# Patient Record
Sex: FEMALE | Race: BLACK OR AFRICAN AMERICAN | NOT HISPANIC OR LATINO | Employment: UNEMPLOYED | URBAN - METROPOLITAN AREA
[De-identification: names, ages, dates, MRNs, and addresses within clinical notes are randomized per-mention and may not be internally consistent; named-entity substitution may affect disease eponyms.]

---

## 2018-08-26 ENCOUNTER — HOSPITAL ENCOUNTER (EMERGENCY)
Facility: CLINIC | Age: 33
Discharge: HOME | End: 2018-08-26
Attending: PSYCHIATRY & NEUROLOGY | Admitting: PSYCHIATRY & NEUROLOGY
Payer: COMMERCIAL

## 2018-08-26 VITALS
HEART RATE: 70 BPM | DIASTOLIC BLOOD PRESSURE: 75 MMHG | OXYGEN SATURATION: 99 % | RESPIRATION RATE: 16 BRPM | TEMPERATURE: 97 F | SYSTOLIC BLOOD PRESSURE: 125 MMHG | WEIGHT: 107 LBS

## 2018-08-26 DIAGNOSIS — F41.1 GAD (GENERALIZED ANXIETY DISORDER): ICD-10-CM

## 2018-08-26 PROCEDURE — 99285 EMERGENCY DEPT VISIT HI MDM: CPT | Performed by: PSYCHIATRY & NEUROLOGY

## 2018-08-26 PROCEDURE — 25000132 ZZH RX MED GY IP 250 OP 250 PS 637: Performed by: PSYCHIATRY & NEUROLOGY

## 2018-08-26 PROCEDURE — 99283 EMERGENCY DEPT VISIT LOW MDM: CPT | Mod: Z6 | Performed by: PSYCHIATRY & NEUROLOGY

## 2018-08-26 RX ORDER — HYDROXYZINE HYDROCHLORIDE 25 MG/1
25 TABLET, FILM COATED ORAL EVERY 8 HOURS PRN
Qty: 50 TABLET | Refills: 0 | Status: ON HOLD | OUTPATIENT
Start: 2018-08-26 | End: 2019-04-25

## 2018-08-26 RX ORDER — HYDROXYZINE HYDROCHLORIDE 25 MG/1
50 TABLET, FILM COATED ORAL ONCE
Status: DISCONTINUED | OUTPATIENT
Start: 2018-08-26 | End: 2018-08-26

## 2018-08-26 RX ORDER — MIRTAZAPINE 15 MG/1
15 TABLET, FILM COATED ORAL AT BEDTIME
Qty: 30 TABLET | Refills: 0 | Status: ON HOLD | OUTPATIENT
Start: 2018-08-26 | End: 2019-04-25

## 2018-08-26 RX ORDER — HYDROXYZINE HYDROCHLORIDE 50 MG/1
50 TABLET, FILM COATED ORAL ONCE
Status: COMPLETED | OUTPATIENT
Start: 2018-08-26 | End: 2018-08-26

## 2018-08-26 RX ADMIN — HYDROXYZINE HYDROCHLORIDE 50 MG: 50 TABLET, FILM COATED ORAL at 23:18

## 2018-08-26 ASSESSMENT — ENCOUNTER SYMPTOMS
HEMATOLOGIC/LYMPHATIC NEGATIVE: 1
ACTIVITY CHANGE: 1
DYSPHORIC MOOD: 0
SLEEP DISTURBANCE: 1
NEUROLOGICAL NEGATIVE: 1
EYES NEGATIVE: 1
ENDOCRINE NEGATIVE: 1
RESPIRATORY NEGATIVE: 1
NERVOUS/ANXIOUS: 1
APPETITE CHANGE: 1
DECREASED CONCENTRATION: 1
GASTROINTESTINAL NEGATIVE: 1
MUSCULOSKELETAL NEGATIVE: 1
CARDIOVASCULAR NEGATIVE: 1
HALLUCINATIONS: 0

## 2018-08-26 NOTE — ED AVS SNAPSHOT
[facility], Emergency Department    [address]    [facility] [address]    Phone:  [phone]    Fax:  [phone]                                       Kimmy Mendez   MRN: [id number]    Department:  [facility], Emergency Department   Date of Visit:  8/26/2018           Patient Information     Date Of Birth          1985        Your diagnoses for this visit were:     ROWAN (generalized anxiety disorder)        You were seen by Magno Collier MD.      Follow-up Information     Follow up with Clinic, [facility].    Contact information:    [address] [facility] [id number] [phone]          Discharge Instructions       Stop Zoloft as it causes side effects. Stop trazodone as it is not helping you sleep  Start Remeron to help with anxiety, improving appetite and help with sleeping  Add hydroxyzine (Vistaril) to manage anxiety until Remeron takes effect. It will also help with reducing anxiety, help with sleep and reduce nausea    Follow-up at [facility] for continued med management and refills. They can help you with therapy.  Consider reconnecting with your previous therapist to work on healthy coping and resilience    24 Hour Appointment Hotline       To make an appointment at any [facility] clinic, call [phone] ([phone]). If you don't have a family doctor or clinic, we will help you find one. [facility] clinics are conveniently located to serve the needs of you and your family.             Review of your medicines      START taking        Dose / Directions Last dose taken    hydrOXYzine 25 MG tablet   Commonly known as:  ATARAX   Dose:  25 mg   Quantity:  50 tablet        Take 1 tablet (25 mg) by mouth every 8 hours as needed for anxiety   Refills:  0        mirtazapine 15 MG tablet   Commonly known as:  REMERON   Dose:  15 mg   Quantity:  30 tablet        Take 1 tablet (15 mg) by mouth At Bedtime   Refills:  0          Our records show that you are  "taking the medicines listed below. If these are incorrect, please call your family doctor or clinic.        Dose / Directions Last dose taken    prenatal multivitamin plus iron 27-0.8 MG Tabs per tablet   Dose:  1 tablet   Indication:  Pregnancy        Take 1 tablet by mouth daily   Refills:  0          STOP taking        Dose Reason for stopping Comments    TRAZODONE HCL PO              ZOLOFT PO                      Prescriptions were sent or printed at these locations (2 Prescriptions)                   Other Prescriptions                Printed at Department/Unit printer (2 of 2)         hydrOXYzine (ATARAX) 25 MG tablet               mirtazapine (REMERON) 15 MG tablet                Orders Needing Specimen Collection     None      Pending Results     No orders found from 8/24/2018 to 8/27/2018.            Pending Culture Results     No orders found from 8/24/2018 to 8/27/2018.            Pending Results Instructions     If you had any lab results that were not finalized at the time of your Discharge, you can call the ED Lab Result RN at [phone]. You will be contacted by this team for any positive Lab results or changes in treatment. The nurses are available 7 days a week from 10A to 6:30P.  You can leave a message 24 hours per day and they will return your call.        Thank you for choosing [facility]       Thank you for choosing [facility] for your care. Our goal is always to provide you with excellent care. Hearing back from our patients is one way we can continue to improve our services. Please take a few minutes to complete the written survey that you may receive in the mail after you visit with us. Thank you!        [facility] Information     [employer] lets you send messages to your doctor, view your test results, renew your prescriptions, schedule appointments and more. To sign up, go to [email or web address] . Click on \"Log in\" on the left side of the screen, which will take you to the Welcome page. Then " "click on \"Sign up Now\" on the right side of the page.     You will be asked to enter the access code listed below, as well as some personal information. Please follow the directions to create your username and password.     Your access code is: [email or web address]  Expires: 2018 11:22 PM     Your access code will  in 90 days. If you need help or a new code, please call your [facility] clinic or [phone].        Care EveryWhere ID     This is your Care EveryWhere ID. This could be used by other organizations to access your [facility] medical records  [id number]        Equal Access to Services     [facility] Center[facility] : Francisca Martínez, tawanda parikh, [address], maki santillan. So [facility] [phone].    ATENCIÓN: Si habla español, tiene a miner disposición servicios gratuitos de asistencia lingüística. Llame al [phone].    We comply with applicable federal civil rights laws and [place] laws. We do not discriminate on the basis of race, color, national origin, age, disability, sex, sexual orientation, or gender identity.            After Visit Summary       This is your record. Keep this with you and show to your community pharmacist(s) and doctor(s) at your next visit.                  "

## 2018-08-27 NOTE — ED PROVIDER NOTES
History     Chief Complaint   Patient presents with     Anxiety     reports having high levels of anxiety, feeling worried all the time, stressing over simple things that she doesn't need to stress over, crying a lot, emotionally labile, racing thoughts, not sleeping well, pacing     Patient is a 32 year old female presenting with nervous/anxious. The history is provided by the patient and medical records.   Ted Mendez is a 32 year old female who is here accompanied by older sister. Patient lives with her mother and 3 yo son. Sister is presently staying with them to help patient. She currently has a provider in [place] who started Zoloft and Trazodone for her mood concerns. Patient tried the Zoloft but started to feel nauseous and dizzy. She decided to stop taking it. She was also given trazodone to help with sleep but reports 100 mg is not helping her. She admits to excessive worry. She is stressed financially. She is not working because she does not feel she can function. Her  is in [place]. Patient reports worrying about the smallest things. She paces and does not feel she can sleep. Her appetite is poor. She is not suicidal. Patient has history of postpartum depression and was prescribed citalopram. She had seen a therapist at the time. Patient cannot see her provider presently as [place] is [place]. Sister has scheduled an appointment with [facility] in early September.    PERSONAL MEDICAL HISTORY  Past Medical History:   Diagnosis Date     Depressive disorder      PAST SURGICAL HISTORY  History reviewed. No pertinent surgical history.  FAMILY HISTORY  No family history on file.  SOCIAL HISTORY  Social History   Substance Use Topics     Smoking status: Never Smoker     Smokeless tobacco: Never Used     Alcohol use No     MEDICATIONS  No current facility-administered medications for this encounter.      Current Outpatient Prescriptions   Medication     hydrOXYzine (ATARAX) 25 MG tablet      mirtazapine (REMERON) 15 MG tablet     Prenatal Vit-Fe Fumarate-FA (PRENATAL MULTIVITAMIN  PLUS IRON) 27-0.8 MG TABS     ALLERGIES  No Known Allergies    I have reviewed the Medications, Allergies, Past Medical and Surgical History, and Social History in the Epic system.    Review of Systems   Constitutional: Positive for activity change and appetite change.   HENT: Negative.    Eyes: Negative.    Respiratory: Negative.    Cardiovascular: Negative.    Gastrointestinal: Negative.    Endocrine: Negative.    Genitourinary: Negative.    Musculoskeletal: Negative.    Skin: Negative.    Neurological: Negative.    Hematological: Negative.    Psychiatric/Behavioral: Positive for decreased concentration and sleep disturbance. Negative for dysphoric mood, hallucinations and suicidal ideas. The patient is nervous/anxious.    All other systems reviewed and are negative.      Physical Exam   BP: 110/71  Pulse: 73  Temp: 98.3  F (36.8  C)  Resp: 16  Weight: 48.5 kg (107 lb)  SpO2: 99 %      Physical Exam   Constitutional: She appears well-developed.   HENT:   Head: Normocephalic.   Eyes: Pupils are equal, round, and reactive to light.   Neck: Normal range of motion.   Pulmonary/Chest: Effort normal.   Musculoskeletal: Normal range of motion.   Neurological: She is alert.   Skin: Skin is warm.   Psychiatric: Her speech is normal. Judgment and thought content normal. Her mood appears anxious. She is not agitated, not aggressive, not hyperactive, not actively hallucinating and not combative. Thought content is not paranoid and not delusional. Cognition and memory are normal. She expresses no homicidal and no suicidal ideation. She is inattentive.   Nursing note and vitals reviewed.      ED Course     ED Course     Procedures    Labs Ordered and Resulted from Time of ED Arrival Up to the Time of Departure from the ED - No data to display         Assessments & Plan (with Medical Decision Making)   Patient with RWOAN who feels it is  impairing her ability to function. There is no imminent danger requiring urgent intervention. Patient is started on Remeron to manage her mood, poor appetite and sleep. She can take hydroxyzine for additional benefit and as a bridge until Remeron takes effect. Patient can stop taking Zoloft and trazodone as they appear to cause more side effects or is not working. She is encouraged to seek therapy either with her previous therapist or services provided through [facility]. She is encouraged to follow-up at [facility] on 8/6/18 for continued management.    I have reviewed the nursing notes.    I have reviewed the findings, diagnosis, plan and need for follow up with the patient.    New Prescriptions    HYDROXYZINE (ATARAX) 25 MG TABLET    Take 1 tablet (25 mg) by mouth every 8 hours as needed for anxiety    MIRTAZAPINE (REMERON) 15 MG TABLET    Take 1 tablet (15 mg) by mouth At Bedtime       Final diagnoses:   ROWAN (generalized anxiety disorder)       8/26/2018   [facility], [place], EMERGENCY DEPARTMENT     Magno Collier MD  08/26/18 7991

## 2018-08-27 NOTE — DISCHARGE INSTRUCTIONS
Stop Zoloft as it causes side effects. Stop trazodone as it is not helping you sleep  Start Remeron to help with anxiety, improving appetite and help with sleeping  Add hydroxyzine (Vistaril) to manage anxiety until Remeron takes effect. It will also help with reducing anxiety, help with sleep and reduce nausea    Follow-up at [facility] for continued med management and refills. They can help you with therapy.  Consider reconnecting with your previous therapist to work on healthy coping and resilience

## 2019-03-13 ENCOUNTER — TELEPHONE (OUTPATIENT)
Dept: PSYCHIATRY | Facility: CLINIC | Age: 34
End: 2019-03-13

## 2019-03-13 NOTE — TELEPHONE ENCOUNTER
Social Work   Incoming/Outgoing Call  [facility]  Outgoing Call To: Andrea Oneal    Reason for Call:  Social Work Supports     Response/Plan:   tried calling Kimmy twice on Monday (3/11) and twice today.  The phone number does not work.        Will route to patient's current psychiatric provider(s) as an FYI.   Please call or EPIC message with any questions or concerns.    Sarah Quevedo,  Learner

## 2019-04-23 ENCOUNTER — TRANSFERRED RECORDS (OUTPATIENT)
Dept: HEALTH INFORMATION MANAGEMENT | Facility: CLINIC | Age: 34
End: 2019-04-23

## 2019-04-24 ENCOUNTER — HOSPITAL ENCOUNTER (INPATIENT)
Facility: HOSPITAL | Age: 34
LOS: 6 days | Discharge: HOME | End: 2019-04-30
Attending: PSYCHIATRY & NEUROLOGY | Admitting: PSYCHIATRY & NEUROLOGY
Payer: COMMERCIAL

## 2019-04-24 ENCOUNTER — TRANSFERRED RECORDS (OUTPATIENT)
Dept: HEALTH INFORMATION MANAGEMENT | Facility: CLINIC | Age: 34
End: 2019-04-24

## 2019-04-24 DIAGNOSIS — R45.851 DEPRESSION WITH SUICIDAL IDEATION: ICD-10-CM

## 2019-04-24 DIAGNOSIS — E03.9 HYPOTHYROIDISM, UNSPECIFIED TYPE: Primary | ICD-10-CM

## 2019-04-24 DIAGNOSIS — F32.A DEPRESSION WITH SUICIDAL IDEATION: ICD-10-CM

## 2019-04-24 PROCEDURE — 12400000 ZZH R&B MH

## 2019-04-24 PROCEDURE — 25000132 ZZH RX MED GY IP 250 OP 250 PS 637: Performed by: NURSE PRACTITIONER

## 2019-04-24 RX ORDER — ALUMINA, MAGNESIA, AND SIMETHICONE 2400; 2400; 240 MG/30ML; MG/30ML; MG/30ML
30 SUSPENSION ORAL EVERY 4 HOURS PRN
Status: DISCONTINUED | OUTPATIENT
Start: 2019-04-24 | End: 2019-04-30 | Stop reason: HOSPADM

## 2019-04-24 RX ORDER — OLANZAPINE 10 MG/1
10 TABLET ORAL 3 TIMES DAILY PRN
Status: DISCONTINUED | OUTPATIENT
Start: 2019-04-24 | End: 2019-04-26

## 2019-04-24 RX ORDER — HYDROXYZINE HYDROCHLORIDE 25 MG/1
25-50 TABLET, FILM COATED ORAL EVERY 4 HOURS PRN
Status: DISCONTINUED | OUTPATIENT
Start: 2019-04-24 | End: 2019-04-30 | Stop reason: HOSPADM

## 2019-04-24 RX ORDER — OLANZAPINE 10 MG/2ML
10 INJECTION, POWDER, FOR SOLUTION INTRAMUSCULAR 3 TIMES DAILY PRN
Status: DISCONTINUED | OUTPATIENT
Start: 2019-04-24 | End: 2019-04-26

## 2019-04-24 RX ORDER — TRAZODONE HYDROCHLORIDE 50 MG/1
50 TABLET, FILM COATED ORAL
Status: DISCONTINUED | OUTPATIENT
Start: 2019-04-24 | End: 2019-04-26

## 2019-04-24 RX ORDER — ACETAMINOPHEN 325 MG/1
650 TABLET ORAL EVERY 4 HOURS PRN
Status: DISCONTINUED | OUTPATIENT
Start: 2019-04-24 | End: 2019-04-30 | Stop reason: HOSPADM

## 2019-04-24 RX ADMIN — TRAZODONE HYDROCHLORIDE 50 MG: 50 TABLET ORAL at 23:22

## 2019-04-24 ASSESSMENT — ACTIVITIES OF DAILY LIVING (ADL)
BATHING: 0-->INDEPENDENT
RETIRED_COMMUNICATION: 2-->DIFFICULTY UNDERSTANDING (NOT RELATED TO LANGUAGE BARRIER)
DRESS: 0-->INDEPENDENT
TOILETING: 0-->INDEPENDENT
COGNITION: 1 - ATTENTION OR MEMORY DEFICITS
AMBULATION: 0-->INDEPENDENT
FALL_HISTORY_WITHIN_LAST_SIX_MONTHS: NO
RETIRED_EATING: 0-->INDEPENDENT
SWALLOWING: 2-->DIFFICULTY SWALLOWING FOODS
TRANSFERRING: 0-->INDEPENDENT
WHICH_OF_THE_ABOVE_FUNCTIONAL_RISKS_HAD_A_RECENT_ONSET_OR_CHANGE?: COGNITION

## 2019-04-24 ASSESSMENT — MIFFLIN-ST. JEOR: SCORE: 1169.91

## 2019-04-25 LAB
ALBUMIN UR-MCNC: NEGATIVE MG/DL
APPEARANCE UR: CLEAR
BILIRUB UR QL STRIP: NEGATIVE
COLOR UR AUTO: ABNORMAL
GLUCOSE UR STRIP-MCNC: NEGATIVE MG/DL
HGB UR QL STRIP: NEGATIVE
KETONES UR STRIP-MCNC: 10 MG/DL
LEUKOCYTE ESTERASE UR QL STRIP: NEGATIVE
NITRATE UR QL: NEGATIVE
PH UR STRIP: 6.5 PH (ref 4.7–8)
SOURCE: ABNORMAL
SP GR UR STRIP: 1 (ref 1–1.03)
UROBILINOGEN UR STRIP-MCNC: NORMAL MG/DL (ref 0–2)

## 2019-04-25 PROCEDURE — 25000132 ZZH RX MED GY IP 250 OP 250 PS 637: Performed by: NURSE PRACTITIONER

## 2019-04-25 PROCEDURE — 99223 1ST HOSP IP/OBS HIGH 75: CPT | Performed by: NURSE PRACTITIONER

## 2019-04-25 PROCEDURE — 81003 URINALYSIS AUTO W/O SCOPE: CPT | Performed by: NURSE PRACTITIONER

## 2019-04-25 PROCEDURE — 12400000 ZZH R&B MH

## 2019-04-25 RX ORDER — LAMOTRIGINE 25 MG/1
25 TABLET ORAL 2 TIMES DAILY
Status: DISCONTINUED | OUTPATIENT
Start: 2019-04-25 | End: 2019-04-25

## 2019-04-25 RX ORDER — HYDROXYZINE HYDROCHLORIDE 25 MG/1
25 TABLET, FILM COATED ORAL EVERY 8 HOURS PRN
Status: ON HOLD | COMMUNITY
End: 2019-04-25

## 2019-04-25 RX ORDER — LAMOTRIGINE 25 MG/1
25 TABLET ORAL DAILY
Status: DISCONTINUED | OUTPATIENT
Start: 2019-04-25 | End: 2019-04-28

## 2019-04-25 RX ORDER — LORAZEPAM 0.5 MG/1
0.5 TABLET ORAL 3 TIMES DAILY PRN
Status: ON HOLD | COMMUNITY
End: 2019-04-25

## 2019-04-25 RX ORDER — SERTRALINE HYDROCHLORIDE 25 MG/1
25 TABLET, FILM COATED ORAL DAILY
Status: ON HOLD | COMMUNITY
End: 2019-04-25

## 2019-04-25 RX ORDER — LEVOTHYROXINE SODIUM 25 UG/1
25 TABLET ORAL EVERY MORNING
Status: ON HOLD | COMMUNITY
End: 2019-04-30

## 2019-04-25 RX ORDER — MIRTAZAPINE 15 MG/1
15 TABLET, FILM COATED ORAL AT BEDTIME
Status: DISCONTINUED | OUTPATIENT
Start: 2019-04-25 | End: 2019-04-28

## 2019-04-25 RX ORDER — CHLORAL HYDRATE 500 MG
1000 CAPSULE ORAL DAILY
Status: DISCONTINUED | OUTPATIENT
Start: 2019-04-25 | End: 2019-04-28

## 2019-04-25 RX ORDER — LEVOTHYROXINE SODIUM 25 UG/1
25 TABLET ORAL EVERY MORNING
Status: DISCONTINUED | OUTPATIENT
Start: 2019-04-25 | End: 2019-04-30 | Stop reason: HOSPADM

## 2019-04-25 RX ORDER — MIRTAZAPINE 15 MG/1
30 TABLET, FILM COATED ORAL AT BEDTIME
Status: ON HOLD | COMMUNITY
End: 2019-04-30

## 2019-04-25 RX ORDER — LORAZEPAM 0.5 MG/1
0.5 TABLET ORAL 3 TIMES DAILY PRN
Status: DISCONTINUED | OUTPATIENT
Start: 2019-04-25 | End: 2019-04-26

## 2019-04-25 RX ORDER — LAMOTRIGINE 25 MG/1
25 TABLET ORAL 2 TIMES DAILY
Status: ON HOLD | COMMUNITY
End: 2019-04-30

## 2019-04-25 RX ORDER — TRAZODONE HYDROCHLORIDE 100 MG/1
100 TABLET ORAL AT BEDTIME
Status: ON HOLD | COMMUNITY
End: 2019-04-25

## 2019-04-25 RX ADMIN — LAMOTRIGINE 25 MG: 25 TABLET ORAL at 20:05

## 2019-04-25 RX ADMIN — Medication 1000 MG: at 15:11

## 2019-04-25 RX ADMIN — OLANZAPINE 10 MG: 10 TABLET, FILM COATED ORAL at 19:24

## 2019-04-25 RX ADMIN — LEVOTHYROXINE SODIUM 25 MCG: 25 TABLET ORAL at 13:45

## 2019-04-25 RX ADMIN — MIRTAZAPINE 15 MG: 15 TABLET, FILM COATED ORAL at 20:05

## 2019-04-25 ASSESSMENT — ACTIVITIES OF DAILY LIVING (ADL)
LAUNDRY: UNABLE TO COMPLETE
ORAL_HYGIENE: INDEPENDENT
DRESS: INDEPENDENT;SCRUBS (BEHAVIORAL HEALTH)
HYGIENE/GROOMING: INDEPENDENT
HYGIENE/GROOMING: INDEPENDENT
ORAL_HYGIENE: INDEPENDENT
DRESS: INDEPENDENT
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
## 2019-04-25 NOTE — PLAN OF CARE
"  Problem: Adult Behavioral Health Plan of Care  Goal: Patient-Specific Goal (Individualization)  Description  Patient will participate in 50% of groups  Patient will consume 50% of meals  Patient will sleep 6- 8  hours a night  Patient will be compliant with treatment team recommendations   Note:   Patient has only been asleep for 1.5 hours, but resting in bed the rest of shift , and may have napped between rounds. Declined prn when offered. Both tylenol for back discomfort, and a medication to help sleep. \" I would rather not take any extra pills if I don't have to because of my kidneys\" . Writer attempted to gather more information, but difficult. Accepted an ice pack prn for back discomfort. Earlier in shift had leg cramp in calf which resolved.       Problem: Suicide Risk  Goal: Absence of Self-Harm  Description  Patient will be free of self harm during hospitalization.  Patient will report decrease in suicidal ideation prior to discharge   Note:   Patient has reported no difficulty coping or suicidal ideation to this writer this evening. Has bell to ring for staff and not wanting to come out of room as no hijab. Will have treatment team review Jewish considerations in am.     "

## 2019-04-25 NOTE — PLAN OF CARE
Patient asking roommate for items instead of using bell to call staff. Patient has been reminded to use bell. Is not eggressing room due to lack of Taoist garment. Declined referral for .
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
## 2019-04-25 NOTE — PROGRESS NOTES
04/24/19 2141   Patient Belongings   Belongings Search No belongings   Clothing Search Yes   Second Staff Gisele   Comment black head scarf, pink underwear   List items sent to safe: none  All other belongings put in assigned cubby in belongings room.     I have reviewed my belongings list on admission and verify that it is correct.     Patient signature_______________________________    Second staff witness (if patient unable to sign) ______________________________       I have received all my belongings at discharge.    Patient signature________________________________    Johnathan EDMONDSON   4/24/2019  9:47 PM

## 2019-04-25 NOTE — PLAN OF CARE
Face to face end of shift report received from Mami Noyola completed. Patient observed.     Michelle Hernandez  4/24/2019  11:50 PM

## 2019-04-25 NOTE — PLAN OF CARE
Face to face end of shift report received from Lazarus JONES RN. Rounding completed. Patient observed in room.     Jacki Zapata  4/25/2019  4:40 PM

## 2019-04-25 NOTE — PLAN OF CARE
Received a call from [facility] 8th floor [phone]. Charge RN had said that they cannot locate the hold paperwork and feel it may have been cancelled when she transferred to us. Charge nurse will continue to attempt to locate and fax to us. Patient had verbalized she had a sore right leg, which while assessing appears to have been a muscle cramp back of calf which has resolved.
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
## 2019-04-25 NOTE — PLAN OF CARE
Face to face end of shift report received from Michelle HAYES RN. Rounding completed. Patient observed.     Lazarus Hart  4/25/2019  7:53 AM

## 2019-04-25 NOTE — PLAN OF CARE
"  Problem: Adult Behavioral Health Plan of Care  Goal: Patient-Specific Goal (Individualization)  Description  Patient will participate in 50% of groups  Patient will consume 50% of meals  Patient will sleep 6- 8  hours a night  Patient will be compliant with treatment team recommendations   4/25/2019 1035 by Lazarus Hart RN  Outcome: No Change   Pt has been in her room this AM resting in bed. Pt reported that she is just \"to tired\" to eat food and would just like to drink things. Pt was given a oatmeal this AM and did eat this. Pt appears to be confused at times and was unable to report to writer what pharmacy she uses to get her prescriptions. She talked with writer for some time about praying for her son and her mother. She said that people should take care of their mothers and not send them\"away.\" Pt has not had her garment that is worn on her head at this time due to pt making passive SI statements of wanting to be in heaven with her son. This will be discussed in treatment team this AM.     Pt was given her head garment. Pt took this in her heads, brought it to her face and then gave it back to staff stating that is all she wanted it to do and that she did not need it any longer. Writer sat with pt again and talked for some time. Pt's main focus is Religious based and forgiveness. She said that she always is wanting to die to be with her son but that she would never act on this because she does not want to go to hell. When attempting to ask further questions such as anxiety she said that she is tired of being asked stupid questions and that no one understands what she is going through.     Informed pt of orders for urine collection. Pt verbalized understanding and stated that she will notify staff when she needed to void.      Problem: Suicide Risk  Goal: Absence of Self-Harm  Description  Patient will be free of self harm during hospitalization.  Patient will report decrease in suicidal ideation prior to " discharge   4/25/2019 1035 by Lazarus Hart, RN  Outcome: No Change   Pt has not had any acts of self harm at this time.

## 2019-04-25 NOTE — H&P
"Psychiatric Eval/H&P  Patient Name: Kimmy Mendez   YOB: 1985  Age: 33 year old  [id number]    Primary Physician: [facility], [place]   Completed By: EMMANUEL Goldstein CNP     CC:  Suicidal Ideation    HPI  (per admit/ED) Hx of ROWAN, depression, and post partum depression. Pt presented to ED on 4/23 reporting intense grief; pt's son was reportedly murdered by pt's mother 8 days ago by choking. Pt is reporting \"strong suicidal thoughts\" with no specific plan, prior to her son's murder, pt states her son and her evy always prevented her from acting on her SI; pt unable to safety plan. Pt reports she has had a depressed mood for the past year and has tried several psych meds with minimal success; most recent change was lowered dose of Lorazepam and started on new med but pt doesn't know what it is called. No hx of SIB. No hx of IP mental health admits.       Patient is not very receptive to interview initially, she states \"I wish people would stop asking me stupid questions\" - so the rest of the interview, she provided what information she wanted to talk about, mainly regarding her Moravian and forgiveness.  Additional information in the report was gathered from chart review.    She tells me she has suicidal thoughts \"every second and minute\" - however she follows up with she would not act on this because she would go to [facility] and she wants to go to heaven with her son.  She informs me that her Moravian protects her from suicide.  Her Moravian is Druze, Allah is God.  Patient informs me she was in [place] recently and she left her son with her mother.  She reports her mother has been very ill all her life, \"mentally\".  She states \"I wish I could describe how I feel\".  Patient states \"my son is not lost, he is in heaven and in my heart\".  She states several times \"I am doing this for my mom\", \"I love my mother and forgive her\".  When asked how I can help, she responds \"pray for me\" and " "\"give me the medications you think is best\".  She is thankful for my sitting with her and only asks that I pray for her.    Past Psychiatric History:   Outpatient  visits starting in  with post partum depression after the birth of her son, prescribed Celexa.  Was prescribed Zoloft (which made her dizzy and nauseous) and trazodone (not helpful), most recently prescribed mirtazapine and hydroxyzine.  Patient has no history of suicide attempts or hospitalizations due to mental health.  She had some psychotherapy in 2015, none currently.  She was seeing Dr. Jacques Horan from [facility] in [place].    Social History:   Continues to remain unclear regarding the events and where patient was living at the time of death of her son.  Understanding review of records indicate she was in possibly living in [place], left her son with her mother and he  there.  Patient has a sister and brother in [place], a cousin and Aunt in [place].  Patient's  is in [place].  Patient is originally from [facility], is unemployed currently.  Hindu Zoroastrianism.    Chemical Use History:   No history of any substance use.       Family Psychiatric History:   Mother with serious mental illness and multiple hospitalizations.       Medical History and ROS  Current Facility-Administered Medications   Medication     acetaminophen (TYLENOL) tablet 650 mg     alum & mag hydroxide-simethicone (MYLANTA ES/MAALOX  ES) suspension 30 mL     Fish Oil CAPS 500 mg     hydrOXYzine (ATARAX) tablet 25-50 mg     lamoTRIgine (LaMICtal) tablet 25 mg     levothyroxine (SYNTHROID/LEVOTHROID) tablet 25 mcg     LORazepam (ATIVAN) tablet 0.5 mg     magnesium hydroxide (MILK OF MAGNESIA) suspension 30 mL     mirtazapine (REMERON) tablet 15 mg     nicotine (NICORETTE) gum 2-4 mg     OLANZapine (zyPREXA) tablet 10 mg    Or     OLANZapine (zyPREXA) injection 10 mg     traZODone (DESYREL) tablet 50 mg       No Known Allergies  Past Medical " "History:   Diagnosis Date     Depressive disorder      No past surgical history on file.    Physical Exam  Constitutional: oriented to person, place, and time, appears well-developed and well-nourished.   HENT: WNL  Neck: Normal range of motion   Cardiovascular: Normal rate, regular rhythm, normal heart sounds   Pulmonary/Chest: Effort normal and breath sounds normal   Abdominal: WNL  Skin: Dry, intact, no open areas, rashes, moles of concern    Review of Systems:  Constitution: No weight loss, fever, night sweats  Skin: No rashes, pruritus or open wounds  Neuro: No headaches or seizure activity.  Psych:  See HPI  Eyes: No vision changes.  ENT: No problems chewing or swallowing.   Musculoskeletal: No muscle pain, joint pain or swelling   Respiratory: No cough or dyspnea  Cardiovascular:  No chest pain,  palpitations or fainting  Gastrointestinal:  No abdominal pain, nausea, vomiting or change in bowel habits    MSE/PSYCH  PSYCHIATRIC EXAM  /89   Pulse 86   Temp 97.9  F (36.6  C) (Tympanic)   Resp 16   Ht 1.626 m (5' 4\")   Wt 48 kg (105 lb 12.8 oz)   SpO2 98%   BMI 18.16 kg/m       -Appearance/Behavior: Distressed  {attitude:anxious and guarded  -Motor: fidgety.  -Gait: Normal.    -Abnormal involuntary movements: None noted  -Mood: depressed, anxious and labile.  -Affect: Dysphoric and Labile.  -Speech: Pressured                  -Thought process/associations: Loose associations.  -Thought content: unclear delusion present, tangential.  -Perceptual disturbances: No hallucinations..              -Suicidal/Homicidal Ideation: Endorses suicidal thoughts, denies plan or intent  -Judgment: Limited.  -Insight: Limited.  *Orientation: time, place and person.  *Memory: Intact, however possibly in denial or shock   *Attention: Poor  *Language: fluent, no aphasias, able to repeat phrases and name objects. Vocab intact.  *Fund of information: appropriate for education  *Cognitive functioning estimate: 0 - " "independent.     Labs:   Low Potassium 4/23 - resolved with replacement now WNL     Assessment/Impression:   Patient presents as labile and tangential, appears to possibly be in shock and/or denial regarding the death of her son.  She endorses daily, \"every second and minute\" suicidal thoughts, however her Buddhist is the main protective factor.  She also talks a lot about forgiveness, in forgiving her mother and when asking more questions regarding this, patient avoids answering of any details surrounding her son and her mother.  Patient has history of depression and anxiety prior to this traumatic event, has been prescribed several different medications.  She is agreeable to continue mirtazapine at hs for mood/sleep, Ativan or Hydroxyzine for anxiety, and Lamictal to further stabilize mood.  Patient is now on a 72 hour hold and will likely file a petition for commitment to ensure a safe discharge plan, possibly higher level of care.      Educated regarding medication indications, risks, benefits, side effects, contraindications and possible interactions. Verbally expressed understanding.     DX:  Major Depressive Disorder, recurrent, severe without psychosis  Bereavement, severe  ROWAN      Plan:  Admit to Unit: [address] [place] [address]  72 hour mental health hold    Monitor for target symptoms:   Provide a safe environment and therapeutic milieu.     Continue mirtazapine and lamictal  Ativan for anxiety    Anticipated length of stay:  >5 days for stabilization and safety       Sammie Shields APRN, CNP  "

## 2019-04-25 NOTE — PROGRESS NOTES
04/24/19 2123   Patient Belongings   Patient Belongings none   Belongings Search No belongings   Clothing Search Yes   Second Staff Gisele   Comment black head scarf, pink underwear   .behb  Nothing to safe

## 2019-04-26 PROCEDURE — 99233 SBSQ HOSP IP/OBS HIGH 50: CPT | Performed by: NURSE PRACTITIONER

## 2019-04-26 PROCEDURE — 12400000 ZZH R&B MH

## 2019-04-26 PROCEDURE — 25000132 ZZH RX MED GY IP 250 OP 250 PS 637: Performed by: NURSE PRACTITIONER

## 2019-04-26 RX ORDER — OLANZAPINE 5 MG/1
5 TABLET ORAL AT BEDTIME
Status: DISCONTINUED | OUTPATIENT
Start: 2019-04-26 | End: 2019-04-30 | Stop reason: HOSPADM

## 2019-04-26 RX ORDER — OLANZAPINE 10 MG/2ML
5 INJECTION, POWDER, FOR SOLUTION INTRAMUSCULAR 2 TIMES DAILY PRN
Status: DISCONTINUED | OUTPATIENT
Start: 2019-04-26 | End: 2019-04-30 | Stop reason: HOSPADM

## 2019-04-26 RX ORDER — OLANZAPINE 5 MG/1
5 TABLET ORAL 2 TIMES DAILY PRN
Status: DISCONTINUED | OUTPATIENT
Start: 2019-04-26 | End: 2019-04-30 | Stop reason: HOSPADM

## 2019-04-26 RX ORDER — OLANZAPINE 10 MG/1
10 TABLET ORAL AT BEDTIME
Status: DISCONTINUED | OUTPATIENT
Start: 2019-04-26 | End: 2019-04-26

## 2019-04-26 RX ADMIN — Medication 1000 MG: at 08:26

## 2019-04-26 RX ADMIN — LAMOTRIGINE 25 MG: 25 TABLET ORAL at 21:33

## 2019-04-26 RX ADMIN — OLANZAPINE 5 MG: 5 TABLET, FILM COATED ORAL at 21:32

## 2019-04-26 RX ADMIN — MIRTAZAPINE 15 MG: 15 TABLET, FILM COATED ORAL at 21:32

## 2019-04-26 RX ADMIN — LEVOTHYROXINE SODIUM 25 MCG: 25 TABLET ORAL at 06:36

## 2019-04-26 ASSESSMENT — ACTIVITIES OF DAILY LIVING (ADL)
HYGIENE/GROOMING: INDEPENDENT
DRESS: INDEPENDENT
ORAL_HYGIENE: INDEPENDENT
LAUNDRY: UNABLE TO COMPLETE
ORAL_HYGIENE: INDEPENDENT
HYGIENE/GROOMING: INDEPENDENT
DRESS: INDEPENDENT

## 2019-04-26 NOTE — PLAN OF CARE
Face to face end of shift report received from Michelle HAYES RN. Rounding completed. Patient observed.     Hanna Smith  4/26/2019  10:45 AM

## 2019-04-26 NOTE — PLAN OF CARE
Face to face end of shift report received from Hanna Dunham. Rounding completed. Patient observed in room.    Salome Boyd  4/26/2019  5:19 PM

## 2019-04-26 NOTE — PLAN OF CARE
"  Problem: Adult Behavioral Health Plan of Care  Goal: Patient-Specific Goal (Individualization)  Description  Patient will participate in 50% of groups  Patient will consume 50% of meals  Patient will sleep 6- 8  hours a night  Patient will be compliant with treatment team recommendations   Patient may have head garment upon request when higinio for safety. If patient requests head garment and is unable to contract for safety, a 1:1 staff must be in place.    4/26/2019 1230 by Hanna Smith RN  Outcome: Improving   Patient presents lethargic upon initial assessment. Patient states she has not been able to eat. She states she would like to but has not had the appetite. Patient denies pain. Patient states her depression is very high. She denies HI/SI, hallucinations and anxiety. Patient seems to be in brighter spirits and does smile at times during conversation. Patient states that she became upset with a previous nurse when they asked her if she had lost her son. Patient states \" I will never lose my son, he is in my heart and he is in heaven.\" Patient states that she has always done everything to protect her son and that's why she had her sister and mom watch him so that she could deal with her depression. Patient states \" I love my mom, I have taken care of her since I was 12.\" Patient states she had 6 siblings. However, 3 of them passed away. Patient states her family has been through so much back home in [place] during the civil war that now her family is struggling. Patient states her mom has dealt with mental illness for a very long time. When the patient was asked if her mom was the one who hurt her son she became upset and stated, no my mother would never do that. Ala took him to heaven. He is the only one who can take you off this earth. Patient continues to state she loves her mom. Patient also fixates on taking care of parents in their end stages of life. Patient often recites verses from the Quran " "stating \"Take care of your Mama, for flora is under her feet.\" Patient states she was appalled by how people in [place] treat their parents when they put them in nursing homes. Patient seems insightful but also in extreme denial about the situation with her son. Patient did eat breakfast and was smiling and laughing with this nurse. Patient took prescribed medications.      Problem: Suicide Risk  Goal: Absence of Self-Harm  Description  Patient will be free of self harm during hospitalization.  Patient will report decrease in suicidal ideation prior to discharge   4/26/2019 1230 by Hanna Smith, RN  Outcome: Improving   Patient has remained free of self harm and states when she has thoughts of suicide she prays to Ala. She states she would never kill herself because she wants to go to heaven with her son.       "

## 2019-04-26 NOTE — PROGRESS NOTES
"[facility]  Psychiatric Progress Note      Impression:   (per admit/ED) Hx of ROWAN, depression, and post partum depression. Pt presented to ED on  reporting intense grief; pt's son was reportedly murdered by pt's mother 8 days ago by choking. Pt is reporting \"strong suicidal thoughts\" with no specific plan, prior to her son's murder, pt states her son and her evy always prevented her from acting on her SI; pt unable to safety plan. Pt reports she has had a depressed mood for the past year and has tried several psych meds with minimal success; most recent change was lowered dose of Lorazepam and started on new med but pt doesn't know what it is called. No hx of SIB. No hx of IP mental health admits.      Patient is sitting up in her room, brighter affect this morning.  She tells me she got medicine yesterday and slept really good last night.  We discuss this as Zyprexa and explore if this would be beneficial scheduled at bedtime as well as used as needed during the day.  Educate patient regarding class, indications, risks, benefits, side effects, and patient acknowledges understanding, and states she feels she needs \"something\".  She informs me her provider prescribed Lamictal for her mood, as she describes \"goes up then down\", adds this was before the death of her son.  We also discuss anxiety as she has Lorazepam prn.  She tells me taking this or any other \"pams\" make her \"more crazy\" and she does not want to take them, also does not want to take anything addictive.  Patient then shares with me how her cousin and nephew apparently knew about her son's death and kept in from her for the day, afraid to tell her and what she would do.  She describes \"knowing in my heart something happened\", then eventually they sat her down and told her he .  Patient cries, this being the first grief reaction, emotion I have seen from her, and she relays how difficult this is.  She also shares stories of her life " "in [place], how they were poor but yet cooked food for \"everyone\" because as in her Protestant, \"we are all waiting here\" and \"everything we do is for Allah\".  Patient shares her irritation with staff here who \"ask stupid questions\" of her, she states \"I will not kill myself here or anywhere\" and she states \"I have not lost my son, he is in heaven and in my heart\".  Patient also understands the staff have a job to do, assessments, she states \"I know\".  Also, her mother has been mentally ill for as long as she can remember, her and her sister took care of her and she states her mother is in the hospital now getting help.      Patient reports she would like to stay until she feels stable, then plans to return to the [facility] and stay with her Cousin.  She expresses not wanting to go back to her home as this would be too difficult with all of her son's things still there.  She would like to donate his toys to the needy, although not sure she is ready to this, encourage to enlist help from cousin and nephew.             DIagnoses:   Major Depressive Disorder, recurrent, severe without psychosis  Bereavement, severe  ROWAN             Plan:     Schedule Zyprexa 5mg at hs  Utilize Zyprexa 5mg prn for agitation or anxiety  Continue Lamictal and Remeron for sleep    ELOS:  3-5 days for further stabilization and safe discharge planning      Attestation:  Patient has been seen and evaluated by me,  EMMANUEL Goldstein CNP          Interim History:   The patient's care was discussed with the treatment team and chart notes were reviewed.          Medications:     Current Facility-Administered Medications   Medication     acetaminophen (TYLENOL) tablet 650 mg     alum & mag hydroxide-simethicone (MYLANTA ES/MAALOX  ES) suspension 30 mL     fish oil-omega-3 fatty acids capsule 1,000 mg     hydrOXYzine (ATARAX) tablet 25-50 mg     lamoTRIgine (LaMICtal) tablet 25 mg     levothyroxine (SYNTHROID/LEVOTHROID) tablet 25 mcg     " "magnesium hydroxide (MILK OF MAGNESIA) suspension 30 mL     mirtazapine (REMERON) tablet 15 mg     nicotine (NICORETTE) gum 2-4 mg     OLANZapine (zyPREXA) tablet 5 mg    Or     OLANZapine (zyPREXA) injection 5 mg     OLANZapine (zyPREXA) tablet 5 mg            10 point ROS negative        Allergies:     Allergies   Allergen Reactions     Pork Derived Products Other (See Comments)     Pt does not ingest of these products.             Psychiatric Examination:   /77   Pulse 78   Temp 97.4  F (36.3  C) (Tympanic)   Resp 14   Ht 1.626 m (5' 4\")   Wt 48 kg (105 lb 12.8 oz)   SpO2 98%   BMI 18.16 kg/m    Weight is 105 lbs 12.8 oz  Body mass index is 18.16 kg/m .    Appearance:  awake, alert  Attitude:  cooperative  Eye Contact:  good  Mood:  anxious and better  Affect:  mood congruent  Speech:  clear, coherent and pressured speech  Psychomotor Behavior:  no evidence of tardive dyskinesia, dystonia, or tics  Thought Process:  tangental and however improving  Associations:  no loose associations  Thought Content:  no evidence of suicidal ideation or homicidal ideation and no evidence of psychotic thought  Insight:  fair  Judgment:  fair  Oriented to:  time, person, and place  Attention Span and Concentration:  fair  Recent and Remote Memory:  fair  Fund of Knowledge: appropriate  Muscle Strength and Tone: normal  Gait and Station: Normal           Labs:     Results for orders placed or performed during the hospital encounter of 04/24/19   UA reflex to Microscopic   Result Value Ref Range    Color Urine Straw     Appearance Urine Clear     Glucose Urine Negative NEG^Negative mg/dL    Bilirubin Urine Negative NEG^Negative    Ketones Urine 10 (A) NEG^Negative mg/dL    Specific Gravity Urine 1.003 1.003 - 1.035    Blood Urine Negative NEG^Negative    pH Urine 6.5 4.7 - 8.0 pH    Protein Albumin Urine Negative NEG^Negative mg/dL    Urobilinogen mg/dL Normal 0.0 - 2.0 mg/dL    Nitrite Urine Negative NEG^Negative    " Leukocyte Esterase Urine Negative NEG^Negative    Source Midstream Urine

## 2019-04-26 NOTE — PLAN OF CARE
"Patient accepted am medication, did prayer to Allah. Asked writer to pray for her and her family. Patient apologetic about breath. Writer informed that she can brush teeth if she would like. Writer told patient that she will have Ensure supplement. Patient stated that she has had that in past when pregnant, and knows she should eat but can't even feel she can chew. Declined offer to be out on unit and utilize Hindu headwear.\"I am just not ready\"  Pleasant, talkative, no delusional comments.   "

## 2019-04-26 NOTE — PLAN OF CARE
"  Problem: Adult Behavioral Health Plan of Care  Goal: Patient-Specific Goal (Individualization)  Description  Patient will participate in 50% of groups  Patient will consume 50% of meals  Patient will sleep 6- 8  hours a night  Patient will be compliant with treatment team recommendations   Patient may have head garment upon request when higinio for safety. If patient requests head garment and is unable to contract for safety, a 1:1 staff must be in place.      Pt has been isolative to her room.  UA was collected at the beginning of the shift.  Pt is hard to assess.  She gets frustrated with nursing assessment and doesn't always answer questions.   Pt refused to eat dinner, stating that the staff is trying to kill her and send her to hell.  Writer did attempt to ask pt what she liked to eat, pt would not answer.  Pt became upset with writer stating that she can see that writer is pregnant and it's not fair that writer is going to spend a lifetime with child while her child is dead.  Later she told writer that \"you are faking your pregnancy and you are going to hell.  You need to go home tonight and pray.\"  Pt has had poor boundaries with peers.  She has been calling her old roommate to her door and asking her to do things for her.  Pt also told that peer that she was going to hel.  Pt was not very receptive to redirection.  Writer offered PRN Zyprexa 10mg which she took at 1924.  Pt told writer \" I can see the devil in your eyes and I know you are tying to send me to hell.\"  When writer attempted to give pt her bed time medications she stated she was starving and that she was being fed here.  Writer attempted to offer pt food options which she declined.  Pt later did accept chicken broth and apple juice.  Pt was then cooperative with her HS medications.   Outcome: No Change     Problem: Suicide Risk  Goal: Absence of Self-Harm  Description  Patient will be free of self harm during hospitalization.  Patient " will report decrease in suicidal ideation prior to discharge     Pt states she want to die and go to heaven with her son but will not kill herself because she is scared of going to hell.     Outcome: No Change

## 2019-04-26 NOTE — PLAN OF CARE
BEHAVIORAL TEAM DISCUSSION    Participants:  Sammie Shields NP,  Berta Truong LSW,  Leidy Menezes LSW, Cassia Bolton RN, Shayla Morris RN,  Dianna Kraft RN, Juani Bernal OT, Sammie Bustamante OT.   Progress: Minimal  Continued Stay Criteria/Rationale: Remains delusional. Depression. Recent trauma. Making negative comments to pregnant staff.   Medical/Physical: I and O due to minimal intake.   Precautions:   Behavioral Orders   Procedures    Code 1 - Restrict to Unit    Routine Programming     As clinically indicated    Status 15     Every 15 minutes.     Plan: Zyprexa given for agitation. Possible schedule ativan. Filing for commitment. Ordered ensure due to limited intake.   Rationale for change in precautions or plan: none    Current Facility-Administered Medications:     acetaminophen (TYLENOL) tablet 650 mg, 650 mg, Oral, Q4H PRN, Tianna Waters, NP    alum & mag hydroxide-simethicone (MYLANTA ES/MAALOX  ES) suspension 30 mL, 30 mL, Oral, Q4H PRN, Tianna Waters, NP    fish oil-omega-3 fatty acids capsule 1,000 mg, 1,000 mg, Oral, Daily, Sammie Shields APRN CNP, 1,000 mg at 04/26/19 0826    hydrOXYzine (ATARAX) tablet 25-50 mg, 25-50 mg, Oral, Q4H PRN, Tianna Waters, NP    lamoTRIgine (LaMICtal) tablet 25 mg, 25 mg, Oral, Daily, Sammie Shields APRN CNP, 25 mg at 04/25/19 2005    levothyroxine (SYNTHROID/LEVOTHROID) tablet 25 mcg, 25 mcg, Oral, QAM, Sammie Shields APRN CNP, 25 mcg at 04/26/19 0636    LORazepam (ATIVAN) tablet 0.5 mg, 0.5 mg, Oral, TID PRN, Sammie Shields APRN CNP    magnesium hydroxide (MILK OF MAGNESIA) suspension 30 mL, 30 mL, Oral, At Bedtime PRN, Tianna Waters, NP    mirtazapine (REMERON) tablet 15 mg, 15 mg, Oral, At Bedtime, Sammie Shields APRN CNP, 15 mg at 04/25/19 2005    nicotine (NICORETTE) gum 2-4 mg, 2-4 mg, Buccal, Q1H PRN, Tianna Waters, NP    OLANZapine (zyPREXA) tablet 10 mg, 10 mg, Oral, TID PRN, 10 mg at  04/25/19 1924 **OR** OLANZapine (zyPREXA) injection 10 mg, 10 mg, Intramuscular, TID PRN, Tianna Waters NP    traZODone (DESYREL) tablet 50 mg, 50 mg, Oral, At Bedtime PRN, Tianna Waters NP, 50 mg at 04/24/19 7822   Active Problems:    Depression with suicidal ideation

## 2019-04-26 NOTE — PLAN OF CARE
Received call from Verena Torrez (listed as pt niece on DORIAN) who requested a callback from the provider at ph#[phone].. Sticky note sent

## 2019-04-26 NOTE — PLAN OF CARE
Problem: Adult Behavioral Health Plan of Care  Goal: Patient-Specific Goal (Individualization)  Description  Patient will participate in 50% of groups  Patient will consume 50% of meals  Patient will sleep 6- 8  hours a night  Patient will be compliant with treatment team recommendations   Patient may have head garment upon request when higinio for safety. If patient requests head garment and is unable to contract for safety, a 1:1 staff must be in place.    4/26/2019 0549 by Michelle Hernandez, RN  Note:   Patient has been sleeping for 5 hours . Patient has had no requests this shift. Patient has made no suicidal statements to staff.      Problem: Suicide Risk  Goal: Absence of Self-Harm  Description  Patient will be free of self harm during hospitalization.  Patient will report decrease in suicidal ideation prior to discharge   4/26/2019 0549 by Michelle Hernandez, RN  Note:   Patient has been free of self harm this shift, no suicidal comments. Patient has been resting comfortably this evening.

## 2019-04-26 NOTE — PLAN OF CARE
Face to face end of shift report received from Jacki Noyola completed. Patient observed.     Michelle Hernandez  4/25/2019  11:47 PM

## 2019-04-27 PROCEDURE — 25000132 ZZH RX MED GY IP 250 OP 250 PS 637: Performed by: NURSE PRACTITIONER

## 2019-04-27 PROCEDURE — 12400000 ZZH R&B MH

## 2019-04-27 PROCEDURE — 99233 SBSQ HOSP IP/OBS HIGH 50: CPT | Performed by: NURSE PRACTITIONER

## 2019-04-27 RX ADMIN — LEVOTHYROXINE SODIUM 25 MCG: 25 TABLET ORAL at 06:49

## 2019-04-27 RX ADMIN — MAGNESIUM HYDROXIDE 30 ML: 400 SUSPENSION ORAL at 20:57

## 2019-04-27 RX ADMIN — Medication 1000 MG: at 09:14

## 2019-04-27 RX ADMIN — MIRTAZAPINE 15 MG: 15 TABLET, FILM COATED ORAL at 20:57

## 2019-04-27 RX ADMIN — OLANZAPINE 5 MG: 5 TABLET, FILM COATED ORAL at 20:57

## 2019-04-27 RX ADMIN — LAMOTRIGINE 25 MG: 25 TABLET ORAL at 20:57

## 2019-04-27 ASSESSMENT — ACTIVITIES OF DAILY LIVING (ADL)
ORAL_HYGIENE: INDEPENDENT
HYGIENE/GROOMING: INDEPENDENT
HYGIENE/GROOMING: INDEPENDENT
DRESS: INDEPENDENT;SCRUBS (BEHAVIORAL HEALTH)
ORAL_HYGIENE: INDEPENDENT
DRESS: INDEPENDENT

## 2019-04-27 NOTE — PLAN OF CARE
Problem: Adult Behavioral Health Plan of Care  Goal: Patient-Specific Goal (Individualization)  Description  Patient will participate in 50% of groups  Patient will consume 50% of meals  Patient will sleep 6- 8  hours a night  Patient will be compliant with treatment team recommendations   Patient may have head garment upon request when higinio for safety. If patient requests head garment and is unable to contract for safety, a 1:1 staff must be in place.    Patient requests no male caregivers and for males not to do the room checks as she needs to cover her head each time.   Patient requests no apple juice as it reminds her of her son.  Patient requests night light remain on throughout the night due to fear of the dark.     Pt in room laying on bed with eyes closed with regular respirations and position changes.  Pt did wake up at 0500 for morning prayers.     4/27/2019 0625 by Salome Hong, RN  Outcome: Improving

## 2019-04-27 NOTE — PLAN OF CARE
Face to face end of shift report received from DOMINICK Hong RN. Rounding completed. Patient observed, up in her room.     Morro Fregoso  4/27/2019  9:30 AM

## 2019-04-27 NOTE — PROGRESS NOTES
"[facility]  Psychiatric Progress Note      Impression:   (per admit/ED) Hx of ROWAN, depression, and post partum depression. Pt presented to ED on 4/23 reporting intense grief; pt's son was reportedly murdered by pt's mother 8 days ago by choking. Pt is reporting \"strong suicidal thoughts\" with no specific plan, prior to her son's murder, pt states her son and her evy always prevented her from acting on her SI; pt unable to safety plan. Pt reports she has had a depressed mood for the past year and has tried several psych meds with minimal success; most recent change was lowered dose of Lorazepam and started on new med but pt doesn't know what it is called. No hx of SIB. No hx of IP mental health admits.      Patient is lying down in bed today, she presents as more irritable initially, states she does not know why staff have to bother her so much.  Again we discuss staff doing their job, however I assure I will pass on to be more mindful in any questioning.  She reports sleep was good, she took Zyprexa and would like to continue.  Remind her there are also prn doses available during the day if she feels more overwhelmed or agitated.  Patient states again she feels she needs to stay longer to feel better.    Patient reports she would like to stay until she feels stable, then plans to return to the [facility] and stay with her Cousin.  She expresses not wanting to go back to her home as this would be too difficult with all of her son's things still there.  She would like to donate his toys to the needy, although not sure she is ready to this, encourage to enlist help from cousin and nephew.             DIagnoses:   Major Depressive Disorder, recurrent, severe without psychosis  Bereavement, severe  ROWAN             Plan:     Schedule Zyprexa 5mg at hs  Utilize Zyprexa 5mg prn for agitation or anxiety  Continue Lamictal     ELOS:  3-5 days for further stabilization and safe discharge " "planning      Attestation:  Patient has been seen and evaluated by me,  EMMANUEL Goldstein CNP          Interim History:   The patient's care was discussed with the treatment team and chart notes were reviewed.          Medications:     Current Facility-Administered Medications   Medication     acetaminophen (TYLENOL) tablet 650 mg     alum & mag hydroxide-simethicone (MYLANTA ES/MAALOX  ES) suspension 30 mL     fish oil-omega-3 fatty acids capsule 1,000 mg     hydrOXYzine (ATARAX) tablet 25-50 mg     lamoTRIgine (LaMICtal) tablet 25 mg     levothyroxine (SYNTHROID/LEVOTHROID) tablet 25 mcg     magnesium hydroxide (MILK OF MAGNESIA) suspension 30 mL     mirtazapine (REMERON) tablet 15 mg     nicotine (NICORETTE) gum 2-4 mg     OLANZapine (zyPREXA) tablet 5 mg    Or     OLANZapine (zyPREXA) injection 5 mg     OLANZapine (zyPREXA) tablet 5 mg            10 point ROS negative        Allergies:     Allergies   Allergen Reactions     Pork Derived Products Other (See Comments)     Pt does not ingest of these products.             Psychiatric Examination:   /76   Pulse 68   Temp 98.1  F (36.7  C) (Tympanic)   Resp 16   Ht 1.626 m (5' 4\")   Wt 48 kg (105 lb 12.8 oz)   SpO2 99%   BMI 18.16 kg/m    Weight is 105 lbs 12.8 oz  Body mass index is 18.16 kg/m .    Appearance:  awake, alert  Attitude:  cooperative  Eye Contact:  good  Mood:  anxious and better  Affect:  mood congruent  Speech:  clear, coherent and pressured speech  Psychomotor Behavior:  no evidence of tardive dyskinesia, dystonia, or tics  Thought Process:  tangental and however improving  Associations:  no loose associations  Thought Content:  no evidence of suicidal ideation or homicidal ideation and no evidence of psychotic thought  Insight:  fair  Judgment:  fair  Oriented to:  time, person, and place  Attention Span and Concentration:  fair  Recent and Remote Memory:  fair  Fund of Knowledge: appropriate  Muscle Strength and Tone: " normal  Gait and Station: Normal           Labs:     Results for orders placed or performed during the hospital encounter of 04/24/19   UA reflex to Microscopic   Result Value Ref Range    Color Urine Straw     Appearance Urine Clear     Glucose Urine Negative NEG^Negative mg/dL    Bilirubin Urine Negative NEG^Negative    Ketones Urine 10 (A) NEG^Negative mg/dL    Specific Gravity Urine 1.003 1.003 - 1.035    Blood Urine Negative NEG^Negative    pH Urine 6.5 4.7 - 8.0 pH    Protein Albumin Urine Negative NEG^Negative mg/dL    Urobilinogen mg/dL Normal 0.0 - 2.0 mg/dL    Nitrite Urine Negative NEG^Negative    Leukocyte Esterase Urine Negative NEG^Negative    Source Midstream Urine

## 2019-04-27 NOTE — PLAN OF CARE
"  Problem: Adult Behavioral Health Plan of Care  Goal: Patient-Specific Goal (Individualization)  Description  Patient will participate in 50% of groups  Patient will consume 50% of meals  Patient will sleep 6- 8  hours a night  Patient will be compliant with treatment team recommendations   Patient may have head garment upon request when higinio for safety. If patient requests head garment and is unable to contract for safety, a 1:1 staff must be in place.    4/26/2019 1915 by Salome Boyd, RN  Outcome: No Change   Patient did not want to attend groups. Did eat some of her dinner. States she slept well. Patient was very labile and tearful at times. States we are \"ruining her life.\" States she does not want to leave her room when staff tried to encourage patient to join others. States people bring her apple juice when she told them not to because it reminds her of her son, and staff keeps asking \"stupid questions\". Staff attempted to explain that those are assessment questions and help staff to know how she is doing. Did come out of room to visit visitors with much prompting.  2030 sister stated after visit that she feels patient needs an  to help her understand things better. Staff then asked patient if she wanted an  and she said \"yes' to help her understand better. Also contacted Sammie regarding zyprexa in which she stated that patient did have a good understanding of the medication.  Supervisor assisted in getting  Ipad up. During medication pass patient did ask and talk with  and stated she was satisfied. She will let us know when she wants to use it again. Is aware it is on the unit. Also apologized for anger at staff earlier and admitted to being frustrated.    Problem: Suicide Risk  Goal: Absence of Self-Harm  Description  Patient will be free of self harm during hospitalization.  Patient will report decrease in suicidal ideation prior to discharge " "  4/26/2019 1915 by Salome Boyd, RN  Outcome: No Change   Patient contracts for safety. Did state \"my head tells me all the time to kill myself but I wont because I  want to be able to be with my son and not go to hell.\" When asked if it was hallucinations, patient denied that it was hallucinations.  Problem: Suicidal Behavior  Goal: Suicidal Behavior is Absent or Managed  4/26/2019 1915 by Salome Boyd, RN  Outcome: No Change     "

## 2019-04-27 NOTE — PLAN OF CARE
Face to face end of shift report received from Aniket Dunham. Rounding completed. Patient observed in room.    Salome Boyd  4/27/2019  4:58 PM

## 2019-04-27 NOTE — PLAN OF CARE
"  Problem: Adult Behavioral Health Plan of Care  Goal: Patient-Specific Goal (Individualization)  Description  Patient will participate in 50% of groups  Patient will consume 50% of meals  Patient will sleep 6- 8  hours a night  Patient will be compliant with treatment team recommendations   Patient may have head garment upon request when higinio for safety. If patient requests head garment and is unable to contract for safety, a 1:1 staff must be in place.    Patient requests no male caregivers and for males not to do the room checks as she needs to cover her head each time.   Patient requests no apple juice as it reminds her of her son.  Patient requests night light remain on throughout the night due to fear of the dark.   2019 1327 by Morro Fregoso, RN    Outcome: No Change  Pt has spent the shift isolating to her room. Pt refuses to come out of her room for meals and/or groups. Pt presented as agitated and angry on initial assessment this morning. Yelling and ranting about staff \"trying to hurt me\" and \"Why would they bring the juice that reminds me of my son?\" This nurse utilized therapeutic listening and pt did eventually calm. Pt talked at length about \"Allah,\" her family, her () son, etc. States that \"Allah took him away. He has his reasons.\" Intermittently tearful during conversation.  Pt thanked this nurse for listening and stated that she felt \"much better.\" Denied pain. Ate 100% of breakfast meal. Slept approximately 7 hours last night. Pt provided staff with scheduled times at which she needs to pray. Prayer times documented in pt's care plan. Pt requesting that staff notify her when it is the designated time(s).     1430 Pt was approached regarding a possible room change. Pt asked if she would be okay with moving to a [facility], as it would allow for more privacy for her. Pt agreeable. This nurse advised pt that she was not being moved to the [facility] because she had done " "anything wrong--and that this move was not a form of punishment. Pt indicated understanding. Pt did state that \"I really don't trust any of you. But, I trust Allah. He will not put me in a bad place.\" When pt entered her room in the [facility], she complained of \"the smell.\" Pt then asked this nurse \"Are you poisoning me?\" This nurse reassured pt that she was not being poisoned. Pt has been resting quietly in her room in the [facility] since.    Problem: Suicide Risk  Goal: Absence of Self-Harm  Description  Patient will be free of self harm during hospitalization.  Patient will report decrease in suicidal ideation prior to discharge     Outcome: No Change  Pt speaks frequently of her Christian, and of how she will see her son \"in Heaven one day.\" Pt also talks about how suicide is a sin and about how she will not go to [facility] if she kills herself. Pt has remained free from self-harm and/or injury this shift.        "

## 2019-04-27 NOTE — PLAN OF CARE
"  Problem: Adult Behavioral Health Plan of Care  Goal: Patient-Specific Goal (Individualization)  Description  Patient will participate in 50% of groups  Patient will consume 50% of meals  Patient will sleep 6- 8  hours a night  Patient will be compliant with treatment team recommendations   Patient may have head garment upon request when higinio for safety. If patient requests head garment and is unable to contract for safety, a 1:1 staff must be in place.    Patient requests no male caregivers and for males not to do the room checks as she needs to cover her head each time.   Patient requests no apple juice as it reminds her of her son.  Patient requests night light remain on throughout the night due to fear of the dark.   2057 Patient given milk of magnesia for constipation, Agrees to notify staff if has bowel movement.    Pt's prayer times:    4:37  AM  1:11  PM  5:05  PM  8:17  PM  9:45  PM    Pt requesting that staff notify her when it is prayer time, as she is not able to see a clock.   4/27/2019 1803 by Salome Boyd, RN  Outcome: No Change     Problem: Suicide Risk  Goal: Absence of Self-Harm  Description  Patient will be free of self harm during hospitalization.  Patient will report decrease in suicidal ideation prior to discharge   4/27/2019 1803 by Salome Boyd, RN  Outcome: No Change   Patient has chosen to remain in her room. States \"I'm not ready to go out there yet. I will tell you when I am ready.\" Admits to having some anxiety but refuses offer of prn medications. States\" I want to do things naturally. I am going to do yoga.\" States she is doing a little better but  admits that she still has to let medications work to help her.Asked patient if she wanted to use her Ipad  at this time and she said \"no. I trust the medications and everything to make me better.\" Informed that it is there if she wants to use it. States she will not harm self because\" would not go to [facility] and could not " "be with son.\" States \"if I wanted to kill myself, I would not take my medications,or eat. I would have done it at home.\"has made and received phone calls. Did eat 1/2 grilled sandwich and stated stomach is full.  "

## 2019-04-27 NOTE — PLAN OF CARE
Face to face end of shift report received from Salome WESTON RN. Rounding completed. Patient observed in her bedroom.     Salome Hong  4/26/2019  11:57 PM

## 2019-04-28 PROCEDURE — 99232 SBSQ HOSP IP/OBS MODERATE 35: CPT | Performed by: NURSE PRACTITIONER

## 2019-04-28 PROCEDURE — 12400000 ZZH R&B MH

## 2019-04-28 PROCEDURE — 25000132 ZZH RX MED GY IP 250 OP 250 PS 637: Performed by: NURSE PRACTITIONER

## 2019-04-28 RX ORDER — LAMOTRIGINE 25 MG/1
50 TABLET ORAL DAILY
Status: DISCONTINUED | OUTPATIENT
Start: 2019-04-28 | End: 2019-04-30 | Stop reason: HOSPADM

## 2019-04-28 RX ORDER — CHLORAL HYDRATE 500 MG
1000 CAPSULE ORAL 3 TIMES DAILY
Status: DISCONTINUED | OUTPATIENT
Start: 2019-04-28 | End: 2019-04-30 | Stop reason: HOSPADM

## 2019-04-28 RX ORDER — OLANZAPINE 2.5 MG/1
2.5 TABLET, FILM COATED ORAL EVERY MORNING
Status: DISCONTINUED | OUTPATIENT
Start: 2019-04-29 | End: 2019-04-30 | Stop reason: HOSPADM

## 2019-04-28 RX ADMIN — LAMOTRIGINE 50 MG: 25 TABLET ORAL at 20:32

## 2019-04-28 RX ADMIN — Medication 1000 MG: at 14:15

## 2019-04-28 RX ADMIN — OLANZAPINE 5 MG: 5 TABLET, FILM COATED ORAL at 20:33

## 2019-04-28 RX ADMIN — Medication 1000 MG: at 08:31

## 2019-04-28 RX ADMIN — Medication 1000 MG: at 20:32

## 2019-04-28 RX ADMIN — VITAMIN D, TAB 1000IU (100/BT) 2000 UNITS: 25 TAB at 12:22

## 2019-04-28 RX ADMIN — LEVOTHYROXINE SODIUM 25 MCG: 25 TABLET ORAL at 06:43

## 2019-04-28 ASSESSMENT — ACTIVITIES OF DAILY LIVING (ADL)
ORAL_HYGIENE: INDEPENDENT
HYGIENE/GROOMING: INDEPENDENT
DRESS: SCRUBS (BEHAVIORAL HEALTH)
DRESS: SCRUBS (BEHAVIORAL HEALTH);INDEPENDENT
ORAL_HYGIENE: INDEPENDENT
HYGIENE/GROOMING: INDEPENDENT

## 2019-04-28 NOTE — PLAN OF CARE
Problem: Adult Behavioral Health Plan of Care  Goal: Patient-Specific Goal (Individualization)  Description  Patient will participate in 50% of groups  Patient will consume 50% of meals  Patient will sleep 6- 8  hours a night  Patient will be compliant with treatment team recommendations   Patient may have head garment upon request when higinio for safety. If patient requests head garment and is unable to contract for safety, a 1:1 staff must be in place.    Patient requests no male caregivers and for males not to do the room checks as she needs to cover her head each time.   Patient requests NO JUICE OR CARROTS, as it reminds her of her son.  NO PORK  Patient requests night light remain on throughout the night due to fear of the dark.     Pt's prayer times:    4:37  AM  1:11  PM  5:05  PM  8:17  PM  9:45  PM    Pt requesting that staff notify her when it is prayer time, as she is not able to see a clock.     4/28/2019 1757 by Salome Boyd, RN  Outcome: No Change   Patient is conversing more. Admits to doing a little better but still knows needs to be in hospital for longer time. States her thoughts still race and has difficulty with concentration. Wanted to work on a puzzle to help with concentration which staff got for her. States she did sleep last night. Stated she also had a bowel movement today and no longer feels constipated. States she is still not ready to leave her room and will let staff know when wants to go to [facility] area. Did not want to use her interpretor Ipad and states she will let us know if wants to use it.   2000 Patient did walk with staff up and down hallway of [facility]. Did not want to go to open unit as admits to not wanting to see any men. States she is fearful of them with her illness. Admits it is very hard for her even at home to leave her house. Admits to having lots of fear but does not want anything extra at this time for anxiety. Working on puzzle per her request to help with her  thinking.  Problem: Suicide Risk  Goal: Absence of Self-Harm  Description  Patient will be free of self harm during hospitalization.  Patient will report decrease in suicidal ideation prior to discharge   4/28/2019 1757 by Salome Boyd RN  Outcome: No Change   Patient continues to think of her son. States she would like to be dead to be with him but would never take her life because she would not be able to be with him in [facility]. States she gets frustrated when relatives tell her she can have another child which hurts her heart.

## 2019-04-28 NOTE — PLAN OF CARE
"  Problem: Adult Behavioral Health Plan of Care  Goal: Patient-Specific Goal (Individualization)  Description  Patient will participate in 50% of groups  Patient will consume 50% of meals  Patient will sleep 6- 8  hours a night  Patient will be compliant with treatment team recommendations   Patient may have head garment upon request when higinio for safety. If patient requests head garment and is unable to contract for safety, a 1:1 staff must be in place.    Patient requests no male caregivers and for males not to do the room checks as she needs to cover her head each time.   Patient requests NO JUICE OR CARROTS, as it reminds her of her son.  Patient requests night light remain on throughout the night due to fear of the dark.     Pt's prayer times:    4:37  AM  1:11  PM  5:05  PM  8:17  PM  9:45  PM    Pt requesting that staff notify her when it is prayer time, as she is not able to see a clock.    4/28/2019 1551 by Morro Fregoso RN    Outcome: No Change  Pt isolated to her room in the ICU the entire shift. Pleasant and cooperative on assessment. Mood, however, remains labile. Denied pain. Reported that she slept \"so good\" last night. Pt showered this morning. Expressed continued grief regarding her son's recent death. Talked at length, again, about \"Allah\" and her Evangelical beliefs. Remains paranoid. Pt told this nurse \"I want to believe that you want to help me. My heart tells me to, but my head says 'no.'\" Remains afraid that staff is poisoning her food and her water. Ate 25% of breakfast meal and 100% of lunch meal. Compliant with medications as prescribed. Pt was assisted with making multiple telephone calls this shift (via the As[employer] phone, as pt will not leave her room without her head covered).    Problem: Suicide Risk  Goal: Absence of Self-Harm  Description  Patient will be free of self harm during hospitalization.  Patient will report decrease in suicidal ideation prior to discharge " "    Outcome: No Change   Pt tearful, at times. Talked frequently about her son. Stated, again, that she knows that he is in Heaven, but that \"I wanted to keep him here with me.\" Pt then went on to state \"I can't be with him right now. If I kill myself, I won't go to [facility].\" Pt did state that she finds some comfort in knowing that \"he is surrounded by so much love in [facility].\" Pt remained free from self-harm and/or injury this shift.         "

## 2019-04-28 NOTE — PLAN OF CARE
Face to face end of shift report received from Salome WESTON RN. Rounding completed. Patient observed in bed with eyes closed.     Salome Hong  4/27/2019  11:38 PM

## 2019-04-28 NOTE — PLAN OF CARE
Face to face end of shift report received from Aniket Dunham. Rounding completed. Patient observed in room.    Salome Boyd  4/28/2019  4:57 PM

## 2019-04-28 NOTE — PLAN OF CARE
"  Problem: Adult Behavioral Health Plan of Care  Goal: Patient-Specific Goal (Individualization)  Description  Patient will participate in 50% of groups  Patient will consume 50% of meals  Patient will sleep 6- 8  hours a night  Patient will be compliant with treatment team recommendations   Patient may have head garment upon request when higinio for safety. If patient requests head garment and is unable to contract for safety, a 1:1 staff must be in place.    Patient requests no male caregivers and for males not to do the room checks as she needs to cover her head each time.   Patient requests no apple juice as it reminds her of her son.  Patient requests night light remain on throughout the night due to fear of the dark.     Pt's prayer times:    4:37  AM  1:11  PM  5:05  PM  8:17  PM  9:45  PM    Pt requesting that staff notify her when it is prayer time, as she is not able to see a clock.     Pt in room laying on bed with eyes closed with regular respirations and position changes.  Pt up at 0545 for prayer. Pt was given her morning med, pt very talkative with nursing. Pt expressed sadness over the death of her child stating \"I know he is in heaven and if I kill myself I will not be able to be with him\". Pt expressed anger at Allah stating \"Allah why did you take my boy\". Pt also talked about her mother stating \"I love my mother, she refuses her medications and she shouldn't have\". Pt states her mom is in a hospital in [place] and will be getting shots because she refuses her meds.     4/28/2019 0552 by Salome Hong, RN  Outcome: Improving     "

## 2019-04-28 NOTE — PROGRESS NOTES
"[facility]  Psychiatric Progress Note      Impression:   (per admit/ED) Hx of ROWAN, depression, and post partum depression. Pt presented to ED on 4/23 reporting intense grief; pt's son was reportedly murdered by pt's mother 8 days ago by choking. Pt is reporting \"strong suicidal thoughts\" with no specific plan, prior to her son's murder, pt states her son and her evy always prevented her from acting on her SI; pt unable to safety plan. Pt reports she has had a depressed mood for the past year and has tried several psych meds with minimal success; most recent change was lowered dose of Lorazepam and started on new med but pt doesn't know what it is called. No hx of SIB. No hx of IP mental health admits.      Spoke with her sister Verena who is in [place].  She wanted update on status and plan.  Her thoughts are if patient is stable enough, she could go home with her nieces who will be visiting on Tuesday, April 30,  They would also like to see her get an individual therapist in the [place] community and work on grief/trauma and she has a PC she connects with for medication management.  Inform Verena I will speak with patient to explore her readiness and stability to leave on Tuesday.  Verena grateful for the phone call - she will also have her niece leave contact phone number.      [facility].  She tells me she feels the medication is helping her sleep and feels her head is better.  We review my conversation with her sister.  Patient tells me she does not think she will be ready to go home on Tuesday as she wants to make sure the medications are working for sure.  She continues to not trust staff or anyone here at the hospital, she tells me \"I trust you with my heart, but not my head\".  Patient requests Omega 3 tid and Vitamin D.  She tells me that is is the female doctor she trusts the most and wants a follow up appointment with her upon discharge.  She also " recognizes the need to individual psychotherapy, and wants Trauma Based therapy.  Otherwise, she is looking forward to seeing her Aunt and Uncle who are visiting today and looks forward to her nieces coming to visit.  Patient states she will let me know when she feels it is time to go home, despite she does not like it here, she feels staying here right now is best.  She is agreeable to increase Lamictal to where it was prior to admission and add low dose olanzapine for morning.           DIagnoses:   Major Depressive Disorder, recurrent, severe without psychosis  Bereavement, severe  ROWAN             Plan:     Schedule Zyprexa 5mg at hs and 2.5mg in morning  Continue Lamictal 50mg  Adding Vit D    ELOS:  3-5 days for further stabilization and safe discharge planning  Possible to leave Tuesday with family if patient feels stable    Attestation:  Patient has been seen and evaluated by me,  EMMANUEL Goldstein CNP          Interim History:   The patient's care was discussed with the treatment team and chart notes were reviewed.          Medications:     Current Facility-Administered Medications   Medication     acetaminophen (TYLENOL) tablet 650 mg     alum & mag hydroxide-simethicone (MYLANTA ES/MAALOX  ES) suspension 30 mL     fish oil-omega-3 fatty acids capsule 1,000 mg     hydrOXYzine (ATARAX) tablet 25-50 mg     lamoTRIgine (LaMICtal) tablet 50 mg     levothyroxine (SYNTHROID/LEVOTHROID) tablet 25 mcg     magnesium hydroxide (MILK OF MAGNESIA) suspension 30 mL     nicotine (NICORETTE) gum 2-4 mg     OLANZapine (zyPREXA) tablet 5 mg    Or     OLANZapine (zyPREXA) injection 5 mg     OLANZapine (zyPREXA) tablet 5 mg     vitamin D3 (CHOLECALCIFEROL) 1000 units (25 mcg) tablet 2,000 Units            10 point ROS negative        Allergies:     Allergies   Allergen Reactions     Pork Derived Products Other (See Comments)     Pt does not ingest of these products.             Psychiatric Examination:   /94    "Pulse 85   Temp 98.8  F (37.1  C) (Tympanic)   Resp 16   Ht 1.626 m (5' 4\")   Wt 48 kg (105 lb 12.8 oz)   SpO2 99%   BMI 18.16 kg/m    Weight is 105 lbs 12.8 oz  Body mass index is 18.16 kg/m .    Appearance:  awake, alert  Attitude:  cooperative  Eye Contact:  good  Mood:  sad  and better  Affect:  mood congruent  Speech:  clear, coherent and less pressured  Psychomotor Behavior:  no evidence of tardive dyskinesia, dystonia, or tics  Thought Process:  tangental and however improving  Associations:  no loose associations  Thought Content:  no evidence of suicidal ideation or homicidal ideation and no evidence of psychotic thought  Insight:  fair  Judgment:  fair  Oriented to:  time, person, and place  Attention Span and Concentration:  fair  Recent and Remote Memory:  fair  Fund of Knowledge: appropriate  Muscle Strength and Tone: normal  Gait and Station: Normal           Labs:     Results for orders placed or performed during the hospital encounter of 04/24/19   UA reflex to Microscopic   Result Value Ref Range    Color Urine Straw     Appearance Urine Clear     Glucose Urine Negative NEG^Negative mg/dL    Bilirubin Urine Negative NEG^Negative    Ketones Urine 10 (A) NEG^Negative mg/dL    Specific Gravity Urine 1.003 1.003 - 1.035    Blood Urine Negative NEG^Negative    pH Urine 6.5 4.7 - 8.0 pH    Protein Albumin Urine Negative NEG^Negative mg/dL    Urobilinogen mg/dL Normal 0.0 - 2.0 mg/dL    Nitrite Urine Negative NEG^Negative    Leukocyte Esterase Urine Negative NEG^Negative    Source Midstream Urine        "

## 2019-04-29 PROCEDURE — 12400000 ZZH R&B MH

## 2019-04-29 PROCEDURE — 25000132 ZZH RX MED GY IP 250 OP 250 PS 637: Performed by: NURSE PRACTITIONER

## 2019-04-29 PROCEDURE — 99232 SBSQ HOSP IP/OBS MODERATE 35: CPT | Performed by: NURSE PRACTITIONER

## 2019-04-29 RX ORDER — MIRTAZAPINE 15 MG/1
15 TABLET, FILM COATED ORAL AT BEDTIME
Status: DISCONTINUED | OUTPATIENT
Start: 2019-04-29 | End: 2019-04-30 | Stop reason: HOSPADM

## 2019-04-29 RX ADMIN — OLANZAPINE 5 MG: 5 TABLET, FILM COATED ORAL at 12:57

## 2019-04-29 RX ADMIN — Medication 1000 MG: at 21:48

## 2019-04-29 RX ADMIN — LEVOTHYROXINE SODIUM 25 MCG: 25 TABLET ORAL at 06:02

## 2019-04-29 RX ADMIN — OLANZAPINE 2.5 MG: 2.5 TABLET, FILM COATED ORAL at 08:14

## 2019-04-29 RX ADMIN — Medication 1000 MG: at 13:00

## 2019-04-29 RX ADMIN — VITAMIN D, TAB 1000IU (100/BT) 2000 UNITS: 25 TAB at 08:12

## 2019-04-29 RX ADMIN — LAMOTRIGINE 50 MG: 25 TABLET ORAL at 21:48

## 2019-04-29 RX ADMIN — MIRTAZAPINE 15 MG: 15 TABLET, FILM COATED ORAL at 21:48

## 2019-04-29 RX ADMIN — Medication 1000 MG: at 08:14

## 2019-04-29 RX ADMIN — OLANZAPINE 5 MG: 5 TABLET, FILM COATED ORAL at 21:48

## 2019-04-29 ASSESSMENT — ACTIVITIES OF DAILY LIVING (ADL)
DRESS: SCRUBS (BEHAVIORAL HEALTH);INDEPENDENT
HYGIENE/GROOMING: INDEPENDENT;SHOWER
HYGIENE/GROOMING: INDEPENDENT
ORAL_HYGIENE: INDEPENDENT
ORAL_HYGIENE: INDEPENDENT
LAUNDRY: UNABLE TO COMPLETE
LAUNDRY: UNABLE TO COMPLETE
DRESS: INDEPENDENT;SCRUBS (BEHAVIORAL HEALTH)
HYGIENE/GROOMING: INDEPENDENT
LAUNDRY: UNABLE TO COMPLETE
HYGIENE/GROOMING: INDEPENDENT
LAUNDRY: UNABLE TO COMPLETE
DRESS: INDEPENDENT;SCRUBS (BEHAVIORAL HEALTH)
DRESS: INDEPENDENT;SCRUBS (BEHAVIORAL HEALTH)
ORAL_HYGIENE: INDEPENDENT
ORAL_HYGIENE: INDEPENDENT

## 2019-04-29 NOTE — PLAN OF CARE
Face to face end of shift report received from Jennifer RENEE RN. Rounding completed. Patient observed in room.     Serena Lennon  4/29/2019  7:59 AM

## 2019-04-29 NOTE — PLAN OF CARE
BEHAVIORAL TEAM DISCUSSION    Participants: Sammie Shields NP, Berta Truong LSW,  Leidy Menezes LSW, Caridad Stone [facility], Cassia Bolton RN, Shayla Lo RN, Jacki Zapata RN, Sindy Edwards RN, Tonia Ibrahim [facility], Juani Bernal OT, Sammie Bustamante OT  Progress: minimal, not participating in programming  Continued Stay Criteria/Rationale: irritability  Medical/Physical: continue to monitor Intake and Output  Precautions:   Behavioral Orders   Procedures    Code 1 - Restrict to Unit    Routine Programming     As clinically indicated    Status 15     Every 15 minutes.     Plan: patient wants trauma based therapy, research Shinto based services  Rationale for change in precautions or plan:   Active Problems:    Depression with suicidal ideation      Current Facility-Administered Medications:     acetaminophen (TYLENOL) tablet 650 mg, 650 mg, Oral, Q4H PRN, Tianna Waters NP    alum & mag hydroxide-simethicone (MYLANTA ES/MAALOX  ES) suspension 30 mL, 30 mL, Oral, Q4H PRN, Tianna Waters NP    fish oil-omega-3 fatty acids capsule 1,000 mg, 1,000 mg, Oral, TID, Sammie Shields APRN CNP, 1,000 mg at 04/29/19 0814    hydrOXYzine (ATARAX) tablet 25-50 mg, 25-50 mg, Oral, Q4H PRN, Tianna Waters NP    lamoTRIgine (LaMICtal) tablet 50 mg, 50 mg, Oral, Daily, Sammie Shields APRN CNP, 50 mg at 04/28/19 2032    levothyroxine (SYNTHROID/LEVOTHROID) tablet 25 mcg, 25 mcg, Oral, QAM, Sammie Shields APRN CNP, 25 mcg at 04/29/19 0602    magnesium hydroxide (MILK OF MAGNESIA) suspension 30 mL, 30 mL, Oral, At Bedtime PRN, Tianna Waters NP, 30 mL at 04/27/19 2057    mirtazapine (REMERON) tablet 15 mg, 15 mg, Oral, At Bedtime, Sammie Shields APRN CNP    nicotine (NICORETTE) gum 2-4 mg, 2-4 mg, Buccal, Q1H PRN, Granberg, Tianna, NP    OLANZapine (zyPREXA) tablet 5 mg, 5 mg, Oral, BID PRN **OR** OLANZapine (zyPREXA) injection 5 mg, 5 mg, Intramuscular, BID PRN, Cornelius,  EMMANUEL Villalobos CNP    OLANZapine (zyPREXA) tablet 2.5 mg, 2.5 mg, Oral, QAM, Sammie Shields APRN CNP, 2.5 mg at 04/29/19 0814    OLANZapine (zyPREXA) tablet 5 mg, 5 mg, Oral, At Bedtime, Sammie Shields APRN CNP, 5 mg at 04/28/19 2033    vitamin D3 (CHOLECALCIFEROL) 1000 units (25 mcg) tablet 2,000 Units, 2,000 Units, Oral, Daily, Sammie Shields APRN CNP, 2,000 Units at 04/29/19 0812

## 2019-04-29 NOTE — PROGRESS NOTES
"[facility]  Psychiatric Progress Note      Impression:   (per admit/ED) Hx of ROWAN, depression, and post partum depression. Pt presented to ED on 4/23 reporting intense grief; pt's son was reportedly murdered by pt's mother 8 days ago by choking. Pt is reporting \"strong suicidal thoughts\" with no specific plan, prior to her son's murder, pt states her son and her evy always prevented her from acting on her SI; pt unable to safety plan. Pt reports she has had a depressed mood for the past year and has tried several psych meds with minimal success; most recent change was lowered dose of Lorazepam and started on new med but pt doesn't know what it is called. No hx of SIB. No hx of IP mental health admits.      4/28 -Spoke with her sister Verena who is in [place].  She wanted update on status and plan.  Her thoughts are if patient is stable enough, she could go home with her nieces who will be visiting on Tuesday, April 30,  They would also like to see her get an individual therapist in the [place] community and work on grief/trauma and she has a PC she connects with for medication management.  Inform Verena I will speak with patient to explore her readiness and stability to leave on Tuesday.  Verena grateful for the phone call - she will also have her niece leave contact phone number.      Patient is agitated initially when meeting with her this morning, she reports not sleeping well and needs to get out of here as she is getting worse.  She is pacing back and forth, paranoid and very angry, ask that I leave.      She did settle down, had a couple doses of Zyprexa and asks RN a couple different times to be able to see me again.  Patient is lying in bed this afternoon, reports feeling much more calm and apologizes for earlier.  We discuss medication changes and she understands we need to put the mirtazapine back on as this is likely helping with sleep and her mood.  She does now want to go home on " Tuesday with her family, as she is realizing she would benefit most by being with her family.  We review medications and she would like the same, with additional Zyprexa prn for anxiety and agitation.  Patient is not aware of which pharmacy she will use as she will be staying with her cousin, however thinks it would be [facility] in [place] - will work on getting this confirmed.  Social Workers will be providing her with some possible resources.         DIagnoses:   Major Depressive Disorder, recurrent, severe without psychosis  Bereavement, severe  ROWAN             Plan:     Schedule Zyprexa 5mg at hs and 2.5mg in morning  Continue Lamictal 50mg  Adding Vit D    ELOS:  3-5 days for further stabilization and safe discharge planning  Possible to leave Tuesday with family if patient feels stable    Attestation:  Patient has been seen and evaluated by me,  EMMANUEL Goldstein CNP          Interim History:   The patient's care was discussed with the treatment team and chart notes were reviewed.          Medications:     Current Facility-Administered Medications   Medication     acetaminophen (TYLENOL) tablet 650 mg     alum & mag hydroxide-simethicone (MYLANTA ES/MAALOX  ES) suspension 30 mL     fish oil-omega-3 fatty acids capsule 1,000 mg     hydrOXYzine (ATARAX) tablet 25-50 mg     lamoTRIgine (LaMICtal) tablet 50 mg     levothyroxine (SYNTHROID/LEVOTHROID) tablet 25 mcg     magnesium hydroxide (MILK OF MAGNESIA) suspension 30 mL     mirtazapine (REMERON) tablet 15 mg     nicotine (NICORETTE) gum 2-4 mg     OLANZapine (zyPREXA) tablet 5 mg    Or     OLANZapine (zyPREXA) injection 5 mg     OLANZapine (zyPREXA) tablet 2.5 mg     OLANZapine (zyPREXA) tablet 5 mg     vitamin D3 (CHOLECALCIFEROL) 1000 units (25 mcg) tablet 2,000 Units            10 point ROS negative        Allergies:     Allergies   Allergen Reactions     Pork Derived Products Other (See Comments)     Pt does not ingest of these products.   "           Psychiatric Examination:   /81   Pulse 76   Temp 98.7  F (37.1  C) (Tympanic)   Resp 16   Ht 1.626 m (5' 4\")   Wt 48 kg (105 lb 12.8 oz)   SpO2 97%   BMI 18.16 kg/m    Weight is 105 lbs 12.8 oz  Body mass index is 18.16 kg/m .    Appearance:  awake, alert  Attitude:  cooperative  Eye Contact:  good  Mood:  sad  and better  Affect:  mood congruent  Speech:  clear, coherent and less pressured  Psychomotor Behavior:  no evidence of tardive dyskinesia, dystonia, or tics  Thought Process:  tangental and however improving  Associations:  no loose associations  Thought Content:  no evidence of suicidal ideation or homicidal ideation and no evidence of psychotic thought  Insight:  fair  Judgment:  fair  Oriented to:  time, person, and place  Attention Span and Concentration:  fair  Recent and Remote Memory:  fair  Fund of Knowledge: appropriate  Muscle Strength and Tone: normal  Gait and Station: Normal           Labs:     Results for orders placed or performed during the hospital encounter of 04/24/19   UA reflex to Microscopic   Result Value Ref Range    Color Urine Straw     Appearance Urine Clear     Glucose Urine Negative NEG^Negative mg/dL    Bilirubin Urine Negative NEG^Negative    Ketones Urine 10 (A) NEG^Negative mg/dL    Specific Gravity Urine 1.003 1.003 - 1.035    Blood Urine Negative NEG^Negative    pH Urine 6.5 4.7 - 8.0 pH    Protein Albumin Urine Negative NEG^Negative mg/dL    Urobilinogen mg/dL Normal 0.0 - 2.0 mg/dL    Nitrite Urine Negative NEG^Negative    Leukocyte Esterase Urine Negative NEG^Negative    Source Midstream Urine        "

## 2019-04-29 NOTE — PLAN OF CARE
Problem: Adult Behavioral Health Plan of Care  Goal: Patient-Specific Goal (Individualization)  Description  Patient will participate in 50% of groups  Patient will consume 50% of meals  Patient will sleep 6- 8  hours a night  Patient will be compliant with treatment team recommendations   Patient may have head garment upon request when higinio for safety. If patient requests head garment and is unable to contract for safety, a 1:1 staff must be in place.    Patient requests no male caregivers and for males not to do the room checks as she needs to cover her head each time.   Patient requests NO JUICE OR CARROTS, as it reminds her of her son.  NO PORK  Patient requests night light remain on throughout the night due to fear of the dark.     Pt's prayer times:    4:37  AM  1:11  PM  5:05  PM  8:17  PM  9:45  PM    Pt requesting that staff notify her when it is prayer time, as she is not able to see a clock.     4/29/2019 0554 by Jennifer Otero, RN  Outcome: No Change   Pt appeared to be sleeping most of this shift, normal respirations and position changes noted.

## 2019-04-29 NOTE — PLAN OF CARE
Face to face end of shift report received. Rounding completed. Patient observed in room.     Serena Lennon  4/29/2019  4:00 PM

## 2019-04-29 NOTE — PLAN OF CARE
Face to face end of shift report received from Kelly RN. Rounding completed. Patient observed.     Jennifer Otero  4/29/2019  12:25 AM

## 2019-04-29 NOTE — PLAN OF CARE
"  Problem: Adult Behavioral Health Plan of Care  Goal: Patient-Specific Goal (Individualization)  Description  Patient will participate in 50% of groups  Patient will consume 50% of meals  Patient will sleep 6- 8  hours a night  Patient will be compliant with treatment team recommendations   Patient may have head garment upon request when higinio for safety. If patient requests head garment and is unable to contract for safety, a 1:1 staff must be in place.    Patient requests no male caregivers and for males not to do the room checks as she needs to cover her head each time.   Patient requests NO JUICE OR CARROTS, as it reminds her of her son.  NO PORK  Patient requests night light remain on throughout the night due to fear of the dark.     Pt's prayer times:    4:37  AM  1:11  PM  5:05  PM  8:17  PM  9:45  PM    Pt requesting that staff notify her when it is prayer time, as she is not able to see a clock.      Pt. Dismissive with first face to face attempt. \"You have no heart in your work please leave.\" Pt. Offered interpretation services and refused. [employer] interpretation service system brought to pt. And pt. Continues to refuse. \"Girlfriend I have spoke english for years. I don't need this service. I can speak it and understand you\" Pt. Very apologetic over her dismissive behavior this a.m. Requesting to speak to provider again about her medications. Provider updated and reports she will meet with Pt. Pt. Denies HI, SI, pain, and hallucinations. Pt. Reporting \"I feel so depressed I want to be dead. I would never kill myself or I wont be with my son in [facility]. He's 3 he was born in December. I love him so much I wont have any other children because I don't want to share my love with anyone else.\"  1230 Pt. C/o eating a chicken wrap. \"I don't eat any meat. Why are you all doing this to me.\" Pt. offered interpretation I-pad multiple times. Choices offered off menu. Pt. Reports \"I told you I don't want the " .  Just order me anything. Pt. In agreement with vegetable wrap. Pt. Administered phone @ 100 and 1340. Pt. Administered PRN Zyprexa 5 mg po @ 1257 for increasing agitation. Pt. In agreement. Will continue to monitor. Provider to pt. Room @ 1330 Pt. Not acknowledging provider at this time.      4/29/2019 1144 by Serena Lennon RN  Outcome: No Change     Problem: Suicidal Behavior  Goal: Suicidal Behavior is Absent or Managed  Outcome: No Change

## 2019-04-30 VITALS
TEMPERATURE: 97.2 F | WEIGHT: 105.8 LBS | OXYGEN SATURATION: 100 % | BODY MASS INDEX: 18.06 KG/M2 | SYSTOLIC BLOOD PRESSURE: 132 MMHG | HEIGHT: 64 IN | RESPIRATION RATE: 16 BRPM | HEART RATE: 70 BPM | DIASTOLIC BLOOD PRESSURE: 93 MMHG

## 2019-04-30 PROCEDURE — 99238 HOSP IP/OBS DSCHRG MGMT 30/<: CPT | Performed by: NURSE PRACTITIONER

## 2019-04-30 PROCEDURE — 25000132 ZZH RX MED GY IP 250 OP 250 PS 637: Performed by: NURSE PRACTITIONER

## 2019-04-30 RX ORDER — CHLORAL HYDRATE 500 MG
1000 CAPSULE ORAL 3 TIMES DAILY
Qty: 90 CAPSULE | Refills: 0 | Status: SHIPPED | OUTPATIENT
Start: 2019-04-30 | End: 2023-06-18

## 2019-04-30 RX ORDER — OLANZAPINE 5 MG/1
5 TABLET ORAL 2 TIMES DAILY PRN
Qty: 60 TABLET | Refills: 0 | Status: SHIPPED | OUTPATIENT
Start: 2019-04-30 | End: 2023-06-18

## 2019-04-30 RX ORDER — LAMOTRIGINE 25 MG/1
50 TABLET ORAL DAILY
Qty: 30 TABLET | Refills: 0 | Status: SHIPPED | OUTPATIENT
Start: 2019-04-30 | End: 2023-06-18

## 2019-04-30 RX ORDER — OLANZAPINE 5 MG/1
5 TABLET ORAL AT BEDTIME
Qty: 30 TABLET | Refills: 0 | Status: SHIPPED | OUTPATIENT
Start: 2019-04-30 | End: 2023-06-18

## 2019-04-30 RX ORDER — LEVOTHYROXINE SODIUM 25 UG/1
25 TABLET ORAL EVERY MORNING
Qty: 30 TABLET | Refills: 0 | Status: SHIPPED | OUTPATIENT
Start: 2019-05-01 | End: 2023-06-18

## 2019-04-30 RX ORDER — MIRTAZAPINE 15 MG/1
15 TABLET, FILM COATED ORAL AT BEDTIME
Qty: 30 TABLET | Refills: 0 | Status: SHIPPED | OUTPATIENT
Start: 2019-04-30 | End: 2023-06-18

## 2019-04-30 RX ADMIN — VITAMIN D, TAB 1000IU (100/BT) 2000 UNITS: 25 TAB at 08:30

## 2019-04-30 RX ADMIN — LEVOTHYROXINE SODIUM 25 MCG: 25 TABLET ORAL at 06:04

## 2019-04-30 RX ADMIN — OLANZAPINE 2.5 MG: 2.5 TABLET, FILM COATED ORAL at 08:29

## 2019-04-30 RX ADMIN — Medication 1000 MG: at 08:28

## 2019-04-30 NOTE — PLAN OF CARE
BEHAVIORAL TEAM DISCUSSION    Participants: Sammie Shields NP, [facility]ymour [facility], Berta Truong LSW,  Leidy Menezes LSW, Caridad Stone LGSW, mariel Bustamante OT, Jacki Faith RN, Stefany Alfonso RN  Progress: Moderate   Continued Stay Criteria/Rationale: Depression, grief  Medical/Physical: None   Precautions:   Behavioral Orders   Procedures    Code 1 - Restrict to Unit    Routine Programming     As clinically indicated    Status 15     Every 15 minutes.     Plan: meds sent to [facility], discharge home with family this afternoon   Rationale for change in precautions or plan: NONE    Active Problems:    Depression with suicidal ideation    Current Facility-Administered Medications:     acetaminophen (TYLENOL) tablet 650 mg, 650 mg, Oral, Q4H PRN, Tianna Waters NP    alum & mag hydroxide-simethicone (MYLANTA ES/MAALOX  ES) suspension 30 mL, 30 mL, Oral, Q4H PRN, Tianna Waters NP    fish oil-omega-3 fatty acids capsule 1,000 mg, 1,000 mg, Oral, TID, Sammie Shields APRN CNP, 1,000 mg at 04/30/19 0828    hydrOXYzine (ATARAX) tablet 25-50 mg, 25-50 mg, Oral, Q4H PRN, Tianna Waters NP    lamoTRIgine (LaMICtal) tablet 50 mg, 50 mg, Oral, Daily, Sammie Shields APRN CNP, 50 mg at 04/29/19 2148    levothyroxine (SYNTHROID/LEVOTHROID) tablet 25 mcg, 25 mcg, Oral, QAM, Sammie Shields APRN CNP, 25 mcg at 04/30/19 0604    magnesium hydroxide (MILK OF MAGNESIA) suspension 30 mL, 30 mL, Oral, At Bedtime PRN, Tianna Waters NP, 30 mL at 04/27/19 2057    mirtazapine (REMERON) tablet 15 mg, 15 mg, Oral, At Bedtime, Sammie Shields APRN CNP, 15 mg at 04/29/19 2148    nicotine (NICORETTE) gum 2-4 mg, 2-4 mg, Buccal, Q1H PRN, Granberg, Tianna, NP    OLANZapine (zyPREXA) tablet 5 mg, 5 mg, Oral, BID PRN, 5 mg at 04/29/19 1257 **OR** OLANZapine (zyPREXA) injection 5 mg, 5 mg, Intramuscular, BID PRN, Sammie Shields, APRN CNP    OLANZapine (zyPREXA) tablet 2.5 mg,  2.5 mg, Oral, QAM, Sammie Shields APRN CNP, 2.5 mg at 04/30/19 0829    OLANZapine (zyPREXA) tablet 5 mg, 5 mg, Oral, At Bedtime, Sammie Shields APRN CNP, 5 mg at 04/29/19 2148    vitamin D3 (CHOLECALCIFEROL) 1000 units (25 mcg) tablet 2,000 Units, 2,000 Units, Oral, Daily, Sammie Shields APRN CNP, 2,000 Units at 04/30/19 0830

## 2019-04-30 NOTE — PLAN OF CARE
"Writer met with pt this morning - we discussed the therapy appointment that was scheduled for her. Writer talked about this therapist specializing in trauma and also being a [place] woman, which is what pt has expressed interest in.   Pt stated \"Im gonna be honest with you, Im not gonna call her, I am not ready. I don't want to lie to you\". Writer encouraged pt to keep the info for a later time. Pt talked to writer about her cousin and her daughter being a good support for her and that she is looking forward to going home, but knows she will \"never feel good again\". Pt talked about her  living in [place] and how she is not in love with him and hates him but only stayed with him for their son. Pt talked about how it is going to be hard, but is hoping to divorce him now.  Pt also talked about her cousin who lives in [place] who told her not to do EMDR that has been recommended for her in the past because she needs trauma therapy and EMDR is a short term fix. Writer dicussed a little about EMDR and other therapies she has done. Pt talked about her being depressed long before her son , which is why she went to [place] - her sister took her and her mother in to take care of since they were both sick. Pt talked about having to take care of her mother for most of her life, since the age of 12 when she got sick, She stated she had to cook, clean and do everything at that age and even had to lock her mom In her bedroom at times because she needed to just go play and get her mind off things. Pt talked about her mom refusing medications for 20 years and finally accepting medications when they were in [place]. Pt stated, \"once I am more stable, I would like to go to [place] and find someone to give me medications. Pt talked about her sister not understanding mental illness, but her niece does and has been a goo support for her because she has also experienced depression.       Pt is discharging at the " recommendation of the treatment team. Pt is discharging home transported by family. Pt denies having any thoughts of hurting themself or anyone else. Pt denies anxiety or depression. Pt has follow up with therapy. Discharge instructions, including; demographic sheet, psychiatric evaluation, discharge summary, and AVS were faxed to these next level of care providers.

## 2019-04-30 NOTE — PLAN OF CARE
Problem: Adult Behavioral Health Plan of Care  Goal: Patient-Specific Goal (Individualization)  Description  Patient will participate in 50% of groups  Patient will consume 50% of meals  Patient will sleep 6- 8  hours a night  Patient will be compliant with treatment team recommendations   Patient may have head garment upon request when higinio for safety. If patient requests head garment and is unable to contract for safety, a 1:1 staff must be in place.    Patient requests no male caregivers and for males not to do the room checks as she needs to cover her head each time.   Patient requests NO JUICE, APPLES, or CARROTS, as it reminds her of her son.  NO PORK  Patient requests night light remain on throughout the night due to fear of the dark.     Pt's prayer times:    4:37  AM  1:11  PM  5:05  PM  8:17  PM  9:45  PM    Pt requesting that staff notify her when it is prayer time, as she is not able to see a clock.      4/30/2019 0307 by Stefany Hopson, RN  Note:   Pt has been in bed with eyes closed and regular respirations. 15 minute and PRN checks all night. No complaints offered. Will continue to monitor.    Pt up at 0530- with c/o some dizziness. Pt encouraged to drink water and educated on standing slowly to avoid falling and notify staff if she needs assistance with moving or repositing or s/s don't improve. VS were TIGRE Hopson  4/30/2019  3:08 AM

## 2019-04-30 NOTE — PLAN OF CARE
"  Problem: Adult Behavioral Health Plan of Care  Goal: Patient-Specific Goal (Individualization)  Description  Patient will participate in 50% of groups  Patient will consume 50% of meals  Patient will sleep 6- 8  hours a night  Patient will be compliant with treatment team recommendations   Patient may have head garment upon request when higinio for safety. If patient requests head garment and is unable to contract for safety, a 1:1 staff must be in place.    Patient requests no male caregivers and for males not to do the room checks as she needs to cover her head each time.   Patient requests NO JUICE, APPLES, or CARROTS, as it reminds her of her son.  NO PORK  Patient requests night light remain on throughout the night due to fear of the dark.     Pt's prayer times:    4:37  AM  1:11  PM  5:05  PM  8:17  PM  9:45  PM    Pt requesting that staff notify her when it is prayer time, as she is not able to see a clock.      4/30/2019 0940 by Alejandra Ozuna RN  Outcome: No Change  Note:   Patient cooperative with cares.  Patient compliant with all medication admin this shift.  \"I know I'm crazy (pt. Points to her head) , but I feel with my heart not my head\"  \"you give your son big hug, lots of hugs, love thy mother\"  Pt denies any SI, HI, and hallucinations this shift. Pt denies any pain. States they slept well and feels rested.       Problem: Suicide Risk  Goal: Absence of Self-Harm  Description  Patient will be free of self harm during hospitalization.  Patient will report decrease in suicidal ideation prior to discharge   4/30/2019 0940 by Alejandra Ozuna, RN  Outcome: No Change  Note:   Patient remains free from self-harm while on the unit.       "

## 2019-04-30 NOTE — PLAN OF CARE
"  Problem: Adult Behavioral Health Plan of Care  Goal: Patient-Specific Goal (Individualization)  Description  Patient will participate in 50% of groups  Patient will consume 50% of meals  Patient will sleep 6- 8  hours a night  Patient will be compliant with treatment team recommendations   Patient may have head garment upon request when higinio for safety. If patient requests head garment and is unable to contract for safety, a 1:1 staff must be in place.    Patient requests no male caregivers and for males not to do the room checks as she needs to cover her head each time.   Patient requests NO JUICE, APPLES, or CARROTS, as it reminds her of her son.  NO PORK  Patient requests night light remain on throughout the night due to fear of the dark.     Pt's prayer times:    4:37  AM  1:11  PM  5:05  PM  8:17  PM  9:45  PM    Pt requesting that staff notify her when it is prayer time, as she is not able to see a clock.      4/29/2019 2121 by Estrella Bullock RN  Outcome: No Change  Note:   ADMISSION NOTE    Reason for admission: SI.  Safety concerns: suicide risk.  Risk for or history of violence: none noted.     Patient arrived on unit from [address] [place] [address] accompanied by security and x2  staff on 4/29/2019  7:00 PM.   Status on arrival: tearful, tangential, sad, cooperative.  Patient's legal status is 72 hour hold.  Patient denies SI, HI, and thoughts of self harm and contracts for safety while on unit.      Estrella Bullock  4/29/2019  9:21 PM    Patient is tearful during transfer here from [address] [place] [address].  Patient states she will not harm self while here and does not want to harm herself.  Patient states if she ever harmed herself, she would go to \"hell and not be with my son.  I cannot do that.  When it is my time, Allah will know and I will go to [facility] with my son.\".  Patient asks not to be offered apples or apple juice as it is her son's favorite food and reminds her of him.           Problem: Suicide Risk  Goal: " Absence of Self-Harm  Description  Patient will be free of self harm during hospitalization.  Patient will report decrease in suicidal ideation prior to discharge   4/29/2019 2121 by Estrella Bullock, RN  Outcome: No Change  Note:   Patient denies SI and had no noted self harm this shift.      Problem: Suicidal Behavior  Goal: Suicidal Behavior is Absent or Managed  4/29/2019 2121 by Estrella Bullock, RN  Outcome: No Change  Note:   Patient denies SI and had no noted self harm.

## 2019-04-30 NOTE — PLAN OF CARE
Face to face end of shift report received from Stefany JONES RN. . Rounding completed. Patient observed in bed.     Alejandra Ozuna  4/30/2019  7:54 AM

## 2019-04-30 NOTE — PLAN OF CARE
Face to face end of shift report received from Estrella Noyola completed. Patient observed sleeping in right side lying position with regular and unlabored respirations.     Stefany Hopson  4/30/2019  3:06 AM

## 2019-04-30 NOTE — PLAN OF CARE
"  Problem: Adult Behavioral Health Plan of Care  Goal: Patient-Specific Goal (Individualization)  Description  Patient will participate in 50% of groups  Patient will consume 50% of meals  Patient will sleep 6- 8  hours a night  Patient will be compliant with treatment team recommendations   Patient may have head garment upon request when higinio for safety. If patient requests head garment and is unable to contract for safety, a 1:1 staff must be in place.    Patient requests no male caregivers and for males not to do the room checks as she needs to cover her head each time.   Patient requests NO JUICE OR CARROTS, as it reminds her of her son.  NO PORK Pt. Requesting no meat at this time. Kitchen called and updated.   Patient requests night light remain on throughout the night due to fear of the dark.     Pt's prayer times:    4:37  AM  1:11  PM  5:05  PM  8:17  PM  9:45  PM    Pt requesting that staff notify her when it is prayer time, as she is not able to see a clock.       Pt. Addy HI, SI, anxiety, pain, and hallucinations. Pt reports moderate depression. Pt. Cooperative with nursing assessment. Pt. In agreement to update staff to thoughts feelings of wanting to harm self or others. Pt. Eating WDL. Pt. Offers no c/o issues with bowel and bladder.      Pt. Updated she will be transferred to  for private room. Pt. In agreement. Pt. Transferred @ 1900 with staff x2 and security.   4/29/2019 1911 by Serena Lennon, RN  Outcome: No Change     Problem: Suicide Risk  Goal: Absence of Self-Harm  Description  Patient will be free of self harm during hospitalization.  Patient will report decrease in suicidal ideation prior to discharge    Pt. Free from self harm. \"I wont hurt myself I want to go to heaven with my son.\"      Outcome: No Change        "

## 2019-04-30 NOTE — DISCHARGE INSTRUCTIONS
Behavioral Discharge Planning and Instructions    Summary: Pt was admitted for grief and depression     Main Diagnosis:   Major Depressive Disorder, recurrent, severe without psychosis  Bereavement, severe  ROWAN    Major Treatments, Procedures and Findings: Stabilize with medications, connect with community programs.    Symptoms to Report: feeling more aggressive, increased confusion, losing more sleep, mood getting worse or thoughts of suicide    Lifestyle Adjustment: Take all medications as prescribed, meet with doctor/ medication provider, out patient therapist, , and [facility] worker as scheduled. Abstain from alcohol or any unprescribed drugs.    Psychiatry Follow-up:     [facility] ** please call her if you cannot make the appointment**   Jillian Domingo 5/6 @ [facility]   [address]  [place], [place]   Phone: [phone]    Resources:   Crisis Intervention: 796.430.4564 or 237-152-7415 (TTY: 310.418.5733).  Call anytime for help.  National [place] on Mental Illness (www.[place].roni.org): 935.520.2341 or 965-806-2364.  Alcoholics Anonymous (www.alcoholics-anonymous.org): Check your phone book for your local chapter.  Suicide Awareness Voices of Education (SAVE) (www.save.org): 138-182-FEHB (1445)  National Suicide Prevention Line (www.mentalhealthmn.org): 171-327-OSFJ (1831)  Mental Health Consumer/Survivor Network of [place] (www.mhcsn.net): 981.481.5578 or 224-114-3315  Mental Health Association of [place] (www.mentalhealth.org): 783.264.8180 or 849-814-0813    General Medication Instructions:   See your medication sheet(s) for instructions.   Take all medicines as directed.  Make no changes unless your doctor suggests them.   Go to all your doctor visits.  Be sure to have all your required lab tests. This way, your medicines can be refilled on time.  Do not use any drugs not prescribed by your doctor.  Avoid alcohol.

## 2019-04-30 NOTE — PLAN OF CARE
Discharge Note    Patient Discharged to home on 4/30/2019 1:38 PM via No transportation concerns accompanied by staff.     Patient and family informed of discharge instructions in AVS. patient verbalizes understanding and denies having any questions pertaining to AVS. Patient stable at time of discharge. Patient denies SI, HI, and thoughts of self harm at time of discharge. All personal belongings returned to patient. Discharge prescriptions sent to [facility] via fax. Psych evaluation, history and physical, AVS, and discharge summary.     Alejandra Ozuna  4/30/2019  1:55 PM

## 2019-04-30 NOTE — DISCHARGE SUMMARY
"[facility]    Discharge Summary  Adult Psychiatry    Date of Admission:  4/24/2019  Date of Discharge:  4/30/2019  Discharging Provider: Sammie Shields    Discharge Diagnoses   Principal Discharge Diagnosis: Major Depressive Disorder, severe, recurrent, without psychotic features  Secondary Discharge Diagnosis: Bereavement, severe  Medical Diagnoses addressed this admission: None    History of Present Illness   (per admit/ED) Hx of ROWAN, depression, and post partum depression. Pt presented to ED on 4/23 reporting intense grief; pt's son was reportedly murdered by pt's mother 8 days ago by choking. Pt is reporting \"strong suicidal thoughts\" with no specific plan, prior to her son's murder, pt states her son and her evy always prevented her from acting on her SI; pt unable to safety plan. Pt reports she has had a depressed mood for the past year and has tried several psych meds with minimal success; most recent change was lowered dose of Lorazepam and started on new med but pt doesn't know what it is called. No hx of SIB. No hx of  mental health admits.       (initial H&P) Patient is not very receptive to interview initially, she states \"I wish people would stop asking me stupid questions\" - so the rest of the interview, she provided what information she wanted to talk about, mainly regarding her Tenriism and forgiveness.  Additional information in the report was gathered from chart review.     She tells me she has suicidal thoughts \"every second and minute\" - however she follows up with she would not act on this because she would go to [facility] and she wants to go to heaven with her son.  She informs me that her Tenriism protects her from suicide.  Her Tenriism is Amish, Allah is God.  Patient informs me she was in [place] recently and she left her son with her mother.  She reports her mother has been very ill all her life, \"mentally\".  She states \"I wish I could describe how I feel\".  Patient " "states \"my son is not lost, he is in heaven and in my heart\".  She states several times \"I am doing this for my mom\", \"I love my mother and forgive her\".  When asked how I can help, she responds \"pray for me\" and \"give me the medications you think is best\".  She is thankful for my sitting with her and only asks that I pray for her.     Past Psychiatric History:   Outpatient  visits starting in  with post partum depression after the birth of her son, prescribed Celexa.  Was prescribed Zoloft (which made her dizzy and nauseous) and trazodone (not helpful), most recently prescribed mirtazapine and hydroxyzine.  Patient has no history of suicide attempts or hospitalizations due to mental health.  She had some psychotherapy in , none currently.  She was seeing Dr. Jacques Horan from [facility] in [place].     Social History:   Continues to remain unclear regarding the events and where patient was living at the time of death of her son.  Understanding review of records indicate she was in possibly living in [place], left her son with her mother and he  there.  Patient has a sister and brother in [place], a cousin and Aunt in [place].  Patient's  is in [place].  Patient is originally from [place], is unemployed currently.  [employer]regational Bahai.     Chemical Use History:   No history of any substance use.      Hospital Course   Patient going through extreme bereavement, complicated with already existing depression.  She was able to stabilize for the most part, became much more cooperative and expressive, showed appropriate grief at times, and practices her Bahai for comfort and protective factor.  Patient understands appropriately she will likely need much time for recovery and plans to seek trauma based therapy when she is ready.  Patient does endorse underlying hopelessness and thoughts of not wanting to be on this earth, however she denies having plan or intent of self harm as " this goes against her Voodoo.  Patient was accepting of taking Zyprexa for help with sleep, anxiety and racing thoughts with good benefit.  She should continue mirtazapine and Lamictal, and follow up with outpatient provider for additional recommendations - which could be further increase in mood stabilizer, switching, or possible antidepressant.  At the time of discharge, there is not evidence this patient is in imminent danger of self harm or harming others and will be discharging with family today.        Sammie Shields    Significant Results and Procedures   None    Pending Results   None  Unresulted Labs Ordered in the Past 30 Days of this Admission     No orders found from 2/23/2019 to 4/25/2019.        Code Status   Full Code    Primary Care Physician   [facility]    Physical Exam   Appearance:  awake, alert  Attitude:  cooperative  Eye Contact:  good  Mood:  sad  and depressed  Affect:  mood congruent  Speech:  clear, coherent  Psychomotor Behavior:  no evidence of tardive dyskinesia, dystonia, or tics  Thought Process:  goal oriented  Associations:  no loose associations  Thought Content:  no evidence of psychotic thought and passive suicidal ideation present  Insight:  fair  Judgment:  fair  Oriented to:  time, person, and place  Attention Span and Concentration:  intact  Recent and Remote Memory:  intact  Language: Able to name objects, Able to repeat phrases and Able to read and write  Fund of Knowledge: appropriate  Muscle Strength and Tone: normal  Gait and Station: Normal    Time Spent on this Encounter   I, Sammie Shields, personally saw the patient today and spent greater than 30 minutes discharging this patient.    Discharge Disposition   Discharged to stay with family - Cousin  Condition at discharge: Satisfactory    Consultations This Hospital Stay   None    Discharge Orders   No discharge procedures on file.  Discharge Medications   Current Discharge Medication List       START taking these medications    Details   fish oil-omega-3 fatty acids 1000 MG capsule Take 1 capsule (1,000 mg) by mouth 3 times daily  Qty: 90 capsule, Refills: 0    Associated Diagnoses: Depression with suicidal ideation      !! OLANZapine (ZYPREXA) 5 MG tablet Take 1 tablet (5 mg) by mouth At Bedtime  Qty: 30 tablet, Refills: 0    Associated Diagnoses: Depression with suicidal ideation      !! OLANZapine (ZYPREXA) 5 MG tablet Take 1 tablet (5 mg) by mouth 2 times daily as needed (anxiety or agitation)  Qty: 60 tablet, Refills: 0    Associated Diagnoses: Depression with suicidal ideation      vitamin D3 2000 units tablet Take 2,000 Units by mouth daily  Qty: 30 tablet, Refills: 0    Associated Diagnoses: Depression with suicidal ideation       !! - Potential duplicate medications found. Please discuss with provider.      CONTINUE these medications which have CHANGED    Details   lamoTRIgine (LAMICTAL) 25 MG tablet Take 2 tablets (50 mg) by mouth daily  Qty: 30 tablet, Refills: 0    Associated Diagnoses: Depression with suicidal ideation      levothyroxine (SYNTHROID/LEVOTHROID) 25 MCG tablet Take 1 tablet (25 mcg) by mouth every morning  Qty: 30 tablet, Refills: 0    Associated Diagnoses: Hypothyroidism, unspecified type      mirtazapine (REMERON) 15 MG tablet Take 1 tablet (15 mg) by mouth At Bedtime  Qty: 30 tablet, Refills: 0    Associated Diagnoses: Depression with suicidal ideation           Allergies   Allergies   Allergen Reactions     Pork Derived Products Other (See Comments)     Pt does not ingest of these products.

## 2023-05-20 ENCOUNTER — HOSPITAL ENCOUNTER (EMERGENCY)
Facility: CLINIC | Age: 38
Discharge: HOME | End: 2023-05-20
Attending: EMERGENCY MEDICINE | Admitting: EMERGENCY MEDICINE
Payer: COMMERCIAL

## 2023-05-20 VITALS
RESPIRATION RATE: 18 BRPM | DIASTOLIC BLOOD PRESSURE: 92 MMHG | TEMPERATURE: 98.1 F | SYSTOLIC BLOOD PRESSURE: 134 MMHG | OXYGEN SATURATION: 100 % | HEART RATE: 55 BPM

## 2023-05-20 DIAGNOSIS — R09.89 THROAT TIGHTNESS: ICD-10-CM

## 2023-05-20 DIAGNOSIS — R10.13 EPIGASTRIC PAIN: ICD-10-CM

## 2023-05-20 LAB
HOLD SPECIMEN: NORMAL

## 2023-05-20 PROCEDURE — 99283 EMERGENCY DEPT VISIT LOW MDM: CPT

## 2023-05-20 PROCEDURE — 93005 ELECTROCARDIOGRAM TRACING: CPT

## 2023-05-20 RX ORDER — FAMOTIDINE 20 MG/1
20 TABLET, FILM COATED ORAL 2 TIMES DAILY
Qty: 60 TABLET | Refills: 0 | Status: SHIPPED | OUTPATIENT
Start: 2023-05-20 | End: 2023-06-20

## 2023-05-20 RX ORDER — SUCRALFATE 1 G/1
1 TABLET ORAL 4 TIMES DAILY
Qty: 20 TABLET | Refills: 0 | Status: SHIPPED | OUTPATIENT
Start: 2023-05-20 | End: 2023-06-18

## 2023-05-20 ASSESSMENT — ACTIVITIES OF DAILY LIVING (ADL): ADLS_ACUITY_SCORE: 35

## 2023-05-20 NOTE — ED PROVIDER NOTES
History     Chief Complaint:  Shortness of Breath    The history is provided by the patient.      Kimmy Mendez is a 37 year old female with shortness of breath. The patient reports that for the past three days, she will wake up in the middle of the night after just a few hours of sleep feeling very short of breath, particularly her throat feels tight. This same thing happened yesterday as well when she woke up from a nap in the middle of the day. She denies any postnasal drip.    Independent Historian:   None - Patient Only    Review of External Notes:   None     Medications:    Zyprexa  Remeron  Synthroid  Lamictal     Past Medical History:    Depression   SI  Grief reaction with prolonged bereavement   Helicobacter pylori gastritis  Vitamin D deficiency   Anxiety     Past Surgical History:    Upper gastrointestinal endoscopy      Physical Exam     Patient Vitals for the past 24 hrs:   BP Temp Temp src Pulse Resp SpO2   05/20/23 1900 (!) 134/92 -- -- 55 -- 100 %   05/20/23 1830 (!) 138/96 -- -- 54 -- 100 %   05/20/23 1826 134/76 -- -- 52 -- 100 %   05/20/23 1815 (!) 154/80 -- -- 61 -- 100 %   05/20/23 1656 (!) 140/89 98.1  F (36.7  C) Oral 71 18 100 %        Physical Exam  Constitutional: Alert, attentive, GCS 15   Eyes: EOM are normal, anicteric, conjugate gaze  CV: distal extremities warm, well perfused  Chest: Non-labored breathing on RA  GI:  non tender. No distension. No guarding or rebound.    Neurological: Alert, attentive, moving all extremities equally.   Skin: Skin is warm and dry.        Emergency Department Course     Emergency Department Course & Assessments:       Interventions:  Medications - No data to display     Assessments:  1838 I obtained history and examined the patient as noted above. Explained findings. Prepared for discharge.     Independent Interpretation (X-rays, CTs, rhythm strip):  None    Consultations/Discussion of Management or Tests:  None        Social Determinants of Health  affecting care:   None    Disposition:  The patient was discharged to home.     Impression & Plan      Medical Decision Making:  Is a 37-year-old woman presenting for evaluation of throat tightness and upper abdominal pain that only occurs when laying supine.  She denies any pain with eating, denies any chest pain, chest pressure shortness of breath and walking around during the day.  Given this is isolated with positional changes and only in supine position I have high suspicion for stomach acid as etiology with either esophageal, gastric irritation versus potential aspiration of gastric acid.  I recommended Pepcid, continuation of Nexium which she started today and feels improved and Carafate.  Given her benign exam with no right upper quadrant tenderness, no chest pain or shortness of breath here in the emergency department, I see no indication for labs or imaging.  Return precautions reviewed and she was discharged.    Diagnosis:    ICD-10-CM    1. Epigastric pain  R10.13       2. Throat tightness  R09.89            Discharge Medications:  New Prescriptions    FAMOTIDINE (PEPCID) 20 MG TABLET    Take 1 tablet (20 mg) by mouth 2 times daily    SUCRALFATE (CARAFATE) 1 GM TABLET    Take 1 tablet (1 g) by mouth 4 times daily      Travon Freeman MD  [employer]  8:09 PM 05/20/23     Scribe Disclosure:  I, Brandyn Светлана, am serving as a scribe at 6:17 PM on 5/20/2023 to document services personally performed by Travon Freeman MD based on my observations and the provider's statements to me.   5/20/2023   Travon Freeman MD Dunbar, John Forrest, MD  05/20/23 2009

## 2023-05-20 NOTE — LETTER
May 20, 2023      To Whom It May Concern:      Kimmy Mendez was seen in our Emergency Department today, 05/20/23.  I expect her condition to improve over the next 1-2 days.  She may return to work/school when improved.    Sincerely,        Mariya MARTI RN

## 2023-05-20 NOTE — DISCHARGE INSTRUCTIONS
You should continue to take the Nexium, you can add in the Pepcid and the Carafate to see if this helps with your symptoms.  You should return to the emergency room if you develop worsening pain, fever, have bloody vomit or black tarry stools.  Otherwise, you should follow your primary doctor for recheck.

## 2023-05-20 NOTE — ED TRIAGE NOTES
Patient arrives from urgent care. Patient states that for the last 3 days she is able to sleep well for 3 hours but than wakes feeling SOB, and feels her throat is tight.      Triage Assessment     Row Name 05/20/23 0907       Triage Assessment (Adult)    Airway WDL WDL       Respiratory WDL    Respiratory WDL WDL       Skin Circulation/Temperature WDL    Skin Circulation/Temperature WDL WDL       Cardiac WDL    Cardiac WDL WDL       Peripheral/Neurovascular WDL    Peripheral Neurovascular WDL WDL       Cognitive/Neuro/Behavioral WDL    Cognitive/Neuro/Behavioral WDL WDL

## 2023-05-22 LAB
ATRIAL RATE - MUSE: 46 BPM
DIASTOLIC BLOOD PRESSURE - MUSE: NORMAL MMHG
INTERPRETATION ECG - MUSE: NORMAL
P AXIS - MUSE: 58 DEGREES
PR INTERVAL - MUSE: 194 MS
QRS DURATION - MUSE: 72 MS
QT - MUSE: 438 MS
QTC - MUSE: 383 MS
R AXIS - MUSE: 25 DEGREES
SYSTOLIC BLOOD PRESSURE - MUSE: NORMAL MMHG
T AXIS - MUSE: 54 DEGREES
VENTRICULAR RATE- MUSE: 46 BPM

## 2023-06-10 PROCEDURE — 99284 EMERGENCY DEPT VISIT MOD MDM: CPT | Mod: 25

## 2023-06-10 PROCEDURE — 93005 ELECTROCARDIOGRAM TRACING: CPT

## 2023-06-11 ENCOUNTER — HOSPITAL ENCOUNTER (EMERGENCY)
Facility: CLINIC | Age: 38
Discharge: HOME | End: 2023-06-11
Attending: EMERGENCY MEDICINE | Admitting: EMERGENCY MEDICINE
Payer: COMMERCIAL

## 2023-06-11 VITALS
OXYGEN SATURATION: 100 % | RESPIRATION RATE: 16 BRPM | HEIGHT: 63 IN | DIASTOLIC BLOOD PRESSURE: 92 MMHG | HEART RATE: 71 BPM | TEMPERATURE: 97.1 F | SYSTOLIC BLOOD PRESSURE: 144 MMHG | WEIGHT: 117.73 LBS | BODY MASS INDEX: 20.86 KG/M2

## 2023-06-11 DIAGNOSIS — R06.00 DYSPNEA, UNSPECIFIED TYPE: ICD-10-CM

## 2023-06-11 LAB
ALBUMIN SERPL BCG-MCNC: 4.8 G/DL (ref 3.5–5.2)
ALP SERPL-CCNC: 78 U/L (ref 35–104)
ALT SERPL W P-5'-P-CCNC: 24 U/L (ref 10–35)
ANION GAP SERPL CALCULATED.3IONS-SCNC: 11 MMOL/L (ref 7–15)
AST SERPL W P-5'-P-CCNC: 32 U/L (ref 10–35)
BASOPHILS # BLD AUTO: 0.1 10E3/UL (ref 0–0.2)
BASOPHILS NFR BLD AUTO: 1 %
BILIRUB SERPL-MCNC: 0.2 MG/DL
BUN SERPL-MCNC: 6.3 MG/DL (ref 6–20)
CALCIUM SERPL-MCNC: 9.7 MG/DL (ref 8.6–10)
CHLORIDE SERPL-SCNC: 101 MMOL/L (ref 98–107)
CREAT SERPL-MCNC: 0.58 MG/DL (ref 0.51–0.95)
DEPRECATED HCO3 PLAS-SCNC: 24 MMOL/L (ref 22–29)
EOSINOPHIL # BLD AUTO: 0.4 10E3/UL (ref 0–0.7)
EOSINOPHIL NFR BLD AUTO: 7 %
ERYTHROCYTE [DISTWIDTH] IN BLOOD BY AUTOMATED COUNT: 13 % (ref 10–15)
FLUAV RNA SPEC QL NAA+PROBE: NEGATIVE
FLUBV RNA RESP QL NAA+PROBE: NEGATIVE
GFR SERPL CREATININE-BSD FRML MDRD: >90 ML/MIN/1.73M2
GLUCOSE SERPL-MCNC: 102 MG/DL (ref 70–99)
HCG SERPL QL: NEGATIVE
HCT VFR BLD AUTO: 40.8 % (ref 35–47)
HGB BLD-MCNC: 14.1 G/DL (ref 11.7–15.7)
HOLD SPECIMEN: NORMAL
IMM GRANULOCYTES # BLD: 0 10E3/UL
IMM GRANULOCYTES NFR BLD: 0 %
LIPASE SERPL-CCNC: 45 U/L (ref 13–60)
LYMPHOCYTES # BLD AUTO: 3.3 10E3/UL (ref 0.8–5.3)
LYMPHOCYTES NFR BLD AUTO: 56 %
MAGNESIUM SERPL-MCNC: 2 MG/DL (ref 1.7–2.3)
MCH RBC QN AUTO: 29.7 PG (ref 26.5–33)
MCHC RBC AUTO-ENTMCNC: 34.6 G/DL (ref 31.5–36.5)
MCV RBC AUTO: 86 FL (ref 78–100)
MONOCYTES # BLD AUTO: 0.4 10E3/UL (ref 0–1.3)
MONOCYTES NFR BLD AUTO: 7 %
NEUTROPHILS # BLD AUTO: 1.7 10E3/UL (ref 1.6–8.3)
NEUTROPHILS NFR BLD AUTO: 29 %
NRBC # BLD AUTO: 0 10E3/UL
NRBC BLD AUTO-RTO: 0 /100
PLATELET # BLD AUTO: 295 10E3/UL (ref 150–450)
POTASSIUM SERPL-SCNC: 3.8 MMOL/L (ref 3.4–5.3)
PROT SERPL-MCNC: 7.2 G/DL (ref 6.4–8.3)
RBC # BLD AUTO: 4.74 10E6/UL (ref 3.8–5.2)
RSV RNA SPEC NAA+PROBE: NEGATIVE
SARS-COV-2 RNA RESP QL NAA+PROBE: NEGATIVE
SODIUM SERPL-SCNC: 136 MMOL/L (ref 136–145)
TROPONIN T SERPL HS-MCNC: <6 NG/L
WBC # BLD AUTO: 5.8 10E3/UL (ref 4–11)

## 2023-06-11 PROCEDURE — 83735 ASSAY OF MAGNESIUM: CPT | Performed by: EMERGENCY MEDICINE

## 2023-06-11 PROCEDURE — 258N000003 HC RX IP 258 OP 636: Performed by: EMERGENCY MEDICINE

## 2023-06-11 PROCEDURE — 36415 COLL VENOUS BLD VENIPUNCTURE: CPT | Performed by: EMERGENCY MEDICINE

## 2023-06-11 PROCEDURE — 87637 SARSCOV2&INF A&B&RSV AMP PRB: CPT | Performed by: EMERGENCY MEDICINE

## 2023-06-11 PROCEDURE — 83690 ASSAY OF LIPASE: CPT | Performed by: EMERGENCY MEDICINE

## 2023-06-11 PROCEDURE — 84484 ASSAY OF TROPONIN QUANT: CPT | Performed by: EMERGENCY MEDICINE

## 2023-06-11 PROCEDURE — 84703 CHORIONIC GONADOTROPIN ASSAY: CPT | Performed by: EMERGENCY MEDICINE

## 2023-06-11 PROCEDURE — 96360 HYDRATION IV INFUSION INIT: CPT

## 2023-06-11 PROCEDURE — 85025 COMPLETE CBC W/AUTO DIFF WBC: CPT | Performed by: EMERGENCY MEDICINE

## 2023-06-11 PROCEDURE — 80053 COMPREHEN METABOLIC PANEL: CPT | Performed by: EMERGENCY MEDICINE

## 2023-06-11 RX ADMIN — SODIUM CHLORIDE 1000 ML: 9 INJECTION, SOLUTION INTRAVENOUS at 00:53

## 2023-06-11 ASSESSMENT — ACTIVITIES OF DAILY LIVING (ADL): ADLS_ACUITY_SCORE: 35

## 2023-06-11 NOTE — LETTER
June 11, 2023      To Whom It May Concern:      Kimmy Mendez was seen in our Emergency Department today, 06/11/23.  I expect her condition to improve over the next 3 days.  She may return to work when improved.    Sincerely,        Charla PAYNE RN

## 2023-06-11 NOTE — Clinical Note
Kimmy Mendez was seen and treated in our emergency department on 6/10/2023.  She may return to work on 06/13/2023.       If you have any questions or concerns, please don't hesitate to call.      Ana Lynch, DO

## 2023-06-11 NOTE — ED TRIAGE NOTES
SOB with lying flat, lethargic, all over body tingling and tongue heaviness for approx 4 weeks. Hx of H. Pylori - abx completed - now symptoms back.

## 2023-06-11 NOTE — ED PROVIDER NOTES
History     Chief Complaint:  Multiple Symptoms       The history is provided by the patient and a friend.      Kimmy Mendez is a 37 year old female with history of gestational diabetes mellitus and hypothyroidism who presents to the ED with multiple symptoms. The patient reports that for the past 2 days she is experiencing worsening fatigue, shortness of breath,  tongue heaviness, lethargy, tingling in extremities, and lightheadedness. The friend of the patient reports that the patient is only experiencing shortness of breath when the patient sleeps and it causes her to wake up suddenly.  She reports this has been intermittently going on for a month.  She has recently worn a holter monitor for 14 days and turned it in yesterday.  She states that she is worried that her symptoms are related to her heart.  She denies recent sore throat, abdominal pain, chest pain, neck pain, black or bloody stool, hormone use, birth control use, long travels or trips, or family hx of heart attack under 50 years of age.     Independent Historian:   Friend - They report supplemental history    Review of External Notes: 5/25/23 office visit      Medications:    Magnesium   Famotidine  Sucralfate  Omeprazole  Ondansetron  Probiotic  Cholecalciferol    Past Medical History:    Gestational diabetes mellitus  Depression with suicidal ideation  Hypothyroidism  Generalized anxiety disorder  Grief reaction with prolonged bereavement  Helicobacter pylori gastritis  Positive GBS test  Vitamin D deficiency    Past Surgical History:    Upper gastrointestinal endoscopy    Physical Exam     Patient Vitals for the past 24 hrs:   BP Temp Temp src Pulse Resp SpO2 Height Weight   06/11/23 0200 (!) 144/92 -- -- 71 16 100 % -- --   06/11/23 0150 -- -- -- -- -- 100 % -- --   06/11/23 0140 -- -- -- -- -- 100 % -- --   06/11/23 0130 (!) 148/93 -- -- 71 -- 100 % -- --   06/11/23 0100 (!) 170/112 -- -- 73 -- -- -- --   06/11/23 0055 (!) 153/108 -- -- 66 --  "100 % -- --   06/11/23 0001 (!) 145/104 97.1  F (36.2  C) Temporal 86 18 100 % 1.6 m (5' 3\") 53.4 kg (117 lb 11.6 oz)        Physical Exam  Nursing note and vitals reviewed.  Constitutional: Well nourished.   Eyes: Conjunctiva normal.  Pupils are equal, round, and reactive to light.   ENT: Nose normal. Mucous membranes pink and moist.  No posterior oropharyngeal erythema/exudate. Uvula midline  Neck: Normal range of motion.  CVS: Normal rate, regular rhythm.  Normal heart sounds.  No murmur.  Pulmonary: Lungs clear to auscultation bilaterally. No wheezes/rales/rhonchi.  GI: Abdomen soft. Nontender, nondistended. No rigidity or guarding.    MSK: No calf tenderness or swelling.  Neuro: Alert. Follows simple commands.  Skin: Skin is warm and dry. No rash noted.   Psychiatric: Anxious appearing      Emergency Department Course   ECG  ECG taken at 1609  Sinus bradycardia with premature atrial complexes in a pattern of bigeminy   Nonspecific T wave abnormality   Abnormal ECG  Rate 54 bpm. CT interval 206 ms. QRS duration 72 ms. QT/QTc 404/383 ms. P-R-T axes 45 18 42.     Imaging:  Echo Stress Echocardiogram    (Results Pending)      Report per radiology    Laboratory:  Labs Ordered and Resulted from Time of ED Arrival to Time of ED Departure   COMPREHENSIVE METABOLIC PANEL - Abnormal       Result Value    Sodium 136      Potassium 3.8      Chloride 101      Carbon Dioxide (CO2) 24      Anion Gap 11      Urea Nitrogen 6.3      Creatinine 0.58      Calcium 9.7      Glucose 102 (*)     Alkaline Phosphatase 78      AST 32      ALT 24      Protein Total 7.2      Albumin 4.8      Bilirubin Total 0.2      GFR Estimate >90     LIPASE - Normal    Lipase 45     TROPONIN T, HIGH SENSITIVITY - Normal    Troponin T, High Sensitivity <6     MAGNESIUM - Normal    Magnesium 2.0     HCG QUALITATIVE PREGNANCY - Normal    hCG Serum Qualitative Negative     INFLUENZA A/B, RSV, & SARS-COV2 PCR - Normal    Influenza A PCR Negative      " Influenza B PCR Negative      RSV PCR Negative      SARS CoV2 PCR Negative     CBC WITH PLATELETS AND DIFFERENTIAL    WBC Count 5.8      RBC Count 4.74      Hemoglobin 14.1      Hematocrit 40.8      MCV 86      MCH 29.7      MCHC 34.6      RDW 13.0      Platelet Count 295      % Neutrophils 29      % Lymphocytes 56      % Monocytes 7      % Eosinophils 7      % Basophils 1      % Immature Granulocytes 0      NRBCs per 100 WBC 0      Absolute Neutrophils 1.7      Absolute Lymphocytes 3.3      Absolute Monocytes 0.4      Absolute Eosinophils 0.4      Absolute Basophils 0.1      Absolute Immature Granulocytes 0.0      Absolute NRBCs 0.0          Emergency Department Course & Assessments:           Interventions:  Medications   0.9% sodium chloride BOLUS (1,000 mLs Intravenous $New Bag 6/11/23 0053)          Independent Interpretation (X-rays, CTs, rhythm strip):  None    Assessments/Consultations/Discussion of Management or Tests:  ED Course as of 06/11/23 0203   Sat Tima 10, 2023   1230 I obtained history and examined the patient as noted above.        Social Determinants of Health affecting care:   None    Disposition:  The patient was discharged to home.     Impression & Plan    Medical Decision Making:  Patient is a 37-year-old female presenting with a myriad of complaints though predominantly dyspnea.  She reports that this has been ongoing for roughly a month.  She is anxious appearing on arrival though nontoxic.  She is mildly hypertensive though is down trended without intervention during her time in the ED.  EKG without STEMI.  High-sensitivity screening troponin negative, she denies any active chest pain and I doubt ACS.  PERC negative, clinically doubt PE.  Labs without profound anemia or significant electrolyte derangements.  We did discuss recommendation for chest x-ray given her complaint though she is declining today.  She reports she had an x-ray roughly a week ago.  She expressed understanding of missed  or delayed diagnoses.  Patient was noted to have a murmur on exam.  I did discuss given her recent Holter monitor, it is not unreasonable for her to consider having an outpatient echo.  She was very persistent that this happened today and unfortunately I do not feel that this is warranted emergently today based on the chronicity of her symptoms.  I will plan to order an echo on discharge and did recommend close PCP follow-up as well as follow-up with the heart and vascular center.  I do suspect some component of underlying anxiety may be precipitating some of her presentation as well.  Patient was quite adamant that additional testing including vitamin levels of B12 be checked today though I discussed unfortunately from the emergency room these labs are not typically tested routinely though encouraged close PCP follow-up.  There is no clinical evidence to suggest pharyngitis, peritonsillar abscess, retropharyngeal abscess or epiglottitis which could be precipitating her dyspnea.  Plan for close outpatient follow-up at this point in time, return precautions given.      Diagnosis:    ICD-10-CM    1. Dyspnea, unspecified type  R06.00 Echo Stress Echocardiogram           Discharge Medications:  New Prescriptions    No medications on file          Scribe Disclosure:  Davina COOLEY, am serving as a scribe at 1:58 AM on 6/11/2023 to document services personally performed by Ana Lynch DO based on my observations and the provider's statements to me.      6/11/2023   Ana Lynch DO McDonald, Lindsey E, DO  06/11/23 0326

## 2023-06-12 ENCOUNTER — TELEPHONE (OUTPATIENT)
Dept: CARDIOLOGY | Facility: CLINIC | Age: 38
End: 2023-06-12

## 2023-06-12 LAB
ATRIAL RATE - MUSE: 54 BPM
DIASTOLIC BLOOD PRESSURE - MUSE: NORMAL MMHG
INTERPRETATION ECG - MUSE: NORMAL
P AXIS - MUSE: 45 DEGREES
PR INTERVAL - MUSE: 206 MS
QRS DURATION - MUSE: 72 MS
QT - MUSE: 404 MS
QTC - MUSE: 383 MS
R AXIS - MUSE: 18 DEGREES
SYSTOLIC BLOOD PRESSURE - MUSE: NORMAL MMHG
T AXIS - MUSE: 42 DEGREES
VENTRICULAR RATE- MUSE: 54 BPM

## 2023-06-12 NOTE — TELEPHONE ENCOUNTER
[facility] Call Center    Phone Message    May a detailed message be left on voicemail: yes     Reason for Call: Other: Please call the patient to schedule the Echo stress test in [place] and would like to have it today if possible      Action Taken: Other: Cardiology    Travel Screening: Not Applicable     Thank you!  [facility]

## 2023-06-13 ENCOUNTER — HOSPITAL ENCOUNTER (OUTPATIENT)
Dept: CARDIOLOGY | Facility: CLINIC | Age: 38
Discharge: HOME | End: 2023-06-13
Attending: EMERGENCY MEDICINE | Admitting: EMERGENCY MEDICINE
Payer: COMMERCIAL

## 2023-06-13 DIAGNOSIS — R06.00 DYSPNEA, UNSPECIFIED TYPE: ICD-10-CM

## 2023-06-13 PROCEDURE — 93350 STRESS TTE ONLY: CPT | Mod: 26 | Performed by: INTERNAL MEDICINE

## 2023-06-13 PROCEDURE — C8928 TTE W OR W/O FOL W/CON,STRES: HCPCS

## 2023-06-13 PROCEDURE — 93016 CV STRESS TEST SUPVJ ONLY: CPT | Performed by: INTERNAL MEDICINE

## 2023-06-13 PROCEDURE — 93321 DOPPLER ECHO F-UP/LMTD STD: CPT | Mod: 26 | Performed by: INTERNAL MEDICINE

## 2023-06-13 PROCEDURE — 93018 CV STRESS TEST I&R ONLY: CPT | Performed by: INTERNAL MEDICINE

## 2023-06-13 PROCEDURE — 93325 DOPPLER ECHO COLOR FLOW MAPG: CPT | Mod: 26 | Performed by: INTERNAL MEDICINE

## 2023-06-13 PROCEDURE — 255N000002 HC RX 255 OP 636: Performed by: EMERGENCY MEDICINE

## 2023-06-13 PROCEDURE — 999N000208 ECHO STRESS ECHOCARDIOGRAM

## 2023-06-13 RX ADMIN — HUMAN ALBUMIN MICROSPHERES AND PERFLUTREN 9 ML: 10; .22 INJECTION, SOLUTION INTRAVENOUS at 11:39

## 2023-06-18 ENCOUNTER — HOSPITAL ENCOUNTER (EMERGENCY)
Facility: CLINIC | Age: 38
Discharge: HOME | End: 2023-06-19
Attending: STUDENT IN AN ORGANIZED HEALTH CARE EDUCATION/TRAINING PROGRAM | Admitting: STUDENT IN AN ORGANIZED HEALTH CARE EDUCATION/TRAINING PROGRAM
Payer: COMMERCIAL

## 2023-06-18 ENCOUNTER — APPOINTMENT (OUTPATIENT)
Dept: CT IMAGING | Facility: CLINIC | Age: 38
End: 2023-06-18
Attending: STUDENT IN AN ORGANIZED HEALTH CARE EDUCATION/TRAINING PROGRAM
Payer: COMMERCIAL

## 2023-06-18 DIAGNOSIS — F44.5 PSYCHIATRIC PSEUDOSEIZURE: ICD-10-CM

## 2023-06-18 DIAGNOSIS — E87.6 HYPOKALEMIA: ICD-10-CM

## 2023-06-18 DIAGNOSIS — F43.21 GRIEF: ICD-10-CM

## 2023-06-18 LAB
ANION GAP SERPL CALCULATED.3IONS-SCNC: 17 MMOL/L (ref 7–15)
BASOPHILS # BLD AUTO: 0 10E3/UL (ref 0–0.2)
BASOPHILS NFR BLD AUTO: 1 %
BUN SERPL-MCNC: 4.5 MG/DL (ref 6–20)
CALCIUM SERPL-MCNC: 9 MG/DL (ref 8.6–10)
CHLORIDE SERPL-SCNC: 100 MMOL/L (ref 98–107)
CREAT SERPL-MCNC: 0.63 MG/DL (ref 0.51–0.95)
DEPRECATED HCO3 PLAS-SCNC: 18 MMOL/L (ref 22–29)
EOSINOPHIL # BLD AUTO: 0 10E3/UL (ref 0–0.7)
EOSINOPHIL NFR BLD AUTO: 1 %
ERYTHROCYTE [DISTWIDTH] IN BLOOD BY AUTOMATED COUNT: 12.9 % (ref 10–15)
GFR SERPL CREATININE-BSD FRML MDRD: >90 ML/MIN/1.73M2
GLUCOSE BLDC GLUCOMTR-MCNC: 150 MG/DL (ref 70–99)
GLUCOSE SERPL-MCNC: 156 MG/DL (ref 70–99)
HCG INTACT+B SERPL-ACNC: <1 MIU/ML
HCT VFR BLD AUTO: 37.6 % (ref 35–47)
HGB BLD-MCNC: 12.9 G/DL (ref 11.7–15.7)
IMM GRANULOCYTES # BLD: 0 10E3/UL
IMM GRANULOCYTES NFR BLD: 0 %
LYMPHOCYTES # BLD AUTO: 0.9 10E3/UL (ref 0.8–5.3)
LYMPHOCYTES NFR BLD AUTO: 15 %
MAGNESIUM SERPL-MCNC: 1.7 MG/DL (ref 1.7–2.3)
MCH RBC QN AUTO: 29.5 PG (ref 26.5–33)
MCHC RBC AUTO-ENTMCNC: 34.3 G/DL (ref 31.5–36.5)
MCV RBC AUTO: 86 FL (ref 78–100)
MONOCYTES # BLD AUTO: 0.4 10E3/UL (ref 0–1.3)
MONOCYTES NFR BLD AUTO: 6 %
NEUTROPHILS # BLD AUTO: 4.8 10E3/UL (ref 1.6–8.3)
NEUTROPHILS NFR BLD AUTO: 77 %
NRBC # BLD AUTO: 0 10E3/UL
NRBC BLD AUTO-RTO: 0 /100
PLATELET # BLD AUTO: 256 10E3/UL (ref 150–450)
POTASSIUM SERPL-SCNC: 3.3 MMOL/L (ref 3.4–5.3)
RBC # BLD AUTO: 4.37 10E6/UL (ref 3.8–5.2)
SODIUM SERPL-SCNC: 135 MMOL/L (ref 136–145)
WBC # BLD AUTO: 6.1 10E3/UL (ref 4–11)

## 2023-06-18 PROCEDURE — 96374 THER/PROPH/DIAG INJ IV PUSH: CPT

## 2023-06-18 PROCEDURE — 84702 CHORIONIC GONADOTROPIN TEST: CPT | Performed by: STUDENT IN AN ORGANIZED HEALTH CARE EDUCATION/TRAINING PROGRAM

## 2023-06-18 PROCEDURE — 70450 CT HEAD/BRAIN W/O DYE: CPT

## 2023-06-18 PROCEDURE — 96372 THER/PROPH/DIAG INJ SC/IM: CPT | Performed by: STUDENT IN AN ORGANIZED HEALTH CARE EDUCATION/TRAINING PROGRAM

## 2023-06-18 PROCEDURE — 250N000011 HC RX IP 250 OP 636

## 2023-06-18 PROCEDURE — 99285 EMERGENCY DEPT VISIT HI MDM: CPT | Mod: 25

## 2023-06-18 PROCEDURE — 36415 COLL VENOUS BLD VENIPUNCTURE: CPT | Performed by: STUDENT IN AN ORGANIZED HEALTH CARE EDUCATION/TRAINING PROGRAM

## 2023-06-18 PROCEDURE — 83735 ASSAY OF MAGNESIUM: CPT | Performed by: STUDENT IN AN ORGANIZED HEALTH CARE EDUCATION/TRAINING PROGRAM

## 2023-06-18 PROCEDURE — 250N000011 HC RX IP 250 OP 636: Performed by: STUDENT IN AN ORGANIZED HEALTH CARE EDUCATION/TRAINING PROGRAM

## 2023-06-18 PROCEDURE — 93005 ELECTROCARDIOGRAM TRACING: CPT

## 2023-06-18 PROCEDURE — 80048 BASIC METABOLIC PNL TOTAL CA: CPT | Performed by: STUDENT IN AN ORGANIZED HEALTH CARE EDUCATION/TRAINING PROGRAM

## 2023-06-18 PROCEDURE — 82962 GLUCOSE BLOOD TEST: CPT

## 2023-06-18 PROCEDURE — 90791 PSYCH DIAGNOSTIC EVALUATION: CPT

## 2023-06-18 PROCEDURE — 85025 COMPLETE CBC W/AUTO DIFF WBC: CPT | Performed by: STUDENT IN AN ORGANIZED HEALTH CARE EDUCATION/TRAINING PROGRAM

## 2023-06-18 PROCEDURE — 258N000003 HC RX IP 258 OP 636: Performed by: STUDENT IN AN ORGANIZED HEALTH CARE EDUCATION/TRAINING PROGRAM

## 2023-06-18 PROCEDURE — 250N000013 HC RX MED GY IP 250 OP 250 PS 637: Performed by: STUDENT IN AN ORGANIZED HEALTH CARE EDUCATION/TRAINING PROGRAM

## 2023-06-18 PROCEDURE — 96361 HYDRATE IV INFUSION ADD-ON: CPT

## 2023-06-18 RX ORDER — LORAZEPAM 2 MG/ML
1 INJECTION INTRAMUSCULAR ONCE
Status: COMPLETED | OUTPATIENT
Start: 2023-06-18 | End: 2023-06-18

## 2023-06-18 RX ORDER — OLANZAPINE 10 MG/2ML
10 INJECTION, POWDER, FOR SOLUTION INTRAMUSCULAR DAILY PRN
Status: DISCONTINUED | OUTPATIENT
Start: 2023-06-18 | End: 2023-06-19 | Stop reason: HOSPADM

## 2023-06-18 RX ORDER — POTASSIUM CHLORIDE 1.5 G/1.58G
40 POWDER, FOR SOLUTION ORAL ONCE
Status: COMPLETED | OUTPATIENT
Start: 2023-06-18 | End: 2023-06-18

## 2023-06-18 RX ORDER — ONDANSETRON 4 MG/1
4 TABLET, FILM COATED ORAL EVERY 8 HOURS PRN
COMMUNITY
End: 2023-06-20

## 2023-06-18 RX ORDER — LANOLIN ALCOHOL/MO/W.PET/CERES
3 CREAM (GRAM) TOPICAL
Status: DISCONTINUED | OUTPATIENT
Start: 2023-06-18 | End: 2023-06-19 | Stop reason: HOSPADM

## 2023-06-18 RX ORDER — HYDROXYZINE HYDROCHLORIDE 10 MG/1
10-20 TABLET, FILM COATED ORAL
Status: ON HOLD | COMMUNITY
End: 2023-06-27

## 2023-06-18 RX ORDER — LORAZEPAM 2 MG/ML
INJECTION INTRAMUSCULAR
Status: COMPLETED
Start: 2023-06-18 | End: 2023-06-18

## 2023-06-18 RX ORDER — LORAZEPAM 1 MG/1
1 TABLET ORAL EVERY 8 HOURS PRN
Status: DISCONTINUED | OUTPATIENT
Start: 2023-06-18 | End: 2023-06-19 | Stop reason: HOSPADM

## 2023-06-18 RX ADMIN — OLANZAPINE 10 MG: 10 INJECTION, POWDER, FOR SOLUTION INTRAMUSCULAR at 13:49

## 2023-06-18 RX ADMIN — POTASSIUM CHLORIDE FOR ORAL SOLUTION 40 MEQ: 1.5 POWDER, FOR SOLUTION ORAL at 12:37

## 2023-06-18 RX ADMIN — SODIUM CHLORIDE 1000 ML: 9 INJECTION, SOLUTION INTRAVENOUS at 15:02

## 2023-06-18 RX ADMIN — LORAZEPAM 1 MG: 2 INJECTION INTRAMUSCULAR at 13:21

## 2023-06-18 RX ADMIN — SODIUM CHLORIDE, POTASSIUM CHLORIDE, SODIUM LACTATE AND CALCIUM CHLORIDE 1000 ML: 600; 310; 30; 20 INJECTION, SOLUTION INTRAVENOUS at 12:38

## 2023-06-18 RX ADMIN — LORAZEPAM 1 MG: 2 INJECTION INTRAMUSCULAR; INTRAVENOUS at 13:21

## 2023-06-18 ASSESSMENT — ACTIVITIES OF DAILY LIVING (ADL)
ADLS_ACUITY_SCORE: 35

## 2023-06-18 ASSESSMENT — COLUMBIA-SUICIDE SEVERITY RATING SCALE - C-SSRS
TOTAL  NUMBER OF ABORTED OR SELF INTERRUPTED ATTEMPTS LIFETIME: NO
2. HAVE YOU ACTUALLY HAD ANY THOUGHTS OF KILLING YOURSELF?: NO
ATTEMPT LIFETIME: NO
TOTAL  NUMBER OF INTERRUPTED ATTEMPTS LIFETIME: NO
6. HAVE YOU EVER DONE ANYTHING, STARTED TO DO ANYTHING, OR PREPARED TO DO ANYTHING TO END YOUR LIFE?: NO
1. HAVE YOU WISHED YOU WERE DEAD OR WISHED YOU COULD GO TO SLEEP AND NOT WAKE UP?: NO

## 2023-06-18 NOTE — ED PROVIDER NOTES
"  History     Chief Complaint:  Seizures     The history is provided by the patient.      Kimmy Mendez is a 37 year old female with a history of gestational diabetes who presents to the ED for seizures. EMS reports two witnessed seizures in the [facility] waiting room. They report that after her seizure she began fighting staff and was put into restraints for her own protection. They report patient was given 10 mg valium. EMS denies seizure disorder history. Patient denies previous history of seizures.     Patient reports bringing her nephew to Urgent Care for him to be checked out. She denies changes in medications, headache, or recent head injury. She reports she feels \"light\", and her hands are cold. Patient reports appointment with cardiology on 6/19/23. She denies chance of pregnancy.    Patient's sister reports previous episode of similar behavior.     Independent Historian:   Sister supplemented patient history as seen above.     Review of External Notes:   Reviewed office visit note for 6/11/23 for dyspnea    Medications:    Pepcid  Lamictal  Synthroid  Remeron  Zyprexa  Carafate  Lamictal    Past Medical History:    MDD  Primigravida in third trimester  Positive GBS test  Prolonged rupture of membranes  Diet controlled gestational diabetes mellitus in third trimester  Vitamin D deficiency  Hypothyroidism  Premature atrial contraction  H pylori infection  ROWAN    Past Surgical History:  Upper gastrointestinal endoscopy    Physical Exam     Patient Vitals for the past 24 hrs:   BP Temp Temp src Pulse Resp SpO2   06/18/23 1543 128/88 -- -- 115 25 100 %   06/18/23 1532 104/70 -- -- 87 21 99 %   06/18/23 1528 104/70 -- -- 57 16 100 %   06/18/23 1512 95/51 -- -- 60 19 100 %   06/18/23 1502 -- -- -- 80 17 100 %   06/18/23 1501 (!) 71/53 -- -- 54 16 100 %   06/18/23 1446 (!) 89/65 -- -- 100 18 98 %   06/18/23 1427 -- 98.2  F (36.8  C) Oral -- -- --   06/18/23 1400 119/86 -- -- 101 22 100 %   06/18/23 1330 (!) " 153/91 -- -- (!) 122 28 100 %   06/18/23 1308 (!) 156/90 -- -- (!) 123 30 --   06/18/23 1108 -- -- -- 96 25 100 %   06/18/23 1100 124/87 99  F (37.2  C) Temporal 102 23 99 %   06/18/23 1052 (!) 131/94 -- -- 111 16 100 %      Physical Exam  GENERAL: Patient well-appearing and in no acute distress. Opened her eyes, confused  HEAD: Atraumatic.  Neck: No rigidity  CV: RRR, no murmurs rubs or gallops  PULM: CTAB with good aeration; no retractions, rales, rhonchi, or wheezing, protecting airway  ABD: Soft, nontender, nondistended, no guarding  DERM: No rash. Skin warm and dry  EXTREMITY: Moving all extremities without difficulty. No calf tenderness or peripheral edema.   VASCULAR: Symmetric pulses bilaterally     Reassessed 1230: NEURO: A,Ox3. CN 2-12 fully intact. Strength 5/5 bilateral LE/UE. Sensation fully intact to light touch symmetrically bilateral LE/UE. Normal finger-to-nose and heel to shin. No ataxia.      Reassessment 1319  GENERAL: Screaming, yelling, attempting to throw herself out of the bed  HEAD: Atraumatic.  Eyes: Anicteric  NOSE: No active bleeding  MOUTH: Moist mucosa  THROAT: Patent airway.   Neck: No rigidity  CV: RRR, no murmurs rubs or gallops  PULM: CTAB with good aeration; no retractions, rales, rhonchi, or wheezing  ABD: Soft, nontender, nondistended, no guarding, no peritoneal signs   DERM: No rash. Skin warm and dry  EXTREMITY: Moving all extremities without difficulty. No calf tenderness or peripheral edema. Patient had to be restrained for her own safety.        Emergency Department Course   ECG  ECG taken at 128, ECG read at 1130  Sinus rhythm with 1st degree AV block  Possible left atrial enlargement   Rate 93 bpm. GA interval 224 ms. QRS duration 68 ms. QT/QTc 360/447 ms. P-R-T axes 58 29 46.     1649  ECG interpreted by me.  Time 1649  Sinus bradycardia rate 58 with first degree AV block and premature atrial complexes in a pattern of bigeminy. No ST elevation or depression. QRS 68, qtc  410. Normal axis.   No evidence of WPW, Brugada, HOCM, ARVD, ASD, or Wellen's.        Imaging:  CT Head w/o Contrast   Final Result   IMPRESSION:       1. No acute intracranial abnormality.         Report per radiology    Laboratory:  Labs Ordered and Resulted from Time of ED Arrival to Time of ED Departure   BASIC METABOLIC PANEL - Abnormal       Result Value    Sodium 135 (*)     Potassium 3.3 (*)     Chloride 100      Carbon Dioxide (CO2) 18 (*)     Anion Gap 17 (*)     Urea Nitrogen 4.5 (*)     Creatinine 0.63      Calcium 9.0      Glucose 156 (*)     GFR Estimate >90     GLUCOSE BY METER - Abnormal    GLUCOSE BY METER POCT 150 (*)    HCG QUANTITATIVE PREGNANCY - Normal    hCG Quantitative <1     MAGNESIUM - Normal    Magnesium 1.7     CBC WITH PLATELETS AND DIFFERENTIAL    WBC Count 6.1      RBC Count 4.37      Hemoglobin 12.9      Hematocrit 37.6      MCV 86      MCH 29.5      MCHC 34.3      RDW 12.9      Platelet Count 256      % Neutrophils 77      % Lymphocytes 15      % Monocytes 6      % Eosinophils 1      % Basophils 1      % Immature Granulocytes 0      NRBCs per 100 WBC 0      Absolute Neutrophils 4.8      Absolute Lymphocytes 0.9      Absolute Monocytes 0.4      Absolute Eosinophils 0.0      Absolute Basophils 0.0      Absolute Immature Granulocytes 0.0      Absolute NRBCs 0.0        Emergency Department Course & Assessments:     Interventions:  Medications   OLANZapine (zyPREXA) injection 10 mg (10 mg Intramuscular $Given 6/18/23 1349)   lactated ringers BOLUS 1,000 mL (0 mLs Intravenous Stopped 6/18/23 1502)   potassium chloride (KLOR-CON) Packet 40 mEq (40 mEq Oral $Given 6/18/23 1237)   LORazepam (ATIVAN) injection 1 mg (1 mg Intravenous $Given 6/18/23 1321)   0.9% sodium chloride BOLUS (1,000 mLs Intravenous $New Bag 6/18/23 1502)      Independent Interpretation (X-rays, CTs, rhythm strip):  CT Head: No bleed noted.     Assessments/Consultations/Discussion of Management or Tests:  ED Course as of  "06/18/23 1833   Sun Jun 18, 2023   1107 I obtained history and examined the patient as noted above.    1154 I obtained history and examined the patient as noted above.    1306 RN reports patient was shaking her arms and appeared to have a seizure.    1315 RN reports seizure.    1354 I spoke with SLIM Pierre regarding the patient.      Social Determinants of Health affecting care:   None    Disposition:  Pending DEC    Impression & Plan      Medical Decision Making:  Patient presented after concern for seizure.  Chronic conditions complicating- Per sister, patient had a similar episode of screaming and yelling and gross agitation years prior that required a 3-day hospital stay.  Differential diagnosis-consider seizure, electrolyte abnormality, infection, among others.  No fever or leukocytosis, no headache, do not think this is meningitis or infection.  Started seizure work-up initially because there was no reported history of seizure.  CT head unremarkable.  hCG negative.  Electrolytes only notable for potassium minimally low at 3.3 which was was repleted orally.  Given IV fluids.  I reassessed patient after a period of time and she was back to her neurologic baseline.  Patient then had an episode where she was shaking both her upper and lower extremities however she was looking around and making eye contact with people.  That lasted less than 30 seconds and then resolved on intervention. This did not appear to be a seizure.  She then later started yelling and screaming and tried to throw herself out of the bed, which is similar to what happened per EMS earlier.  Patient was given a milligram of Ativan but still had episodes of yelling and screaming.  When asked what is wrong she states \"I do not know.\"  Patient required restraints and then was given 10 mg olanzapine and is resting comfortably with a one-to-one.  Patient was placed on a hold.  Consulted DEC who will assess patient this evening.  Turned over " to Dr. Alexander pending DEC assessment.    Diagnosis:    ICD-10-CM    1. Seizures (H)  R56.9 Adult Neurology  Referral      2. Hypokalemia  E87.6       3. Bizarre behavior  R46.2          Scribe Disclosure:  I, Mayte Au, am serving as a scribe at 11:18 AM on 6/18/2023 to document services personally performed by Matt Siddiqui MD based on my observations and the provider's statements to me.   6/18/2023    Matt Siddiqui MD Foss, Kevin, MD  06/18/23 5519

## 2023-06-18 NOTE — ED NOTES
PT has been sleeping for the past hour, restraints were removed as pt was able to answer questions.  Sitter remains at the bedside, code 21 box just outside of the room.  PRN zyprexa ordered in case of any self harm.  Will continue to assess and monitor.

## 2023-06-18 NOTE — PHARMACY-ADMISSION MEDICATION HISTORY
Pharmacist Admission Medication History    Admission medication history is complete. The information provided in this note is only as accurate as the sources available at the time of the update.    Medication reconciliation/reorder completed by provider prior to medication history? No    Information Source(s): Family member via phone    Pertinent Information: Pt's sister states pt finished recent course of abx (tetracycline + Flagyl).    Changes made to PTA medication list:    Added: Zofran, hydroxyzine    Deleted: fish oil, Lamictal, levothyroxine, mirtazapine, olanzapine, sucralfate, vit D    Changed: None       Allergies reviewed with patient and updates made in EHR: no    Medication History Completed By: Fior Waters [facility] 6/18/2023 2:34 PM    Prior to Admission medications    Medication Sig Last Dose Taking? Auth Provider Long Term End Date   famotidine (PEPCID) 20 MG tablet Take 1 tablet (20 mg) by mouth 2 times daily unknown Yes Travon Freeman MD     hydrOXYzine (ATARAX) 10 MG tablet Take 10-20 mg by mouth nightly as needed for itching (sedation) prn at prn Yes Unknown, Entered By History     ondansetron (ZOFRAN) 4 MG tablet Take 4 mg by mouth every 8 hours as needed for nausea prn at prn Yes Unknown, Entered By History

## 2023-06-18 NOTE — ED NOTES
RN notified MD of lower BP readings with lowest of 71/53. HR also going down to low 50's intermittently. Provider aware and order for another liter of NS started. RN will continue to monitor.

## 2023-06-18 NOTE — ED NOTES
Bed: ED27  Expected date: 6/18/23  Expected time: 10:39 AM  Means of arrival: Ambulance  Comments:  BV3  37F  Seizure/medicated

## 2023-06-18 NOTE — ED PROVIDER NOTES
Assumed care from Dr. Siddiqui.  Episode of shaking.  Pseudoseizure per Dr. Siddiqui.  Became agitated.  Received zyprexa.      On 72 hour hold.  Has 1:1.  Awaiting clearance of mental status for DEC eval.  Mental status improved.  Patient calm cooperative.  Patient did speak with dad.  Discussed that consultation with Vivien.  At this time concern for depression but given complex presentation will require family collateral information.  I did speak with patient's sister at earlier in the day.  She expressed that she is very concerned about sisters behavior and wanted to be involved in DEC assessment.  Unfortunately, at time of DEC assessment, we were unable to reach the patient's sister.  Given concern for severe depression and home safety, patient be monitored in the ER overnight awaiting collateral information from family and to assure clinical stability prior to final disposition.    Boarding orders placed.    MD Catherine Dias Kristi Jo Schneider, MD  06/19/23 0057

## 2023-06-18 NOTE — ED NOTES
1315:    Writer called to pt's room when ED tech noted pt was shaking in the bed.  Pt would shake for a several seconds and appeared to open eyes then begin shaking again.  Pt calmed without medications.  About 10 min later pt was witnessed trying to jump head first off of the bed.  Pt was caught by an ED tech before she hit her head and began fighting with staff (hitting and kicking).  Code 21 was called and pt was placed in 5-point restraints for her protection.  ODILON and DEC assessment were ordered.  Pt continues pulling on restraints and screaming.  Sitter at the bedside.  Pt was given 1mg Ativan and 10mg IM zyprexa per MD orders.    Please note that as of approx 1200 pt had been alert and oriented, calm, able to get up to the commode without problem.  Pt stated that she remembered going to [facility] this morning and remembered sitting in a chair.  She appeared to be feeling better and stated she felt ready to go home.  Sitter had been pulled as pt was appropriate and did not appear to be a danger to herself or others.

## 2023-06-18 NOTE — ED TRIAGE NOTES
BIBA in 4 point restraints after 2 witnessed seizures in the [facility] waiting room.  After seizure pt began fighting with staff and was put into restraints for her own protection.  Pt was given 10 mg valium on scene and arrived to [facility] sedated.   Per EMS this is most likely a first seizure.  Out of restraints upon arrival to the ED, VSS, sitter at the bedside.     Triage Assessment     Row Name 06/18/23 1053       Triage Assessment (Adult)    Airway WDL WDL       Respiratory WDL    Respiratory WDL WDL       Skin Circulation/Temperature WDL    Skin Circulation/Temperature WDL WDL       Cardiac WDL    Cardiac WDL WDL       Peripheral/Neurovascular WDL    Peripheral Neurovascular WDL WDL       Cognitive/Neuro/Behavioral WDL    Cognitive/Neuro/Behavioral WDL X;arousability    Level of Consciousness sedated    Arousal Level arouses to vigorous stimulation

## 2023-06-19 VITALS
HEART RATE: 93 BPM | TEMPERATURE: 98.2 F | OXYGEN SATURATION: 99 % | SYSTOLIC BLOOD PRESSURE: 113 MMHG | RESPIRATION RATE: 14 BRPM | DIASTOLIC BLOOD PRESSURE: 89 MMHG

## 2023-06-19 LAB
ATRIAL RATE - MUSE: 58 BPM
ATRIAL RATE - MUSE: 93 BPM
DIASTOLIC BLOOD PRESSURE - MUSE: NORMAL MMHG
DIASTOLIC BLOOD PRESSURE - MUSE: NORMAL MMHG
INTERPRETATION ECG - MUSE: NORMAL
INTERPRETATION ECG - MUSE: NORMAL
P AXIS - MUSE: 58 DEGREES
P AXIS - MUSE: 72 DEGREES
PR INTERVAL - MUSE: 214 MS
PR INTERVAL - MUSE: 224 MS
QRS DURATION - MUSE: 68 MS
QRS DURATION - MUSE: 68 MS
QT - MUSE: 360 MS
QT - MUSE: 418 MS
QTC - MUSE: 410 MS
QTC - MUSE: 447 MS
R AXIS - MUSE: 29 DEGREES
R AXIS - MUSE: 56 DEGREES
SYSTOLIC BLOOD PRESSURE - MUSE: NORMAL MMHG
SYSTOLIC BLOOD PRESSURE - MUSE: NORMAL MMHG
T AXIS - MUSE: 46 DEGREES
T AXIS - MUSE: 62 DEGREES
VENTRICULAR RATE- MUSE: 58 BPM
VENTRICULAR RATE- MUSE: 93 BPM

## 2023-06-19 ASSESSMENT — ACTIVITIES OF DAILY LIVING (ADL)
ADLS_ACUITY_SCORE: 35
ADLS_ACUITY_SCORE: 35

## 2023-06-19 NOTE — ED NOTES
Spoke with pts brother Angelika on the phone. He reports that the pt has been dealing with various things lately. Was dealing with depression after the loss of her son and was on medications but is no longer on them. Would like to speak to DEC when they assess pt. Elvin phone number is [phone].
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
## 2023-06-19 NOTE — PLAN OF CARE
"[facility]  June 18, 2023  Plan of Care Hand-off Note     Patient Care Path: Observation    Plan for Care:     Per records, patient was hospitalized for MDD and bereavement in 2019. Patient was diagnosed with MDD, Severe, recurrent, without psychotic features. patient has a well  documented Outpatient MH visits starting in 2015 with post partum depression after the birth of her son Patient presented to the ED after having a pseudo seizure at the Urgent care.     Patient states she was \"perfectly healthy when she brought her nephew to urgent care today.\" Patient states she had an episode where she experienced chest pain and difficulty breathing. She states she started having these episodes 3 weeks ago after she was diagnosed with H-Pylori. Patient denies all mental health symptoms and states she stopped taking all of her psych medications in 2019. Patient states she does not think her symptoms are related to her mental health. Patient strongly believes that these symptoms are connected to the H-Pylori bacteria she was diagnosed with and treated for with antibiotics or the heart condition she was recently diagnosed with . Patient states she would rather discharge but is agreeable to whatever the hospital recommends.     This  made 3 attempts to reach patient's sister who lives with her and is also her emergency .  was unable to reach her. This  is unable to recommend discharge at this time as I am unable to reach family and would need to get collateral as well as safety plan with family and patient. Patient appeared flat, depressed, sad and appears to be minimizing the severity of her depression and current stressors. This  is recommending overnight observation and reassessment in the morning. Patient is poor historian. Connecting with family would be helpful in determining appropriate level of care and/or establishing a safe discharge plan.     Critical Safety Issues: " seizure activity    Overview:  This patient is a child/adolescent: No    This patient has additional special visitor precautions: No    Legal Status: Voluntary    Contacts:   Arabella Rao (Sister, patient currently lives with her): [phone]  Margot Oconnor phone number is [phone].    Psychiatry Consult:  Psychiatry Consult not requested because Patient will be reassessed tomorrow morning    Updated Attending Provider regarding plan of care.    Vivien Mcrae, LICSW
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
## 2023-06-20 ENCOUNTER — HOSPITAL ENCOUNTER (OUTPATIENT)
Facility: CLINIC | Age: 38
Setting detail: OBSERVATION
Discharge: OTHER INSTITUTION | End: 2023-06-22
Attending: EMERGENCY MEDICINE | Admitting: HOSPITALIST
Payer: COMMERCIAL

## 2023-06-20 DIAGNOSIS — R56.9 SEIZURE-LIKE ACTIVITY (H): ICD-10-CM

## 2023-06-20 LAB
ANION GAP SERPL CALCULATED.3IONS-SCNC: 13 MMOL/L (ref 7–15)
ATRIAL RATE - MUSE: 96 BPM
BASOPHILS # BLD AUTO: 0.1 10E3/UL (ref 0–0.2)
BASOPHILS NFR BLD AUTO: 2 %
BUN SERPL-MCNC: 4.8 MG/DL (ref 6–20)
CALCIUM SERPL-MCNC: 9.3 MG/DL (ref 8.6–10)
CHLORIDE SERPL-SCNC: 103 MMOL/L (ref 98–107)
CREAT SERPL-MCNC: 0.53 MG/DL (ref 0.51–0.95)
DEPRECATED HCO3 PLAS-SCNC: 21 MMOL/L (ref 22–29)
DIASTOLIC BLOOD PRESSURE - MUSE: NORMAL MMHG
EOSINOPHIL # BLD AUTO: 0.1 10E3/UL (ref 0–0.7)
EOSINOPHIL NFR BLD AUTO: 2 %
ERYTHROCYTE [DISTWIDTH] IN BLOOD BY AUTOMATED COUNT: 13.2 % (ref 10–15)
GFR SERPL CREATININE-BSD FRML MDRD: >90 ML/MIN/1.73M2
GLUCOSE BLDC GLUCOMTR-MCNC: 91 MG/DL (ref 70–99)
GLUCOSE SERPL-MCNC: 191 MG/DL (ref 70–99)
HCT VFR BLD AUTO: 35.5 % (ref 35–47)
HGB BLD-MCNC: 12 G/DL (ref 11.7–15.7)
HOLD SPECIMEN: NORMAL
HOLD SPECIMEN: NORMAL
IMM GRANULOCYTES # BLD: 0 10E3/UL
IMM GRANULOCYTES NFR BLD: 0 %
INTERPRETATION ECG - MUSE: NORMAL
LYMPHOCYTES # BLD AUTO: 1.1 10E3/UL (ref 0.8–5.3)
LYMPHOCYTES NFR BLD AUTO: 28 %
MCH RBC QN AUTO: 29 PG (ref 26.5–33)
MCHC RBC AUTO-ENTMCNC: 33.8 G/DL (ref 31.5–36.5)
MCV RBC AUTO: 86 FL (ref 78–100)
MONOCYTES # BLD AUTO: 0.3 10E3/UL (ref 0–1.3)
MONOCYTES NFR BLD AUTO: 6 %
NEUTROPHILS # BLD AUTO: 2.5 10E3/UL (ref 1.6–8.3)
NEUTROPHILS NFR BLD AUTO: 62 %
NRBC # BLD AUTO: 0 10E3/UL
NRBC BLD AUTO-RTO: 0 /100
P AXIS - MUSE: 44 DEGREES
PLATELET # BLD AUTO: 266 10E3/UL (ref 150–450)
POTASSIUM SERPL-SCNC: 3.5 MMOL/L (ref 3.4–5.3)
PR INTERVAL - MUSE: 224 MS
QRS DURATION - MUSE: 72 MS
QT - MUSE: 326 MS
QTC - MUSE: 411 MS
R AXIS - MUSE: 16 DEGREES
RBC # BLD AUTO: 4.14 10E6/UL (ref 3.8–5.2)
SODIUM SERPL-SCNC: 137 MMOL/L (ref 136–145)
SYSTOLIC BLOOD PRESSURE - MUSE: NORMAL MMHG
T AXIS - MUSE: 39 DEGREES
VENTRICULAR RATE- MUSE: 96 BPM
WBC # BLD AUTO: 4 10E3/UL (ref 4–11)

## 2023-06-20 PROCEDURE — 93005 ELECTROCARDIOGRAM TRACING: CPT

## 2023-06-20 PROCEDURE — 36415 COLL VENOUS BLD VENIPUNCTURE: CPT | Performed by: EMERGENCY MEDICINE

## 2023-06-20 PROCEDURE — G0378 HOSPITAL OBSERVATION PER HR: HCPCS

## 2023-06-20 PROCEDURE — 85025 COMPLETE CBC W/AUTO DIFF WBC: CPT | Performed by: EMERGENCY MEDICINE

## 2023-06-20 PROCEDURE — 99222 1ST HOSP IP/OBS MODERATE 55: CPT | Mod: AI

## 2023-06-20 PROCEDURE — 82962 GLUCOSE BLOOD TEST: CPT

## 2023-06-20 PROCEDURE — 99285 EMERGENCY DEPT VISIT HI MDM: CPT

## 2023-06-20 PROCEDURE — 80048 BASIC METABOLIC PNL TOTAL CA: CPT | Performed by: EMERGENCY MEDICINE

## 2023-06-20 RX ORDER — ONDANSETRON 4 MG/1
4 TABLET, ORALLY DISINTEGRATING ORAL EVERY 6 HOURS PRN
Status: DISCONTINUED | OUTPATIENT
Start: 2023-06-20 | End: 2023-06-22 | Stop reason: HOSPADM

## 2023-06-20 RX ORDER — AMOXICILLIN 250 MG
1 CAPSULE ORAL 2 TIMES DAILY PRN
Status: DISCONTINUED | OUTPATIENT
Start: 2023-06-20 | End: 2023-06-22 | Stop reason: HOSPADM

## 2023-06-20 RX ORDER — HYDROXYZINE HYDROCHLORIDE 10 MG/1
10-20 TABLET, FILM COATED ORAL
Status: DISCONTINUED | OUTPATIENT
Start: 2023-06-20 | End: 2023-06-22 | Stop reason: HOSPADM

## 2023-06-20 RX ORDER — ACETAMINOPHEN 650 MG/1
650 SUPPOSITORY RECTAL EVERY 6 HOURS PRN
Status: DISCONTINUED | OUTPATIENT
Start: 2023-06-20 | End: 2023-06-22 | Stop reason: HOSPADM

## 2023-06-20 RX ORDER — LIDOCAINE 40 MG/G
CREAM TOPICAL
Status: DISCONTINUED | OUTPATIENT
Start: 2023-06-20 | End: 2023-06-22 | Stop reason: HOSPADM

## 2023-06-20 RX ORDER — ACETAMINOPHEN 325 MG/1
650 TABLET ORAL EVERY 6 HOURS PRN
Status: DISCONTINUED | OUTPATIENT
Start: 2023-06-20 | End: 2023-06-22 | Stop reason: HOSPADM

## 2023-06-20 RX ORDER — PROCHLORPERAZINE 25 MG
25 SUPPOSITORY, RECTAL RECTAL EVERY 12 HOURS PRN
Status: DISCONTINUED | OUTPATIENT
Start: 2023-06-20 | End: 2023-06-22 | Stop reason: HOSPADM

## 2023-06-20 RX ORDER — PROCHLORPERAZINE MALEATE 5 MG
10 TABLET ORAL EVERY 6 HOURS PRN
Status: DISCONTINUED | OUTPATIENT
Start: 2023-06-20 | End: 2023-06-22 | Stop reason: HOSPADM

## 2023-06-20 RX ORDER — LORAZEPAM 2 MG/ML
2 INJECTION INTRAMUSCULAR
Status: COMPLETED | OUTPATIENT
Start: 2023-06-20 | End: 2023-06-22

## 2023-06-20 RX ORDER — ONDANSETRON 2 MG/ML
4 INJECTION INTRAMUSCULAR; INTRAVENOUS EVERY 6 HOURS PRN
Status: DISCONTINUED | OUTPATIENT
Start: 2023-06-20 | End: 2023-06-22 | Stop reason: HOSPADM

## 2023-06-20 RX ORDER — AMOXICILLIN 250 MG
2 CAPSULE ORAL 2 TIMES DAILY PRN
Status: DISCONTINUED | OUTPATIENT
Start: 2023-06-20 | End: 2023-06-22 | Stop reason: HOSPADM

## 2023-06-20 ASSESSMENT — ACTIVITIES OF DAILY LIVING (ADL)
ADLS_ACUITY_SCORE: 20
ADLS_ACUITY_SCORE: 20
ADLS_ACUITY_SCORE: 35
ADLS_ACUITY_SCORE: 35
ADLS_ACUITY_SCORE: 20
ADLS_ACUITY_SCORE: 35

## 2023-06-20 NOTE — ED NOTES
Bed: [facility]  Expected date: 6/20/23  Expected time:   Means of arrival: Ambulance  Comments:  [facility] to [facility]

## 2023-06-20 NOTE — ED NOTES
[facility]  ED Nurse Handoff Report    ED Chief complaint: Seizures  . ED Diagnosis:   Final diagnoses:   Seizure-like activity (H)       Allergies:   Allergies   Allergen Reactions     Pork-Derived Products Other (See Comments)     Pt does not ingest of these products.        Code Status: Full Code    Activity level - Baseline/Home:  independent.  Activity Level - Current:     Lift room needed: No.   Bariatric: No   Needed: No   Isolation: No.   Infection: Not Applicable.     Respiratory status: Room air    Vital Signs (within 30 minutes):   Vitals:    06/20/23 1243 06/20/23 1330 06/20/23 1400 06/20/23 1430   BP: (!) 155/93 (!) 158/92 (!) 161/91 (!) 142/86   Pulse: 98 112 (!) 128 (!) 128   Resp: 20      Temp: 99.3  F (37.4  C)      TempSrc: Oral      SpO2: 100% 100% 100%        Cardiac Rhythm:  ,      Pain level:    Patient confused: No.   Patient Falls Risk: nonskid shoes/slippers when out of bed, patient and family education and activity supervised.   Elimination Status: Has voided     Patient Report - Initial Complaint: seizure.   Focused Assessment: Pt comes to the ED after a possible seizure at home.  She was here on the 18th with psuedoseizures witnessed by ED doctor but today comes in with what a video shows as a possible new seizure.  Pt is alert and oriented with family at the bedside.  Did have some difficulty on Sunday and was restrained for a short period of time.  Today she appears much more oriented and calm.  Pt is being admitted for EEG and new seizure work-up.     Abnormal Results:   Labs Ordered and Resulted from Time of ED Arrival to Time of ED Departure   BASIC METABOLIC PANEL - Abnormal       Result Value    Sodium 137      Potassium 3.5      Chloride 103      Carbon Dioxide (CO2) 21 (*)     Anion Gap 13      Urea Nitrogen 4.8 (*)     Creatinine 0.53      Calcium 9.3      Glucose 191 (*)     GFR Estimate >90     CBC WITH PLATELETS AND DIFFERENTIAL    WBC Count  4.0      RBC Count 4.14      Hemoglobin 12.0      Hematocrit 35.5      MCV 86      MCH 29.0      MCHC 33.8      RDW 13.2      Platelet Count 266      % Neutrophils 62      % Lymphocytes 28      % Monocytes 6      % Eosinophils 2      % Basophils 2      % Immature Granulocytes 0      NRBCs per 100 WBC 0      Absolute Neutrophils 2.5      Absolute Lymphocytes 1.1      Absolute Monocytes 0.3      Absolute Eosinophils 0.1      Absolute Basophils 0.1      Absolute Immature Granulocytes 0.0      Absolute NRBCs 0.0          No orders to display       Treatments provided: labs  Family Comments: family is at the bedside and supportive in cares  OBS brochure/video discussed/provided to patient:  No  ED Medications: Medications - No data to display    Drips infusing:  No  For the majority of the shift this patient was Green.   Interventions performed were n/a.    Sepsis treatment initiated: No    Cares/treatment/interventions/medications to be completed following ED care: Thus far pt has been calm in the ED, please note restraints were used during her last ED stay on suday (6/18)    ED Nurse Name: Deyanira Barnes RN  2:54 PM    RECEIVING UNIT ED HANDOFF REVIEW    Above ED Nurse Handoff Report was reviewed: Yes  Reviewed by: Kim Kathleen RN on June 20, 2023 at 4:27 PM
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
## 2023-06-20 NOTE — ED NOTES
Pt had episode of being alert with eyes open and not responding to verbal stimuli. RN to bedside for eval. VS stable. MD updated and performed bedside evaluation.

## 2023-06-20 NOTE — PROGRESS NOTES
ROOM # 206-2    -Living Situation (if not independent, order SW consult):  Facility name:  : [facility]    Activity level at baseline: Independent   Activity level on admit: SBA    Who will be transporting you at discharge: [facility]    Patient registered to observation; given Patient Bill of Rights; given the opportunity to ask questions about observation status and their plan of care.  Patient has been oriented to the observation room, bathroom and call light is in place.    Discussed discharge goals and expectations with patient/family.

## 2023-06-20 NOTE — PHARMACY-ADMISSION MEDICATION HISTORY
Pharmacist Admission Medication History    Admission medication history is complete. The information provided in this note is only as accurate as the sources available at the time of the update.    Medication reconciliation/reorder completed by provider prior to medication history? No    Information Source(s): Patient via in-person    Pertinent Information:      Changes made to PTA medication list:    Added: None    Deleted: famotidine, ondansetron    Changed: None    Medication Affordability:  Not including over the counter (OTC) medications, was there a time in the past 3 months when you did not take your medications as prescribed because of cost?: No    Allergies reviewed with patient and updates made in EHR: no    Medication History Completed By: Rona Davidson RP 6/20/2023 3:28 PM    Prior to Admission medications    Medication Sig Last Dose Taking? Auth Provider Long Term End Date   hydrOXYzine (ATARAX) 10 MG tablet Take 10-20 mg by mouth nightly as needed for itching (sedation)  Yes Unknown, Entered By History
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
## 2023-06-21 LAB
ANION GAP SERPL CALCULATED.3IONS-SCNC: 15 MMOL/L (ref 7–15)
BUN SERPL-MCNC: 3.1 MG/DL (ref 6–20)
CALCIUM SERPL-MCNC: 9.7 MG/DL (ref 8.6–10)
CHLORIDE SERPL-SCNC: 103 MMOL/L (ref 98–107)
CREAT SERPL-MCNC: 0.56 MG/DL (ref 0.51–0.95)
DEPRECATED HCO3 PLAS-SCNC: 17 MMOL/L (ref 22–29)
ERYTHROCYTE [DISTWIDTH] IN BLOOD BY AUTOMATED COUNT: 13.3 % (ref 10–15)
GFR SERPL CREATININE-BSD FRML MDRD: >90 ML/MIN/1.73M2
GLUCOSE SERPL-MCNC: 124 MG/DL (ref 70–99)
HCT VFR BLD AUTO: 41.3 % (ref 35–47)
HGB BLD-MCNC: 14.1 G/DL (ref 11.7–15.7)
MCH RBC QN AUTO: 29.7 PG (ref 26.5–33)
MCHC RBC AUTO-ENTMCNC: 34.1 G/DL (ref 31.5–36.5)
MCV RBC AUTO: 87 FL (ref 78–100)
PLATELET # BLD AUTO: 308 10E3/UL (ref 150–450)
POTASSIUM SERPL-SCNC: 3.1 MMOL/L (ref 3.4–5.3)
POTASSIUM SERPL-SCNC: 3.7 MMOL/L (ref 3.4–5.3)
RBC # BLD AUTO: 4.75 10E6/UL (ref 3.8–5.2)
SODIUM SERPL-SCNC: 135 MMOL/L (ref 136–145)
WBC # BLD AUTO: 7.8 10E3/UL (ref 4–11)

## 2023-06-21 PROCEDURE — 85027 COMPLETE CBC AUTOMATED: CPT

## 2023-06-21 PROCEDURE — 36415 COLL VENOUS BLD VENIPUNCTURE: CPT | Performed by: HOSPITALIST

## 2023-06-21 PROCEDURE — 96374 THER/PROPH/DIAG INJ IV PUSH: CPT

## 2023-06-21 PROCEDURE — G0378 HOSPITAL OBSERVATION PER HR: HCPCS

## 2023-06-21 PROCEDURE — 250N000013 HC RX MED GY IP 250 OP 250 PS 637: Performed by: HOSPITALIST

## 2023-06-21 PROCEDURE — 84132 ASSAY OF SERUM POTASSIUM: CPT | Performed by: HOSPITALIST

## 2023-06-21 PROCEDURE — 36415 COLL VENOUS BLD VENIPUNCTURE: CPT

## 2023-06-21 PROCEDURE — 250N000011 HC RX IP 250 OP 636

## 2023-06-21 PROCEDURE — 999N000157 HC STATISTIC RCP TIME EA 10 MIN

## 2023-06-21 PROCEDURE — 82310 ASSAY OF CALCIUM: CPT

## 2023-06-21 PROCEDURE — 99232 SBSQ HOSP IP/OBS MODERATE 35: CPT | Performed by: HOSPITALIST

## 2023-06-21 PROCEDURE — 96376 TX/PRO/DX INJ SAME DRUG ADON: CPT

## 2023-06-21 RX ORDER — POTASSIUM CHLORIDE 1500 MG/1
40 TABLET, EXTENDED RELEASE ORAL ONCE
Status: COMPLETED | OUTPATIENT
Start: 2023-06-21 | End: 2023-06-21

## 2023-06-21 RX ADMIN — LORAZEPAM 2 MG: 2 INJECTION INTRAMUSCULAR; INTRAVENOUS at 23:04

## 2023-06-21 RX ADMIN — LORAZEPAM 2 MG: 2 INJECTION INTRAMUSCULAR; INTRAVENOUS at 07:58

## 2023-06-21 RX ADMIN — POTASSIUM CHLORIDE 40 MEQ: 1500 TABLET, EXTENDED RELEASE ORAL at 17:39

## 2023-06-21 ASSESSMENT — ACTIVITIES OF DAILY LIVING (ADL)
ADLS_ACUITY_SCORE: 19
ADLS_ACUITY_SCORE: 20
ADLS_ACUITY_SCORE: 19
ADLS_ACUITY_SCORE: 20
ADLS_ACUITY_SCORE: 19
ADLS_ACUITY_SCORE: 20
ADLS_ACUITY_SCORE: 19

## 2023-06-21 NOTE — PLAN OF CARE
PRIMARY DIAGNOSIS: SEIZURES  OUTPATIENT/OBSERVATION GOALS TO BE MET BEFORE DISCHARGE:  1. ADLs back to baseline: Yes    2. Activity and level of assistance: Ambulating independently.    3. Pain status: Pain free.    4. Return to near baseline physical activity: Yes     Discharge Planner Nurse   Safe discharge environment identified: No  Barriers to discharge: Yes         Entered by: Lonnie Orozco RN 06/21/2023 4:04 AM  Vitals are Temp: 98.2  F (36.8  C) Temp src: Oral BP: (!) 156/90 Pulse: 98   Resp: 16 SpO2: 100 %.  Patient is Alert and Oriented x4. independent with no assistive devices .Pt is on a Regular diet. Denies of  pain. Saline locked.she started having multiple seizures this morning that each episode doesn't last up to 30sec, cross cover paged. ECG result shows sinus rhythm with 1st degree AV blocker    Please review provider order for any additional goals.   Nurse to notify provider when observation goals have been met and patient is ready for discharge.

## 2023-06-21 NOTE — PROGRESS NOTES
Neurology Note  6/21/2023    EEG    Her EEG is normal, with no evidence of focal slowing, epileptiform discharges, or seizure activity.    Uriel Kramer MD PhD  [facility], [facility].

## 2023-06-21 NOTE — PLAN OF CARE
PRIMARY DIAGNOSIS: Seizure-like-Activity  OUTPATIENT/OBSERVATION GOALS TO BE MET BEFORE DISCHARGE:  ADLs back to baseline: No    Activity and level of assistance: Up with 1-2, GB.     Pain status: Pain free.    Return to near baseline physical activity: No     Discharge Planner Nurse   Safe discharge environment identified: No  Barriers to discharge: Yes       Patient has a bedside sitter in the room for seizure-like-activity. Around 0715 this morning, patient experienced 10-15 seizure-like-activity episodes, witnessed by this RN (writer) and bedside sitter. Patient would go unresponsive, eyes would rapidly twitch, and sometimes patient would fall forward or backward. All 4 side rails on the bed are up for safety and seizure pads are in place. PRN IV ativan was given. Plan is to have an EEG completed today and then neurology needs to see patient. On room air. Up with 1-2 assist with a GB. Unable to complete accurate set of vitals due to frequency of these seizure-like-activity episodes. Service is aware.      Please review provider order for any additional goals.   Nurse to notify provider when observation goals have been met and patient is ready for discharge.

## 2023-06-21 NOTE — PLAN OF CARE
PRIMARY DIAGNOSIS: SEIZURES  OUTPATIENT/OBSERVATION GOALS TO BE MET BEFORE DISCHARGE:  1. ADLs back to baseline: Yes    2. Activity and level of assistance: Ambulating independently.    3. Pain status: Pain free.    4. Return to near baseline physical activity: Yes     Discharge Planner Nurse   Safe discharge environment identified: No  Barriers to discharge: Yes         Entered by: Lonnie Orozco RN 06/20/2023 8:09 PM  Vitals are Temp: 98.7  F (37.1  C) Temp src: Oral BP: (!) 148/91 Pulse: 97   Resp: 16 SpO2: 100 %.  Patient is Alert and Oriented x4. independent with no assistive devices .Pt is on a Regular diet. Denies of  pain. Saline locked.she was is company of her cousin on resumption.she said she is fine.    Please review provider order for any additional goals.   Nurse to notify provider when observation goals have been met and patient is ready for discharge.

## 2023-06-21 NOTE — PLAN OF CARE
PRIMARY DIAGNOSIS: SEIZURES  OUTPATIENT/OBSERVATION GOALS TO BE MET BEFORE DISCHARGE:  1. ADLs back to baseline: Yes    2. Activity and level of assistance: Ambulating independently.    3. Pain status: Pain free.    4. Return to near baseline physical activity: Yes     Discharge Planner Nurse   Safe discharge environment identified: No  Barriers to discharge: Yes         Entered by: Lonnie Orozco RN 06/21/2023 4:14 AM    Vitals are Temp: 98.4  F (36.9  C) Temp src: Oral BP: (!) 141/95 Pulse: 94   Resp: 16 SpO2: 99 %.  Patient is Alert and Oriented x4. independent with no assistive devices .Pt is on a Regular diet. Denies of  Pain.no episode seizure so far in the shift,patient needs explanation of each care given in details, she could be depressive  due to the dx condition, care is ongoing. Had EEG yesterday with outcome of Sinus rhythm with 1st degree A.V Block.    Plan: Neurology consult.        Please review provider order for any additional goals.   Nurse to notify provider when observation goals have been met and patient is ready for discharge.   Family

## 2023-06-21 NOTE — PROGRESS NOTES
Patient has had 10-15 witnessed seizure-like activity episodes since 0715 this morning. Patient goes unresponsive with a blank stare, sometimes experiences rapid eye twitching, other times her body goes limp and falls forward or backward.  Bedside sitter present. Seizure pads x4 on all bedside rails and all rails up for safety. PRN ativan IV given. Service notified and came to bedside. Plan is to contact neurology to determine when EEG can be performed.

## 2023-06-21 NOTE — PLAN OF CARE
"PRIMARY DIAGNOSIS: Seizure like activity  OUTPATIENT/OBSERVATION GOALS TO BE MET BEFORE DISCHARGE:  ADLs back to baseline: Yes    Activity and level of assistance: up with 1 assist and GB    Pain status: Pain free.    Return to near baseline physical activity: Yes     Discharge Planner Nurse   Safe discharge environment identified: Yes  Barriers to discharge: yes        Patient is on room air, up with 1 assist and GB, reporting no pain. Patient had an EEG this afternoon and report was normal with no seizure activity. Bedside sitter present in the room. RN managed potassium protocol initiated. Patient will receive 40K+ and lab recheck will be around 2200 tonight.    /87 (BP Location: Right arm)   Pulse 106   Temp 98.8  F (37.1  C) (Oral)   Resp 14   Ht 1.6 m (5' 2.99\")   Wt 52.1 kg (114 lb 13.8 oz)   LMP  (LMP Unknown)   SpO2 97%   BMI 20.35 kg/m         Please review provider order for any additional goals.   Nurse to notify provider when observation goals have been met and patient is ready for discharge.                        "
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
## 2023-06-21 NOTE — PLAN OF CARE
PRIMARY DIAGNOSIS: Seizure-like-Activity  OUTPATIENT/OBSERVATION GOALS TO BE MET BEFORE DISCHARGE:  ADLs back to baseline: No    Activity and level of assistance: up with 1-2 assist, GB    Pain status: Pain free.    Return to near baseline physical activity: No     Discharge Planner Nurse   Safe discharge environment identified: Yes  Barriers to discharge: Yes    Patient just returned from an EEG procedure. Per sitter's report, patient did okay at the procedure. No seizure activity. Neurology to see patientl     Please review provider order for any additional goals.   Nurse to notify provider when observation goals have been met and patient is ready for discharge.
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
## 2023-06-22 ENCOUNTER — HOSPITAL ENCOUNTER (INPATIENT)
Dept: NEUROLOGY | Facility: CLINIC | Age: 38
Discharge: HOME | End: 2023-06-22
Attending: PHYSICIAN ASSISTANT | Admitting: INTERNAL MEDICINE
Payer: COMMERCIAL

## 2023-06-22 ENCOUNTER — HOSPITAL ENCOUNTER (INPATIENT)
Facility: CLINIC | Age: 38
LOS: 5 days | Discharge: HOME | End: 2023-06-27
Attending: HOSPITALIST | Admitting: INTERNAL MEDICINE
Payer: COMMERCIAL

## 2023-06-22 VITALS
OXYGEN SATURATION: 100 % | HEART RATE: 99 BPM | HEIGHT: 63 IN | WEIGHT: 114.86 LBS | BODY MASS INDEX: 20.35 KG/M2 | TEMPERATURE: 99 F | DIASTOLIC BLOOD PRESSURE: 88 MMHG | RESPIRATION RATE: 18 BRPM | SYSTOLIC BLOOD PRESSURE: 130 MMHG

## 2023-06-22 DIAGNOSIS — F32.A DEPRESSION WITH SUICIDAL IDEATION: Primary | ICD-10-CM

## 2023-06-22 DIAGNOSIS — R45.851 DEPRESSION WITH SUICIDAL IDEATION: Primary | ICD-10-CM

## 2023-06-22 DIAGNOSIS — F41.9 ANXIETY: ICD-10-CM

## 2023-06-22 PROBLEM — R56.9 SEIZURE (H): Status: ACTIVE | Noted: 2023-06-22

## 2023-06-22 LAB
ANION GAP SERPL CALCULATED.3IONS-SCNC: 11 MMOL/L (ref 7–15)
BUN SERPL-MCNC: 7.5 MG/DL (ref 6–20)
CALCIUM SERPL-MCNC: 9.5 MG/DL (ref 8.6–10)
CHLORIDE SERPL-SCNC: 104 MMOL/L (ref 98–107)
CREAT SERPL-MCNC: 0.53 MG/DL (ref 0.51–0.95)
DEPRECATED HCO3 PLAS-SCNC: 23 MMOL/L (ref 22–29)
GFR SERPL CREATININE-BSD FRML MDRD: >90 ML/MIN/1.73M2
GLUCOSE SERPL-MCNC: 102 MG/DL (ref 70–99)
HOLD SPECIMEN: NORMAL
LACTATE SERPL-SCNC: 1.9 MMOL/L (ref 0.7–2)
POTASSIUM SERPL-SCNC: 4.6 MMOL/L (ref 3.4–5.3)
SODIUM SERPL-SCNC: 138 MMOL/L (ref 136–145)

## 2023-06-22 PROCEDURE — 250N000011 HC RX IP 250 OP 636: Performed by: PHYSICIAN ASSISTANT

## 2023-06-22 PROCEDURE — 83605 ASSAY OF LACTIC ACID: CPT | Performed by: PHYSICIAN ASSISTANT

## 2023-06-22 PROCEDURE — 99223 1ST HOSP IP/OBS HIGH 75: CPT | Mod: AI | Performed by: PHYSICIAN ASSISTANT

## 2023-06-22 PROCEDURE — 36415 COLL VENOUS BLD VENIPUNCTURE: CPT | Performed by: HOSPITALIST

## 2023-06-22 PROCEDURE — 96376 TX/PRO/DX INJ SAME DRUG ADON: CPT

## 2023-06-22 PROCEDURE — G0378 HOSPITAL OBSERVATION PER HR: HCPCS

## 2023-06-22 PROCEDURE — 120N000001 HC R&B MED SURG/OB

## 2023-06-22 PROCEDURE — 250N000011 HC RX IP 250 OP 636: Performed by: HOSPITALIST

## 2023-06-22 PROCEDURE — 250N000011 HC RX IP 250 OP 636

## 2023-06-22 PROCEDURE — 99207 PR NO BILLABLE SERVICE THIS VISIT: CPT | Performed by: HOSPITALIST

## 2023-06-22 PROCEDURE — 36415 COLL VENOUS BLD VENIPUNCTURE: CPT | Performed by: PHYSICIAN ASSISTANT

## 2023-06-22 PROCEDURE — 95714 VEEG EA 12-26 HR UNMNTR: CPT

## 2023-06-22 PROCEDURE — 80048 BASIC METABOLIC PNL TOTAL CA: CPT | Performed by: HOSPITALIST

## 2023-06-22 RX ORDER — ONDANSETRON 4 MG/1
4 TABLET, ORALLY DISINTEGRATING ORAL EVERY 6 HOURS PRN
Status: DISCONTINUED | OUTPATIENT
Start: 2023-06-22 | End: 2023-06-27 | Stop reason: HOSPADM

## 2023-06-22 RX ORDER — POLYETHYLENE GLYCOL 3350 17 G/17G
17 POWDER, FOR SOLUTION ORAL DAILY PRN
Status: DISCONTINUED | OUTPATIENT
Start: 2023-06-22 | End: 2023-06-27 | Stop reason: HOSPADM

## 2023-06-22 RX ORDER — LORAZEPAM 2 MG/ML
1 INJECTION INTRAMUSCULAR EVERY 4 HOURS PRN
Status: DISCONTINUED | OUTPATIENT
Start: 2023-06-22 | End: 2023-06-22 | Stop reason: HOSPADM

## 2023-06-22 RX ORDER — AMOXICILLIN 250 MG
2 CAPSULE ORAL 2 TIMES DAILY PRN
Status: DISCONTINUED | OUTPATIENT
Start: 2023-06-22 | End: 2023-06-27 | Stop reason: HOSPADM

## 2023-06-22 RX ORDER — LORAZEPAM 2 MG/ML
1 INJECTION INTRAMUSCULAR
Status: COMPLETED | OUTPATIENT
Start: 2023-06-22 | End: 2023-06-23

## 2023-06-22 RX ORDER — LORAZEPAM 2 MG/ML
2 INJECTION INTRAMUSCULAR
Status: DISCONTINUED | OUTPATIENT
Start: 2023-06-22 | End: 2023-06-22

## 2023-06-22 RX ORDER — ONDANSETRON 2 MG/ML
4 INJECTION INTRAMUSCULAR; INTRAVENOUS EVERY 6 HOURS PRN
Status: DISCONTINUED | OUTPATIENT
Start: 2023-06-22 | End: 2023-06-27 | Stop reason: HOSPADM

## 2023-06-22 RX ORDER — BISACODYL 10 MG
10 SUPPOSITORY, RECTAL RECTAL DAILY PRN
Status: DISCONTINUED | OUTPATIENT
Start: 2023-06-22 | End: 2023-06-27 | Stop reason: HOSPADM

## 2023-06-22 RX ORDER — LIDOCAINE 40 MG/G
CREAM TOPICAL
Status: DISCONTINUED | OUTPATIENT
Start: 2023-06-22 | End: 2023-06-27 | Stop reason: HOSPADM

## 2023-06-22 RX ORDER — AMOXICILLIN 250 MG
1 CAPSULE ORAL 2 TIMES DAILY PRN
Status: DISCONTINUED | OUTPATIENT
Start: 2023-06-22 | End: 2023-06-27 | Stop reason: HOSPADM

## 2023-06-22 RX ADMIN — LORAZEPAM 2 MG: 2 INJECTION INTRAMUSCULAR; INTRAVENOUS at 21:30

## 2023-06-22 RX ADMIN — LORAZEPAM 1 MG: 2 INJECTION INTRAMUSCULAR; INTRAVENOUS at 16:02

## 2023-06-22 RX ADMIN — LORAZEPAM 2 MG: 2 INJECTION INTRAMUSCULAR; INTRAVENOUS at 09:32

## 2023-06-22 ASSESSMENT — ACTIVITIES OF DAILY LIVING (ADL)
CONCENTRATING,_REMEMBERING_OR_MAKING_DECISIONS_DIFFICULTY: YES
ADLS_ACUITY_SCORE: 20
ADLS_ACUITY_SCORE: 21
ADLS_ACUITY_SCORE: 20
ADLS_ACUITY_SCORE: 19
ADLS_ACUITY_SCORE: 20
CHANGE_IN_FUNCTIONAL_STATUS_SINCE_ONSET_OF_CURRENT_ILLNESS/INJURY: YES

## 2023-06-22 NOTE — CONSULTS
"Triage and Transition - Consult and Liaison     Kimmy [facility]  June 22, 2023    Psychiatry consult acknowledged.     Attempted consult at 0930. Informed patient is not able to participate in assessment/consult at this time as patient is noted by staff to be \"incoherent\".      Will call back later this morning.    TAMIA VO MSW, [facility]  Triage and Transition - Consult and Liaison   [phone]    "

## 2023-06-22 NOTE — PROVIDER NOTIFICATION
Pt continues to have frequent unresponsive episodes- plan to transfer patient to higher level of care for continuous EEG monitoring.

## 2023-06-22 NOTE — PHARMACY-ADMISSION MEDICATION HISTORY
Pharmacist Admission Medication History    Admission medication history is complete. The information provided in this note is only as accurate as the sources available at the time of the update.    Medication reconciliation/reorder completed by provider prior to medication history? No    Information Source(s): Patient and Hospital records via N/A    Pertinent Information: Med rec completed at [facility] by a pharmacist before transferring to us. For further information see note on 6/20.     Changes made to PTA medication list:    Added: None    Deleted: None    Changed: None    Allergies reviewed with patient and updates made in EHR: no    Medication History Completed By: Hanna Hudson RPH 6/22/2023 6:00 PM    Prior to Admission medications    Medication Sig Last Dose Taking? Auth Provider Long Term End Date   hydrOXYzine (ATARAX) 10 MG tablet Take 10-20 mg by mouth nightly as needed for itching (sedation)  Yes Unknown, Entered By History

## 2023-06-22 NOTE — PROGRESS NOTES
Patient arrived via EMS. Had 1 focal seizure (mouth was twitching and patient unresponsive for a few seconds) and quickly recovered without intervention. Vitals stable    Reason for Admission: seizures vs pseudoseizures    Cognitive/Mentation: A/Ox 4  Neuros/CMS: Intact, reports numbness and tingling in body since seizure started but not having either symptom now  VS: stable.   Pain: none.   GI: BS active last BM 6/22/23. Continent.  : Continent.  Pulmonary: LS clear.    Tele: on.  Drains/Lines: L PIV  Skin: intact  Activity: Assist x 2 with gaitbelt for safety.  Diet: regular with thin liquids. Takes pills whole.     Therapies recs: TBD  Discharge: TBD    Aggression Stoplight Tool: green    End of shift summary: Patient here for continuous EEG monitoring. Calm and cooperative. No seizures noted

## 2023-06-22 NOTE — PROVIDER NOTIFICATION
Patient had another unresponsive episode- ice applied to sternum and no response from patient. Vitals : /100, , O2 sats 100% on RA- provider updated and PRN Ativan 2mg given IV- will continue to monitor.

## 2023-06-22 NOTE — PROGRESS NOTES
Cross cover was Notified that  Patient was noted to have frequent episodes of seizure-like activity starting at 7 PM  Patient was given Ativan as ordered

## 2023-06-22 NOTE — PLAN OF CARE
Patient's After Visit Summary was reviewed with patient and/or sister.   Patient verbalized understanding of After Visit Summary, recommended follow up and was given an opportunity to ask questions.   Discharge medications sent home with patient/family: Transfer to [facility].  Discharged with other:EMS
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
## 2023-06-22 NOTE — PLAN OF CARE
PRIMARY DIAGNOSIS: Seizure/Pseudoseizure  OUTPATIENT/OBSERVATION GOALS TO BE MET BEFORE DISCHARGE:  1. ADLs back to baseline: Yes    2. Activity and level of assistance: A1x GB    3. Pain status: Pain free.    4. Return to near baseline physical activity: Yes     Discharge Planner Nurse   Safe discharge environment identified: No  Barriers to discharge: Yes       Entered by: Martha Sharp RN 06/22/2023 2:04 AM     Please review provider order for any additional goals.   Nurse to notify provider when observation goals have been met and patient is ready for discharge.    Pt had a couple seizure like episodes would go unresponsive with eyes twitching and becomes floppy lasting >1min. Pt has no recollection of episodes and describes it like going to sleep and waking up with heavy head. PRN Ativan given.

## 2023-06-22 NOTE — PROVIDER NOTIFICATION
This writer was called into room by sitter and upon arriving to room patient was slumped over bed rail and pads backwards and unresponsive. Body was limp and pt had tears rolling down eyes. Provider called to bedside- vitals stable. Repositioned patient in bed- patient became alert and has no recollection of the event. No new orders at this time. Neurology following and psych consult already placed.

## 2023-06-22 NOTE — PLAN OF CARE
PRIMARY DIAGNOSIS: Seizure/Pseudoseizure  OUTPATIENT/OBSERVATION GOALS TO BE MET BEFORE DISCHARGE:  1. ADLs back to baseline: Yes    2. Activity and level of assistance: A1x GB    3. Pain status: Pain free.    4. Return to near baseline physical activity: Yes     Discharge Planner Nurse   Safe discharge environment identified: No  Barriers to discharge: Yes       Entered by: Martha Sharp RN 06/22/2023 7:03 AM     Please review provider order for any additional goals.   Nurse to notify provider when observation goals have been met and patient is ready for discharge.    Pt had a couple seizure like episodes would go unresponsive with eyes twitching and becomes floppy lasting >1min. Pt has no recollection of episodes and describes it like going to sleep and waking up with heavy head. PRN Ativan given. From 12MN until 0430 am pt was asleep no seizure like episode noted. Sitter at bedside.

## 2023-06-22 NOTE — PROGRESS NOTES
Patient transferring to [facility] for continuous EEG monitoring:  -Report called and given to: Morteza RN  -Unit= Station 73  -Room= 702  -Family member updated: Verena (niece)  -Transport: [facility] at 1630    Will send belongings with.
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
## 2023-06-22 NOTE — PLAN OF CARE
PRIMARY DIAGNOSIS: Seizure/Pseudoseizure  OUTPATIENT/OBSERVATION GOALS TO BE MET BEFORE DISCHARGE:  1. ADLs back to baseline: Yes    2. Activity and level of assistance: A1x GB    3. Pain status: Pain free.    4. Return to near baseline physical activity: Yes     Discharge Planner Nurse   Safe discharge environment identified: No  Barriers to discharge: Yes       Entered by: Martha Sharp RN 06/22/2023 2:02 AM     Please review provider order for any additional goals.   Nurse to notify provider when observation goals have been met and patient is ready for discharge.    Pt A&Ox4. Sitter at bedside for pt safety.

## 2023-06-22 NOTE — CONSULTS
Triage and Transition - Consult and Liaison     Kimmy [facility]  June 22, 2023    Psychiatry consult acknowledged. Consult unable to be completed due to medical complexities needing a higher level of care.   Please re-consult if needed.    TAMIA VO MSW, [facility]  Triage and Transition - Consult and Liaison   [phone]

## 2023-06-23 ENCOUNTER — APPOINTMENT (OUTPATIENT)
Dept: MRI IMAGING | Facility: CLINIC | Age: 38
End: 2023-06-23
Attending: PSYCHIATRY & NEUROLOGY
Payer: COMMERCIAL

## 2023-06-23 ENCOUNTER — APPOINTMENT (OUTPATIENT)
Dept: INTERPRETER SERVICES | Facility: CLINIC | Age: 38
End: 2023-06-23
Payer: COMMERCIAL

## 2023-06-23 LAB
ALBUMIN SERPL BCG-MCNC: 4.2 G/DL (ref 3.5–5.2)
ALP SERPL-CCNC: 64 U/L (ref 35–104)
ALT SERPL W P-5'-P-CCNC: 19 U/L (ref 0–50)
ANION GAP SERPL CALCULATED.3IONS-SCNC: 11 MMOL/L (ref 7–15)
AST SERPL W P-5'-P-CCNC: 21 U/L (ref 0–45)
BASOPHILS # BLD AUTO: 0.1 10E3/UL (ref 0–0.2)
BASOPHILS NFR BLD AUTO: 1 %
BILIRUB SERPL-MCNC: 0.4 MG/DL
BUN SERPL-MCNC: 6 MG/DL (ref 6–20)
CALCIUM SERPL-MCNC: 9.5 MG/DL (ref 8.6–10)
CHLORIDE SERPL-SCNC: 103 MMOL/L (ref 98–107)
CREAT SERPL-MCNC: 0.55 MG/DL (ref 0.51–0.95)
DEPRECATED HCO3 PLAS-SCNC: 23 MMOL/L (ref 22–29)
EOSINOPHIL # BLD AUTO: 0.2 10E3/UL (ref 0–0.7)
EOSINOPHIL NFR BLD AUTO: 4 %
ERYTHROCYTE [DISTWIDTH] IN BLOOD BY AUTOMATED COUNT: 13.4 % (ref 10–15)
GFR SERPL CREATININE-BSD FRML MDRD: >90 ML/MIN/1.73M2
GLUCOSE BLDC GLUCOMTR-MCNC: 96 MG/DL (ref 70–99)
GLUCOSE SERPL-MCNC: 115 MG/DL (ref 70–99)
HCT VFR BLD AUTO: 40.8 % (ref 35–47)
HGB BLD-MCNC: 13.9 G/DL (ref 11.7–15.7)
IMM GRANULOCYTES # BLD: 0 10E3/UL
IMM GRANULOCYTES NFR BLD: 0 %
LYMPHOCYTES # BLD AUTO: 1.5 10E3/UL (ref 0.8–5.3)
LYMPHOCYTES NFR BLD AUTO: 42 %
MCH RBC QN AUTO: 29.8 PG (ref 26.5–33)
MCHC RBC AUTO-ENTMCNC: 34.1 G/DL (ref 31.5–36.5)
MCV RBC AUTO: 87 FL (ref 78–100)
MONOCYTES # BLD AUTO: 0.2 10E3/UL (ref 0–1.3)
MONOCYTES NFR BLD AUTO: 6 %
NEUTROPHILS # BLD AUTO: 1.7 10E3/UL (ref 1.6–8.3)
NEUTROPHILS NFR BLD AUTO: 47 %
NRBC # BLD AUTO: 0 10E3/UL
NRBC BLD AUTO-RTO: 0 /100
PLATELET # BLD AUTO: 293 10E3/UL (ref 150–450)
POTASSIUM SERPL-SCNC: 4 MMOL/L (ref 3.4–5.3)
PROT SERPL-MCNC: 6.6 G/DL (ref 6.4–8.3)
RBC # BLD AUTO: 4.67 10E6/UL (ref 3.8–5.2)
SODIUM SERPL-SCNC: 137 MMOL/L (ref 136–145)
TSH SERPL DL<=0.005 MIU/L-ACNC: 2.92 UIU/ML (ref 0.3–4.2)
WBC # BLD AUTO: 3.7 10E3/UL (ref 4–11)

## 2023-06-23 PROCEDURE — 99232 SBSQ HOSP IP/OBS MODERATE 35: CPT | Performed by: HOSPITALIST

## 2023-06-23 PROCEDURE — 99231 SBSQ HOSP IP/OBS SF/LOW 25: CPT | Performed by: NURSE PRACTITIONER

## 2023-06-23 PROCEDURE — 70553 MRI BRAIN STEM W/O & W/DYE: CPT

## 2023-06-23 PROCEDURE — 36415 COLL VENOUS BLD VENIPUNCTURE: CPT | Performed by: PHYSICIAN ASSISTANT

## 2023-06-23 PROCEDURE — 36415 COLL VENOUS BLD VENIPUNCTURE: CPT | Performed by: PSYCHIATRY & NEUROLOGY

## 2023-06-23 PROCEDURE — 120N000001 HC R&B MED SURG/OB

## 2023-06-23 PROCEDURE — 85025 COMPLETE CBC W/AUTO DIFF WBC: CPT | Performed by: PHYSICIAN ASSISTANT

## 2023-06-23 PROCEDURE — 255N000002 HC RX 255 OP 636: Performed by: INTERNAL MEDICINE

## 2023-06-23 PROCEDURE — A9585 GADOBUTROL INJECTION: HCPCS | Performed by: INTERNAL MEDICINE

## 2023-06-23 PROCEDURE — 99254 IP/OBS CNSLTJ NEW/EST MOD 60: CPT

## 2023-06-23 PROCEDURE — 250N000013 HC RX MED GY IP 250 OP 250 PS 637: Performed by: HOSPITALIST

## 2023-06-23 PROCEDURE — 84443 ASSAY THYROID STIM HORMONE: CPT | Performed by: PSYCHIATRY & NEUROLOGY

## 2023-06-23 PROCEDURE — 80053 COMPREHEN METABOLIC PANEL: CPT | Performed by: PHYSICIAN ASSISTANT

## 2023-06-23 RX ORDER — ACETAMINOPHEN 325 MG/1
650 TABLET ORAL EVERY 4 HOURS PRN
Status: DISCONTINUED | OUTPATIENT
Start: 2023-06-23 | End: 2023-06-27 | Stop reason: HOSPADM

## 2023-06-23 RX ORDER — GADOBUTROL 604.72 MG/ML
6 INJECTION INTRAVENOUS ONCE
Status: COMPLETED | OUTPATIENT
Start: 2023-06-23 | End: 2023-06-23

## 2023-06-23 RX ORDER — ACETAMINOPHEN 650 MG/1
650 SUPPOSITORY RECTAL EVERY 4 HOURS PRN
Status: DISCONTINUED | OUTPATIENT
Start: 2023-06-23 | End: 2023-06-27 | Stop reason: HOSPADM

## 2023-06-23 RX ADMIN — ACETAMINOPHEN 650 MG: 325 TABLET ORAL at 14:31

## 2023-06-23 RX ADMIN — GADOBUTROL 6 ML: 604.72 INJECTION INTRAVENOUS at 16:21

## 2023-06-23 ASSESSMENT — ACTIVITIES OF DAILY LIVING (ADL)
ADLS_ACUITY_SCORE: 21

## 2023-06-23 NOTE — PLAN OF CARE
Reason for Admission: seizures vs psuedoseizures    Cognitive/Mentation: A/Ox 4  Neuros/CMS: Intact ex lethargic at times   VS: stable on RA.   Tele: SB with PAC's.  GI: No BM.  : Continent up to bedside commode  Pulmonary: LS clear.  Pain: denies.     Drains/Lines: PIV - SL  Skin: intact  Activity: Assist x 2 pivot.  Diet: regular with thin liquids. Takes pills whole.     Therapies recs: pending  Discharge: pending    Aggression Stoplight Tool: green    End of shift summary: pt had 2 witnessed unresponsive episodes/seizures, ativan given x1, see notes. Sitter remains at the bedside. Neuro consulted. Continuous EEG in place.
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
## 2023-06-23 NOTE — PROGRESS NOTES
Psych P called at 0942 to see when Psych is able to assess patient, given 3 episodes since 0700 (5 in last 12 hours). No pickup - voicemail left at 0947.

## 2023-06-23 NOTE — PROVIDER NOTIFICATION
MD Notification    Notified Person: MD    Notified Person Name: Dr. Collins    Notification Date/Time: 6/23/23 at 0737    Notification Interaction: phone - voicemail left    Purpose of Notification: To follow-up with RRT/seizure-like activity from NOC shift and verify if Ativan should be given or not    Comments: [facility] informed RN that Dr. Llamas is the one on-call.    Dr. Rios called at 0740. No answer and voicemail full. RN will reattempt calling again later this AM.

## 2023-06-23 NOTE — PROGRESS NOTES
Voicemail for consult and liaison left requesting them to see patient again as patient is now awake and ready. RN requested call back if patient states she refuses again prior to disconnecting Ipad/services.
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
## 2023-06-23 NOTE — PROGRESS NOTES
[facility]    Medicine Progress Note - Hospitalist Service    Date of Admission:  6/22/2023    Assessment & Plan   Kimmy Mendez is a 37 year old female w/ PMH of H.pylori ROWAN, depression, and post partum depression, hospitalization in 2019 grief reaction with prolonged bereavement admitted to [facility] 6/20/23 for recurrent spells of alteration of consciousness, ultimately transferred to [facility] 6/22/23 for continuous video EEG, Neurology, and Psychiatry consult to discern seizure vs pseudoseizure.     Recurrent spells of alterations in consciousness  EEG from 6/21/2023 negative for any focal slowing, epileptiform discharges, or seizure activity.  She has not been started on AEDs  *CT head 6/18/23 acute intracranial abnormality.  *normal LFTs, TSH in 5/2023.  - Admitted to inpatient status.  - Neurology consulted, appreciate their assistance.  - Psychiatry consulted, appreciate their assistance.  - Continuous EEG monitoring is ongoing.  - Seizure precautions.  - Cardiac monitoring.  - Continuous pulse ox.    ROWAN  Depression  Hx of post partum depression  Grief reaction with prolonged bereavement  - Psychiatry consulted, appreciate their assistance.  - Not on meds PTA aside from recent prn hydroxyzine for sleep.    Recently underwent cardiac evaluation for dyspnea and palpitations  *Stress echo was completed on 06/13/2023, with average exercise capacity. No ST segment changes observed with stress, PACs during stress and in recovery per. Her rest echo noted normal LV function, EF estimated at 55-60%. She was noted to have normal stress echocardiogram with no evidence of stress-induced ischemia. This is felt to be a low risk study.    *ekg SB w/ PAC in bigeminy.  Has orthopnea, palpitations resulting in difficulty falling asleep and staying asleep.   *Zio patch x 14d showed occasional PACs, with burden of 4.2%. Heart rate , with average heart rate of 71 beats per minute. She did have  second-degree AV block-Mobitz 1 (Wenckebach) overnight. About half of the symptom episodes occurred in association with PACs and/or PVCs, the other have occurred with sinus rhythm.  - Continue cardiac monitoring for now.    H.pylori  - Recurrent, dx 5/23/23, started therapy on Saturday 5/27, completed therapy without ongoing sx.        Diet: Combination Diet Regular Diet Adult    DVT Prophylaxis: Pneumatic Compression Devices  Hogan Catheter: Not present  Lines: None     Cardiac Monitoring: ACTIVE order. Indication: seizures  Code Status: Full Code      Clinically Significant Risk Factors Present on Admission                                Disposition Plan      Expected Discharge Date: 06/24/2023                  Tyler Rizvi MD  Hospitalist Service  [facility]  Securely message with [employer] (more info)  Text page via [facility] Paging/Directory   ______________________________________________________________________    Interval History   Kimmy Mendez was seen this morning. Feels OK at the moment, eating breakfast. Continues to have intermittent episodes of unresponsiveness/shaking. Has been awake/alert and not post-ictal appearing in between episodes per discussion with nursing.    Physical Exam   Vital Signs: Temp: 98.5  F (36.9  C) Temp src: Oral BP: (!) 126/91 Pulse: (!) 154   Resp: 16 SpO2: 100 % O2 Device: None (Room air)    Weight: 0 lbs 0 oz    Constitutional: awake, alert, cooperative, no apparent distress, sitting up in the hospital bed  Respiratory: no increased work of breathing, clear to auscultation bilaterally, no crackles or wheezing  Cardiovascular: regular rate and rhythm, normal S1 and S2, no murmur noted  GI: normal bowel sounds, soft, non-distended, non-tender  Skin: warm, dry  Musculoskeletal: no lower extremity pitting edema present  Neurologic: awake, alert, answers questions appropriately, moves all extremities, flat affect    Medical Decision Making       40 MINUTES  SPENT BY ME on the date of service doing chart review, history, exam, documentation & further activities per the note.      Data     I have personally reviewed the following data over the past 24 hrs:    3.7 (L)  \   13.9   / 293     137 103 6.0 /  115 (H)   4.0 23 0.55 \       ALT: 19 AST: 21 AP: 64 TBILI: 0.4   ALB: 4.2 TOT PROTEIN: 6.6 LIPASE: N/A       Procal: N/A CRP: N/A Lactic Acid: 1.9

## 2023-06-23 NOTE — CONSULTS
"Triage and Transition - Consult and Liaison     Kimmy [facility]  June 23, 2023    Session start: 11: 00 am  Session end: 11:23 am  Session duration in minutes: 23  CPT utilized: 60197 - Brief diagnostic assessment (modifier 52)  Patient was seen virtually ([employer] cart or other teleconferencing device).    Diagnosis:   300.00 (F41.9) Unspecified Anxiety Disorder, present and by hx.;  296.32 (F33.1) Major Depressive Disorder, Recurrent Episode, Moderate _ and With anxious distress, by history;   309.81 (F43.10) Posttraumatic Stress Disorder (includes Posttraumatic Stress Disorder for Children 6 Years and Younger)  Without dissociative symptoms, by history;   Hx of PPD, by hx, per chart review    Plan/Recommendations:     Continue care coordination.    This writers role is to address sx, hx, dx before psychiatry provider sees pt.    Pt reports she already sees a therapist at [facility].    She reports her meds are helping with anxiety and she declines any other mental health sx at this point in time.    Will ask psychiatry provider to follow-up given consult reason \"discern seizure vs spells and not on any PTA meds\", left message.     Next steps include: Psychiatry provider follow-up.        Reason for consult: Psychiatry consult was requested due to \"pseaudoseizures (currently seizure workup), depression/anxiety\" - per consult reason. Patient was seen by Triage and Transition Consult & Liaison team.     Identifying information: Kimmy is 37 year old Black or   female   followed related to \"pseaudoseizures (currently seizure workup), depression/anxiety\" - per consult reason.  Pt reports she lives in [place] with her cousin.    Summary of Patient Situation  Pt sitting up in bed upon TH arrival, she is agreeable to meet via TH today.  She states her current mood as \"Tired today\".  She states she is unsure why she is here at the hospital, chart review indicates \"seizure-like activity\".  She " "reports she lives with her cousin in [place].  She reports good appetite and sleep.  Per chart review, pt with a hx of PTSD, MDD, PPD, and anxiety, with information available in epic ehr at the time of this session.    Today, pt presents with mild anxiety sx including worry at times.  She denies any excessive anxiety sx.  She denies any depression sx, she states \"no, not at all\".  She denies any current PTSD sx, she states \"no, that was 3 years ago\".  She denies SI,HI,AH/VH.  She reports she is hopeful, looking forward to feeling better.    Pt reports adequate supports including \"my cousins, I have 3 sisters and 1 brother, friends in [place] and friends nationally such as [place]\", she states she feels well supported.  She reports historical coping skills of deep breathing, lavender baths, cold showers.  Writer psychoeducated on mindfulness, staying in present moment, positive self-talk, and mediation.    She reports she sees a therapist named \"Bora at [facility]\".  She reports her PCP prescribes meds including ativan and atarax.  Regarding her perceived level of helpfulness around this, she states she thinks they are helping with sx management.    Pt declines any further mental health needs at this point in time.    Will ask psychiatry provider to follow-up given consult reason \"discern seizure vs spells and not on any PTA meds\", left message.          Significant Clinical History  Per chart review, pt with a hx of PTSD, MDD, PPD, and anxiety, with information available in epic ehr at the time of this session.    Current Providers  Primary Care Provider: Yes .   Therapist: Yes \"Bora at [facility]\"     Collateral information:   Reviewed chart, coordinated with care team, and coordinated with psychiatry provider for pt follow-up, left message.      Mental Status Exam   Affect: Appropriate  Appearance: Appropriate   Attention Span/Concentration: Attentive    Eye Contact: Engaged  Fund of " "Knowledge: Appropriate   Language /Speech Content: Fluent  Language /Speech Volume: Soft   Language /Speech Rate/Productions: Normal   Recent Memory: Intact  Remote Memory: Intact  Mood: Normal   Orientation:   Person: Yes   Place: Yes  Time of Day: Yes   Date: Yes   Situation (Do they understand why they are here?): Answer: \"I dont know\"   Psychomotor Behavior: Normal   Thought Content: Clear denies SI, HI, AH/VH  Thought Form: Intact    Current medications: Per EHR at the time of this note.  Current Facility-Administered Medications   Medication     bisacodyl (DULCOLAX) suppository 10 mg     lidocaine (LMX4) cream     lidocaine 1 % 0.1-1 mL     melatonin tablet 1 mg     ondansetron (ZOFRAN ODT) ODT tab 4 mg    Or     ondansetron (ZOFRAN) injection 4 mg     polyethylene glycol (MIRALAX) Packet 17 g     senna-docusate (SENOKOT-S/PERICOLACE) 8.6-50 MG per tablet 1 tablet    Or     senna-docusate (SENOKOT-S/PERICOLACE) 8.6-50 MG per tablet 2 tablet     sodium chloride (PF) 0.9% PF flush 3 mL     sodium chloride (PF) 0.9% PF flush 3 mL        Therapeutic intervention and progress:  Therapeutic intervention consisted of building therapeutic rapport, active listening, validation, thought reframing, engaging in learning/practicing coping skills, stress relief practices, normalizing and CBT concepts. Patient is making progress towards treatment goals as evidenced by pt engaged in session, pt willingness to engage in therapy.        Cristal HAYES  Psychotherapist Trainee   Triage and Transition - Consult and Liaison   [phone]    "

## 2023-06-23 NOTE — PROVIDER NOTIFICATION
SHANE called writer into the room as patient went unresponsive , vitals was normal on RA ,after multiple attempts to wake patient up back to  alert then go again into unresponsive stage then writer witnessed jerky movements of Upper body lasted 30 seconds with oxy saturation drop down to 82% and patient unresponsive at time  RRT paged      Paged general  neuro on call regarding new events and any need to initiate  anti seizure med's please advice , no call back

## 2023-06-23 NOTE — PROVIDER NOTIFICATION
"MD Notification    Notified Person: MD    Notified Person Name: Dr. Rios    Notification Date/Time: 6/23/23 at 0837    Notification Interaction: web-based page    Purpose of Notification: \"Can you please assess latest EEG results and call back RN? Patient just had unresponsive episode lasting approx. 7 min with short Tonic-clonic symptoms. Please advise. Thanks! Rosa JUAREZ\"    Orders Received:No new orders    Comments: Dr. Rios called back with update that Dr. Collins (overnight neurologist) will be reviewing EEG and getting back to Dr. Rios who will then call RN (writer) back.    "

## 2023-06-23 NOTE — PLAN OF CARE
Goal Outcome Evaluation:       Dx: Seizures  Neuro: A&Ox4. Neuros intact. VSS. Psuedoseizures.   Cardiac: SB with bigeminal PACs. VSS.   Resp: WDL. RA  GI/: WDL  Diet: Regular. Thin liquids. Takes pills whole.  Activity: A2 pivot.   Musculoskeletal: General weakness.   Skin: WDL.  Pain: Denies.   Lab/ tests: Brain MRI completed  IV: L PIV, SL  Consults: Psychiatry, psychotherapy  Other:  Plan:  Medication management.

## 2023-06-23 NOTE — PROVIDER NOTIFICATION
Paged general neurology regarding patient had on and off multiple seizure like activity for 15 mts , IV ativan 2 mg given by Bed side RN

## 2023-06-23 NOTE — CODE/RAPID RESPONSE
[facility]    House PARAS RRT Note  6/23/2023   Time Called: 0259    RRT called for: Seizure    Assessment & Plan     Seizure-like activity.   - Upon arrival, pt lying in bed, resting, in no overt distress.  Nursing notes while siting at pt's bedside, pt was sleeping when she suddenly sat up in bed, then upper body fell backward to the bed.  Pt minimally response after laying head back on bed.  Pt performed movement 2 additional times with 3rd time nursing noted hypoxia prompting RRT.   Nursing notes pt minimally responsive for ~ 10 min in total.  After 3rd time, pt developed tonic clonic seizure activity noted in BUE.  After arrival to to pt's bedside, pt minimally responsive for ~3-4 min when pt gently woke up, able to answer questions and follow commands.  Pt's VS noting SBP 120s, HR 70s-80s, RR 10s, O2 sats > 92% on RA.    INTERVENTIONS:  - Continue seizure precautions  - BG - 96  - Nursing to contact neurology to update noting pt with additional seizure like activity, not on antiepileptics, would they like to load pt with any medications?    At the end of the RRT pt remains hemodynamically stable, in no overt distress, requesting water    Discussed with and defer further cares to nursing, hospitalist and neurology paged, pending call back    Interval History     Kimmy Mendez is a 37 year old female who was admitted on 6/22/2023 for spells of AMS.    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        Physical Exam   Vital Signs with Ranges:  Temp:  [98.7  F (37.1  C)-99  F (37.2  C)] 98.7  F (37.1  C)  Pulse:  [] 85  Resp:  [16-18] 18  BP: ()/() 114/78  SpO2:  [98 %-100 %] 98 %  No intake/output data  recorded.    Constitutional: Pt lying in bed, resting, in no overt distress  Neck: No upper airway wheezes or stridor noted  Pulmonary: In no apparent respiratory distress  Cardiovascular: Appears well perfused  GI: Not assessed  Skin/Integumen: Warm, dry  Neuro: Spontaneously opens eyes to voice,, able to answer questions and follow commands, moving all extremities, no obvious focal neuro deficit noted  Psych:  Calm  Extremities: Moving all extremities     Medical Decision Making       25 MINUTES SPENT BY ME on the date of service doing chart review, history, exam, documentation & further activities per the note.       EMMANUEL [facility]  Hospitalist-House PARAS  Hospitalist Service  Securely message with [employer] (more info)  Text page via [facility] Paging/Directory

## 2023-06-23 NOTE — PROGRESS NOTES
"Paged PA Johanne Cunningham, \"Pt started becoming unresponsive at 2119. Ativan given once, pt still remains unresponsive. Do you want to come see this pt, or do you want us to page neuro directly, they have not seen her yet. Consult is in.\"     Verbal order, \"call an RRT\"    Addendum, pt became fully alert and woke up, RRT was not called for the reason, hospitalist updated    "

## 2023-06-23 NOTE — PLAN OF CARE
"9825-5636: Pt here following seizure-like activity. A&Ox3-4, disoriented to situation at times. Generalized weakness 3-4/5, left side appearing slightly weaker than right at times. Intermittently tachy during episodes, otherwise VSS on RA. Tele NSR. Up with assist x2 to [facility] for safety. BM this shift. No incontinence. Tolerating regular diet. Mild frontal headache this afternoon, treated with cold packs and PRN Tylenol.     Patient had multiple episodes throughout shift of unresponsiveness lasting 5-20 minutes/each. Symptoms of episodes appeared repetitive where patient would become unresponsiveness for 5-10 minutes. She would then \"awaken\" and sit straight up with eyes open, but not respond to any stimulation. She would then lay back down. She would intermittently have twitching to left side of face/lower lip. Two times she had moments where her left side or all limbs would shake. She would then wake up, occasionally be tired and soft spoken, but otherwise neuros intact and oriented x3-4. Gen neuro made aware and Dr. Rios rounded at bedside. Per neuro team, EEG is not showing epileptic seizures. Cont. EEG discontinued. Family at bedside. Pt denies pain following each episode. Psych currently assessing. MRI checklist complete and MRI ready for patient after psych sees.       "

## 2023-06-23 NOTE — CONSULTS
Triage and Transition - Consult and Liaison     Kimmy [facility]  June 23, 2023    10:15 am:  Psychiatry consult acknowledged. Attemped to see pt via TH today, care team brought TH/ipad in room, pt declined x2 to meet to this writer and care team, as she was eating and wanted to wait to meet. Consult unable to be completed at this point in time.      Cristal HAYES Psychotherapist Trainee  Triage and Transition - Consult and Liaison   [phone]

## 2023-06-24 PROCEDURE — 120N000001 HC R&B MED SURG/OB

## 2023-06-24 PROCEDURE — 99232 SBSQ HOSP IP/OBS MODERATE 35: CPT | Performed by: HOSPITALIST

## 2023-06-24 PROCEDURE — 250N000013 HC RX MED GY IP 250 OP 250 PS 637: Performed by: HOSPITALIST

## 2023-06-24 RX ADMIN — ACETAMINOPHEN 650 MG: 325 TABLET ORAL at 02:03

## 2023-06-24 RX ADMIN — ACETAMINOPHEN 650 MG: 325 TABLET ORAL at 13:29

## 2023-06-24 RX ADMIN — ACETAMINOPHEN 650 MG: 325 TABLET ORAL at 20:11

## 2023-06-24 ASSESSMENT — ACTIVITIES OF DAILY LIVING (ADL)
ADLS_ACUITY_SCORE: 21

## 2023-06-24 NOTE — PLAN OF CARE
Reason for Admission: pseudo seizures     Cognitive/Mentation: A/Ox 4  Neuros/CMS: Intact   VS: stable.   Tele: SR.  GI: BS active, passing flatus. Continent.  :Continent.  Pulmonary: LS clear.  Pain: tylenol given for headache.   Skin: intact  Activity: Assist x 1-2 with pivot.  Diet: regular with thin liquids. Takes pills whole.     Therapies recs: pending  Discharge: pending    Aggression Stoplight Tool: green    End of shift summary: pt had 3 episodes overnight of being unresponsive. Was vitally stable during all episodes. MRI results pending.

## 2023-06-24 NOTE — PLAN OF CARE
Date & Time: 6/24 8984-7188  Diagnosis: Seizures  Procedures: NA  Orientation/Cognitive: AOx4  VS/O2: VSS ex HTN, sometimes tachy, RA  Mobility: SBA  Diet: Regular  Pain Management: C/o HA & period cramps - PRN tylenol given  Bowel & Bladder: Continent  Skin: WDL  Abnormal Labs: WBC 3.7  Tele: NSR  IV Access/Drips/Fluids: L PIV SL   Drains: NA  Tests: EEG - negative, MRI - pending  Consults: Neurology (signed off), hospitalist, psych  Discharge Plan: Pending   Other: Neuros intact. No episodes throughout shift.

## 2023-06-24 NOTE — PROGRESS NOTES
"SPIRITUAL HEALTH SERVICES  SPIRITUAL ASSESSMENT Progress Note  [facility]     REFERRAL SOURCE: Admission Request    Introduced spiritual health services/role to Kimmy who had family visiting at bedside. She graciously declined a visit sharing that she needs her \"family and my language around me now.\"    She is aware of ongoing support from spiritual health and how to request it in the future if desired.    PLAN: Spiritual Health remains available for support.    Jaycee Diaz  Associate   [facility] Phone [phone]  [facility] Pager [phone]    [facility] available 24/7 for emergent requests/referrals, either by having the on-call  paged or by entering an ASAP/STAT consult in Epic (this will also page the on-call ).    "

## 2023-06-24 NOTE — PROGRESS NOTES
...  [facility]  Neuroscience and Spine [place]  Neurology Daily Note              Interval History:   Ms Mendez  presented to [facility] 6/21/2023 after she had had recurring spells, at that time reported having had several spells with altered level of awareness and that she had had multiple spells over the past few weeks prior to her presentation.  She was noted to have had a spell while being in the ER with concerns regarding seizures versus nonepileptic spells.  Her notes indicated that she had a spell which was observed where she slumped forward with loss of responsiveness returning to consciousness within minutes with no postictal.  And there was no incontinence of urine or tongue biting.  She had a routine EEG done at [facility] which was reported as showing episodes of fast activity, concerning for medication effect, there were no epileptiform discharges seen, she was transferred to [facility] for continued EEG monitoring.       Patient indicated that she has no past history for seizures, no history for meningitis or encephalitis or head trauma, she reported no recent focal neurological symptoms but admitted that she has a history for anxiety and depression for which she has been treated in the past.  She also reported that she has a sister who has epilepsy who has been on medication.  No other family member has been affected.Ms. Mendez has remained neurologically stable, no further spells reported since the morning of 6/23/2023.  Long-term video EEG report recording did not reveal any epileptiform discharges or electrographic seizures.  The patient had no specific complaints but had questions regarding management of her nonepileptic spells.       Review of Systems:   The 10 point Review of Systems is negative other than noted in the HPI       Medications:   Scheduled Meds:    sodium chloride (PF)  3 mL Intracatheter Q8H     PRN Meds: acetaminophen **OR** acetaminophen,  bisacodyl, lidocaine 4%, lidocaine (buffered or not buffered), melatonin, ondansetron **OR** ondansetron, polyethylene glycol, senna-docusate **OR** senna-docusate, sodium chloride (PF)        Physical Exam:   Vitals: Blood pressure (!) 138/92, pulse 87, temperature 98.6  F (37  C), temperature source Oral, resp. rate 16, weight 56.7 kg (125 lb), SpO2 98 %, not currently breastfeeding.         Data:   ROUTINE IP LABS (Last 3results)  CBC RESULTS:     Recent Labs   Lab 06/23/23  0804 06/21/23  0645 06/20/23  1328   WBC 3.7* 7.8 4.0   RBC 4.67 4.75 4.14   HGB 13.9 14.1 12.0   HCT 40.8 41.3 35.5    308 266     Basic Metabolic Panel:  Recent Labs   Lab 06/23/23  0804 06/23/23  0304 06/22/23  1123 06/21/23  2222 06/21/23  0645     --  138  --  135*   POTASSIUM 4.0  --  4.6 3.7 3.1*   CHLORIDE 103  --  104  --  103   CO2 23  --  23  --  17*   BUN 6.0  --  7.5  --  3.1*   CR 0.55  --  0.53  --  0.56   * 96 102*  --  124*   CHARLIE 9.5  --  9.5  --  9.7     INR:No lab results found in last 7 days.   Lipid Profile:No results for input(s): CHOL, HDL, LDL, TRIG, CHOLHDLRATIO in the last 65570 hours.  TSH:  TSH   Date Value Ref Range Status   06/23/2023 2.92 0.30 - 4.20 uIU/mL Final   ,   Vitamin B12: No results found for: B12   A1C: No results found for: A1C     IMAGING:   Independent interpretation of the following studies by myself as part of today's encounter.   CT head: 6/19/2023  Noncontrast CT scan of the head revealed no acute changes, ventricles and sulci were normal, no parenchymal lesions were identified, no midline shift.      Brain MRI 6/23/23:  Brain MRI revealed no evidence for acute or subacute infarct, no mass lesions, no mass effect, no enhancing lesions and ventricles and sulci were normal.  There were mild scattered T2/Flair hyperintense lesions in the periventricular white matter, not in atypical distribution for demyelinating disease and could be attributed to nonspecific small vessel  ischemic changes  There were no enhancing lesions, ventricles and sulci were normal.    -- Prolonged Video EEG monitoring did not reveal any epileptiform discharges or electrographic seizures, with active spells, there were notable superimposed muscle artifacts but no EEG correlate for concerns regarding epileptic seizures.  Brain MRI revealed no evidence for acute or subacute infarct, no mass lesions, no mass effect, no enhancing lesions and ventricles and sulci were normal.  There were mild scattered T2/Flair hyperintense lesions in the periventricular white matter, not in atypical distribution for demyelinating disease and could be attributed to nonspecific small vessel ischemic changes  There were no enhancing lesions, ventricles and sulci were normal.          Assessment and Plan:                                         #.  Recurring nonepileptic spells, no EEG correlate, for epileptiform activities would monitor the spells, no postictal period, urinary incontinence or tongue biting.        #. History for anxiety and depression and grief reaction     The brain MRI of 6/23/2023 was personally reviewed, no evidence for acute or subacute strokes, ventricles and sulci were normal, no evidence of mass effect, no enhancing lesions,there were scattered nonspecific mild scattered  T2/Flair hyperintensity in the periventricular white matter, these were not in a typical pattern for demyelinating lesions these results were personally discussed with the patient.   Recommendations:  Patient has already been referred to psychiatry for further recommendations regarding management of recurring nonepileptic spells.  -Assessment is also recommended for underlying anxiety and depression and prolonged grief reaction.       -- Avoid the use of benzodiazepines [place] for these recurrent spells.  ---AED is not recommended at this time for what appears to be nonepileptic spells.     --No further neurological work-up is recommended at  this time, neurology will sign off, please call with questions or concerns.        30 minutes was spent in direct patient care, more than 50% of the time in counseling and directing care, the recommendations were also discussed with the patient's niece by telephone, she had questions which were addressed to her satisfaction.    Courtney Llamas M.D.  [facility]  Office [phone]

## 2023-06-24 NOTE — PROGRESS NOTES
[facility]    Medicine Progress Note - Hospitalist Service    Date of Admission:  6/22/2023    Assessment & Plan   Kimmy Mendez is a 37 year old female w/ PMH of H.pylori ROWAN, depression, and post partum depression, hospitalization in 2019 grief reaction with prolonged bereavement admitted to [facility] 6/20/23 for recurrent spells of alteration of consciousness, ultimately transferred to [facility] 6/22/23 for continuous video EEG, Neurology, and Psychiatry consult to discern seizure vs pseudoseizure.     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  She has not been started on AEDs  *CT head 6/18/23 acute intracranial abnormality.  *normal LFTs, TSH in 5/2023.  - Admitted to inpatient status.  - Neurology consulted, appreciate their assistance.  - Psychiatry consulted, appreciate their assistance.  - Continuous EEG monitoring did not show evidence of seizure activity.  - MRI brain obtained, report pending although patient states neurology told her it was normal.  - Seizure precautions.  - Cardiac monitoring.  - Continuous pulse ox.  - Will try to contact psychiatry today, patient may be able to go home soon with outpatient psych follow-up.    ROWAN  Depression  Hx of post partum depression  Grief reaction with prolonged bereavement  - Psychiatry consulted, appreciate their assistance.  - Patient declined starting any psychiatry medications at that time.    Recently underwent cardiac evaluation for dyspnea and palpitations  *Stress echo was completed on 06/13/2023, with average exercise capacity. No ST segment changes observed with stress, PACs during stress and in recovery per. Her rest echo noted normal LV function, EF estimated at 55-60%. She was noted to have normal stress echocardiogram with no evidence of stress-induced ischemia. This is felt to  be a low risk study.    *ekg SB w/ PAC in [facility].  Has orthopnea, palpitations resulting in difficulty falling asleep and staying asleep.   *Zio patch x 14d showed occasional PACs, with burden of 4.2%. Heart rate , with average heart rate of 71 beats per minute. She did have second-degree AV block-Mobitz 1 (Wenckebach) overnight. About half of the symptom episodes occurred in association with PACs and/or PVCs, the other have occurred with sinus rhythm.  - Continue cardiac monitoring for now.    H.pylori  - Recurrent, dx 5/23/23, started therapy on Saturday 5/27, completed therapy without ongoing sx.        Diet: Combination Diet Regular Diet Adult    DVT Prophylaxis: Pneumatic Compression Devices  Hogan Catheter: Not present  Lines: None     Cardiac Monitoring: ACTIVE order. Indication: seizures  Code Status: Full Code      Clinically Significant Risk Factors                                  Disposition Plan      Expected Discharge Date: 06/25/2023                  Tyler Rizvi MD  Hospitalist Service  [facility]  Securely message with [employer] (more info)  Text page via [employer] Paging/Directory   ______________________________________________________________________    Interval History   Kimmy Mendez was seen this afternoon. She feels OK. Has had a good day, no episodes during the day today. Had three episodes overnight per nursing report. Denies chest pain, shortness of breath, nausea, abdominal pain. Multiple family members in the room with her today.    Physical Exam   Vital Signs: Temp: 98.4  F (36.9  C) Temp src: Oral BP: (!) 151/98 Pulse: 107   Resp: 18 SpO2: 98 % O2 Device: None (Room air)    Weight: 125 lbs .01 oz    Constitutional: awake, alert, cooperative, no apparent distress, sitting up in the hospital bed  Respiratory: no increased work of breathing, clear to auscultation bilaterally, no crackles or wheezing  Cardiovascular: regular rate and rhythm, normal S1 and S2,  no murmur noted  GI: normal bowel sounds, soft, non-distended, non-tender  Skin: warm, dry  Musculoskeletal: no lower extremity pitting edema present  Neurologic: awake, alert, answers questions appropriately, moves all extremities    Medical Decision Making       40 MINUTES SPENT BY ME on the date of service doing chart review, history, exam, documentation & further activities per the note.      Data

## 2023-06-25 PROCEDURE — 250N000013 HC RX MED GY IP 250 OP 250 PS 637: Performed by: HOSPITALIST

## 2023-06-25 PROCEDURE — 99232 SBSQ HOSP IP/OBS MODERATE 35: CPT | Performed by: HOSPITALIST

## 2023-06-25 PROCEDURE — 120N000001 HC R&B MED SURG/OB

## 2023-06-25 RX ADMIN — ACETAMINOPHEN 650 MG: 325 TABLET ORAL at 20:59

## 2023-06-25 ASSESSMENT — ACTIVITIES OF DAILY LIVING (ADL)
ADLS_ACUITY_SCORE: 21

## 2023-06-25 NOTE — PLAN OF CARE
Reason for Admission: pseudo seizures      Cognitive/Mentation: A/Ox 4  Neuros/CMS: Intact   VS: stable.   Tele: SR.  GI: BS active, passing flatus. Continent.  :Continent.  Pulmonary: LS clear.  Pain: tylenol given menstrual cramps   Skin: intact  Activity: Assist x 1 GB  Diet: regular with thin liquids. Takes pills whole.      Therapies recs: pending  Discharge: pending     Aggression Stoplight Tool: green     End of shift summary: MRI results pending. No episodes overnight

## 2023-06-25 NOTE — DISCHARGE INSTRUCTIONS
"    Crisis Lines  Crisis Text Line  Text 012098  You will be connected with a trained live crisis counselor to provide support.    National Hope Line  1.800.SUICIDE [1002619]    National Suicide Prevention Lifeline  Free and confidential support  1.800.273.TALK [2520]  http://suicidepreventionlifeline.org    Community Resources  Fast Tracker  Linking people to mental health and substance use disorder resources  fasttrackRENTISHn.org     [place] Mental Health Warm Line  Peer to peer support  Monday thru Saturday, 12 pm to 10 pm  121.901.8904 or 9.047.051.6190  Text \"Support\" to 80366    National [place] on Mental Illness (DAFNE)  554.114.3940 or 1.888.DAFNE.HELPS    Walk-in Counseling Center  Free mental health counseling  [address]  767.035.3689    Mental Health Apps  My3  https://Uro Jock/    VirtualHopeBox  https://Nutrigreen/apps/virtual-hope-box/    Suicide Safety Plan (Nexercise)    Calm Harm      Additional information:  YOu were seen by a licensed mental health professional through Triage and Transition services, Behavioral Healthcare Providers ([facility])  for a mental health assessment. It is recommended that you follow up with your established providers (psychiatrist, mental health therapist, and/or primary care doctor - as relevant) as soon as possible. Coordinators from [facility] will be calling you in the next 24-48 hours to ensure that you have the resources you need.  You can also contact [facility] coordinators directly at [phone]. You may have been scheduled for or offered an appointment with a mental health provider. [facility] maintains an extensive network of licensed behavioral health providers to connect patients with the services they need.  We do not charge providers a fee to participate in our referral network.  We match patients with providers based on a patient's specific needs, insurance coverage, and location.  Our first effort will be to refer you to a " provider within your care system, and will utilize providers outside your care system as needed.

## 2023-06-25 NOTE — CONSULTS
Triage and Transition - Consult and Liaison     Kimmy Mendez  June 26, 2023    Psychiatry consult acknowledged, spoke with Dr. Rizvi and nursing over the phone throughout day. Dr. Rizvi initially wanted to know if patient would be able to be discharged with current psychiatric services. I reviewed recent note- patient was not interested in medications and was already connected with therapy. Patient had declined further support on 6/23. Dr. Rizvi stated he planned to talk with patient and patient's family and would see if they were interested in any other services and if not, plan for discharge today.     Received call back from nurse stating patient's brother wanted to talk to psych. I called patient's brother, Enio ([phone]), on the phone. He states he is unsure what is triggering these but would like to get her help. He asked if psych had seen her at all while hospitalized, I confirmed we had seen her but did not provide information about what she shared or plan. He asks if we could come by again while he is present. I informed him the medication provider would be in tomorrow and could meet with patient to see if there are further needs for medication and that we could set up services if she was interested. He states he would be around.     Silvia Le, [facility]   Triage and Transition - Consult and Liaison   [phone]

## 2023-06-25 NOTE — PROGRESS NOTES
SPIRITUAL HEALTH SERVICES Progress Note  [facility] Neuro    Visited Kimmy and her brother, Enio, in response to her request for SHS. At the time of my visit, Kimmy reported feeling good. She requested that I keep her in my prayers.    Plan: SH remains available.    JEROME Perez.   Intern    [facility] routine referrals *[phone]  [facility] available 24/7 for emergent requests/referrals, either by paging the on-call  or by entering an ASAP/STAT consult in Epic (this will also page the on-call ).

## 2023-06-25 NOTE — PROGRESS NOTES
RN spoke with Silvia Le from psych department. Confirmed that she and psychiatrist will be seeing patient tomorrow AM. Time aimed for visit between [phone]. Silvia states she will call patient's brother tonight (soon) to have discussion with him, that he requested.  Patient and brother updated at bedside.

## 2023-06-25 NOTE — CONSULTS
Care Management Initial Consult    General Information  Assessment completed with: Patient,    Type of CM/SW Visit: Initial Assessment    Primary Care Provider verified and updated as needed:     Readmission within the last 30 days:        Reason for Consult: discharge planning  Advance Care Planning:            Communication Assessment  Patient's communication style: spoken language (English or Bilingual)    Hearing Difficulty or Deaf: no   Wear Glasses or Blind: no    Cognitive  Cognitive/Neuro/Behavioral: WDL  Level of Consciousness: alert  Arousal Level: opens eyes spontaneously  Orientation: oriented x 4  Mood/Behavior: calm, cooperative  Best Language: 0 - No aphasia  Speech: clear, spontaneous, logical    Living Environment:   People in home: other relative(s)     Current living Arrangements: house      Able to return to prior arrangements: yes       Family/Social Support:  Care provided by: self  Provides care for:                  Description of Support System:           Current Resources:   Patient receiving home care services: No     Community Resources: OP Mental Health  Equipment currently used at home: none  Supplies currently used at home:      Employment/Financial:  Employment Status:          Financial Concerns:             Does the patient's insurance plan have a 3 day qualifying hospital stay waiver?  No    Lifestyle & Psychosocial Needs:  Social Determinants of Health     Tobacco Use: Low Risk  (6/19/2023)    Patient History      Smoking Tobacco Use: Never      Smokeless Tobacco Use: Never      Passive Exposure: Not on file   Alcohol Use: Not on file   Financial Resource Strain: Not on file   Food Insecurity: Not on file   Transportation Needs: Not on file   Physical Activity: Not on file   Stress: Not on file   Social Connections: Not on file   Intimate Partner Violence: Not on file   Depression: Not on file   Housing Stability: Not on file       Functional Status:  Prior to admission patient  needed assistance:              Mental Health Status:          Chemical Dependency Status:                Values/Beliefs:  Spiritual, Cultural Beliefs, Taoist Practices, Values that affect care:    Description of Beliefs that Will Affect Care: Baptist            Additional Information:  CM/SW consulted for discharge planning/disposition. SW completed chart review. Patient is a 37 year old female w/ PMH of H.pylori ROWAN, depression, and post partum depression, hospitalization in 2019 grief reaction with prolonged bereavement admitted to [facility] 6/20/23 for recurrent spells of alteration of consciousness, ultimately transferred to [facility] 6/22/23 for continuous video EEG, Neurology, and Psychiatry consult to discern seizure vs pseudoseizure.   SW met with patient at bedside to introduce self and role and discuss discharge planning. Patient states she lives in a house with her cousin and her cousin's children. She stated she feels she is doing very well right now and is ready to return home. Denies any needs for resources for further support at this time.   SW/CM will continue to follow for any further care coordination.     ANEL Montilla

## 2023-06-25 NOTE — PLAN OF CARE
Pt here with pseudoseizures/conversion disorder. Psych following. A&Ox4. Neuros appear intact. Slightly elevated BP, other VSS. Tele NSR, now discontinued. No headache. Denies pain - no cramps today. Menstruation continues. No episodes of unresponsiveness today. Up with SBA w/ GB. Tolerating regular diet. Gen neuro reviewed MRI; no new orders. MD talked with patient and brother, patient agreed with meeting with Psych tomorrow. RN spoke with Silvia from psych who stated this will likely occur between [id number]. Patient and brother aware. Discharge plan pending psych eval.

## 2023-06-25 NOTE — PROGRESS NOTES
[facility]    Medicine Progress Note - Hospitalist Service    Date of Admission:  6/22/2023    Assessment & Plan   Kimmy Mendez is a 37 year old female w/ PMH of H.pylori ROWAN, depression, and post partum depression, hospitalization in 2019 grief reaction with prolonged bereavement admitted to [facility] 6/20/23 for recurrent spells of alteration of consciousness, ultimately transferred to [facility] 6/22/23 for continuous video EEG, Neurology, and Psychiatry consult to discern seizure vs pseudoseizure.     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  She has not been started on AEDs  *CT head 6/18/23 acute intracranial abnormality.  *normal LFTs, TSH in 5/2023.  - Admitted to inpatient status.  - Neurology consulted, appreciate their assistance.  - Psychiatry consulted, appreciate their assistance.  - Continuous EEG monitoring did not show evidence of seizure activity.  - MRI brain obtained, no significant abnormalities noted, see report and Neuro notes.  - Seizure precautions.  - Psychiatry recommending psychotherapy. Patient considering continuing with current therapist versus finding more intense therapy.  - Re-consult psychiatry tomorrow. Patient wants to speak with psychiatry again. Concerned about anxiety and is now open to trying medication.    ROWAN  Depression  Hx of post partum depression  Grief reaction with prolonged bereavement  - Psychiatry consulted, appreciate their assistance.  - Patient declined starting any psychiatry medications at that time, but now states she wants to consider medication for anxiety.  - Re-consult psychiatry tomorrow.    Recently underwent cardiac evaluation for dyspnea and palpitations  *Stress echo was completed on 06/13/2023, with average exercise capacity. No ST segment changes observed with stress, PACs  during stress and in recovery per. Her rest echo noted normal LV function, EF estimated at 55-60%. She was noted to have normal stress echocardiogram with no evidence of stress-induced ischemia. This is felt to be a low risk study.    *ekg SB w/ PAC in [facility].  Has orthopnea, palpitations resulting in difficulty falling asleep and staying asleep.   *Zio patch x 14d showed occasional PACs, with burden of 4.2%. Heart rate , with average heart rate of 71 beats per minute. She did have second-degree AV block-Mobitz 1 (Wenckebach) overnight. About half of the symptom episodes occurred in association with PACs and/or PVCs, the other have occurred with sinus rhythm.  - No new arrhythmias noted on cardiac monitoring, will discontinued.    H.pylori  - Recurrent, dx 5/23/23, started therapy on Saturday 5/27, completed therapy without ongoing sx.        Diet: Combination Diet Regular Diet Adult    DVT Prophylaxis: Pneumatic Compression Devices  Hogan Catheter: Not present  Lines: None     Cardiac Monitoring: ACTIVE order. Indication: seizures  Code Status: Full Code      Clinically Significant Risk Factors                                  Disposition Plan      Expected Discharge Date: 06/25/2023      Destination: home with family            Tyler Rizvi MD  Hospitalist Service  [facility]  Securely message with [employer] (more info)  Text page via [employer] Paging/Directory   ______________________________________________________________________    Interval History   Kimmy Mendez was seen this morning and again this afternoon. She feels much better today. Frequency of episodes has decreased. Denies fevers, chest pain, shortness of breath, nausea, abdominal pain.  Met with her again this afternoon. Her brother was in the room at the time. She is concerned about feelings of anxiety and would like to see psychiatry and consider taking medication for this. Her brother also spoke to me outside the  room and has concerns that she has significant depression/anxiety that she is admitting to and he feels like she needs more intense treatment.    Physical Exam   Vital Signs: Temp: 97.5  F (36.4  C) Temp src: Oral BP: 128/89 Pulse: 71   Resp: 18 SpO2: 95 % O2 Device: None (Room air)    Weight: 125 lbs .01 oz    Constitutional: awake, alert, cooperative, no apparent distress, sitting up in the hospital bed  Respiratory: no increased work of breathing, clear to auscultation bilaterally, no crackles or wheezing  Cardiovascular: regular rate and rhythm, normal S1 and S2, no murmur noted  GI: normal bowel sounds, soft, non-distended, non-tender  Skin: warm, dry  Musculoskeletal: no lower extremity pitting edema present  Neurologic: awake, alert, answers questions appropriately, moves all extremities    Medical Decision Making       55 MINUTES SPENT BY ME on the date of service doing chart review, history, exam, documentation & further activities per the note.      Data

## 2023-06-26 PROCEDURE — 99233 SBSQ HOSP IP/OBS HIGH 50: CPT

## 2023-06-26 PROCEDURE — 99232 SBSQ HOSP IP/OBS MODERATE 35: CPT | Performed by: HOSPITALIST

## 2023-06-26 PROCEDURE — 120N000001 HC R&B MED SURG/OB

## 2023-06-26 PROCEDURE — 250N000013 HC RX MED GY IP 250 OP 250 PS 637: Performed by: PHYSICIAN ASSISTANT

## 2023-06-26 PROCEDURE — 250N000013 HC RX MED GY IP 250 OP 250 PS 637: Performed by: HOSPITALIST

## 2023-06-26 RX ORDER — HYDROXYZINE HYDROCHLORIDE 25 MG/1
25 TABLET, FILM COATED ORAL 3 TIMES DAILY PRN
Status: DISCONTINUED | OUTPATIENT
Start: 2023-06-26 | End: 2023-06-27 | Stop reason: HOSPADM

## 2023-06-26 RX ADMIN — ACETAMINOPHEN 650 MG: 325 TABLET ORAL at 13:09

## 2023-06-26 RX ADMIN — FLUOXETINE 20 MG: 20 CAPSULE ORAL at 19:20

## 2023-06-26 ASSESSMENT — ACTIVITIES OF DAILY LIVING (ADL)
ADLS_ACUITY_SCORE: 21

## 2023-06-26 NOTE — CONSULTS
Psychiatry Consultation; Follow up              Reason for Consult, requesting source:    Meds PNES  Requesting source: hospitalist    Labs and imaging reviewed, seen by EMMANUEL Livingston CNP    Total time spent in chart review, patient interview and coordination of care; 60 minutes               Interim history:    From my initial psychiatric consult 6/23: Kimmy Mendez is a 37 year old female w/ PMH of H.pylori ROWAN, depression, and post partum depression, hospitalization in 2019 grief reaction with prolonged bereavement admitted to [facility] 6/20/23 for recurrent spells of alteration of consciousness, ultimately transferred to [facility] 6/22/23 for continuous video EEG, Neurology, and Psychiatry consult to discern seizure vs pseudoseizure. Stress echo was completed on 06/13/2023 for dyspnea and palpitations an average exercise capacity. No ST segment changes observed with stress.      Pt was seen by neurology 6/21 who noted She has had several alterations in consciousness over the past few weeks, with observed spells in the ER as well as while an inpatient earlier today. Concern was raised about seizures, but the observations by medical personnel are suggestive of non-epileptic spells. Earlier today she spontaneously slumped forward with loss of responsiveness, with return to consciousness within minutes, with no post-ictal period. She reports no muscle pain, incontinence, or tongue biting. Her EEG performed earlier today showed no focal slowing, epileptiform discharges, or seizure activity. Neurology did NOT recommend antiepileptics.      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 She tells me today she doesn't remember much of that hospitalization due to dissociation and severe psychological distress.  Prior to that hospitalization, she had a hx of ROWAN,  "depression, and post partum depression. She was hospitalized in 2019 as pt's son was reportedly murdered by pt's mother 8 days ago by choking.      She again denies all anxiety, depression, stress sxs to me today. She reports all but one \"episode\" was witnessed by other people and after episodes is tired/exhausted. Her cousin showed me a video of the seizure like activity which included left sided twitching, no drooling. She denies loss of bowel/bladder. She has only been taking Atarax PRN for sleep.      On f/u psych consult 6/26, patient with brighter affect and had brother at bedside. She was very open to discussion of her mental health and stress contributing to her \"episodes.\" She is open and wanting to start medications, states she also notices that her mood is more irritable, anxious, sad prior to her period starting. She also reports that atarax as needed helps her anxiety and sleep. Her family worries about her \"hhyperness\" and offered possible diagnosis of ADHD or bipolar, she denies all present/prior symptoms of jennifer/hypomania. I discussed ADHD would have to be worked up outpatient and I think the anxiety is driving a lot of the talkativeness and other inattentive sxs. She denies SI, HI, AVH. She gave me verbal permission to call her therapist.               Current Medications:       sodium chloride (PF)  3 mL Intracatheter Q8H       Past Psychiatric History:   Outpatient MH visits starting in 2015 with post partum depression after the birth of her son, prescribed Celexa.  Was prescribed Zoloft (which made her dizzy and nauseous) and trazodone (not helpful), most recently prescribed mirtazapine and hydroxyzine.  Patient has no history of suicide attempts or hospitalizations due to mental health.  She had some psychotherapy in 2015, none currently.  She was seeing Dr. Jacques Horan from [facility] in [place]   She was discharged from psych hospital in 2019 with remeron, zyprexa 5mg at " "bedtime, and lamictal 25mg.            MSE:   Appearance: awake, alert and adequately groomed  Attitude:  cooperative  Eye Contact:  good  Mood:  good  Affect:  appropriate and in normal range and mood congruent  Speech:  clear, coherent  Psychomotor Behavior:  no evidence of tardive dyskinesia, dystonia, or tics  Muscle strength and tone: baseline   Throught Process:  logical, linear and goal oriented  Associations:  no loose associations  Thought Content:  no evidence of suicidal ideation or homicidal ideation and no evidence of psychotic thought  Insight:  partial  Judgement:  fair  Oriented to:  time, person, and place  Attention Span and Concentration:  intact  Recent and Remote Memory:  intact  Language: able to name/identify objects without impairment  Fund of Knowledge: intact with awareness of current and past events    Vital signs:  Temp: 98.3  F (36.8  C) Temp src: Oral BP: (!) 139/100 Pulse: 87   Resp: 16 SpO2: 100 % O2 Device: None (Room air)     Weight: 56.7 kg (125 lb)  Estimated body mass index is 22.15 kg/m  as calculated from the following:    Height as of 6/20/23: 1.6 m (5' 2.99\").    Weight as of this encounter: 56.7 kg (125 lb).             DSM-5 Diagnosis:   Psychogenic seizures, provisional   ROWAN  History of MDD, recurrent severe episodes without psychosis   Premenstrual dysphoric disorder, provisional           Assessment:   Per my assessment discussing PNES suspicion and recommendations on 6/23: Kimmy is a very pleasant 37 year old Armenian woman who presents for evaluation of \"seizure like activity.\" EEGs were normal and neurology did not recommend antiepileptics. Patient has lacked post-ictal symptoms common in epilepsy, denies loosing bowel/bladder, no tongue biting, etc. Rapid alerting and reorientation are common after PNES but uncommon with epileptic seizure. Interestingly, most episodes of PNES occur in front of witnesses, which is true for Patricia's episodes. With a history of severe " depression and bereavement after her son  in 2019, it makes sense that she is having increased stress and psychological response as her mother is currently hospitalized in [place] for mental health and has not been charged/convicted for sons murder due to mental health.      I discussed the possibility with patient and family that this could be psychogenic non-epileptic seizures and explained that the events are not under conscious control, but that patients can learn to control them. Discussed that she may be denying mental health sxs, but that her stress is likely manifesting in a neurological way. She was open to this discussion. She was not open to any psychopharmacologic interventions at this time.    On f/u assessment today , patient is agreeable to starting Prozac for anxiety and period related mood changes. She states hydroxyzine has been beneficial as a PRN for anxiety and sleep. Pt and family were open to diagnosis of PNES and recommendations for increased therapy and an outpatient psychiatrist.       1. Prozac 20mg daily   2. Atarax 25mg TID PRN anxiety, sleep   3. Follow-up with outpatient therapist 2x weekly  4. Requested outpatient psychiatrist appointment  5. Patient safe to discharge home       Criss Cruz, MITCH-BC  Consult/Liaison Psychiatry   [facility]

## 2023-06-26 NOTE — PLAN OF CARE
Reason for Admission: pseudo seizures      Cognitive/Mentation: A/Ox 4  Neuros/CMS: Intact   VS: stable.   GI: BS active, passing flatus. Continent.  :Continent.  Pulmonary: LS clear.  Pain: tylenol given for headache  Skin: intact  Activity: Assist x SBA with GB  Diet: regular with thin liquids. Takes pills whole.      Therapies recs: pending  Discharge: pending     Aggression Stoplight Tool: green     End of shift summary: No episodes overnight

## 2023-06-26 NOTE — PROGRESS NOTES
[facility]    Medicine Progress Note - Hospitalist Service    Date of Admission:  6/22/2023    Assessment & Plan     Kimmy Mendez is a 37 year old female w/ PMH of H.pylori ROWAN, depression, and post partum depression, hospitalization in 2019 grief reaction with prolonged bereavement admitted to [facility] 6/20/23 for recurrent spells of alteration of consciousness, ultimately transferred to [facility] 6/22/23 for continuous video EEG, Neurology, and Psychiatry consult to discern seizure vs pseudoseizure.      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  She has not been started on AEDs  *CT head 6/18/23 acute intracranial abnormality.  *normal LFTs, TSH in 5/2023.  - Admitted to inpatient status.  - Neurology consulted, appreciate their assistance.  - Psychiatry consulted, appreciate their assistance.  - Continuous EEG monitoring did not show evidence of seizure activity.  - MRI brain obtained, no significant abnormalities noted, see report and Neuro notes.  - Seizure precautions.  - Psychiatry recommending psychotherapy. Patient considering continuing with current therapist versus finding more intense therapy.  -Psychiatry consulted today     ROWAN  Depression  Hx of post partum depression  Grief reaction with prolonged bereavement  - Psychiatry consulted, appreciate their assistance.  - Patient declined starting any psychiatry medications at that time, but now states she wants to consider medication for anxiety.  -Psychiatry team consulted as patient is open to medications.     Recently underwent cardiac evaluation for dyspnea and palpitations  *Stress echo was completed on 06/13/2023, with average exercise capacity. No ST segment changes observed with stress, PACs during stress and in recovery per. Her rest echo noted normal LV function, EF  estimated at 55-60%. She was noted to have normal stress echocardiogram with no evidence of stress-induced ischemia. This is felt to be a low risk study.    [facility]  Has orthopnea, palpitations resulting in difficulty falling asleep and staying asleep.   *Zio patch x 14d showed occasional PACs, with burden of 4.2%. Heart rate , with average heart rate of 71 beats per minute. She did have second-degree AV block-Mobitz 1 (Wenckebach) overnight. About half of the symptom episodes occurred in association with PACs and/or PVCs, the other have occurred with sinus rhythm.  - No new arrhythmias noted on cardiac monitoring, will discontinued.     H.pylori  - Recurrent, dx 5/23/23, started therapy on Saturday 5/27, completed therapy without ongoing sx.              Diet: Combination Diet Regular Diet Adult    DVT Prophylaxis: Pneumatic Compression Devices  Hogan Catheter: Not present  Lines: None     Cardiac Monitoring: None  Code Status: Full Code      Clinically Significant Risk Factors                                  Disposition Plan      Expected Discharge Date: 06/26/2023      Destination: home with family            Monica Garcia MD  Hospitalist Service  [facility]  Securely message with [employer] (more info)  Text page via [facility] Paging/Directory   ______________________________________________________________________    Interval History     I saw the patient this morning and her brother was present and patient wanted her brother to be involved in the discussion about her medical care.  I did discuss the results of MRI, continuous EEG and recommendations from psychiatry.  Patient mentioned to me that she wants to continue follow-up with her own psychotherapist    Patient does admit that she needs help and does want to function and want to be at baseline and want to go to school and work.  She does have insight that she needs psychiatric help.    Patient and brother had a  lot of questions which were answered to satisfaction and I also discussed plan of care with patient's nurse    Physical Exam   Vital Signs: Temp: 97.3  F (36.3  C) Temp src: Oral BP: 134/75 Pulse: 79   Resp: 16 SpO2: 100 % O2 Device: None (Room air)    Weight: 125 lbs .01 oz        General: Patient appears comfortable and in no acute distress.  HEENT: Head is atraumatic, normocephalic.  Pupils are equal, round and reactive to light.  No scleral icterus. Oral mucosa is moist   Neck: Neck is supple and No Lymphadenopathy   Respiratory: Lungs are clear to auscultation bilaterally with no wheeze or crackles   Cardiovascular: Regular rate , S1 and S2 normal with no murmer or rubs or gallops  Abdomen:   soft , non tender , non distended and bowel sound present   Skin: No skin rashes or lesions to inspection or palpation.  Neurologic: Higher functions are within normal limits. No obvious defects in speech, language and memory. No facial droop  Musculoskeletal: Normal Range of motion over upper and lower extremities bilaterally   Psychiatric: cooperative     Medical Decision Making             Data         Imaging results reviewed over the past 24 hrs:   No results found for this or any previous visit (from the past 24 hour(s)).

## 2023-06-26 NOTE — PLAN OF CARE
Neuro: Intact  Cardio: BP WDL  Resp: LS clear room air  GI: Tolerating diet BS active  : Voiding without issue  Skin: Intact  Pain: Denies  Activity: SBA  Starting Prozac this evening.

## 2023-06-26 NOTE — PLAN OF CARE
Reason for Admission: Pseudo seizures    Cognitive/Mentation: A/Ox 4  Neuros/CMS: Intact   VS: stable. .  GI: BS positive,  flatus, l. Continent.  : . Continent.  Pulmonary: LS clear.  Pain: cramps/Tylenol and hot pack.     Drains/Lines: PIV  Skin: Intact  Activity: Assist x 1 with SBA.  Diet: regular with thin liquids. Takes pills whole.     Therapies recs: home  Discharge: pending Psyc seeing patient    Aggression Stoplight Tool: green    End of shift summary: waiting for Psyc who was supposed to be here between 10-12pm, have left 2 voicemails

## 2023-06-27 VITALS
DIASTOLIC BLOOD PRESSURE: 76 MMHG | WEIGHT: 125 LBS | BODY MASS INDEX: 22.15 KG/M2 | RESPIRATION RATE: 16 BRPM | TEMPERATURE: 97.5 F | OXYGEN SATURATION: 96 % | HEART RATE: 71 BPM | SYSTOLIC BLOOD PRESSURE: 126 MMHG

## 2023-06-27 PROCEDURE — 99239 HOSP IP/OBS DSCHRG MGMT >30: CPT | Performed by: HOSPITALIST

## 2023-06-27 PROCEDURE — 250N000013 HC RX MED GY IP 250 OP 250 PS 637: Performed by: PHYSICIAN ASSISTANT

## 2023-06-27 RX ORDER — HYDROXYZINE HYDROCHLORIDE 25 MG/1
25 TABLET, FILM COATED ORAL 3 TIMES DAILY PRN
Qty: 90 TABLET | Refills: 0 | Status: ON HOLD | OUTPATIENT
Start: 2023-06-27 | End: 2023-08-09

## 2023-06-27 RX ADMIN — FLUOXETINE 20 MG: 20 CAPSULE ORAL at 08:18

## 2023-06-27 ASSESSMENT — ACTIVITIES OF DAILY LIVING (ADL)
ADLS_ACUITY_SCORE: 21

## 2023-06-27 NOTE — PROGRESS NOTES
Care Management Discharge Note    Discharge Date: 06/27/2023       Discharge Disposition:      Discharge Services:      Discharge DME:      Discharge Transportation: family or friend will provide    Private pay costs discussed: Not applicable    Does the patient's insurance plan have a 3 day qualifying hospital stay waiver?  No    PAS Confirmation Code:    Patient/family educated on Medicare website which has current facility and service quality ratings:      Education Provided on the Discharge Plan:    Persons Notified of Discharge Plans:   Patient/Family in Agreement with the Plan:      Handoff Referral Completed: No    Additional Information:  Patient will discharge today.  Per Mental Health note 6/23 patient has a therapist she sees.  Psychiatry appointment scheduled and on AVS per Psych.  No further CM interventions anticipated.    Maria Guadalupe Bangura RN, BSN, PHN  Inpatient Care Coordination  [facility]  Phone: [phone]

## 2023-06-27 NOTE — PLAN OF CARE
Orientation/Cognitive: A&Ox4   Mobility Level/Assist Equipment: SBA  Fall Risk (Y/N): Y. Seizure Precautions    Behavior Concerns: None  Pain Management: Denies  Tele/VS/O2: VSS on RA  ABNL Lab/BG: No new lab results  Diet: Reg  Bowel/Bladder: Cont.  Skin Concerns: None  Drains/Devices: PIV SL  Tests/Procedures for next shift: TDB  Anticipated DC date & active delays: 6/27?  Patient Stated Goal for Today: To sleep

## 2023-06-27 NOTE — DISCHARGE INSTRUCTIONS
Future Appts:    Date: Friday, 7/7/2023  Time: 2:00 pm - 3:00 pm  Provider: Christy Paz MD, MD  Location: [facility], [address] [place] [address]  Phone: [phone]  Type: Medication Mgmt - Initial (In-Person)    Patient Instructions  THIS IS ONLY A RESERVATION. PATIENT MUST CALL [phone] TO PRE-REGISTER AND CONFIRM APPOINTMENT. Please arrive 15 min early with ID and insurance card. Insurance accepted: [place] ESTES, commercial insurance. If pt has no insurance, we have a [facility] Navigator available to assist in applying for state insurance. We have 3 locations - intake appts available in [place] or [place], depending on day/time of week. Please call [phone] to verify location and pre-register to confirm appt.

## 2023-06-27 NOTE — DISCHARGE SUMMARY
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  No follow up labs or test are   needed.  Follow up with your therapist at the [facility] on   Tuesday.        Follow-up and recommended labs and tests       Follow up with primary care provider, [facility],   within 7 days for hospital follow- up.    Continue to follow with your psychotherapist  Follow with psychiatry as scheduled        {Additional follow-up instructions/to-do's for PCP    :    Unresulted Labs Ordered in the Past 30 Days of this Admission     No orders found from 5/23/2023 to 6/23/2023.      These results will be followed up by     Discharge Disposition   Discharged to home  Condition at discharge: Stable    Hospital Course     Kimmy Mendez is a 37 year old female w/ PMH of H.pylori ROWAN, depression, and post partum depression, hospitalization in 2019 grief reaction with prolonged bereavement admitted to [facility] 6/20/23 for recurrent spells of alteration of consciousness, ultimately transferred to [facility] 6/22/23 for continuous video EEG, Neurology, and Psychiatry consult to discern seizure vs pseudoseizure.    Patient had CT scan of the head done on 6/18/2023 which did not show any acute abnormality, she had normal TSH in 5/2023, EEG done at outside  hospital on 6/21/2023 was negative for any focal slowing or any seizure activity or epileptiform discharges.  Neurology was consulted along with psychiatry and continuous EEG monitoring did not show any evidence of seizure activity and MRI of the brain did not show any significant abnormality.  Psychiatry saw the patient and they recommended patient to do psychotherapy and patient does work with her own psychotherapist as they speak in her own language and she is comfortable with them and will continue to work with them.  She was also seen by psychiatry again during the hospital stay and was started on Prozac along with 3 times daily as needed hydroxyzine.  Patient will also follow-up with psychiatry on discharge.  On day of discharge patient was very cheerful and denied any suicidal or homicidal ideation and was in agreement with going home.  Care coordinator team was also notified of discharge and follow-ups     Consultations This Hospital Stay   NEUROLOGY IP CONSULT  PSYCHIATRY IP CONSULT  CARE MANAGEMENT / SOCIAL WORK IP CONSULT  PSYCHIATRY IP CONSULT  SPIRITUAL HEALTH SERVICES IP CONSULT  CARE MANAGEMENT / SOCIAL WORK IP CONSULT  PSYCHIATRY IP CONSULT  PSYCHIATRY IP CONSULT    Code Status   Full Code    Time Spent on this Encounter   I, Monica Garcia MD, personally saw the patient today and spent greater than 30 minutes discharging this patient.       Monica Garcia MD  [facility] NEUROSCIENCE UNIT  [address] [place] [address]  Phone: [phone]  ______________________________________________________________________    Physical Exam   Vital Signs: Temp: 97.5  F (36.4  C) Temp src: Oral BP: 126/76 Pulse: 71   Resp: 16 SpO2: 96 % O2 Device: None (Room air)    Weight: 125 lbs .01 oz    General: Alert and oriented x3  Respiratory: Lungs are clear to auscultation with no wheeze  CVS: S1-S2 normal  Musculoskeletal: Normal range of motion over upper and lower extremities  bilaterally  Psychiatric: Cooperative       Primary Care Physician   [facility]    Discharge Orders      Reason for your hospital stay    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  No follow up labs or test are needed.  Follow up with your therapist at the [facility] on Tuesday.     Activity    Your activity upon discharge: activity as tolerated     Reason for your hospital stay    Pseudoseizures     Follow-up and recommended labs and tests     Follow up with primary care provider, [facility], within 7 days for hospital follow- up.    Continue to follow with your psychotherapist  Follow with psychiatry as scheduled     Activity    Your activity upon discharge: activity as tolerated     Diet    Follow this diet upon discharge: Regular Diet Adult     Diet    Follow this diet upon discharge: Orders Placed This Encounter      Combination Diet Regular Diet Adult       Significant Results and Procedures   Most Recent 3 CBC's:Recent Labs   Lab Test 06/23/23  0804 06/21/23  0645 06/20/23  1328   WBC 3.7* 7.8 4.0   HGB 13.9 14.1 12.0   MCV 87 87 86    308 266     Most Recent 3 BMP's:Recent Labs   Lab Test 06/23/23  0804 06/23/23  0304 06/22/23  1123 06/21/23  2222 06/21/23  0645     --  138  --  135*   POTASSIUM 4.0  --  4.6 3.7 3.1*   CHLORIDE 103  --  104  --  103   CO2 23  --  23  --  17*   BUN 6.0  --  7.5  --  3.1*   CR 0.55  --  0.53  --  0.56   ANIONGAP 11  --  11  --  15   CHARLIE 9.5  --  9.5  --  9.7   * 96 102*  --  124*     Most Recent 2 LFT's:Recent Labs   Lab Test 06/23/23  0804 06/11/23  0050   AST 21 32   ALT 19 24   ALKPHOS 64 78   BILITOTAL 0.4 0.2   ,   Results for orders placed or performed during the hospital encounter of 06/22/23   MR Brain w/o & w Contrast    Narrative     EXAM: MR BRAIN WITHOUT AND WITH CONTRAST  LOCATION: [facility]  DATE: 06/23/2023    INDICATION: Recurring spells with altered awareness, evaluation in progress for seizures vs nonepileptic spells.  COMPARISON: CT of the head 06/18/2023.  CONTRAST: 6 mL Gadavist.  TECHNIQUE: Multiplanar multisequence head MRI without and with intravenous contrast.    FINDINGS:  INTRACRANIAL CONTENTS: No abnormal intracranial restricted diffusion is identified to suggest recent infarct. The ventricles appear normal in size and configuration. A few scattered small foci of nonspecific T2 FLAIR hyperintense signal are seen in the   cerebral white matter. The morphology, volume, and signal intensity of the brain parenchyma otherwise appear within normal limits. There is no intracranial hemorrhage. No extra-axial fluid collection, or mass effect. No malformation of cortical   development identified. Symmetric volume and signal intensity of the hippocampal formations without definite findings of mesial temporal sclerosis. No intracranial mass or abnormal enhancement.    SELLA: No abnormality accounting for technique.    OSSEOUS STRUCTURES/SOFT TISSUES: Normal marrow signal. The major intracranial vascular flow-voids are maintained.     ORBITS: No abnormality accounting for technique.     SINUSES/MASTOIDS: No paranasal sinus mucosal disease. No middle ear or mastoid effusion.       Impression    IMPRESSION:  1.  No acute intracranial process identified.  2.  A few scattered nonspecific small foci of T2 FLAIR hyperintense signal in cerebral white matter. This may represent sequela of early minimal chronic small vessel ischemic change. These lesions also could be seen in patients with vascular   headache/migraine. Other possibilities (demyelinating disease, sequela of previous infectious/inflammatory insult, atypical small vessel vasculopathy, etc.) are thought to be less likely.             Discharge Medications    Current Discharge Medication List      START taking these medications    Details   FLUoxetine (PROZAC) 20 MG capsule Take 1 capsule (20 mg) by mouth daily  Qty: 30 capsule, Refills: 0    Associated Diagnoses: Anxiety         CONTINUE these medications which have CHANGED    Details   hydrOXYzine (ATARAX) 25 MG tablet Take 1 tablet (25 mg) by mouth 3 times daily as needed for anxiety (sleep)  Qty: 90 tablet, Refills: 0    Associated Diagnoses: Anxiety           Allergies   Allergies   Allergen Reactions     Pork-Derived Products Other (See Comments)     Pt does not ingest of these products.

## 2023-06-27 NOTE — PLAN OF CARE
Reason for Admission: Pseudoseizures    Cognitive/Mentation: A/Ox 4  Neuros/CMS: Intact   VS: stable. .  GI: BS positive,  flatus, last BM 6/27. Continent.  : . Continent..  Pain: denies.   Activity:  Independent.  Diet: regular with thin liquids. Takes pills whole.       Discharge: home today    Aggression Stoplight Tool: green    End of shift summary: patient to discharge after lunch. Will review discharge AVS with patient and her brother

## 2023-07-25 ENCOUNTER — HOSPITAL ENCOUNTER (EMERGENCY)
Facility: CLINIC | Age: 38
Discharge: PSYCHIATRIC OR SUBSTANCE USE FACILITY | End: 2023-07-28
Attending: EMERGENCY MEDICINE | Admitting: EMERGENCY MEDICINE
Payer: COMMERCIAL

## 2023-07-25 ENCOUNTER — TELEPHONE (OUTPATIENT)
Dept: BEHAVIORAL HEALTH | Facility: CLINIC | Age: 38
End: 2023-07-25

## 2023-07-25 DIAGNOSIS — M79.671 PAIN IN BOTH FEET: ICD-10-CM

## 2023-07-25 DIAGNOSIS — R45.851 SUICIDAL IDEATION: ICD-10-CM

## 2023-07-25 DIAGNOSIS — F23 ACUTE PSYCHOSIS (H): ICD-10-CM

## 2023-07-25 DIAGNOSIS — E87.1 HYPONATREMIA: ICD-10-CM

## 2023-07-25 DIAGNOSIS — M79.672 PAIN IN BOTH FEET: ICD-10-CM

## 2023-07-25 PROBLEM — F33.9 RECURRENT MAJOR DEPRESSION (H): Status: ACTIVE | Noted: 2023-07-25

## 2023-07-25 PROBLEM — F41.1 GAD (GENERALIZED ANXIETY DISORDER): Status: ACTIVE | Noted: 2023-07-25

## 2023-07-25 LAB
ALBUMIN SERPL BCG-MCNC: 4.7 G/DL (ref 3.5–5.2)
ALBUMIN UR-MCNC: NEGATIVE MG/DL
ALP SERPL-CCNC: 82 U/L (ref 35–104)
ALT SERPL W P-5'-P-CCNC: 31 U/L (ref 0–50)
AMPHETAMINES UR QL SCN: NORMAL
ANION GAP SERPL CALCULATED.3IONS-SCNC: 12 MMOL/L (ref 7–15)
ANION GAP SERPL CALCULATED.3IONS-SCNC: 14 MMOL/L (ref 7–15)
APPEARANCE UR: CLEAR
AST SERPL W P-5'-P-CCNC: 28 U/L (ref 0–45)
BARBITURATES UR QL SCN: NORMAL
BASOPHILS # BLD AUTO: 0 10E3/UL (ref 0–0.2)
BASOPHILS NFR BLD AUTO: 1 %
BENZODIAZ UR QL SCN: NORMAL
BILIRUB SERPL-MCNC: 0.3 MG/DL
BILIRUB UR QL STRIP: NEGATIVE
BUN SERPL-MCNC: 6.2 MG/DL (ref 6–20)
BUN SERPL-MCNC: 8.4 MG/DL (ref 6–20)
BZE UR QL SCN: NORMAL
CALCIUM SERPL-MCNC: 8.9 MG/DL (ref 8.6–10)
CALCIUM SERPL-MCNC: 9.6 MG/DL (ref 8.6–10)
CANNABINOIDS UR QL SCN: NORMAL
CHLORIDE SERPL-SCNC: 101 MMOL/L (ref 98–107)
CHLORIDE SERPL-SCNC: 91 MMOL/L (ref 98–107)
COLOR UR AUTO: NORMAL
CREAT SERPL-MCNC: 0.43 MG/DL (ref 0.51–0.95)
CREAT SERPL-MCNC: 0.53 MG/DL (ref 0.51–0.95)
DEPRECATED HCO3 PLAS-SCNC: 21 MMOL/L (ref 22–29)
DEPRECATED HCO3 PLAS-SCNC: 21 MMOL/L (ref 22–29)
EOSINOPHIL # BLD AUTO: 0 10E3/UL (ref 0–0.7)
EOSINOPHIL NFR BLD AUTO: 0 %
ERYTHROCYTE [DISTWIDTH] IN BLOOD BY AUTOMATED COUNT: 12.6 % (ref 10–15)
ETHANOL SERPL-MCNC: <0.01 G/DL
GFR SERPL CREATININE-BSD FRML MDRD: >90 ML/MIN/1.73M2
GFR SERPL CREATININE-BSD FRML MDRD: >90 ML/MIN/1.73M2
GLUCOSE SERPL-MCNC: 107 MG/DL (ref 70–99)
GLUCOSE SERPL-MCNC: 167 MG/DL (ref 70–99)
GLUCOSE UR STRIP-MCNC: NEGATIVE MG/DL
HCG SERPL QL: NEGATIVE
HCT VFR BLD AUTO: 37.6 % (ref 35–47)
HGB BLD-MCNC: 13.1 G/DL (ref 11.7–15.7)
HGB UR QL STRIP: NEGATIVE
IMM GRANULOCYTES # BLD: 0 10E3/UL
IMM GRANULOCYTES NFR BLD: 1 %
KETONES UR STRIP-MCNC: NEGATIVE MG/DL
LEUKOCYTE ESTERASE UR QL STRIP: NEGATIVE
LYMPHOCYTES # BLD AUTO: 1.1 10E3/UL (ref 0.8–5.3)
LYMPHOCYTES NFR BLD AUTO: 18 %
MCH RBC QN AUTO: 29.4 PG (ref 26.5–33)
MCHC RBC AUTO-ENTMCNC: 34.8 G/DL (ref 31.5–36.5)
MCV RBC AUTO: 84 FL (ref 78–100)
MONOCYTES # BLD AUTO: 0.3 10E3/UL (ref 0–1.3)
MONOCYTES NFR BLD AUTO: 5 %
NEUTROPHILS # BLD AUTO: 4.5 10E3/UL (ref 1.6–8.3)
NEUTROPHILS NFR BLD AUTO: 75 %
NITRATE UR QL: NEGATIVE
NRBC # BLD AUTO: 0 10E3/UL
NRBC BLD AUTO-RTO: 0 /100
OPIATES UR QL SCN: NORMAL
PH UR STRIP: 6.5 [PH] (ref 5–7)
PLATELET # BLD AUTO: 312 10E3/UL (ref 150–450)
POTASSIUM SERPL-SCNC: 3.4 MMOL/L (ref 3.4–5.3)
POTASSIUM SERPL-SCNC: 3.5 MMOL/L (ref 3.4–5.3)
PROT SERPL-MCNC: 7 G/DL (ref 6.4–8.3)
RBC # BLD AUTO: 4.46 10E6/UL (ref 3.8–5.2)
RBC URINE: <1 /HPF
SODIUM SERPL-SCNC: 126 MMOL/L (ref 136–145)
SODIUM SERPL-SCNC: 134 MMOL/L (ref 136–145)
SP GR UR STRIP: 1 (ref 1–1.03)
SQUAMOUS EPITHELIAL: <1 /HPF
UROBILINOGEN UR STRIP-MCNC: NORMAL MG/DL
WBC # BLD AUTO: 5.9 10E3/UL (ref 4–11)
WBC URINE: 0 /HPF

## 2023-07-25 PROCEDURE — 81001 URINALYSIS AUTO W/SCOPE: CPT | Performed by: EMERGENCY MEDICINE

## 2023-07-25 PROCEDURE — 80307 DRUG TEST PRSMV CHEM ANLYZR: CPT | Performed by: EMERGENCY MEDICINE

## 2023-07-25 PROCEDURE — 258N000003 HC RX IP 258 OP 636: Performed by: EMERGENCY MEDICINE

## 2023-07-25 PROCEDURE — 250N000011 HC RX IP 250 OP 636: Mod: JZ | Performed by: EMERGENCY MEDICINE

## 2023-07-25 PROCEDURE — 82077 ASSAY SPEC XCP UR&BREATH IA: CPT | Performed by: EMERGENCY MEDICINE

## 2023-07-25 PROCEDURE — 80048 BASIC METABOLIC PNL TOTAL CA: CPT | Performed by: EMERGENCY MEDICINE

## 2023-07-25 PROCEDURE — 99285 EMERGENCY DEPT VISIT HI MDM: CPT | Mod: 25

## 2023-07-25 PROCEDURE — 85004 AUTOMATED DIFF WBC COUNT: CPT | Performed by: EMERGENCY MEDICINE

## 2023-07-25 PROCEDURE — 80053 COMPREHEN METABOLIC PANEL: CPT | Performed by: EMERGENCY MEDICINE

## 2023-07-25 PROCEDURE — 84295 ASSAY OF SERUM SODIUM: CPT | Performed by: EMERGENCY MEDICINE

## 2023-07-25 PROCEDURE — 82947 ASSAY GLUCOSE BLOOD QUANT: CPT | Performed by: EMERGENCY MEDICINE

## 2023-07-25 PROCEDURE — 36415 COLL VENOUS BLD VENIPUNCTURE: CPT | Performed by: EMERGENCY MEDICINE

## 2023-07-25 PROCEDURE — 82374 ASSAY BLOOD CARBON DIOXIDE: CPT | Performed by: EMERGENCY MEDICINE

## 2023-07-25 PROCEDURE — 84703 CHORIONIC GONADOTROPIN ASSAY: CPT | Performed by: EMERGENCY MEDICINE

## 2023-07-25 PROCEDURE — 96372 THER/PROPH/DIAG INJ SC/IM: CPT | Performed by: EMERGENCY MEDICINE

## 2023-07-25 PROCEDURE — 93005 ELECTROCARDIOGRAM TRACING: CPT

## 2023-07-25 PROCEDURE — 96360 HYDRATION IV INFUSION INIT: CPT

## 2023-07-25 PROCEDURE — 90791 PSYCH DIAGNOSTIC EVALUATION: CPT

## 2023-07-25 RX ORDER — OLANZAPINE 10 MG/2ML
10 INJECTION, POWDER, FOR SOLUTION INTRAMUSCULAR 2 TIMES DAILY PRN
Status: DISCONTINUED | OUTPATIENT
Start: 2023-07-25 | End: 2023-07-28 | Stop reason: HOSPADM

## 2023-07-25 RX ORDER — FLUOXETINE 40 MG/1
40 CAPSULE ORAL DAILY
Status: ON HOLD | COMMUNITY
End: 2023-08-09

## 2023-07-25 RX ORDER — VITAMIN B COMPLEX
1 TABLET ORAL DAILY
Status: ON HOLD | COMMUNITY
End: 2023-08-09

## 2023-07-25 RX ORDER — OLANZAPINE 10 MG/2ML
10 INJECTION, POWDER, FOR SOLUTION INTRAMUSCULAR ONCE
Status: COMPLETED | OUTPATIENT
Start: 2023-07-25 | End: 2023-07-25

## 2023-07-25 RX ADMIN — OLANZAPINE 10 MG: 10 INJECTION, POWDER, FOR SOLUTION INTRAMUSCULAR at 20:15

## 2023-07-25 RX ADMIN — SODIUM CHLORIDE 1000 ML: 9 INJECTION, SOLUTION INTRAVENOUS at 18:44

## 2023-07-25 ASSESSMENT — ACTIVITIES OF DAILY LIVING (ADL)
ADLS_ACUITY_SCORE: 35

## 2023-07-25 NOTE — ED PROVIDER NOTES
"  History     Chief Complaint:  Suicidal and Altered Mental Status       The history is provided by the patient.      Kimmy Mendez is a 37 year old female with history of post traumatic stress disorder, depression, anxiety, and suicidal ideation who presents with altered mental status, hyperactive, and suicidal ideation. Patient's niece reports she was admitted last month to a mental facility. Patient is seeing a psychiatrist and was prescribed medication that she is not taking. Niece states for the past two days, she has been hyperactive and has been going on long walks and running away from home. [place] herself.  She was also reported to be in the backyard with a hose saying that this was going to be the way she .  Today she ran out of the house and was walking without shoes.  She was reported by her niece to be \"flipping off cars\" and cursing at people, acting erratically.  Per the niece, the patient's mom is diagnosed with Bipolar since the patient's son  in  the patient has struggled with significant mental health issues, having a recent hospitalization at [facility].  The patient never filled the prescriptions from that hospital visit and the niece notes it is not clear if she is taking any medications.  The patient denies any physical concerns aside from bilateral foot pain.       Independent Historian:   Marianne provided additional history    Review of External Notes:   Outpatient clinic notes reviewed.  Recent admission for suicidal ideation reviewed.      Medications:  (not clear that patient is taking any of these medications)  Prozac  Atarax  Pepcid  Carafate  Prilosec  Zofran      Past Medical History:    Anxiety   Depressive disorder   Gestational diabetes mellitus   Duodenal ulcer due to Helicobacter pylori   Psychiatric pseudoseizures   Hypothyroidism   Premature atrial contractions  PTSD    Suicidal ideation "   Premature atrial contraction  Vitamin D deficiency      Past Surgical History:    Upper gastrointestinal endoscopy    Physical Exam     Patient Vitals for the past 24 hrs:   BP Temp Temp src Pulse Resp   07/25/23 1808 (!) 175/84 98.1  F (36.7  C) Oral 112 18        Physical Exam  General: Adult female sitting upright  Eyes: PERRL, Conjunctive within normal limits.  No scleral icterus  HENT: Moist mucous membranes, oropharynx clear.   CV: Normal S1S2, no murmur, rub or gallop.  Tachycardic, regular  Resp: Clear to auscultation bilaterally, no wheezes, rales or rhonchi. Normal respiratory effort.  GI: Abdomen is soft, nontender and nondistended.   MSK: No edema.  Tender over the bilateral soles of the feet.  Blistering noted over the sole of the left foot.  Normal active range of motion of all extremities.  Skin: Warm and dry.  There are large blister of the sole of the left foot.  No ecchymoses.  Neuro: Alert to person.  No focal neurologic abnormality noted.  Speech is fluent.  Erratic statements.   Psych: [place] cheerful.  Drinks water and then dumps water on her head.    Emergency Department Course   ECG  ECG taken at 1825, ECG read at 1827  Sinus tachycardia  Otherwise normal ECG   Rate 105 bpm. IA interval 200 ms. QRS duration 70 ms. QT/QTc 338/446 ms. P-R-T axes 52 15 46.     Laboratory:  Labs Ordered and Resulted from Time of ED Arrival to Time of ED Departure   COMPREHENSIVE METABOLIC PANEL - Abnormal       Result Value    Sodium 126 (*)     Potassium 3.5      Chloride 91 (*)     Carbon Dioxide (CO2) 21 (*)     Anion Gap 14      Urea Nitrogen 8.4      Creatinine 0.53      Calcium 9.6      Glucose 167 (*)     Alkaline Phosphatase 82      AST 28      ALT 31      Protein Total 7.0      Albumin 4.7      Bilirubin Total 0.3      GFR Estimate >90     HCG QUALITATIVE PREGNANCY - Normal    hCG Serum Qualitative Negative     ROUTINE UA WITH MICROSCOPIC REFLEX TO CULTURE - Normal    Color Urine Straw      Appearance  Urine Clear      Glucose Urine Negative      Bilirubin Urine Negative      Ketones Urine Negative      Specific Gravity Urine 1.005      Blood Urine Negative      pH Urine 6.5      Protein Albumin Urine Negative      Urobilinogen Urine Normal      Nitrite Urine Negative      Leukocyte Esterase Urine Negative      RBC Urine <1      WBC Urine 0      Squamous Epithelials Urine <1     ETHYL ALCOHOL LEVEL - Normal    Alcohol ethyl <0.01     DRUG ABUSE SCREEN 1 URINE (ED) - Normal    Amphetamines Urine Screen Negative      Barbituates Urine Screen Negative      Benzodiazepine Urine Screen Negative      Cannabinoids Urine Screen Negative      Cocaine Urine Screen Negative      Opiates Urine Screen Negative     CBC WITH PLATELETS AND DIFFERENTIAL    WBC Count 5.9      RBC Count 4.46      Hemoglobin 13.1      Hematocrit 37.6      MCV 84      MCH 29.4      MCHC 34.8      RDW 12.6      Platelet Count 312      % Neutrophils 75      % Lymphocytes 18      % Monocytes 5      % Eosinophils 0      % Basophils 1      % Immature Granulocytes 1      NRBCs per 100 WBC 0      Absolute Neutrophils 4.5      Absolute Lymphocytes 1.1      Absolute Monocytes 0.3      Absolute Eosinophils 0.0      Absolute Basophils 0.0      Absolute Immature Granulocytes 0.0      Absolute NRBCs 0.0     BASIC METABOLIC PANEL          Emergency Department Course & Assessments:    Interventions:  Medications   OLANZapine (zyPREXA) injection 10 mg (has no administration in time range)   OLANZapine (zyPREXA) injection 10 mg (has no administration in time range)   0.9% sodium chloride BOLUS (0 mLs Intravenous Stopped 7/25/23 1957)        Assessments:  1825 I obtained history and examined the patient as noted above.   I reassessed the patient.  No change.  Is sitting upright in bed.  Easily redirectable.      Consultations/Discussion of Management or Tests:  I discussed the patient with Rishi.  The patient will be admitted for inpatient care for  stabilization given suicidal statements in the setting of known depression and symptoms today concerning for manic/psychosis.       Social Determinants of Health affecting care:   Stress/Adjustment Disorders    Disposition:  The patient will board in the emergency department pending bed placement. Care was signed out to Dr. Salvador.     Impression & Plan      Medical Decision Making:  Kimmy Mendez is a 37-year-old-year-old female with a history of suicidal ideation in the setting of depression with recent hospitalization who presents emergency department with hyperactive, erratic behavior concerning for new onset/psychosis.  She was also making suicidal statements to family.  DEC assessed her and given the constellation of symptoms, it is thought that psychiatric stabilization would be indicated.  She is calm and cooperative here on arrival but did become uncooperative pulling out her IV and becoming more agitated.  Zyprexa was administered.  Patient will continue to be observed with as needed medications for agitation while awaiting inpatient psychiatric bed availability.  She incidentally was noted to be hyponatremic, otherwise medically cleared.  This is likely secondary to dehydration.  She is hydrated here with IVF with plan to recheck sodium level.  My colleague Dr. Salvador will follow-up on this level.    Diagnosis:    ICD-10-CM    1. Acute psychosis (H)  F23       2. Suicidal ideation  R45.851       3. Hyponatremia  E87.1       4. Pain in both feet  M79.671     M79.672            Scribe Disclosure:  I, Juan Carlos Juan Carlosjusten Collier, am serving as a scribe at 6:24 PM on 7/25/2023 to document services personally performed by Kaye Vazquez MD based on my observations and the provider's statements to me.   7/25/2023   Kaye Vazquez MD Jonkman, Tracy Dianne, MD  07/25/23 2015

## 2023-07-25 NOTE — ED TRIAGE NOTES
BIBA Patient wondering around home acting out per EMS per family. Possibly SI, although denies SI to nurse. Scattered and delusional comments when talking to nurse. States she has pain in her feet.      Triage Assessment       Row Name 07/25/23 1810       Triage Assessment (Adult)    Airway WDL WDL       Respiratory WDL    Respiratory WDL WDL       Skin Circulation/Temperature WDL    Skin Circulation/Temperature WDL WDL       Cardiac WDL    Cardiac WDL WDL       Peripheral/Neurovascular WDL    Peripheral Neurovascular WDL WDL       Cognitive/Neuro/Behavioral WDL    Cognitive/Neuro/Behavioral WDL X;all    Level of Consciousness intermittent confusion    Orientation situation;time    Speech illogical;rambling    Mood/Behavior restless       Norris Coma Scale    Best Eye Response 4-->(E4) spontaneous    Best Motor Response 6-->(M6) obeys commands    Best Verbal Response 4-->(V4) confused    Norris Coma Scale Score 14

## 2023-07-26 ENCOUNTER — TELEPHONE (OUTPATIENT)
Dept: BEHAVIORAL HEALTH | Facility: CLINIC | Age: 38
End: 2023-07-26
Payer: COMMERCIAL

## 2023-07-26 ENCOUNTER — APPOINTMENT (OUTPATIENT)
Dept: CT IMAGING | Facility: CLINIC | Age: 38
End: 2023-07-26
Attending: EMERGENCY MEDICINE
Payer: COMMERCIAL

## 2023-07-26 LAB
ANION GAP SERPL CALCULATED.3IONS-SCNC: 14 MMOL/L (ref 7–15)
ATRIAL RATE - MUSE: 105 BPM
ATRIAL RATE - MUSE: 57 BPM
BUN SERPL-MCNC: 8.4 MG/DL (ref 6–20)
CALCIUM SERPL-MCNC: 9.6 MG/DL (ref 8.6–10)
CHLORIDE SERPL-SCNC: 91 MMOL/L (ref 98–107)
CREAT SERPL-MCNC: 0.53 MG/DL (ref 0.51–0.95)
DEPRECATED HCO3 PLAS-SCNC: 21 MMOL/L (ref 22–29)
DIASTOLIC BLOOD PRESSURE - MUSE: NORMAL MMHG
DIASTOLIC BLOOD PRESSURE - MUSE: NORMAL MMHG
GFR SERPL CREATININE-BSD FRML MDRD: >90 ML/MIN/1.73M2
GLUCOSE BLDC GLUCOMTR-MCNC: 126 MG/DL (ref 70–99)
GLUCOSE SERPL-MCNC: 167 MG/DL (ref 70–99)
HGB BLD-MCNC: 10.8 G/DL (ref 11.7–15.7)
HGB BLD-MCNC: 12.5 G/DL (ref 11.7–15.7)
INTERPRETATION ECG - MUSE: NORMAL
INTERPRETATION ECG - MUSE: NORMAL
P AXIS - MUSE: 10 DEGREES
P AXIS - MUSE: 52 DEGREES
POTASSIUM SERPL-SCNC: 3.5 MMOL/L (ref 3.4–5.3)
PR INTERVAL - MUSE: 200 MS
PR INTERVAL - MUSE: 220 MS
QRS DURATION - MUSE: 70 MS
QRS DURATION - MUSE: 72 MS
QT - MUSE: 338 MS
QT - MUSE: 426 MS
QTC - MUSE: 414 MS
QTC - MUSE: 446 MS
R AXIS - MUSE: 15 DEGREES
R AXIS - MUSE: 28 DEGREES
SODIUM SERPL-SCNC: 126 MMOL/L (ref 136–145)
SODIUM SERPL-SCNC: 134 MMOL/L (ref 136–145)
SYSTOLIC BLOOD PRESSURE - MUSE: NORMAL MMHG
SYSTOLIC BLOOD PRESSURE - MUSE: NORMAL MMHG
T AXIS - MUSE: 35 DEGREES
T AXIS - MUSE: 46 DEGREES
VENTRICULAR RATE- MUSE: 105 BPM
VENTRICULAR RATE- MUSE: 57 BPM

## 2023-07-26 PROCEDURE — 258N000003 HC RX IP 258 OP 636: Performed by: EMERGENCY MEDICINE

## 2023-07-26 PROCEDURE — 82962 GLUCOSE BLOOD TEST: CPT

## 2023-07-26 PROCEDURE — 70450 CT HEAD/BRAIN W/O DYE: CPT

## 2023-07-26 PROCEDURE — 85018 HEMOGLOBIN: CPT | Performed by: EMERGENCY MEDICINE

## 2023-07-26 PROCEDURE — 250N000013 HC RX MED GY IP 250 OP 250 PS 637: Performed by: EMERGENCY MEDICINE

## 2023-07-26 PROCEDURE — 36415 COLL VENOUS BLD VENIPUNCTURE: CPT | Performed by: EMERGENCY MEDICINE

## 2023-07-26 PROCEDURE — 96361 HYDRATE IV INFUSION ADD-ON: CPT

## 2023-07-26 RX ORDER — HYDROXYZINE HYDROCHLORIDE 25 MG/1
25 TABLET, FILM COATED ORAL 3 TIMES DAILY PRN
Status: DISCONTINUED | OUTPATIENT
Start: 2023-07-26 | End: 2023-07-28 | Stop reason: HOSPADM

## 2023-07-26 RX ORDER — ACETAMINOPHEN 500 MG
500 TABLET ORAL EVERY 4 HOURS PRN
Status: DISCONTINUED | OUTPATIENT
Start: 2023-07-26 | End: 2023-07-26

## 2023-07-26 RX ORDER — ACETAMINOPHEN 325 MG/1
650 TABLET ORAL EVERY 4 HOURS PRN
Status: DISCONTINUED | OUTPATIENT
Start: 2023-07-26 | End: 2023-07-28 | Stop reason: HOSPADM

## 2023-07-26 RX ADMIN — FLUOXETINE 40 MG: 20 CAPSULE ORAL at 08:32

## 2023-07-26 RX ADMIN — ACETAMINOPHEN 650 MG: 325 TABLET, FILM COATED ORAL at 11:22

## 2023-07-26 RX ADMIN — ACETAMINOPHEN 650 MG: 325 TABLET, FILM COATED ORAL at 19:53

## 2023-07-26 RX ADMIN — SODIUM CHLORIDE 500 ML: 9 INJECTION, SOLUTION INTRAVENOUS at 01:49

## 2023-07-26 ASSESSMENT — ACTIVITIES OF DAILY LIVING (ADL)
ADLS_ACUITY_SCORE: 35

## 2023-07-26 NOTE — ED NOTES
IP MH Referral Acuity Rating Score (RARS)     LMHP complete at referral to IP MH, with DEC; and, daily while awaiting IP MH placement. Call score to PPS.  CRITERIA SCORING   New 72 HH and Involuntary for IP MH (not adolescent) 0/1   Boarding over 24 hours 1/1   Vulnerable adult at least 55+ with multiple co morbidities; or, Patient age 11 or under 0/1   Suicide ideation without relief of precipitating factors 1/1   Current plan for suicide 1/1   Current plan for homicide 0/1   Imminent risk or actual attempt to seriously harm another without relief of factors precipitating the attempt 0/1   Severe dysfunction in daily living (ex: complete neglect for self care, extreme disruption in vegetative function, extreme deterioration in social interactions) 0/1   Recent (last 2 weeks) or current physical aggression in the ED 0/1   Restraints or seclusion episode in ED 0/1   Verbal aggression, agitation, yelling, etc., while in the ED 0/1   Active psychosis with psychomotor agitation or catatonia 0/1   Need for constant or near constant redirection (from leaving, from others, etc).  1/1   Intrusive or disruptive behaviors 1/1   TOTAL Acuity Total Score: 5

## 2023-07-26 NOTE — TELEPHONE ENCOUNTER
[facility] Access Inpatient Bed Call Log 7/26/2023 1:31 AM      Intake has called facilities that have not updated their bed status within the last 12 hours.     Adults:       [facility] is posting 0 beds.    [facility] is posting 0 beds. [phone]    [facility] is posting 0 beds. [phone]   [facility] is posting 0 beds. [phone] 1:46am Marianela, they are capped.   [facility] is posting 0 beds. [phone]   [facility] [place] is posting 0 bed. [phone]    [facility] is posting 0 beds. [phone]   [facility] is posting 0 beds. [phone]   [facility] is posting 0 beds. [phone] 1:12am They are capped.   [facility] is posting 0 beds. [phone]      Pt remains on work list pending appropriate bed availability.

## 2023-07-26 NOTE — ED NOTES
ED signout note    37-year-old female currently on a hold with planned mental health admission for medication noncompliance jennifer and psychosis and suicidal ideation.  Has been seen by our mental health  and recommendations for inpatient mental health treatment.  Patient was pending a repeat BMP as her initial BMP showed mild hyponatremia.  Repeat BMP was improved at 134 for sodium level.  Unfortunately patient had a near syncopal event in the bathroom witnessed by staff and reportedly may have hit her head and so a head CT was done.  This shows no acute traumatic findings.  The rest of her skeletal survey was unremarkable.  We did do a blood sugar which was normal at the time of the event.  Repeat EKG was unremarkable for concerning red flags related to arrhythmia.  There is also no ischemia noted on EKG.  Repeat hemoglobin done was slightly lower than the initial.  In the well be secondary to dilution from the intravenous fluids she had received.  We will repeat to ensure no further concerns related to this.  Medical work-up related to this event unremarkable plan to continue with psychiatric admission.      ICD-10-CM    1. Acute psychosis (H)  F23       2. Suicidal ideation  R45.851       3. Hyponatremia  E87.1       4. Pain in both feet  M79.671     M79.672         MD Modesto Salazar, Christo Barksdale MD  07/26/23 0416
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
## 2023-07-26 NOTE — ED NOTES
IP MH Referral Acuity Rating Score (RARS)    LMHP complete at referral to IP MH, with DEC; and, daily while awaiting IP MH placement. Call score to PPS.  CRITERIA SCORING   New 72 HH and Involuntary for IP MH (not adolescent) 0/1   Boarding over 24 hours 0/1   Vulnerable adult at least 55+ with multiple co morbidities; or, Patient age 11 or under 0/1   Suicide ideation without relief of precipitating factors 1/1   Current plan for suicide 1/1   Current plan for homicide 0/1   Imminent risk or actual attempt to seriously harm another without relief of factors precipitating the attempt 0/1   Severe dysfunction in daily living (ex: complete neglect for self care, extreme disruption in vegetative function, extreme deterioration in social interactions) 0/1   Recent (last 2 weeks) or current physical aggression in the ED 0/1   Restraints or seclusion episode in ED 0/1   Verbal aggression, agitation, yelling, etc., while in the ED 0/1   Active psychosis with psychomotor agitation or catatonia 0/1   Need for constant or near constant redirection (from leaving, from others, etc).  1/1   Intrusive or disruptive behaviors 1/1   TOTAL Acuity Total Score: 4

## 2023-07-26 NOTE — ED NOTES
Pt placed call light asking for her phone. Checked DEC office - informed pt that phone is not in belongings bag, just earpods, perfume, and change of clothing. Showed pt how to use remote for tv. Pt watching tv and eating breakfast tray. Obtained VS. Pt calm, cooperative, and thankful in interactions.

## 2023-07-26 NOTE — PHARMACY-ADMISSION MEDICATION HISTORY
Pharmacist Admission Medication History    Admission medication history is complete. The information provided in this note is only as accurate as the sources available at the time of the update.    Medication reconciliation/reorder completed by provider prior to medication history? No    Information Source(s): Patient, Prescription bottles, and CareEverywhere/SureScripts via in-person    Pertinent Information: Patient just increased to 40 mg of fluoxetine yesterday    Changes made to PTA medication list:  Added: Cholecalciferol  Deleted: None  Changed: Fluoxetine    Medication Affordability:  Not including over the counter (OTC) medications, was there a time in the past 3 months when you did not take your medications as prescribed because of cost?: No    Allergies reviewed with patient and updates made in EHR: yes    Medication History Completed By:   Favio CandelarioD, [facility]  Emergency Medicine Clinical Pharmacist  [phone]   7/25/2023 9:46 PM    Prior to Admission medications    Medication Sig Last Dose Taking? Auth Provider Long Term End Date   FLUoxetine (PROZAC) 40 MG capsule Take 40 mg by mouth daily 7/24/2023 at AM Yes Unknown, Entered By History No    hydrOXYzine (ATARAX) 25 MG tablet Take 1 tablet (25 mg) by mouth 3 times daily as needed for anxiety (sleep) 7/24/2023 at HS Yes Monica Garcia MD No    Vitamin D3 (CHOLECALCIFEROL) 25 mcg (1000 units) tablet Take 1 tablet by mouth daily 7/25/2023 at AM Yes Unknown, Entered By History

## 2023-07-26 NOTE — ED NOTES
Bed: ED08  Expected date: 7/25/23  Expected time:   Means of arrival:   Comments:  ED 20 when DEC call done

## 2023-07-26 NOTE — ED NOTES
RN ED Mental Health Handoff Note    72 hour hold    Does patient require 1:1? Yes, has video monitoring    Hold and rights been given and documented for patient: Yes    Is the patient in BH scrubs? No -Pt has been searched, but prefers to remain in her own clothes    Has the patient been searched? Yes    Is the 15 minute observation tool up to date? Yes    Was patient issued a welcome folder? Yes    Room check completed this shift: Yes    PSS3 and [place] Assessment/Reassessment this shift:    PSS-3      Date and Time Over the past 2 weeks have you felt down, depressed, or hopeless? Over the past 2 weeks have you had thoughts of killing yourself? Have you ever attempted to kill yourself? When did this last happen? User   07/25/23 1815 patient unable to complete patient unable to complete patient unable to complete -- ASL          C-SSRS ([place])      Date and Time Q1 Wished to be Dead (Past Month) Q2 Suicidal Thoughts (Past Month) Q3 Suicidal Thought Method Q4 Suicidal Intent without Specific Plan Q5 Suicide Intent with Specific Plan Q6 Suicide Behavior (Lifetime) Within the Past 3 Months? RETIRED: Level of Risk per Screen Screening Not Complete User   07/25/23 2114 no no no no no -- -- -- -- PSR            Behavioral status of patient: Green    Code 21 called this shift? No    Use of restraints/seclusion this shift? No    Most recent vital signs:  Temp: 98.1  F (36.7  C) Temp src: Oral BP: 103/63 Pulse: 71   Resp: 16 SpO2: 100 % O2 Device: None (Room air)      Medications:  Scheduled medication compliance? No, no meds were scheduled during shift.    PRN Meds administered this shift? No    Medications   OLANZapine (zyPREXA) injection 10 mg (has no administration in time range)   FLUoxetine (PROzac) capsule 40 mg (has no administration in time range)   hydrOXYzine (ATARAX) tablet 25 mg (has no administration in time range)   0.9% sodium chloride BOLUS (0 mLs Intravenous Stopped 7/25/23 1957)   OLANZapine (zyPREXA)  injection 10 mg (10 mg Intramuscular $Given 23)   0.9% sodium chloride BOLUS (0 mLs Intravenous Stopped 23)         ADLs    Meal Provided this shift? No    Hygiene items provided? Yes    ADLs completed? Yes    Date of last shower: prior to admission    Any significant events this shift? Yes. Details: Pt has fallen in bathroom around 0130. She had a near syncopal episode witnessed by staff, and reported to have hit her head. CT head completed-negative for any acute traumatic findings. Labs and BG stable. Repeat EKG unremarkable.    Any information that would be helpful in caring for this patient?  Patient given 72 hour hold information. Copy placed in chart. 72 hour hold will   @ 9453.    Family present/updated? No    Location of patient's belongings: DEC office    Critical Care Minutes:  Does the patient need critical care minutes documented? No

## 2023-07-26 NOTE — ED NOTES
"Pt. Searched and belongings placed in DEC room except for cellphone, sandals, bra, pants, and dress. Security watching patient over camera. Pt. Ripped out R IV access and refused to let author place gauze and bandaid on site. Pt. Bleeding onto floor and clothes. Pt. Continuously avoids answering questions and following commands. Pt. Became uncooperative and slightly combative with author and family member in room. Pt. Makes statements like \"I am an american. I am free. I love you. You are my sister. I am treating everyone with respect. Lets hold hands.\" Pt. Given IM zyprexa with assistance from security.   "

## 2023-07-26 NOTE — TELEPHONE ENCOUNTER
R:  No appropriate beds within [place].  Bed Search update within 10:55PM  Facilities not updated in the last 12 hours were called    [facility]: @ cap per website- Per Florian, no beds available  [facility]: @ cap per website .    [facility]: @ cap per website - Per Mckenzie, no beds  [facility]: @ cap per website  [facility]: @ cap per website- Per Intake, no beds  [facility]: @ cap per website.    [facility]: @ cap per website  [facility]: @ cap per website.         Pt remains on PPS work list awaiting appropriate placement.
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
## 2023-07-26 NOTE — ED NOTES
"Triage & Transition Services, Extended Care     Therapy Progress Note    Patient: Kimmy goes by \"Kimmy,\" uses she/her pronouns  Date of Service: July 26, 2023  Site of Service: [facility] ED  Patient was seen in-person.     Presenting problem:   Kimmy is followed related to Long wait time for admission: 24 hours . Please see initial DEC/[facility] Crisis Assessment completed by CAMDEN Eason on 7/25/23 for complete assessment information. Notable concerns include Suicidal risk, erratic behavior.     Individuals Present: Kimmy & Bryce Amaral    Session start: 4:30 pm  Session end: 4;45 pm  Session duration in minutes: 15  Session number: 1  Anticipated number of sessions or this episode of care: 3  CPT utilized: 95745 - Psychotherapy (with patient) - 30 (16-37*) min    Current Presentation:   Entered room and patient was watching television. She muted the TV and was pleasant, cooperative, conversational, and engaged.     Mental Status Exam:   Appearance: awake, alert, adequately groomed, dressed in hospital scrubs, and appeared as age stated  Attitude: cooperative  Eye Contact: good  Mood: good  Affect: appropriate and in normal range and mood congruent  Speech: clear, coherent  Psychomotor Behavior: no evidence of tardive dyskinesia, dystonia, or tics  Thought Process:  logical  Associations: no loose associations  Thought Content: active suicidal ideation present, no auditory hallucinations present, and no visual hallucinations present  Insight: fair  Judgement: fair  Oriented to: time, person, and place  Attention Span and Concentration: intact  Recent and Remote Memory: intact    Diagnosis:   Recurrent major depression (H) F33.9     ROWAN (generalized anxiety disorder) F41.1     ROWAN (generalized anxiety disorder) F41.1     Depression affecting pregnancy, antepartum O99.340, F32.A       Therapeutic Intervention(s):   Provided active listening, unconditional positive regard, and validation. Coached on coping " "techniques/relaxation skills to help improve distress tolerance and managing intense emotions. Identified stress relief practices.    Treatment Objective(s) Addressed:   The focus of this session was on rapport building and identifying treatment goals Patient is slated for admission. She was content and \"doing okay\" stating she has everything she needs.     Progress Towards Goals:   Patient reports stable symptoms. Patient is making progress towards treatment goals as evidenced by radical acceptance of MH placement/inpatient.     Case Management:   Patient stated she was doing okay and had everything she needed. We talked briefly about coping skills she can use if she becomes anxious, square breathing, visualization techniques.    General Recommendations:   Continue to monitor for harm. Consider: Allow family calls/visits, Be firm but gentle when redirecting, Listen in a neutral, non-judgmental way. Offer reassurance, and Be mindful of your nonverbal cues (body language, facial expressions)    Plan:   Inpatient Mental Health: Continue path to mental health admission and stabilization of ongoing symptoms. Patient had not been taking her prescribed MH medications. As such, medication management seems to be the top concern with the lack thereof resulting in concerning behavioral changes and increased suicidal ideations.     Plan for Care reviewed with Assigned Medical Provider? Yes. Provider, Dr. Williams Ha MD, response: positive. Stay the course.      Bryce Amaral MA  Licensed Mental Health Professional (LMHP), [facility]  [phone]     "

## 2023-07-26 NOTE — TELEPHONE ENCOUNTER
S:  2:20  Per Maurice at [facility], they can review pt for [facility].  Clinical faxed to [phone].

## 2023-07-27 ENCOUNTER — TELEPHONE (OUTPATIENT)
Dept: BEHAVIORAL HEALTH | Facility: CLINIC | Age: 38
End: 2023-07-27
Payer: COMMERCIAL

## 2023-07-27 LAB — SARS-COV-2 RNA RESP QL NAA+PROBE: NEGATIVE

## 2023-07-27 PROCEDURE — 87635 SARS-COV-2 COVID-19 AMP PRB: CPT | Performed by: STUDENT IN AN ORGANIZED HEALTH CARE EDUCATION/TRAINING PROGRAM

## 2023-07-27 PROCEDURE — 250N000013 HC RX MED GY IP 250 OP 250 PS 637: Performed by: EMERGENCY MEDICINE

## 2023-07-27 PROCEDURE — 80143 DRUG ASSAY ACETAMINOPHEN: CPT | Performed by: STUDENT IN AN ORGANIZED HEALTH CARE EDUCATION/TRAINING PROGRAM

## 2023-07-27 PROCEDURE — 36415 COLL VENOUS BLD VENIPUNCTURE: CPT | Performed by: STUDENT IN AN ORGANIZED HEALTH CARE EDUCATION/TRAINING PROGRAM

## 2023-07-27 PROCEDURE — 80179 DRUG ASSAY SALICYLATE: CPT | Performed by: STUDENT IN AN ORGANIZED HEALTH CARE EDUCATION/TRAINING PROGRAM

## 2023-07-27 RX ADMIN — FLUOXETINE 40 MG: 20 CAPSULE ORAL at 08:58

## 2023-07-27 RX ADMIN — ACETAMINOPHEN 650 MG: 325 TABLET, FILM COATED ORAL at 12:52

## 2023-07-27 ASSESSMENT — ACTIVITIES OF DAILY LIVING (ADL)
ADLS_ACUITY_SCORE: 35

## 2023-07-27 NOTE — ED PROVIDER NOTES
[facility] ED Behavioral Health Handoff Note:     Brief HPI:  This is a 37 year old female signed out to me by Dr. Treviño .  See initial ED Provider note for details of the presentation.     Patient is medically cleared for admission to a Behavioral Health unit.      Pending studies include none.      The patient is on a hold.  The type of hold is 72 hr hold.      The patient has not required medication for agitation during my shift.      Exam:   Patient Vitals for the past 24 hrs:   BP Temp Temp src Pulse Resp SpO2   07/26/23 0900 (!) 145/94 98.3  F (36.8  C) Temporal 69 16 100 %   07/25/23 2259 103/63 -- -- 71 16 100 %       ED Course:    There were no significant events while under my care.      Extended DEC evaluated the patient during my shift.  No acute changes to plan.  Continue to wait for available inpatient psychiatric bed.      Patient was signed out to the oncoming provider. Dr. Chapa      Impression:    ICD-10-CM    1. Acute psychosis (H)  F23       2. Suicidal ideation  R45.851       3. Hyponatremia  E87.1       4. Pain in both feet  M79.671     M79.672           Plan:    1. Await Transfer to Mental Health Facility      RESULTS:   Results for orders placed or performed during the hospital encounter of 07/25/23 (from the past 24 hour(s))   Basic metabolic panel (BMP)     Status: Abnormal    Collection Time: 07/25/23  9:21 PM   Result Value Ref Range    Sodium 134 (L) 136 - 145 mmol/L    Potassium 3.4 3.4 - 5.3 mmol/L    Chloride 101 98 - 107 mmol/L    Carbon Dioxide (CO2) 21 (L) 22 - 29 mmol/L    Anion Gap 12 7 - 15 mmol/L    Urea Nitrogen 6.2 6.0 - 20.0 mg/dL    Creatinine 0.43 (L) 0.51 - 0.95 mg/dL    Calcium 8.9 8.6 - 10.0 mg/dL    Glucose 107 (H) 70 - 99 mg/dL    GFR Estimate >90 >60 mL/min/1.73m2   Sodium     Status: Abnormal    Collection Time: 07/25/23  9:21 PM   Result Value Ref Range    Sodium 134 (L) 136 - 145 mmol/L   Glucose by meter     Status: Abnormal    Collection Time: 07/26/23  1:27 AM    Result Value Ref Range    GLUCOSE BY METER POCT 126 (H) 70 - 99 mg/dL   EKG 12-lead, tracing only     Status: None    Collection Time: 07/26/23  1:30 AM   Result Value Ref Range    Systolic Blood Pressure  mmHg    Diastolic Blood Pressure  mmHg    Ventricular Rate 57 BPM    Atrial Rate 57 BPM    NE Interval 220 ms    QRS Duration 72 ms     ms    QTc 414 ms    P Axis 10 degrees    R AXIS 28 degrees    T Axis 35 degrees    Interpretation ECG       Sinus bradycardia with 1st degree A-V block  Cannot rule out Anterior infarct , age undetermined  Abnormal ECG  When compared with ECG of 25-JUL-2023 18:25, (unconfirmed)  Vent. rate has decreased BY  48 BPM  Confirmed by - EMERGENCY ROOM, PHYSICIAN ([id number]),  BISHOP CORONA ([id number]) on 7/26/2023 7:15:32 AM     Hemoglobin     Status: Abnormal    Collection Time: 07/26/23  2:01 AM   Result Value Ref Range    Hemoglobin 10.8 (L) 11.7 - 15.7 g/dL   Hemoglobin     Status: Normal    Collection Time: 07/26/23  3:20 AM   Result Value Ref Range    Hemoglobin 12.5 11.7 - 15.7 g/dL   Head CT w/o contrast     Status: None    Collection Time: 07/26/23  3:53 AM    Narrative    EXAM: CT HEAD W/O CONTRAST  LOCATION: [facility]  DATE: 7/26/2023    INDICATION: Traumatic injury. Fall. Near syncope.  COMPARISON: Brain MRI dated 06/23/2023  TECHNIQUE: Routine CT Head without IV contrast. Multiplanar reformats. Dose reduction techniques were used.    FINDINGS:  INTRACRANIAL CONTENTS: No intracranial hemorrhage, extraaxial collection, or mass effect.  No CT evidence of acute infarct. Normal parenchymal attenuation. Normal ventricles and sulci.     VISUALIZED ORBITS/SINUSES/MASTOIDS: No intraorbital abnormality. No paranasal sinus mucosal disease. No middle ear or mastoid effusion.    BONES/SOFT TISSUES: No acute abnormality.      Impression    IMPRESSION:  1.  No acute intracranial process.             MD Dilcia Maciel Scott, MD  07/26/23 2044

## 2023-07-27 NOTE — PROGRESS NOTES
SPIRITUAL HEALTH SERVICES Progress Note  ED    Responded to on call page for patient support.    Brought patient a [employer] prayer mat, at her request.    No additional needs.  SHS remains available.    Rev. Brionna Phillips M.Div.  Staff   Phone  [phone]

## 2023-07-27 NOTE — ED NOTES
"RN walked into room, patient on the phone. Patient states to RN, \"They want to talk to you.\" Writer asked patient who she is on the phone with. Patient states, \"911.\" RN spoke with dispatch and notified them patient is safe. When writer asked patient why she called 911 patient states, \"Because I left my [place]. Thank you. I love you.\" Informed patient to use call light for needs. Breakfast tray delivered. VSS. Patient tolerated scheduled prozac. Belongings bag and shoes removed from bedside. Placed in DEC office.   "

## 2023-07-27 NOTE — ED NOTES
IP MH Referral Acuity Rating Score (RARS)    LMHP complete at referral to IP MH, with DEC; and, daily while awaiting IP MH placement. Call score to PPS.  CRITERIA SCORING   New 72 HH and Involuntary for IP MH (not adolescent) 1/1   Boarding over 24 hours 1/1   Vulnerable adult at least 55+ with multiple co morbidities; or, Patient age 11 or under 0/1   Suicide ideation without relief of precipitating factors 0/1   Current plan for suicide 0/1   Current plan for homicide 0/1   Imminent risk or actual attempt to seriously harm another without relief of factors precipitating the attempt 0/1   Severe dysfunction in daily living (ex: complete neglect for self care, extreme disruption in vegetative function, extreme deterioration in social interactions) 1/1   Recent (last 2 weeks) or current physical aggression in the ED 0/1   Restraints or seclusion episode in ED 0/1   Verbal aggression, agitation, yelling, etc., while in the ED 0/1   Active psychosis with psychomotor agitation or catatonia 1/1   Need for constant or near constant redirection (from leaving, from others, etc).  0/1   Intrusive or disruptive behaviors 1/1   TOTAL Acuity Total Score: 5

## 2023-07-27 NOTE — TELEPHONE ENCOUNTER
R: [place]  Access Inpatient Bed Call Log 7/26/23 7:25PM    Intake has called facilities that have not updated their bed status within the last 12 hours.                     Adults:             [facility] is at capacity.           [facility] is posting 0 beds.  [phone].  7:30PM per call, no beds available     [facility] is posting 0 beds. Negative covid required.              [facility] is posting 0 beds. Negative covid required. [phone]. 7:30PM Per call with Agatha, no beds available. Low Acuity only.     [facility] is posting 0 beds.   Per call with Nasim, no beds.     [facility] is posting 0 beds. [phone]       [facility] is posting 0 beds        [facility] is posting 2 beds. Per website @7:20PM Per Nasim with [facility] Intake, no beds available         [facility] is posting 1 bed. Low acuity only. Per call with Nasim 7:20PM, no beds

## 2023-07-27 NOTE — ED PROVIDER NOTES
This patient was taken in signout.  We are awaiting word on an inpatient bed.     Antonio Camp MD  07/27/23 7554

## 2023-07-27 NOTE — ED NOTES
Pt remains calm and cooperative throughout shift. Meal tray given. Pt states the bottom of her feet are sore from walking on hot pavement; lotion given to pt with relief. Tylenol given for HA. Pt states she would like a shower in the AM.

## 2023-07-27 NOTE — ED NOTES
"Patient reports headache has improved. BP improved. Alert to self, states it's 2023, when asked why she is at the hospital pt states, \"Because I'm in love.\" Asymptomatic covid test sent. Updated patient on awaiting inpatient transfer. Water provided. Patient remains calm, cooperative.   "

## 2023-07-27 NOTE — TELEPHONE ENCOUNTER
10:49 AM Intake received call from Maritza Dominguez RN that patient has transportation from family and can be placed statewide. Worklist updated. Intake requested covid-19 test for outside review.     12:11 PM Intake called [facility] for review, patient under review.     12:39 PM Patient has been declined from [facility] bed due to acuity, no ICU beds available.     R: The patient is currently in the [facility] ED awaiting placement. Patient on 72HH    Intake [facility] Bed Search (Done at 12:52 PM) Facilities that have not updated their [facility] access site in the last 12 hours have been contacted for bed availability.   [facility] is posting 0 beds.   [facility] is posting 3 beds. 2:11 PM Intake called, no beds at any [facility] facility.   [facility] is posting 0 beds.  [facility] is posting 2 beds.2:11 PM Intake called, no beds at any [facility] facility.   [facility] is posting 0 beds.  [facility] is posting 0 beds.  [facility] is posting 0 beds.  [facility] is posting 1 bed. Low acuity. Declined due to acuity.     [facility] is posting 2 bed. Mixed unit 12+. Low acuity only. Need a negative COVID. Patient is not appropriate for open bed: need covid-19 test  [facility] is posting 1 bed. No aggression. 2:11 PM Intake called, no beds at any [facility] facility.   [facility] is posting 0 beds.   [facility] is posting 1 bed. Low acuity only.COVID negative. Patient is not appropriate for open bed: need covid-19 test  [facility] is posting 4 beds. Low acuity only. 2:11 PM Intake called, no beds at any [facility] facility.   [facility] is posting 1 bed. 0 acute, 0 med psych, 1 mood disorder- very low acuity. Patient is not appropriate for open bed: not presenting with low acuity mood disorder  [facility] is posting 1 bed. Low acuity only. 72 hr hold required. Negative COVID required.Patient is not appropriate for open bed: need covid-19 test  [facility] is  posting 2 beds. Low acuity. COVID negative. Patient is not appropriate for open bed: need covid-19 test    [facility] is posting 0 beds. Vol only, No Hx of aggression, violence, or assault. No sexual offenders. No 72 hr holds.     [facility] is posting 4 beds. (Must have the cognitive ability to do programming. No aggressive or violent behavior or recent HX in the last 2 yrs. MH must be primary). Negative COVID test required. Patient is not appropriate for open bed: need covid-19 test  [facility] is posting 0 beds. Low acuity only. Violence and aggression capped.   [facility] is posting 0 beds. Low acuity, Neg Covid.   [facility] is posting 5 beds. Covid neg. Vol only. Combined adolescent and adult unit. No aggressive or violent behavior. No registered sex offenders. Patient is not appropriate for open bed: need covid-19 test  [facility] is posting 1 bed. No Hx aggression, violence or assault. Negative COVID test required. Patient is not appropriate for open bed: need covid-19 test  [facility] is posting 14 beds. No Covid needed. Call for details. Patient is not appropriate for open bed: on a 72HH and must be placed in [place], facility in [place], [place].   [facility] is posting 5 beds. Mixed Unit (13+). Low acuity only. Negative COVID test required. Patient is not appropriate for open bed: need covid-19 test    [facility] and [place] location has declined patient due to acuity. The patient remains on the worklist. Intake continues to identify appropriate inpatient psychiatry placement.

## 2023-07-27 NOTE — ED NOTES
Pt ambulated to BR. VS taken; VSS. Pt back in bed. Remains calm and cooperative. Let's her needs known. WTCM

## 2023-07-27 NOTE — ED NOTES
RN ED Mental Health Handoff Note    72 hour hold    Does patient require 1:1? No    Hold and rights been given and documented for patient: Yes    Is the patient in BH scrubs? Yes    Has the patient been searched? Yes    Is the 15 minute observation tool up to date? Yes    Was patient issued a welcome folder? Yes    Room check completed this shift: Yes    PSS3 and [place] Assessment/Reassessment this shift:    PSS-3      Date and Time Over the past 2 weeks have you felt down, depressed, or hopeless? Over the past 2 weeks have you had thoughts of killing yourself? Have you ever attempted to kill yourself? When did this last happen? User   07/25/23 1815 patient unable to complete patient unable to complete patient unable to complete -- ASL          C-SSRS ([place])      Date and Time Q1 Wished to be Dead (Past Month) Q2 Suicidal Thoughts (Past Month) Q3 Suicidal Thought Method Q4 Suicidal Intent without Specific Plan Q5 Suicide Intent with Specific Plan Q6 Suicide Behavior (Lifetime) Within the Past 3 Months? RETIRED: Level of Risk per Screen Screening Not Complete User   07/25/23 2114 no no no no no -- -- -- -- PSR            Behavioral status of patient: Green    Code 21 called this shift? No    Use of restraints/seclusion this shift? No    Most recent vital signs:  Temp: 99.8  F (37.7  C) Temp src: Oral BP: (!) 148/93 Pulse: 82   Resp: 16 SpO2: 99 % O2 Device: None (Room air)      Medications:  Scheduled medication compliance? Yes    PRN Meds administered this shift? Yes    Medications   OLANZapine (zyPREXA) injection 10 mg (has no administration in time range)   FLUoxetine (PROzac) capsule 40 mg (40 mg Oral $Given 7/27/23 0858)   hydrOXYzine (ATARAX) tablet 25 mg (has no administration in time range)   acetaminophen (TYLENOL) tablet 650 mg (650 mg Oral $Given 7/27/23 1252)   0.9% sodium chloride BOLUS (0 mLs Intravenous Stopped 7/25/23 1957)   OLANZapine (zyPREXA) injection 10 mg (10 mg Intramuscular $Given  7/25/23 2015)   0.9% sodium chloride BOLUS (0 mLs Intravenous Stopped 7/26/23 0319)         ADLs    Meal Provided this shift? Yes    Hygiene items provided? Yes    ADLs completed? Yes    Date of last shower: 07/27/2023    Any significant events this shift? No    Any information that would be helpful in caring for this patient?  Patient showered today. Updated Sister Enio and Niece Bonny over the phone with permission from patient. Phone removed from room today due to patient calling 911 from room. Patient remains calm, cooperative.     Family present/updated? Yes    Location of patient's belongings: DEC office    Critical Care Minutes:  Does the patient need critical care minutes documented? No

## 2023-07-27 NOTE — PROGRESS NOTES
"Triage & Transition Services, Extended Care     Kimmy [facility]  July 27, 2023    Kimmy is followed related to Long wait time for admission: 2 days . Please see initial DEC Crisis Assessment completed for complete assessment information. Medical record is reviewed. While patient is in the ED, care team is working towards Learn and Demonstrate at Least One Skill Focused on Crisis Stabilization.     Additional notes include: 11:40AM (4 min) Met with patient in ED08. Patient lying propped up on gurney, watching TV. Pt dressed in own clothes, adequately neat. Affect restricted. Pt calm and cooperative. Evidence of thought blocking. Pt appeared to be responding to internal stimuli. Pt unable to coherently describe reason for ED presentation, but she endorses willingness for IP MH hospitalization and endorses that her family cares about her. Patient stated that what she needs is: \"A hot shower and to pray.\" Provided brief psychoeducation about POC. Patient nodded. Asked pt if she would like anything else to be comfortable. Pt requested prayer rug. Pt declined to talk further. Pt stated: \"The hospital is doing everything needed to make me comfortable.\"     Plan:  Inpatient Mental Health: Continue to recommend IP MH admission for patient safety and stabilization due to acute psychosis including disorganization, delusions, poor judgement, and lack of insight rendering pt unable to safely care for self in the community. Pt is unable to engage in safety planning. Pt verbalizes willingness for IP MH admission. 72HH placed by ED MD 7/26 6431.     Plan for Care reviewed with Assigned Medical Provider? Yes: Maritza HOLLAND RN and Provider, Matt Siddiqui MD, response: concur with POC    Extended Care will follow and meet with patient/family/care team as able or requested.     ADITYA PAGE M.Ed., [facility], [facility]  Licensed Mental Health Professional  Triage and Transition Services - Extended Care [phone]          "

## 2023-07-27 NOTE — ED NOTES
"Patient c/o headache, tylenol given. When writer asked patient when her headache began patient states,. \"In 2019.\" Clean sheets provided, patient changed into clean scrubs. Lunch tray delivered. Pt remains calm, cooperative, watching television, thanked writer for cares.   "

## 2023-07-27 NOTE — ED NOTES
RN ED Mental Health Handoff Note    72 hour hold    Does patient require 1:1? No, video monitoring by security    Hold and rights been given and documented for patient: Yes    Is the patient in BH scrubs? No    Has the patient been searched? Yes    Is the 15 minute observation tool up to date? Yes    Was patient issued a welcome folder? Yes    Room check completed this shift: Yes    PSS3 and [place] Assessment/Reassessment this shift:    PSS-3      Date and Time Over the past 2 weeks have you felt down, depressed, or hopeless? Over the past 2 weeks have you had thoughts of killing yourself? Have you ever attempted to kill yourself? When did this last happen? User   07/25/23 1815 patient unable to complete patient unable to complete patient unable to complete -- ASL          C-SSRS ([place])      Date and Time Q1 Wished to be Dead (Past Month) Q2 Suicidal Thoughts (Past Month) Q3 Suicidal Thought Method Q4 Suicidal Intent without Specific Plan Q5 Suicide Intent with Specific Plan Q6 Suicide Behavior (Lifetime) Within the Past 3 Months? RETIRED: Level of Risk per Screen Screening Not Complete User   07/25/23 211 no no no no no -- -- -- -- PSR            Behavioral status of patient: Green    Code 21 called this shift? No    Use of restraints/seclusion this shift? No    Most recent vital signs:  Temp: 97.8  F (36.6  C) Temp src: Temporal BP: 129/63 Pulse: 64   Resp: 18 SpO2: 100 % O2 Device: None (Room air)      Medications:  Scheduled medication compliance? N/A    PRN Meds administered this shift? No    Medications   OLANZapine (zyPREXA) injection 10 mg (has no administration in time range)   FLUoxetine (PROzac) capsule 40 mg (40 mg Oral $Given 7/26/23 0832)   hydrOXYzine (ATARAX) tablet 25 mg (has no administration in time range)   acetaminophen (TYLENOL) tablet 650 mg (650 mg Oral $Given 7/26/23 1953)   0.9% sodium chloride BOLUS (0 mLs Intravenous Stopped 7/25/23 1957)   OLANZapine (zyPREXA) injection 10 mg (10  mg Intramuscular $Given 7/25/23 2015)   0.9% sodium chloride BOLUS (0 mLs Intravenous Stopped 7/26/23 0319)         ADLs    Meal Provided this shift? No    Hygiene items provided? No    ADLs completed? No    Date of last shower: PTA, pt requesting to be able to shower as soon as possible.    Any significant events this shift? No    Any information that would be helpful in caring for this patient?  N/A    Family present/updated? Yes    Location of patient's belongings: DEC    Critical Care Minutes:  Does the patient need critical care minutes documented? No

## 2023-07-27 NOTE — TELEPHONE ENCOUNTER
1:29 AM Per call to Jessica willing to review patient  for Jolynn Graves to fax [phone] clinical.    2:27 AM Fax Sent. Awaiting callback.            4:23 AM Jolynn Graves declined due to acuity.     R: [place]  [place] Bed Call Log 7/27/23 12:01AM  [facility]  Intake has called facilities that have not updated their bed status within the last 12 hours.               [facility] is at capacity.         [facility] is posting 0 beds.  [phone].    [facility] is posting 0 beds. Negative covid required.            [facility] is posting 0 beds. Negative covid required. [phone]. Per call @12:14am to [facility] at capacity, call later to check on discharges.   [facility] is posting 0 beds.   [facility] is posting 0 beds. [phone]     [facility] is posting 0 beds      [facility] is posting 2 beds.    [facility] is posting 1 bed. Low acuity only.          Patient remains on work list pending appropriate bed availability

## 2023-07-28 ENCOUNTER — HOSPITAL ENCOUNTER (INPATIENT)
Facility: CLINIC | Age: 38
LOS: 13 days | Discharge: PSYCHIATRIC OR SUBSTANCE USE FACILITY | End: 2023-08-10
Attending: PSYCHIATRY & NEUROLOGY | Admitting: PSYCHIATRY & NEUROLOGY
Payer: COMMERCIAL

## 2023-07-28 VITALS
SYSTOLIC BLOOD PRESSURE: 154 MMHG | DIASTOLIC BLOOD PRESSURE: 97 MMHG | TEMPERATURE: 99.8 F | OXYGEN SATURATION: 100 % | HEART RATE: 95 BPM | RESPIRATION RATE: 15 BRPM

## 2023-07-28 DIAGNOSIS — K59.09 OTHER CONSTIPATION: ICD-10-CM

## 2023-07-28 DIAGNOSIS — F31.12 BIPOLAR 1 DISORDER, MANIC, MODERATE (H): ICD-10-CM

## 2023-07-28 DIAGNOSIS — F41.9 ANXIETY: ICD-10-CM

## 2023-07-28 DIAGNOSIS — E56.9 VITAMIN DEFICIENCY: Primary | ICD-10-CM

## 2023-07-28 DIAGNOSIS — R56.9 SEIZURE-LIKE ACTIVITY (H): ICD-10-CM

## 2023-07-28 LAB
APAP SERPL-MCNC: <5 UG/ML (ref 10–30)
SALICYLATES SERPL-MCNC: <0.3 MG/DL

## 2023-07-28 PROCEDURE — 250N000013 HC RX MED GY IP 250 OP 250 PS 637: Performed by: EMERGENCY MEDICINE

## 2023-07-28 PROCEDURE — 250N000013 HC RX MED GY IP 250 OP 250 PS 637: Performed by: PSYCHIATRY & NEUROLOGY

## 2023-07-28 PROCEDURE — 99222 1ST HOSP IP/OBS MODERATE 55: CPT | Mod: AI | Performed by: PSYCHIATRY & NEUROLOGY

## 2023-07-28 PROCEDURE — 124N000002 HC R&B MH UMMC

## 2023-07-28 RX ORDER — OLANZAPINE 10 MG/2ML
10 INJECTION, POWDER, FOR SOLUTION INTRAMUSCULAR 3 TIMES DAILY PRN
Status: DISCONTINUED | OUTPATIENT
Start: 2023-07-28 | End: 2023-08-10 | Stop reason: HOSPADM

## 2023-07-28 RX ORDER — AMOXICILLIN 250 MG
1 CAPSULE ORAL 2 TIMES DAILY PRN
Status: DISCONTINUED | OUTPATIENT
Start: 2023-07-28 | End: 2023-08-10 | Stop reason: HOSPADM

## 2023-07-28 RX ORDER — OLANZAPINE 2.5 MG/1
2.5 TABLET, FILM COATED ORAL 2 TIMES DAILY
Status: DISCONTINUED | OUTPATIENT
Start: 2023-07-28 | End: 2023-07-31

## 2023-07-28 RX ORDER — MAGNESIUM HYDROXIDE/ALUMINUM HYDROXICE/SIMETHICONE 120; 1200; 1200 MG/30ML; MG/30ML; MG/30ML
30 SUSPENSION ORAL EVERY 4 HOURS PRN
Status: DISCONTINUED | OUTPATIENT
Start: 2023-07-28 | End: 2023-08-10 | Stop reason: HOSPADM

## 2023-07-28 RX ORDER — ACETAMINOPHEN 325 MG/1
650 TABLET ORAL EVERY 4 HOURS PRN
Status: DISCONTINUED | OUTPATIENT
Start: 2023-07-28 | End: 2023-08-10 | Stop reason: HOSPADM

## 2023-07-28 RX ORDER — VITAMIN B COMPLEX
25 TABLET ORAL DAILY
Status: DISCONTINUED | OUTPATIENT
Start: 2023-07-28 | End: 2023-08-10 | Stop reason: HOSPADM

## 2023-07-28 RX ORDER — OLANZAPINE 10 MG/1
10 TABLET ORAL 3 TIMES DAILY PRN
Status: DISCONTINUED | OUTPATIENT
Start: 2023-07-28 | End: 2023-08-10 | Stop reason: HOSPADM

## 2023-07-28 RX ORDER — HYDROXYZINE HYDROCHLORIDE 25 MG/1
25 TABLET, FILM COATED ORAL 3 TIMES DAILY PRN
Status: DISCONTINUED | OUTPATIENT
Start: 2023-07-28 | End: 2023-08-10 | Stop reason: HOSPADM

## 2023-07-28 RX ADMIN — Medication 25 MCG: at 16:39

## 2023-07-28 RX ADMIN — OLANZAPINE 2.5 MG: 2.5 TABLET, FILM COATED ORAL at 20:01

## 2023-07-28 RX ADMIN — FLUOXETINE 40 MG: 20 CAPSULE ORAL at 08:38

## 2023-07-28 RX ADMIN — ACETAMINOPHEN 650 MG: 325 TABLET, FILM COATED ORAL at 16:38

## 2023-07-28 ASSESSMENT — ACTIVITIES OF DAILY LIVING (ADL)
LAUNDRY: WITH SUPERVISION
WALKING_OR_CLIMBING_STAIRS_DIFFICULTY: NO
ADLS_ACUITY_SCORE: 28
ADLS_ACUITY_SCORE: 28
HYGIENE/GROOMING: INDEPENDENT
ADLS_ACUITY_SCORE: 35
HEARING_DIFFICULTY_OR_DEAF: NO
LAUNDRY: WITH SUPERVISION
HYGIENE/GROOMING: INDEPENDENT
ADLS_ACUITY_SCORE: 35
DRESSING/BATHING_DIFFICULTY: NO
ORAL_HYGIENE: INDEPENDENT
ADLS_ACUITY_SCORE: 35
ADLS_ACUITY_SCORE: 35
CHANGE_IN_FUNCTIONAL_STATUS_SINCE_ONSET_OF_CURRENT_ILLNESS/INJURY: NO
ADLS_ACUITY_SCORE: 28
DIFFICULTY_COMMUNICATING: NO
ADLS_ACUITY_SCORE: 28
DIFFICULTY_EATING/SWALLOWING: NO
CONCENTRATING,_REMEMBERING_OR_MAKING_DECISIONS_DIFFICULTY: NO
DRESS: INDEPENDENT
ADLS_ACUITY_SCORE: 28
FALL_HISTORY_WITHIN_LAST_SIX_MONTHS: NO
WEAR_GLASSES_OR_BLIND: NO
DRESS: INDEPENDENT
TOILETING_ISSUES: NO
ADLS_ACUITY_SCORE: 35
DOING_ERRANDS_INDEPENDENTLY_DIFFICULTY: NO
ORAL_HYGIENE: INDEPENDENT

## 2023-07-28 ASSESSMENT — LIFESTYLE VARIABLES
SKIP TO QUESTIONS 9-10: 1
AUDIT-C TOTAL SCORE: 0

## 2023-07-28 ASSESSMENT — PATIENT HEALTH QUESTIONNAIRE - PHQ9: SUM OF ALL RESPONSES TO PHQ9 QUESTIONS 1 & 2: 0

## 2023-07-28 NOTE — TELEPHONE ENCOUNTER
11:09 PM  Sachi duque [facility]inson called to gather more information for pt review--labs, allergies, SI hx. Intake provided chart info as well as # to [facility]. Awaiting call back.

## 2023-07-28 NOTE — ED NOTES
RN ED Mental Health Handoff Note    72 hour hold    Does patient require 1:1? Yes    Hold and rights been given and documented for patient: Yes    Is the patient in BH scrubs? Yes    Has the patient been searched? Yes    Is the 15 minute observation tool up to date? Yes    Was patient issued a welcome folder? Yes    Room check completed this shift: Yes    PSS3 and [place] Assessment/Reassessment this shift:    PSS-3      Date and Time Over the past 2 weeks have you felt down, depressed, or hopeless? Over the past 2 weeks have you had thoughts of killing yourself? Have you ever attempted to kill yourself? When did this last happen? User   07/25/23 1815 patient unable to complete patient unable to complete patient unable to complete -- ASL          C-SSRS ([place])      Date and Time Q1 Wished to be Dead (Past Month) Q2 Suicidal Thoughts (Past Month) Q3 Suicidal Thought Method Q4 Suicidal Intent without Specific Plan Q5 Suicide Intent with Specific Plan Q6 Suicide Behavior (Lifetime) Within the Past 3 Months? RETIRED: Level of Risk per Screen Screening Not Complete User   07/25/23 2114 no no no no no -- -- -- -- PSR            Behavioral status of patient: Green    Code 21 called this shift? No    Use of restraints/seclusion this shift? No    Most recent vital signs:  Temp: 99.8  F (37.7  C) Temp src: Oral BP: (!) 148/93 Pulse: 82   Resp: 16 SpO2: 99 % O2 Device: None (Room air)      Medications:  Scheduled medication compliance? Yes    PRN Meds administered this shift? No    Medications   OLANZapine (zyPREXA) injection 10 mg (has no administration in time range)   FLUoxetine (PROzac) capsule 40 mg (40 mg Oral $Given 7/27/23 0858)   hydrOXYzine (ATARAX) tablet 25 mg (has no administration in time range)   acetaminophen (TYLENOL) tablet 650 mg (650 mg Oral $Given 7/27/23 1252)   0.9% sodium chloride BOLUS (0 mLs Intravenous Stopped 7/25/23 1957)   OLANZapine (zyPREXA) injection 10 mg (10 mg Intramuscular $Given  7/25/23 2015)   0.9% sodium chloride BOLUS (0 mLs Intravenous Stopped 7/26/23 0319)         ADLs    Meal Provided this shift? Yes    Hygiene items provided? Yes    ADLs completed? Yes    Date of last shower: 7/27    Any significant events this shift? No    Any information that would be helpful in caring for this patient?  Transfer to inpatient facility at 8am    Family present/updated? Yes    Location of patient's belongings: DEC office     Critical Care Minutes:  Does the patient need critical care minutes documented? Yes

## 2023-07-28 NOTE — PROVIDER NOTIFICATION
07/28/23 1542   Individualization/Patient Specific Goals   Patient Personal Strengths stable living environment;spiritual/Temple support;family/social support   Patient Vulnerabilities traumatic event;adverse childhood experience(s)   Anxieties, Fears or Concerns Pt has reported anxiety in the ED and has a history of anxiety.   Individualized Care Needs Pt requires further stabilization on the unit and medication management   Interprofessional Rounds   Summary Symptom stabilizaion and medication management   Participants CTC;nursing;OT;psychiatrist   Behavioral Team Discussion   Progress Pt was admitted to the unit today and requires further stabilization and medication management.   Anticipated length of stay Pt was admitted to the unit today and requires further stabilization and medication management   Continued Stay Criteria/Rationale Symptom stabilization, medication management, and care coordination   Medical/Physical None noted   Plan Multidisciplinary evaluation, medication management, care coordination   Safety Plan Per unit protocol   Anticipated Discharge Disposition home with family
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
## 2023-07-28 NOTE — PLAN OF CARE
"Problem: Suicidal Behavior  Goal: Suicidal Behavior is Absent or Managed  Outcome: Progressing    Patient has been pleasant and cooperative on approach. Presents as somewhat euphoric and animated, with rambling speech. She denies feeling anxious or depressed at this time. Patient spent time at the start of the shift resting in room, reporting that she was tired from too much exercise. Patient requested and was given PRN tylenol for generalized body aches, which she later reported was effective. Patient was out at meal and snack times, and spent some time watching television later in the evening. Appropriate boundaries encouraged when observed hugging another female patient in the lounge. In response, patient exclaimed that she was not a lesbian, but remained pleasant and was redirectable. Patient is medication compliant. She denies SI/SIB/HI/AH/VH and contracts for safety.   BP was 155/89 at start of shift, and 152/83  at bedtime. Internal med consult is ordered for high blood pressure.     BP (!) 152/83   Pulse 104   Temp 98  F (36.7  C) (Oral)   Resp 17   Ht 1.626 m (5' 4\")   Wt 52.6 kg (116 lb)   LMP  (LMP Unknown)   SpO2 100%   BMI 19.91 kg/m      "

## 2023-07-28 NOTE — ED NOTES
This ED charge RN spoke with patient about confidential status. She does not want to be listed as confidential as she wants her family to be able to come visit. Status updated.

## 2023-07-28 NOTE — CONSULTS
"Triage and Transition - Consult and Liaison     Kimmy [facility]  July 28, 2023    Writer spoke to Kimmy this morning. Pt reports she is \"100% wanting to get help\". She is voluntary and in agreement with transition to a inpatient mental health hospital admission.  \"As long as I am happy and healthy\"     Does not meet criteria for involuntary care/hold or commitment pps.     Recommendation that 72 hour hold should be discontinued.    Writer spoke to Dr. Velazco whom is in agreement to drop the 72 hour hold.     Writers updated [facility] PP.     TAMIA VO MSW, [facility]  Triage and Transition - Consult and Liaison   [phone]   "

## 2023-07-28 NOTE — ED NOTES
RN called  [facility]   to provide update that EMS is here to transport the patient ([phone]).
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
## 2023-07-28 NOTE — ED NOTES
0600: I received signout my partner, Dr. Valdez.  Briefly, the patient presented due to suicidal ideation and mental status changes.  Her metabolic work-up has been reassuring.  She had a near syncopal episode on 7/26 but follow-up work-up was reassuring.  At signout I was told the patient had been accepted [facility] 8:32 AM.    1118: I talked with mental health at the [facility] and they said that after talking with the patient, she is agreeable that she needs help and would like her 72-hour hold canceled so she can be a voluntary admission.     Shady Velazco, DO  07/28/23 1123

## 2023-07-28 NOTE — PROGRESS NOTES
"SPIRITUAL HEALTH SERVICES Progress Note  [facility]    Saw pt Kimmy Mendez per per admission request and routine consult for emotional and spiritual support and oriented her to [facility].    Patient/Family Understanding of Illness and Goals of Care - Kimmy said she's struggling with stress, anxiety, depression and mental health.    Distress and Loss - She named and discussed both pain and grief related to complex family dynamics. I acknowledged her emotions and provided her with active listening.    Strengths, Coping, and Resources  - God, Latter day and spirituality help her calmly process emotions and are her \"go to's\" for comfort.    Meaning, Beliefs, and Spirituality - Pt has a high sense of reliance on God to achieve \"inner peace and good health\". She believes God is the ultimate curer and said she continuously seeks \"His guidance and help\". I validated her belief and inquired if she needed any material to support her; she stated having a prayer rug and requested a copy of the Quran (sacred text).    Plan of Care - Provided nurse with a copy of the Quran for pt per length of stay. Lead Taoist  will continue visiting per duration of stay as needed. [facility] remains available to pt per request.    Daniel Fregoso, CPRS, CHP   Intern  Pager [phone]      * [facility] remains available 24/7 for emergent requests/referrals, either by having the switchboard page the on-call  or by entering an ASAP/STAT consult in Epic (this will also page the on-call ). Routine Epic consults receive an initial response within 24 hours.*   "

## 2023-07-28 NOTE — CARE PLAN
/ 07/28/23 1603   Patient Belongings   Did you bring any home meds/supplements to the hospital?  No   Patient Belongings locker   Patient Belongings Put in Hospital Secure Location (Security or Locker, etc.) clothing   Belongings Search Yes   Clothing Search Yes   Second Staff Itzel       Items in pt locker: slipper, 2 tank top, 2 bra, 3 underwear, 3 dresses, 2 pants, ear sammy, deodorant, 6 hijab, hair ties, black bottle of madawi, prayer book(Bible), pocket prayer mat, black under slip, head wrap      7/29/23   6 underwear, 2 dresses, 2 Hijab, sleeping cap      Nothing was send to security    .....A               Admission:  I am responsible for any personal items that are not sent to the safe or pharmacy.  [facility] is not responsible for loss, theft or damage of any property in my possession.    Signature:  _________________________________ Date: _______  Time: _____                                              Staff Signature:  ____________________________ Date: ________  Time: _____      2nd Staff person, if patient is unable/unwilling to sign:    Signature: ________________________________ Date: ________  Time: _____     Discharge:  [facility] has returned all of my personal belongings:    Signature: _________________________________ Date: ________  Time: _____                                          Staff Signature:  ____________________________ Date: ________  Time: _____

## 2023-07-28 NOTE — TELEPHONE ENCOUNTER
R: The patient is currently in the [facility] ED awaiting placement. Patient on 72HH     Intake [facility] Bed Search (Done at 8:02 PM) Facilities that have not updated their [facility] access site in the last 12 hours have been contacted for bed availability.   [facility] is posting 0 beds.   [facility] is posting 3 beds. 2:11 PM Intake called, no beds at any [facility] facility until 10:00am on 7/28  [facility] is posting 0 beds.  [facility] is posting 2 beds. 2:11 PM Intake called, no beds at any [facility] facility until 10:00am on 7/28  [facility] is posting 0 beds.  [facility] is posting 0 beds.  [facility] is posting 0 beds.  [facility] is posting 1 bed. Low acuity. 2:11 PM Intake called, no beds at any [facility] facility until 10:00am on 7/28     [facility] is posting 2 bed. Mixed unit 12+. Low acuity only. Need a negative COVID. 8:09 PM Intake called and spoke to Eliud, bed available and patient can be reviewed   [facility] is posting 1 bed. No aggression. 2:11 PM Intake called, no beds at any [facility] facility until 10:00am on 7/28  [facility] is posting 0 beds.   [facility] is posting 1 bed. Low acuity only.COVID negative.   [facility] is posting 4 beds. Low acuity only. 2:11 PM Intake called, no beds at any [facility] facility until 10:00am on 7/28  [facility] is posting 1 bed. 0 acute, 0 med psych, 0 mood disorder- very low acuity.  [facility] is posting 1 bed. Low acuity only. 72 hr hold required. Negative COVID required.   [facility] is posting 2 beds. Low acuity. COVID negative.     [facility] is posting 0 beds. Vol only, No Hx of aggression, violence, or assault. No sexual offenders. No 72 hr holds.     [facility] is posting 4 beds. (Must have the cognitive ability to do programming. No aggressive or violent behavior or recent HX in the last 2 yrs. MH must be primary). Negative COVID test required.   [facility] is posting 0 beds. Low acuity only. Violence and aggression capped.   [facility] is posting 0 beds. Low acuity, Neg Covid.   [facility] is posting 5 beds. Covid neg. Vol only. Combined adolescent and adult unit. No aggressive or violent behavior. No registered sex offenders. Patient is not appropriate for open bed: patient not voluntary   [facility] is posting 1 bed. No Hx aggression, violence or assault. Negative COVID test required.   [facility] is posting 12 beds. No Covid needed. Call for details. Patient is not appropriate for open bed: on a 72HH and must be placed in [place], facility in [place], [place].   [facility] is posting 5 beds. Mixed Unit (13+). Low acuity only. Negative COVID test required. Patient is not appropriate for open bed: patient initially requesting metro placement due to transportation. Facility requires patients to have their own transportation home     [facility] and [place] location has declined patient due to acuity. The patient remains on the worklist. Intake continues to identify appropriate inpatient psychiatry placement.    8:49 PM Intake spoke to Resident on call to review patient for admission onto station 20, awaiting callback.     9:55 PM Per Resident, patient would need private on station 20 and unit cannot block another bed. Patient's blood pressure is too high and not medically stable at this time.    10:16 PM Intake requested tylenol and aspirin level for labs for [facility] [place] [facility] review.

## 2023-07-28 NOTE — H&P
"[facility], [place]   Psychiatric History and Physical.    Chief complaint and reason for admission:     Bizarre behavior and suicidal threats.    History of present illness: per DEC and ER reports- Collateral information name, relationship, phone number:  Marika Lopes [phone]     What happened today: Marika reported Pt has increased labile mood and erratic behavior for the past one week.  Marika reported Pt took off today from home with her barefoot.  Pt was found cursing and spitting at other cars and wandering on the street as the family called 911 for help.      What is different about patient's functioning: Marika reported Pt has labile mood for the past week as she cried, get angry at random things, cursing then being able to calm down.  Marika reported Pt seemed to have normal sleep and appetite.  Pt also has not  been taking her psychiatric medications consistently. Anithaece states for the past two days, she has been hyperactive and has been going on long walks and running away from home. [place] , [place] herself.  She was also reported to be in the backyard with a hose saying that this was going to be the way she .  Today she ran out of the house and was walking without shoes.  She was reported by her niece to be \"flipping off cars\" and cursing at people, acting erratically.     During visit with this provider and PA student patient presented as pleasant and cooperative, but clearly hyperverbal and likely, hypomanic. She talked openly about her recent struggles with depression and anxiety and that she was not taking meds: \"I hate tablets, they make your personality change\". She, however, also made it clear that she came to this hospital voluntarily because she wanted to get help and was willing to take meds to get better. She had difficulties explaining what was going on with her, reported normal appetite, had " "difficulties quantifying how much sleep she had been having on average per night: \"I sleep too much when I am lazy and little when I have something to do\". She denied Auditory hallucinationsa and Visual hallucinations, explained her bizarre behavior of walking out from her and sister's place barefoot by \"I wanted to prove myself that I love my  son\". Interesting, that while talking about her  son, patient presented with quite incongruent affect periodically laughing and joking. In regards to her spitting and flashing incoming cars, Kimmy said that it was a mistake and she was glad that  was present on the scene. Kimmy denied drinking alcohol and/or using illicit drugs.     Past psychiatric history: reports that she was diagnosed with depression and anxiety. Denied ever experiencing genuine hallucinations, though, patient didn't appear to be too reliable historian. Stated that this is her very first psych hospitalization, though, there is a note about possible admission in 2019 after death of her son. She has a history of being in therapy and (per chart review) has a history of having [facility] worker and being treated with Lamictal, Celexa, Remeron, Zyprexa, Zoloft, Ativan, Valium. Most recent prescribed meds were, apparently, Prozac and Vistaril. Denies taking them.     Past medical history: Anxiety              Depressive disorder      Gestational diabetes mellitus    Duodenal ulcer due to Helicobacter pylori       Psychiatric pseudoseizures      Hypothyroidism             Premature atrial contractions  PTSD     Suicidal ideation   Premature atrial contraction  Vitamin D deficiency  Past Surgical History:    Upper gastrointestinal endoscopy    Family and social history: was born in [place]. Came to [place] in  or . Apparently, was living between different states, mentioned living in [place], [place], [place] and came to [place] in  or , but had not lived here consistently. . Had one son " "who, according to the patient, was murdered by her mother, boy's grandma. Patient currently resides with her sister and sister's kids (sister is a single mom). Patient is unemployed and has no source of income. She reports that her mother served some time in [place], but now lives in \"facility\". Kimmy reports that mother is diagnosed with Bipolar affective disorder, apparently, has a history of poor compliance with meds. Sister per patient is also likely to have MI issues.          Medications:   Has not been taking prescribed meds consistently.       Allergies:     Allergies   Allergen Reactions    Dust Mites Other (See Comments)     congestion    Pork-Derived Products Other (See Comments)     Pt does not ingest of these products.           Labs:     Recent Results (from the past 24 hour(s))   Asymptomatic COVID-19 Virus (Coronavirus) by PCR Nasopharyngeal    Collection Time: 07/27/23  3:59 PM    Specimen: Nasopharyngeal; Swab   Result Value Ref Range    SARS CoV2 PCR Negative Negative   Acetaminophen level    Collection Time: 07/27/23 11:18 PM   Result Value Ref Range    Acetaminophen <5.0 (L) 10.0 - 30.0 ug/mL   Salicylate level    Collection Time: 07/27/23 11:18 PM   Result Value Ref Range    Salicylate <0.3   mg/dL          Psychiatric Examination:     BP (!) 163/93 (BP Location: Left arm, Patient Position: Sitting, Cuff Size: Adult Regular)   Pulse 100   Temp 99.2  F (37.3  C) (Temporal)   Resp 17   Ht 1.626 m (5' 4\")   Wt 52.6 kg (116 lb)   LMP  (LMP Unknown)   SpO2 100%   BMI 19.91 kg/m    Weight is 116 lbs 0 oz  Body mass index is 19.91 kg/m .                Sitting Orthostatic BP: 163/93      Sitting Orthostatic Pulse: 100 bpm                     Appearance: awake, alert and adequately groomed, has head scarf  Attitude:  cooperative  Eye Contact:  good  Mood:   elated  Affect:  intensity is exaggerated  Speech:  clear, coherent and rambling  Psychomotor Behavior:  physical agitation  Throught Process:  " tangental and circumstantial  Associations:  loosening of associations present  Thought Content:  no evidence of suicidal ideation or homicidal ideation  Insight:  partial  Judgement:  limited  Oriented to:  time, person, and place  Attention Span and Concentration:  limited  Recent and Remote Memory:  fair       Review of systems:     For physical examination and 12 point review of system please, see note of Dr. Vazquez from 07/25/23.  I reviewed that note and agree with it.         DIagnoses:     Bipolar affective disorder, manic, moderate severity         Plan:     Will with patient's permission start her on Zyprexa. Will continue to provide support and structure.  She will remain hospitalized as voluntary, but will evaluate for a need for 72 hour hold if patient demands to leave before significant improvement in mental health symptoms achieved.          Total time spent was 57 minutes. Over 50% of times was spent counseling and coordination of care regarding coping skills, medication and discharge planning.

## 2023-07-28 NOTE — PLAN OF CARE
Assessment/Intervention/Current Symtoms and Care Coordination:  Chart review and met with team, discussed pt progress, symptomology, and response to treatment.  Discussed the discharge plan and any potential impediments to discharge.     Discharge Plan or Goal:  Pt was admitted to the unit today. Pt needs further stabilization on the unit and medication management.     Barriers to Discharge:  Pt needs further stabilization on the unit and medication management.     Referral Status:  Pt needs further stabilization before referral can begin.     Legal Status:  Pt is voluntary     Contacts:  Primary Care Provider:   Calos Trujillo APRN, CNP  [address] [place] [address]  [phone] (Work)  [phone] (Fax)     Upcoming Meetings and Dates/Important Information and next steps:  None

## 2023-07-28 NOTE — TELEPHONE ENCOUNTER
10:21 AM Dianne, Charge with unit 30 on unit 32 stated Hayley had asked for actual paperwork on the 72 HH and for the petition for CC to be started today as the hold ends Monday.  Dianne stated that she had asked [facility] but it wasn't helping.    10:31 AM Called [facility] and asked for a Petition for CC to be started.  Johanne updated list and stated Pts assigned staff will call back with an update.    10:42 AM Called [facility] and updated MD Velazco and they will fax over 72 HH paperwork to Intake    10:53 AM Updated Dianne, unit 30 on 32 Charge RN.  She stated that they just received the Pts 72 HH paperwork.  Intake will update her when Petition for CC paperwork arrives.    11:12 AM Per Calos with [facility] Pt is now voluntary and that is why they didn't start Petition for CC.  Calos confirmed with Pt that she understands she is going to a locked IP unit and she is willing to stay until she is better.    11:19 AM Updated Dianne Charge RN on unit 30 on unit 32.

## 2023-07-28 NOTE — ED NOTES
Pt's brother calling for information on pt. Pt is confidential encounter in our system. Pt's brother informed by [facility] that this patient is not here. Pt's brother threatening to show up with .

## 2023-07-28 NOTE — TELEPHONE ENCOUNTER
1:10 AM Intake received call from [facility] that Provider declined due to acuity.    1:13 AM Paged [facility]    1:39 AM [facility] called back accepted patient to unit 32/30.    1:54 AM CRN informed of patient in queue. Due to staffing CRN advised [facility] ED to call on Day shift after 8 am for nurse to nurse report.    1:56 AM ED notified    [facility] Access Inpatient Bed Call Log 7/27/23 9:57 PM (Statewide)  Intake has called facilities that have not updated their bed status within the last 12 hours.                            [facility] is at capacity.          [facility] is posting 0 beds.  [phone].     [facility] is posting 3 beds. Negative covid required. Per website 9:57 PM            [facility] is posting 0 beds. Negative covid required. [phone]. Per call @8:14am, RN not available. Intake to call back after an hour.   [facility] is posting 0 beds.    [facility] is posting 0 beds. [phone]      [facility] is posting 0 beds       [facility] is posting 2 beds. Per website 9:57 PM 7/26 [facility] d/t acuity no appropriate beds available.   [facility] is posting 1 bed. Low acuity only. Per website 9:57 PM 7/26 [facility] d/t acuity no appropriate beds available.   [facility] is posting 2 beds. LOW acuity ONLY. Mixed unit 12+. Negative covid- [phone]. Per website 9:57 PM 7/28 [facility] d/t acuity.  [facility] has 1 bed posted. No aggression. Negative Covid. Low acuity. Per website 9:57 PM        [facility] is posting 0 beds. Negative covid.  Per website 9:57 PM           [facility] 3 beds Low acuity only. Negative covid.  [phone]. Per website 9:57 PM   [facility] is posting 4 beds. Low acuity. No current aggression. Per website 9:57 PM      [facility] ([place]) 0 beds Low acuity only. Negative covid.  [phone]. Per website 9:57 PM   [facility] is posting 1 bed. Low  acuity. 72 HH hold preferred. [phone].  Negative covid required. Per website 9:57 PM   [facility] ([place]) 4 beds. Low acuity only. Negative covid. - [phone]. Per website 9:57 PM   [facility] [place] is posting 0 Beds.  Negative covid required.   Vol only, No history of aggression, violence, or assault. No sexual offenders. No 72 HH holds.   [phone]. Per website 9:57 PM 7/28 Patient is not appropriate for open bed on 72 HH  [facility] is posting 4 beds. Negative covid required.  (Must have the cognitive ability to do programming. No aggressive or violent behavior or recent HX in the last 2 yrs. MH must be primary.) Always low acuity. [phone]. Per website 9:57 PM     [facility] has 0 beds posted. Negative covid required.  Low acuity only. Violence and aggression capped.  [phone].      [facility] is posting 1 bed. Low acuity, Negative covid required.  [phone]. Per call @8:05am, No beds currently but can take info on specific Pt.   [facility] is posting 5 beds. Negative covid required.  Vol only. Combined adolescent and adult unit. No aggressive or violent behavior. No registered sex offenders.  [phone] Per website 9:57 PM 7/28 Patient is not appropriate for open bed on 72 HH   [facility] posting 2 beds Negative covid required.  [phone]. Per website 9:57 PM 7/28 [facility] d/t acuity.    [facility] is posting 1 bed. Negative covid. (No lines, drains, or tubes, oxygen, CPAP, IV, etc.). [phone]. Per website 9:57 PM   [facility] ([facility] is posting 5 beds. Negative covid. (No lines, drains, or tubes, oxygen, CPAP, IV, etc.).   [phone]. Per call @7:54am, beds available. In the process of reviewing current pts.   [facility] is posting 12 beds. No covid test required.  [phone]. Per website 9:57 PM       Patient remains on worklist  pending appropriate bed availability.

## 2023-07-29 LAB
ALBUMIN SERPL BCG-MCNC: 4.6 G/DL (ref 3.5–5.2)
ALP SERPL-CCNC: 81 U/L (ref 35–104)
ALT SERPL W P-5'-P-CCNC: 34 U/L (ref 0–50)
ANION GAP SERPL CALCULATED.3IONS-SCNC: 9 MMOL/L (ref 7–15)
AST SERPL W P-5'-P-CCNC: 39 U/L (ref 0–45)
BILIRUB SERPL-MCNC: 0.3 MG/DL
BUN SERPL-MCNC: 5.1 MG/DL (ref 6–20)
CALCIUM SERPL-MCNC: 9.7 MG/DL (ref 8.6–10)
CHLORIDE SERPL-SCNC: 99 MMOL/L (ref 98–107)
CHOLEST SERPL-MCNC: 211 MG/DL
CREAT SERPL-MCNC: 0.59 MG/DL (ref 0.51–0.95)
DEPRECATED HCO3 PLAS-SCNC: 26 MMOL/L (ref 22–29)
GFR SERPL CREATININE-BSD FRML MDRD: >90 ML/MIN/1.73M2
GLUCOSE SERPL-MCNC: 105 MG/DL (ref 70–99)
HDLC SERPL-MCNC: 73 MG/DL
LDLC SERPL CALC-MCNC: 116 MG/DL
MAGNESIUM SERPL-MCNC: 2.1 MG/DL (ref 1.7–2.3)
NONHDLC SERPL-MCNC: 138 MG/DL
POTASSIUM SERPL-SCNC: 4.2 MMOL/L (ref 3.4–5.3)
PROT SERPL-MCNC: 7 G/DL (ref 6.4–8.3)
SODIUM SERPL-SCNC: 134 MMOL/L (ref 136–145)
TRIGL SERPL-MCNC: 110 MG/DL

## 2023-07-29 PROCEDURE — 36415 COLL VENOUS BLD VENIPUNCTURE: CPT | Performed by: PSYCHIATRY & NEUROLOGY

## 2023-07-29 PROCEDURE — 80053 COMPREHEN METABOLIC PANEL: CPT | Performed by: PSYCHIATRY & NEUROLOGY

## 2023-07-29 PROCEDURE — 250N000013 HC RX MED GY IP 250 OP 250 PS 637: Performed by: PSYCHIATRY & NEUROLOGY

## 2023-07-29 PROCEDURE — 93005 ELECTROCARDIOGRAM TRACING: CPT

## 2023-07-29 PROCEDURE — 83735 ASSAY OF MAGNESIUM: CPT | Performed by: PHYSICIAN ASSISTANT

## 2023-07-29 PROCEDURE — 80061 LIPID PANEL: CPT | Performed by: PSYCHIATRY & NEUROLOGY

## 2023-07-29 PROCEDURE — 99255 IP/OBS CONSLTJ NEW/EST HI 80: CPT | Performed by: PHYSICIAN ASSISTANT

## 2023-07-29 PROCEDURE — 84155 ASSAY OF PROTEIN SERUM: CPT | Performed by: PSYCHIATRY & NEUROLOGY

## 2023-07-29 PROCEDURE — 124N000002 HC R&B MH UMMC

## 2023-07-29 RX ADMIN — OLANZAPINE 2.5 MG: 2.5 TABLET, FILM COATED ORAL at 20:10

## 2023-07-29 RX ADMIN — Medication 25 MCG: at 08:27

## 2023-07-29 RX ADMIN — ACETAMINOPHEN 650 MG: 325 TABLET, FILM COATED ORAL at 11:35

## 2023-07-29 RX ADMIN — OLANZAPINE 2.5 MG: 2.5 TABLET, FILM COATED ORAL at 08:27

## 2023-07-29 RX ADMIN — ACETAMINOPHEN 650 MG: 325 TABLET, FILM COATED ORAL at 17:51

## 2023-07-29 ASSESSMENT — ACTIVITIES OF DAILY LIVING (ADL)
ORAL_HYGIENE: INDEPENDENT
ADLS_ACUITY_SCORE: 28
DRESS: INDEPENDENT
HYGIENE/GROOMING: INDEPENDENT
ADLS_ACUITY_SCORE: 28
LAUNDRY: WITH SUPERVISION
ADLS_ACUITY_SCORE: 28

## 2023-07-29 NOTE — PLAN OF CARE
"Goal Outcome Evaluation: Patient out on unit 50% of the time. Walking in [facility], did take a long nap after receiving morning Zyprexa. In interview referred to herself as a crazy person. Denied hearing voices,\"the only voice I hear is yours.\" Keeps to herself on the unit. Is friendly and makes jokes while talking with her. Says that her sister will come to visit,probably today. Received feedback from a friend calling Kimmy that Kimmy could not sustain a conversation with her.                        "

## 2023-07-29 NOTE — PROGRESS NOTES
"   07/29/23 1453   Group Therapy Session   Group Attendance attended group session   Time Session Began 1115   Time Session Ended 1200   Total Time (minutes) 15   Total # Attendees 3   Group Type life skill;task skill   Group Session Detail Education and group discussion provided on positive affirmations with hands on task to make an Affirmation or Gratitude Calendar for the month of August for fostering hope, improving mood, reality-based activity, creative expression, fine motor skills, self-expression, building self- esteem, coping, healthy leisure and socialization.   Patient Participation Detail Pt joined for the first 15 min of the group.  She read an affirmation aloud though did not appear to have a good understanding of it.  Pt asserted, \"I'm Eren and my hand up means back off, I'm cranky today.\"  Pt went on to elaborate on how she doesn't care and has \"attitude.\"  Pt was given instructions for work on the calendar many times and broken down into one step at a time.  Pt was unable to effectively follow 1 step directions.  She did not understand numbering the calendar and placed random numbers in random areas on her calendar.  She wrote a couple of phrases on her calendar and declared she was \"done.\"  Pt was disorganized, unable to process even one step directives and was clearly not invested in the activity.  No charge.       "

## 2023-07-29 NOTE — PLAN OF CARE
Problem: Adult Behavioral Health Plan of Care  Goal: Plan of Care Review  Outcome: Progressing     Problem: Sleep Disturbance  Goal: Adequate Sleep/Rest  Outcome: Progressing   Goal Outcome Evaluation:        Pt appears to be sleeping comfortably with even and unlabored respirations noted during all safety checks. No concerns noted or verbalized. Will continue with same plan of care.

## 2023-07-29 NOTE — PHARMACY-ADMISSION MEDICATION HISTORY
"Admission Medication History Completed by Pharmacy    Please refer to pharmacy progress note on 7/25, \"Pharmacy-Admission Medication History\" under previous encounter at [facility]  for information regarding prior to admission medications.    Jennifer Rapp, Pharm.D., [facility]  Behavioral Health Inpatient Pharmacist  [facility] ([facility])  Phone: *[phone] ([employer]) or [phone]      "

## 2023-07-29 NOTE — PLAN OF CARE
"Problem: Suicidal Behavior  Goal: Suicidal Behavior is Absent or Managed  Outcome: Progressing     Patient's mood has been more labile this evening. Initially presented as animated and expansive. Denied all symptoms or concerns. Patient appears to keep to herself most of the time and has been observed walking in the hallway and spent time in room praying.  Patient later complained that the unit was loud, and requested PRN tylenol. When asked about other symptoms, patient was dismissive. Patient took tylenol and declined any other questions or interventions.   Affect noted to be more flat and guarded later in evening. Appears more restless and disorganized. Noted to change clothes multiple times through out the evening, and has been at the desk multiple times to request various items of clothing brought in from family today. Somewhat irritable with redirection, but remains cooperative.   Patient requested to take shower this evening, was given towels, and then went back to room to rest instead. Affect is sullen. When asked if she wanted to take shower in the morning, patient shrugged and stated that she is exhausted and couldn't say anything about tomorrow. Patient denied any thoughts of harm to herself or others at this time and contracted for safety. Patient was medication compliant.     BP (!) 147/83 (BP Location: Left arm, Patient Position: Sitting, Cuff Size: Adult Regular)   Pulse 83   Temp 98.4  F (36.9  C) (Temporal)   Resp 17   Ht 1.626 m (5' 4\")   Wt 52.6 kg (116 lb)   LMP  (LMP Unknown)   SpO2 100%   BMI 19.91 kg/m      "
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
## 2023-07-30 PROCEDURE — 250N000013 HC RX MED GY IP 250 OP 250 PS 637: Performed by: PSYCHIATRY & NEUROLOGY

## 2023-07-30 PROCEDURE — 124N000002 HC R&B MH UMMC

## 2023-07-30 RX ADMIN — ACETAMINOPHEN 650 MG: 325 TABLET, FILM COATED ORAL at 18:58

## 2023-07-30 RX ADMIN — Medication 25 MCG: at 08:22

## 2023-07-30 RX ADMIN — OLANZAPINE 2.5 MG: 2.5 TABLET, FILM COATED ORAL at 08:22

## 2023-07-30 RX ADMIN — OLANZAPINE 2.5 MG: 2.5 TABLET, FILM COATED ORAL at 20:22

## 2023-07-30 ASSESSMENT — ACTIVITIES OF DAILY LIVING (ADL)
HYGIENE/GROOMING: INDEPENDENT
ADLS_ACUITY_SCORE: 28
LAUNDRY: WITH SUPERVISION
ADLS_ACUITY_SCORE: 28
DRESS: INDEPENDENT
ORAL_HYGIENE: INDEPENDENT
ADLS_ACUITY_SCORE: 28

## 2023-07-30 NOTE — PLAN OF CARE
"Goal Outcome Evaluation: Patient describes her mood as,\" good, amazing, beautiful.\" Compliant with medication. States that she will take whatever medication the DrBrittney Wants her to take. Does take a 90 minute nap this weekend after breakfast/medication. Walks in angelo often singing,\" This old man...,\" over and over. Oriented x 4. In 1:1, speaks in a humorous  , animated way, often making funny faces to accentuate her points.Eats well at meals. Needed help to stay on track when making menu choices.                        "

## 2023-07-30 NOTE — PLAN OF CARE
Problem: Sleep Disturbance  Goal: Adequate Sleep/Rest  Outcome: Progressing   Goal Outcome Evaluation:       Pt slept 5.75 hours. Single room order for recurrent  major depressive disorder. No PRN given this shift. 15 minutes safety check. Will continue to monitor.

## 2023-07-30 NOTE — PLAN OF CARE
"Problem: Suicidal Behavior  Goal: Suicidal Behavior is Absent or Managed  Outcome: Progressing    Patient has been quiet and withdrawn, spending most of the evening resting in her room. She was out briefly at meal time only. Patient presents with a flat, sullen affect. Patient has been dismissive with any assessment questions, and appears to be minimizing symptoms and concerns. She reports that she is just tired from her medication, and denies feeling anxious or depressed. Patient denies SI/SIB/HI and hallucinations at this time. Patient requested and given tylenol x1 for generalized pain which she was unable to rate, but later reported it was effective.   Patient has been out at meal and snack times. Appetite is fair. She has been observed spending some time walking in the angelo and talking with select peers. Patient is medication compliant.   Patient's brother called for update this evening. Release of information is signed. He reports she does not seem to be at baseline after talking with her on the phone today. Reports patient has a history of minimizing symptoms in order to be discharged sooner, and is concerned that she would leave the hospital before she is ready.     /75 (BP Location: Left arm, Patient Position: Sitting, Cuff Size: Adult Regular)   Pulse 76   Temp 97.6  F (36.4  C) (Temporal)   Resp 17   Ht 1.626 m (5' 4\")   Wt 54.1 kg (119 lb 3.2 oz)   LMP  (LMP Unknown)   SpO2 98%   BMI 20.46 kg/m       "

## 2023-07-30 NOTE — PROGRESS NOTES
07/29/23 1937   Group Therapy Session   Group Attendance attended group session   Time Session Began 1700   Time Session Ended 1745   Total Time (minutes) 10   Total # Attendees 4   Group Type recreation   Group Session Detail Education provided on the importance of healthy leisure, the relationship with boredom and relapse, and hands on experience with Skip-Abelardo Card Game for healthy leisure practice, concentration, new learning, direction following, sequencing, logic and strategy skill practice, simple problem solving, coping, reality-based activity, mood stabilization, an opportunity to experience success, and expansion of social skills   Patient Participation Detail Pt joined group, but did not wish to participate in the group activity.  She requested to observe, though she paid no attention.  Eventually she asked if she could sit by the window and was reassured that she could.  Soon after pt left the group room and did not return.  No charge.

## 2023-07-31 PROCEDURE — 124N000002 HC R&B MH UMMC

## 2023-07-31 PROCEDURE — 99232 SBSQ HOSP IP/OBS MODERATE 35: CPT | Performed by: PSYCHIATRY & NEUROLOGY

## 2023-07-31 PROCEDURE — 250N000013 HC RX MED GY IP 250 OP 250 PS 637: Performed by: PSYCHIATRY & NEUROLOGY

## 2023-07-31 RX ORDER — OLANZAPINE 2.5 MG/1
2.5 TABLET, FILM COATED ORAL EVERY MORNING
Status: DISCONTINUED | OUTPATIENT
Start: 2023-08-01 | End: 2023-08-07

## 2023-07-31 RX ADMIN — ACETAMINOPHEN 650 MG: 325 TABLET, FILM COATED ORAL at 17:57

## 2023-07-31 RX ADMIN — OLANZAPINE 7.5 MG: 5 TABLET, FILM COATED ORAL at 21:06

## 2023-07-31 RX ADMIN — OLANZAPINE 2.5 MG: 2.5 TABLET, FILM COATED ORAL at 08:13

## 2023-07-31 RX ADMIN — Medication 25 MCG: at 08:13

## 2023-07-31 ASSESSMENT — ACTIVITIES OF DAILY LIVING (ADL)
ADLS_ACUITY_SCORE: 28
LAUNDRY: WITH SUPERVISION
ADLS_ACUITY_SCORE: 28
ORAL_HYGIENE: INDEPENDENT
ADLS_ACUITY_SCORE: 28
HYGIENE/GROOMING: INDEPENDENT
ADLS_ACUITY_SCORE: 28
DRESS: INDEPENDENT

## 2023-07-31 NOTE — DISCHARGE INSTRUCTIONS
Behavioral Discharge Planning and Instructions    Summary: You were admitted on 7/28/2023  due to Depression, Anxiety, and Suicidal Ideations.  You were treated by Cisco Hardy MD and discharged on 8/10/2023 from [facility] to [facility] [address] [place] [address]    Main Diagnosis:   Bipolar affective disorder, manic, moderate severity     Health Care Follow-up:     Individual Therapy appointment:   Call to schedule once settled into [facility]  Provider: [facility]  Location: [facility] [place] [address], [address], [place] [address]  Phone: [phone]  Fax: [phone]    Psychiatry Appointment: Wednesday August 30 at 11:20am in-person  [facility]  [facility] [address], [address]  [place] [address]  Phone: [phone]  Fax: [phone]  Notes: Please plan on arriving 15 minutes early for your appointment and bring your photo ID/insurance card.    You were referred to [facility] and completed an intake with them over the phone on 8/9/2023. If you have not heard from a  by 8/24/2023 call and ask about the status at  [phone].    Attend all scheduled appointments with your outpatient providers. Call at least 24 hours in advance if you need to reschedule an appointment to ensure continued access to your outpatient providers.     Major Treatments, Procedures and Findings:  You were provided with: a psychiatric assessment, assessed for medical stability, medication evaluation and/or management, group therapy, family therapy, individual therapy, CD evaluation/assessment, milieu management, and medical interventions    Symptoms to Report: feeling more aggressive, increased confusion, losing more sleep, mood getting worse, or thoughts of suicide    Early warning signs can include: increased depression or anxiety sleep  "disturbances increased thoughts or behaviors of suicide or self-harm  increased unusual thinking, such as paranoia or hearing voices    Safety and Wellness:  Take all medicines as directed.  Make no changes unless your doctor suggests them.      Follow treatment recommendations.  Refrain from alcohol and non-prescribed drugs.  If there is a concern for safety, call 911.    Resources:   Crisis Intervention: 892.863.6308 or 574-840-3993 (TTY: 567.356.3689).  Call anytime for help.  National [place] on Mental Illness (www.[place].roni.org): 449.338.1728 or 307-018-6622.  National Suicide Prevention Line (www.mentalhealthmn.org): 633-708-EZIS (0234)  [facility] Crisis Response 590-949-9161  Crisis text line: Text \"[place]\" to 726521. Free, confidential, 24/7.  [facility] Addiction and Mental Health Support: [phone]    General Medication Instructions:   See your medication sheet(s) for instructions.   Take all medicines as directed.  Make no changes unless your doctor suggests them.   Go to all your doctor visits.  Be sure to have all your required lab tests. This way, your medicines can be refilled on time.  Do not use any drugs not prescribed by your doctor.  Avoid alcohol.    Advance Directives:   Scanned document on file with [facility]? No scanned doc  Is document scanned? Pt states no documents  Honoring Choices Your Rights Handout: Informed and given  Was more information offered? Pt declined    The Treatment team has appreciated the opportunity to work with you. If you have any questions or concerns about your recent admission, you can contact the unit which can receive your call 24 hours a day, 7 days a week. They will be able to get in touch with a Provider if needed. The unit number is [phone].  "

## 2023-07-31 NOTE — PLAN OF CARE
Goal Outcome Evaluation:Showered, ate breakfast, walking in the angelo humming, smiling animated, making funny faces. Talking about wanting to go home so she can cook and be in her room. Medication compliant. Was informed about her medication increase. Frequently talks with staff. Went to group but then called out to talk with the Dr. Displays friendly, smiling mood. Animated and makes funny faces while talking.Medication compliant.

## 2023-07-31 NOTE — PLAN OF CARE
Kimmy's brother, Sai called. He sated that he would like to talk with the Dr. Also stated that Kimmy as an inpatient will take medication but when discharged will not take medication or take to much. Also makes suicidal statements once she is discharged.

## 2023-07-31 NOTE — PROGRESS NOTES
"[facility] [place]   Psychiatric Progress Note        Interim History:   The patient's care was discussed with the treatment team during the daily team meeting and/or staff's chart notes were reviewed.  Staff report patient had, overall, a good weekend. She was compliant with her meds and unit rules, socialized with patients and staff. Appeared to be in a good mood, was seen singing and humming some melodies, at times was tearful. RN talked to the patient's brother, Sai, see RN's note below:    \"Kimmy's brother, Sai called. He sated that he would like to talk with the Dr. Also stated that Kimmy as an inpatient will take medication but when discharged will not take medication or take to much. Also makes suicidal statements once she is discharged. \"    Met with patient: she was seen in 2 occasions, both times in presence of PA student. First time she was seen in one of interview rooms. Kimmy presented as pleasant and in a good mood, though, at times seemed to have problems with boundaries. She exhibited bright affect and smiled often, even, while talking about being sad and missing her son who passed few years ago. Reported that she liked being at this hospital, but would like to be discharged home. We suggested her to stay at this hospital for at least a few more days and increase her dose of Zyprexa. Kimmy first, refused to have Zyprexa increased and we had to explain that dose she is taking now (5 mg per day) is too small for a person of her age and weight. Kimmy reluctantly agreed to have her dose increased. She also stated repeatedly that she is serious about taking care of self and would continue to take meds after discharge from this hospital.          Medications:      [START ON 8/1/2023] OLANZapine  2.5 mg Oral QAM    OLANZapine  7.5 mg Oral At Bedtime    Vitamin D3  25 mcg Oral Daily          Allergies:     Allergies   Allergen Reactions    Dust Mites Other (See " "Comments)     congestion    Pork-Derived Products Other (See Comments)     Pt does not ingest of these products.           Labs:   No results found for this or any previous visit (from the past 24 hour(s)).       Psychiatric Examination:     BP (!) 140/73 (BP Location: Right arm, Patient Position: Sitting, Cuff Size: Adult Regular)   Pulse 71   Temp 98.3  F (36.8  C) (Oral)   Resp 17   Ht 1.626 m (5' 4\")   Wt 54.1 kg (119 lb 3.2 oz)   LMP  (LMP Unknown)   SpO2 98%   BMI 20.46 kg/m    Weight is 119 lbs 3.2 oz  Body mass index is 20.46 kg/m .    Orthostatic Vitals         Most Recent      Sitting Orthostatic /73 07/31 0805    Sitting Orthostatic Pulse (bpm) 71 07/31 0805    Standing Orthostatic /73 07/31 0805    Standing Orthostatic Pulse (bpm) 71 07/31 0805          Appearance: awake, alert, adequately groomed, and appeared as age stated  Attitude:  cooperative  Eye Contact:  good  Mood:  better  Affect:  intensity is normal, at times incongruent to the topic of conversation  Speech:  clear, coherent  Psychomotor Behavior:  no evidence of tardive dyskinesia, dystonia, or tics  Throught Process:  circumstantial  Associations:  no loose associations  Thought Content:  no evidence of suicidal ideation or homicidal ideation and no evidence of psychotic thought  Insight:  partial  Judgement:  limited  Oriented to:  time, person, and place  Attention Span and Concentration:  intact  Recent and Remote Memory:  fair    Clinical Global Impressions  First: 5/3 7/31/2023      Most recent:            Precautions:     Behavioral Orders   Procedures    Code 1 - Restrict to Unit    Routine Programming     As clinically indicated    Seizure precautions    Seizure precautions    Status 15     Every 15 minutes.    Suicide precautions     Patients on Suicide Precautions should have a Combination Diet ordered that includes a Diet selection(s) AND a Behavioral Tray selection for Safe Tray - with utensils, or Safe Tray " - NO utensils      Suicide precautions     Patients on Suicide Precautions should have a Combination Diet ordered that includes a Diet selection(s) AND a Behavioral Tray selection for Safe Tray - with utensils, or Safe Tray - NO utensils            DIagnoses:     Bipolar affective disorder, manic, moderate severity          Plan:     Will as per discussion above and with the patient's permission increase her dose of Zyprexa on 7/31/2023. Will continue to provide support and structure. I called and left message for her brother Sai asking him to contact me, RN or CTC if he wants to share any additional information.    Total time spent was 37 minutes. Over 50% of times was spent counseling and coordination of care regarding coping skills, medication and discharge planning.

## 2023-07-31 NOTE — CARE PLAN
07/31/23 1430   Group Therapy Session   Group Attendance attended group session   Time Session Began 1015   Time Session Ended 1100   Total Time (minutes) 15 - no charge   Total # Attendees 8   Group Type life skill;task skill;other (see comments)  (OT)   Group Topic Covered balanced lifestyle;coping skills/lifestyle management;leisure exploration/use of leisure time;structured socialization   Group Session Detail OT Clinic: Pt participated in occupational therapy clinic to facilitate coping skill exploration, creative expression within personally meaningful activities, and clinical observation of social, cognitive, and kinesthetic performance skills.    Patient Response/Contribution able to recall/repeat info presented;cooperative with task   Patient Participation Detail Pt came to group and began looking in the cupboards as staff was helping other patients. Staff then provided options and she chose 2 different things and did have slight difficulty in understanding to choose and focus on one first. She chose a simple coloring activity and quickly worked on a few small sections while humming. She was called out by provider and then observed to be walking the halls for a bit. She did return and quickly completed another section and stated that she was done. She had not filled in all sections and when staff asked if she wanted to do so, noted that she would not as she had filled in what she wanted to. It was about 25%. No interaction with others. She was pleasant in manner.

## 2023-07-31 NOTE — PLAN OF CARE
Problem: Sleep Disturbance  Goal: Adequate Sleep/Rest  Outcome: Progressing   Goal Outcome Evaluation:    The Pt slept okay(5.75 hours). The fifteen minutes safety checks are in progress with no related incidence. She is on seizure and suicide precautions with no occurrences.

## 2023-07-31 NOTE — PLAN OF CARE
Assessment/Intervention/Current Symtoms and Care Coordination:  Chart review and met with team, discussed pt progress, symptomology, and response to treatment.  Discussed the discharge plan and any potential impediments to discharge.    Tasks Completed:   - Met with pt and discussed support following discharge. Pt reported she has a history of seeing two different therapists in the community named Jillian and Rosanna but could not remember what agency they were through. Pt also noted she had been working with a psychiatrist in the community but could not remember their name. Pt reported a preference to work with a [place] therapist and psychiatrist.   - Researched [place] therapists and psychiatrists. Jillian and Rosanna both work at [facility] in [place]. Also found a psychiatrist in [place], [place] at [facility] which is near pt residence.     Discharge Plan or Goal:  Pt needs further stabilization on the unit and medication management.     Barriers to Discharge:  Pt needs further stabilization on the unit and medication management.     Referral Status:  Pt needs further stabilization before referral can begin.     Legal Status:  Pt is voluntary     Contacts:  Primary Care Provider:   Calos Trujillo, APRN, CNP  [address] [place] [address]  [phone] (Work)  [phone] (Fax)     Upcoming Meetings and Dates/Important Information and next steps:  Get DORIAN from pt for [facility] and schedule intake appointments.

## 2023-08-01 LAB
ATRIAL RATE - MUSE: 87 BPM
DIASTOLIC BLOOD PRESSURE - MUSE: NORMAL MMHG
INTERPRETATION ECG - MUSE: NORMAL
P AXIS - MUSE: 58 DEGREES
PR INTERVAL - MUSE: 178 MS
QRS DURATION - MUSE: 64 MS
QT - MUSE: 382 MS
QTC - MUSE: 459 MS
R AXIS - MUSE: 41 DEGREES
SYSTOLIC BLOOD PRESSURE - MUSE: NORMAL MMHG
T AXIS - MUSE: 59 DEGREES
VENTRICULAR RATE- MUSE: 87 BPM

## 2023-08-01 PROCEDURE — 99232 SBSQ HOSP IP/OBS MODERATE 35: CPT | Performed by: PSYCHIATRY & NEUROLOGY

## 2023-08-01 PROCEDURE — 250N000013 HC RX MED GY IP 250 OP 250 PS 637: Performed by: PSYCHIATRY & NEUROLOGY

## 2023-08-01 PROCEDURE — 124N000002 HC R&B MH UMMC

## 2023-08-01 RX ADMIN — Medication 25 MCG: at 08:04

## 2023-08-01 RX ADMIN — OLANZAPINE 2.5 MG: 2.5 TABLET, FILM COATED ORAL at 08:04

## 2023-08-01 RX ADMIN — OLANZAPINE 7.5 MG: 5 TABLET, FILM COATED ORAL at 20:20

## 2023-08-01 ASSESSMENT — ACTIVITIES OF DAILY LIVING (ADL)
LAUNDRY: WITH SUPERVISION
ADLS_ACUITY_SCORE: 28
ORAL_HYGIENE: INDEPENDENT
HYGIENE/GROOMING: INDEPENDENT
ADLS_ACUITY_SCORE: 28
DRESS: INDEPENDENT
ADLS_ACUITY_SCORE: 28
ADLS_ACUITY_SCORE: 28

## 2023-08-01 NOTE — PLAN OF CARE
Assessment/Intervention/Current Symtoms and Care Coordination:  Chart review and met with team, discussed pt progress, symptomology, and response to treatment.  Discussed the discharge plan and any potential impediments to discharge.     Tasks Completed:   - Spoke to client brother, Tammie, who was sharing concerns regarding pt. Tammie shared they are concerned that pt will not be honest with the team about medication compliance and adherence following the hospital and will say what she needs in order to leave. Tammie also shared that pt is not able to return to her cousin's home where she was living previously. Tammie shared that he would like pt to stay in hospital for at least two months. Shared with Tammie that this is not likely and explained that pt is voluntary and we cannot legally hold her. Tammie had questions about seeking guardianship. Sent Tammie guardianship information over email and shared that details about pt will not be sent over email and encouraged Tammie to call the unit for updates.     Discharge Plan or Goal:  Pt needs further stabilization on the unit and medication management.     Barriers to Discharge:  Pt needs further stabilization on the unit and medication management.     Referral Status:  Pt needs further stabilization before referral can begin.     Legal Status:  Pt is voluntary     Contacts:  Primary Care Provider:   Calos Trujillo, APRN, CNP  [address] [place] [address]  [phone] (Work)  [phone] (Fax)     Upcoming Meetings and Dates/Important Information and next steps:  [facility] and schedule intake appointments.

## 2023-08-01 NOTE — PLAN OF CARE
Problem: Suicidal Behavior  Goal: Suicidal Behavior is Absent or Managed  Outcome: Not Progressing   Goal Outcome Evaluation:          Pt visible in the milieu.  Paces up and down the angelo.  Social with peers.  Pt has needed redirection for being intrusive with another female pt.  Pt denies auditory and visual hallucinations.  Pt seems to minimize her symptoms during check in.  Smiling inappropriately.  Pt pacing the angelo singing.  Pt's brother - Sai called and wanted the provider to know that pt is not anywhere near her baseline.  Pt's brother reported that she was singing songs while he was trying to have a phone conversation with her.  Appetite is good, pt eating meals.  Attended group. Medication compliant.  Pt took a shower this shift.  Will continue to support plan of care.

## 2023-08-01 NOTE — CARE PLAN
08/01/23 1144   Group Therapy Session   Group Attendance attended group session   Time Session Began 1015   Time Session Ended 1115   Total Time (minutes) 35 (No Charge)   Total # Attendees 6   Group Type Occupational Therapy   Group Topic Covered balanced lifestyle;coping skills/lifestyle management;leisure exploration/use of leisure time;relaxation techniques   Group Session Detail OT Clinic Group   Patient Participation Detail Intervention: OT Clinic with 5 peers. Pt participated in a OT Clinic group to facilitate coping skills exploration and creative expression through personally meaningful activities, and to encourage utilization of these healthy coping skills to promote overall health and wellness. Group included clinical observation of social, cognitive and kinesthetic performance skills to inform treatment and safe discharge planning.    Patient Response: Patient joined group at same time as another peer. Appeared to have been sitting next to peer in the lounge as well and chose to sit next to this peer during group. Was setup with a project that they had started in a previous group. Worked on this project for the duration of time in group. Was intermittently social with others during time in group, more so towards the end of group. Offered positive motivation to another peer. Once finished with project, requested to go and take a break.     Mood/Affect: Pleasant       Plan: Patient encouraged to maintain attendance for continued ongoing support in working towards occupational therapy goals to support overall treatment/care.

## 2023-08-01 NOTE — PROGRESS NOTES
Patients brother called wanting an update on how she was doing. Stated that she has cheeked medications in the past and that we should watch out for that. Cheeking precautions placed. Also stated that before she came into the hospital she was running barefoot and that her feet soles should be inspected and she may need some ointment.

## 2023-08-01 NOTE — PLAN OF CARE
Problem: Suicidal Behavior  Goal: Suicidal Behavior is Absent or Managed  Outcome: Progressing     Problem: Sleep Disturbance  Goal: Adequate Sleep/Rest  Outcome: Progressing   Goal Outcome Evaluation:         Pt was awake from 0215 to 0345. Pt came out walking on the hallways. She is quiet, calm and pleasant. Pt denies SI/SIB/HI, anxiety and depression. Pt declines prn sleep aid offered. Pt appears to have slept approximately 5.5 hours. She denies pain.

## 2023-08-01 NOTE — PLAN OF CARE
"Patient has spent majority of the shift in the milieu. Paces up and down the angelo, keeping mostly to herself. Patient got into a verbal confrontation telling another patient to be quiet. Denies suicidal ideation and self injurious thoughts. Denies anxiety and depression. Requested tylenol for foot pain. When approached and asked when she wanted her HS medication, patient looked writer up and down and walked away. Took medication from another RN. Ate dinner. Med compliant. Overall cooperative on the unit. Affect is flat and guarded.    Patient evaluation continues. Assessed mood,anxiety,thoughts and behavior.     Patient gradually progressing towards goals.    Patient is encouraged to participate in groups and assisted to develop healthy coping skills.     VS reviewed: /68 (BP Location: Right arm)   Pulse 57   Temp 98  F (36.7  C) (Temporal)   Resp 17   Ht 1.626 m (5' 4\")   Wt 54.1 kg (119 lb 3.2 oz)   LMP  (LMP Unknown)   SpO2 100%   BMI 20.46 kg/m      Length of stay: 3    Refer to daily team meeting notes for individualized plan of care. Nursing will continue to assess.    "

## 2023-08-01 NOTE — PROGRESS NOTES
"[facility], [place]   Psychiatric Progress Note        Interim History:   The patient's care was discussed with the treatment team during the daily team meeting and/or staff's chart notes were reviewed. Staff report patient slept for about 5.5 hours last night. She was awake from 0215 to 0345 and came out walking in the hallways.  Remained hyperverbal with exaggerated affect, sarcastic at times. Compliant with meds and with hospital rules. Patient's brother called and talked to RN, see note below:       \"Patients brother called wanting an update on how she was doing. Stated that she has cheeked medications in the past and that we should watch out for that. Cheeking precautions placed. Also stated that before she came into the hospital she was running barefoot and that her feet soles should be inspected and she may need some ointment. \"        Met with patient: she was seen in her room in presence of PA student. During our visit Kimmy was sitting on her bed. She again presented as hyperverbal and sarcastic. Frequently answered questions: \"yes, sir or no sir\" and sarcastically saluted to emphasized her words. Stated that she had been doing 100% better compared with her admission, but had difficulties explaining what was better. Speech during our visit was fast but not pressured and Kimmy appeared to be able to focus better on conversation and was not as easily distracted. She denied having med side effects. We spent significant amount of time talking to her about her past poor compliance with meds. Kimmy said that this time things would be different because she intends to take good care of self. We asked her to show us her feet. She had large blister on her right sole, nothing on her left sole. We asked if she would like us to put a bandage on her blister, she said that she didn't and had no questions or concerns.          Medications:      OLANZapine  2.5 mg Oral QAM    OLANZapine  7.5 " "mg Oral At Bedtime    Vitamin D3  25 mcg Oral Daily          Allergies:     Allergies   Allergen Reactions    Dust Mites Other (See Comments)     congestion    Pork-Derived Products Other (See Comments)     Pt does not ingest of these products.           Labs:   No results found for this or any previous visit (from the past 24 hour(s)).       Psychiatric Examination:     /68 (BP Location: Right arm)   Pulse 57   Temp 98.1  F (36.7  C) (Temporal)   Resp 17   Ht 1.626 m (5' 4\")   Wt 54.1 kg (119 lb 3.2 oz)   LMP  (LMP Unknown)   SpO2 100%   BMI 20.46 kg/m    Weight is 119 lbs 3.2 oz  Body mass index is 20.46 kg/m .    Orthostatic Vitals         Most Recent      Sitting Orthostatic /73 07/31 0805    Sitting Orthostatic Pulse (bpm) 71 07/31 0805    Standing Orthostatic /91 08/01 0806    Standing Orthostatic Pulse (bpm) 107 08/01 0806           Appearance: awake, alert, adequately groomed, and appeared as age stated  Attitude: mostly cooperative  Eye Contact:  good  Mood:  better, euphoric  Affect: elated, at times incongruent to the topic of conversation  Speech:  clear, coherent, fast  Psychomotor Behavior:  no evidence of tardive dyskinesia, dystonia, or tics  Throught Process:  circumstantial  Associations:  no loose associations  Thought Content:  no evidence of suicidal ideation or homicidal ideation and no evidence of psychotic thought  Insight:  partial  Judgement:  limited  Oriented to:  time, person, and place  Attention Span and Concentration:  intact  Recent and Remote Memory:  fair    Clinical Global Impressions  First: 5/3 7/31/2023      Most recent:            Precautions:     Behavioral Orders   Procedures    Cheeking Precautions (behavioral units)     Patient Observed swallowing PO medications; Patient asked to drink water after swallowing medication; Patient in Staff line of sight for 15 minutes after medication given; Mouth checks after PO administration (patient asked to open " mouth and stick out their tongue).    Code 1 - Restrict to Unit    Routine Programming     As clinically indicated    Seizure precautions    Seizure precautions    Status 15     Every 15 minutes.    Suicide precautions     Patients on Suicide Precautions should have a Combination Diet ordered that includes a Diet selection(s) AND a Behavioral Tray selection for Safe Tray - with utensils, or Safe Tray - NO utensils      Suicide precautions     Patients on Suicide Precautions should have a Combination Diet ordered that includes a Diet selection(s) AND a Behavioral Tray selection for Safe Tray - with utensils, or Safe Tray - NO utensils            DIagnoses:     Bipolar affective disorder, manic, moderate severity          Plan:     Will continue increased dose of Zyprexa - was increased on 7/31/2023. Will continue to provide support and structure. Patient is agreeable to stay at this hospital voluntarily.    Total time spent was 37 minutes. Over 50% of times was spent counseling and coordination of care regarding coping skills, medication and discharge planning.

## 2023-08-02 PROCEDURE — G0177 OPPS/PHP; TRAIN & EDUC SERV: HCPCS

## 2023-08-02 PROCEDURE — 250N000013 HC RX MED GY IP 250 OP 250 PS 637: Performed by: PSYCHIATRY & NEUROLOGY

## 2023-08-02 PROCEDURE — 90853 GROUP PSYCHOTHERAPY: CPT

## 2023-08-02 PROCEDURE — 124N000002 HC R&B MH UMMC

## 2023-08-02 PROCEDURE — 99232 SBSQ HOSP IP/OBS MODERATE 35: CPT | Performed by: PSYCHIATRY & NEUROLOGY

## 2023-08-02 RX ADMIN — OLANZAPINE 7.5 MG: 5 TABLET, FILM COATED ORAL at 20:26

## 2023-08-02 RX ADMIN — OLANZAPINE 2.5 MG: 2.5 TABLET, FILM COATED ORAL at 07:56

## 2023-08-02 RX ADMIN — Medication 25 MCG: at 07:56

## 2023-08-02 ASSESSMENT — ACTIVITIES OF DAILY LIVING (ADL)
ADLS_ACUITY_SCORE: 28
DRESS: INDEPENDENT
ADLS_ACUITY_SCORE: 28
ORAL_HYGIENE: INDEPENDENT
ADLS_ACUITY_SCORE: 28
ORAL_HYGIENE: INDEPENDENT
ADLS_ACUITY_SCORE: 28
DRESS: INDEPENDENT
ADLS_ACUITY_SCORE: 28
LAUNDRY: WITH SUPERVISION
ADLS_ACUITY_SCORE: 28
LAUNDRY: WITH SUPERVISION
ADLS_ACUITY_SCORE: 28
HYGIENE/GROOMING: INDEPENDENT
HYGIENE/GROOMING: INDEPENDENT

## 2023-08-02 NOTE — PLAN OF CARE
"Goal Outcome Evaluation:    Individual Therapy note:    Therapist introduced self to patient and explained service available to patient.     Reviewed packet with patient: will revisit with patient    Modality used: supportive/ active listening, rapport building, motivational interviewing,     Duration: Met with patient a for a total of 30 minutes.    Time started: 1:40  pm Time ended: 2:10 pm     Individual therapy note    Referent: unit and patient    Reason for referral: meet and greet her nurse inquired if we had met    Targeted symptoms or issue: depression, anxiety, impulsivity, she kept repeating \" I did a very stupid thing\" Her narrative is that during her menstrual cycle she gets agitated and she was walking outside without shoes. She showed writer a very large blister on the bottom of her foot.    Goal(s): safe processing and containment of grief, loss of her son, her mother supposedly has severe mental health issues and he  in her care       Likely frequency of therapy: she said she would like twice per week    Treatment modalities used: see above, also culturally competent listening. She had the narrative that she has a [place] therapist in the community and she said other therapists \" don't understand\". Writer had a conversation about wanting to learn about her culture and her evy which she was responsive to. After writer and she left the group room for another group to start, she and writer paced a couple of laps to finish our conversation. She also talked a lot about \" faking it till I make it.\" She said her heart hurts and she misses her son Kirit a lot. He  at 3 years old and would now be 8 years old.    Duration: Met with patient a for a total of 30 minutes.    Start time: 1:40 pm End Time: 2:10 pm                         "

## 2023-08-02 NOTE — PLAN OF CARE
"Patient has spent majority of the shift in her room resting. States that she is tired. Ate dinner but declined snack. Med compliant. Denies suicidal ideation and self injurious thoughts. Denies anxiety and depression. Denies auditory and visual hallucinations. Inspected her feet and has a large blister on the bottom of her right foot. States that it does not hurt. Med compliant. Pleasant on approach.      Patient evaluation continues. Assessed mood,anxiety,thoughts and behavior.     Patient gradually progressing towards goals.    Patient is encouraged to participate in groups and assisted to develop healthy coping skills.     VS reviewed: BP (!) 140/76   Pulse 54   Temp 97.6  F (36.4  C) (Oral)   Resp 17   Ht 1.626 m (5' 4\")   Wt 54.1 kg (119 lb 3.2 oz)   LMP  (LMP Unknown)   SpO2 100%   BMI 20.46 kg/m      Length of stay: 4    Refer to daily team meeting notes for individualized plan of care. Nursing will continue to assess.    "

## 2023-08-02 NOTE — PLAN OF CARE
NOC Shift Report    Pt in bed at beginning of shift, breathing quiet and unlabored. Pt slept on and off through shift. Pt slept 4.75 hours.     No pt complaints or concerns at this time.     No PRNs given. Will continue to monitor.

## 2023-08-02 NOTE — CARE PLAN
08/02/23 1321   Group Therapy Session   Group Attendance attended group session   Time Session Began 1115   Time Session Ended 1200   Total Time (minutes) 45   Total # Attendees 3   Group Type life skill;task skill;other (see comments)  (OT)   Group Topic Covered balanced lifestyle;coping skills/lifestyle management;leisure exploration/use of leisure time;structured socialization   Group Session Detail OT Clinic: Pt actively participated in occupational therapy clinic to facilitate coping skill exploration, creative expression within personally meaningful activities, and clinical observation of social, cognitive, and kinesthetic performance skills.   Patient Response/Contribution able to recall/repeat info presented;cooperative with task;discussed personal experience with topic   Patient Participation Detail Pt initiated coming to group a few minutes late. She sat down at table by the window and waved hello to staff. Staff assisted in her getting her incomplete project out of her room and she did so. She focused on coloring for 5-10 minute periods. She did not initiate yet was open to talk to staff when approached. When asked about activities she likes to engage in, she shared that she likes to cook and clean and take walks. She did share her favorite dish to make and shared ingredients. She talked about growing up in [place] and learning at a young age to do these activities even though her family did have a maid. She did not engage in singing or humming music this session until the last song being played on the iPad, she sang a few of the words and was moving in her chair to the upbeat music and noted she liked the beat and the lyrics were about being happy. She thanked staff for the group and left, open to staff suggestion that her completed project would be put in her locker. She did fill in more sections of her work with colors and noted that she liked it with some open sections.

## 2023-08-02 NOTE — PLAN OF CARE
"Assessment/Intervention/Current Symtoms and Care Coordination:  Chart review and met with team, discussed pt progress, symptomology, and response to treatment.  Discussed the discharge plan and any potential impediments to discharge.     Tasks Completed:   - Spoke to pt about discharge planning. Shared with pt that brother Tammie shared that she is not able to return to where she was staying previously. Pt reported that is not true and said \"my sister loves me\". Received an DORIAN to contact sister, Arabella Rao, to verify next steps.   - Spoke to Arabella who reported that her sister is able to return but she would like her sister to go to a group home. Arabella requested a [employer]  to share more details. Called back with a [employer]  and left message.     Discharge Plan or Goal:  Pt needs further stabilization on the unit and medication management.     Barriers to Discharge:  Pt needs further stabilization on the unit and medication management.     Referral Status:  Pt needs further stabilization before referral can begin.     Legal Status:  Pt is voluntary     Contacts:  Primary Care Provider:   Calos Trujillo, APRN, CNP  [address] [place] [address]  [phone] (Work)  [phone] (Fax)     Upcoming Meetings and Dates/Important Information and next steps:  Follow-up with sister, Arabella, regarding whether pt is able to return to her home following discharge.     "

## 2023-08-02 NOTE — PROGRESS NOTES
08/02/23 1500   Group Therapy Session   Group Attendance attended group session   Time Session Began 1305   Time Session Ended 1300   Total Time (minutes) 35   Total # Attendees 2   Group Type psychotherapeutic   Group Topic Covered relaxation techniques   Group Session Detail MIndfulness [place]   Patient Response/Contribution cooperative with task;discussed personal experience with topic     Mood great, enjoying view and enjoyed making a floral pattern mandala. Small group need early and she was the only person then in attendance. After the small group, writer and she did 1:1 session. See note

## 2023-08-02 NOTE — PROGRESS NOTES
"[facility], [place]   Psychiatric Progress Note        Interim History:     The patient's care was discussed with the treatment team during the daily team meeting and/or staff's chart notes were reviewed. Staff report patient slept for about 4.75 hours last night. She was compliant with meds and spent somewhat more time outside of her room, went to few groups. While being in groups told peers and OT about growing in [place] and what activities she likes to do. Was not reported to be singing or humming in this unit corridor. Patient's brother who lives in [place] called and voiced concern about patient still not being back to her baseline and about her not to be discharged back to community prematurely. Said that family considers legal guardianship over Kimmy.     Met with patient: she was seen exercising on a stationary bike, but got off it and talked to me in communal area. Was cooperative and somewhat less sarcastic today. Said that she would like to be discharged home. I told her that yesterday I talked to [facility] who, according to the patient, is her best friend and [facility] told me that Kimmy was not at her baseline and should not be discharged home yet. I also told Kimmy about her brother's similar concerns and suggested Kimmy to stay at this hospital voluntarily for at least few more days. She passively accepted this: \"I am not a doctor, I am a patient. You know better\". Said that she had no side effects from her meds. Talked briefly about her dead son and how she believed that he was now in [place]. Said that she had no questions or concerns.          Medications:      OLANZapine  2.5 mg Oral QAM    OLANZapine  7.5 mg Oral At Bedtime    Vitamin D3  25 mcg Oral Daily          Allergies:     Allergies   Allergen Reactions    Dust Mites Other (See Comments)     congestion    Pork-Derived Products Other (See Comments)     Pt does not ingest of these products.           Labs:   No results found " "for this or any previous visit (from the past 24 hour(s)).       Psychiatric Examination:     /77   Pulse 57   Temp 98.2  F (36.8  C) (Oral)   Resp 17   Ht 1.626 m (5' 4\")   Wt 54.1 kg (119 lb 3.2 oz)   LMP  (LMP Unknown)   SpO2 100%   BMI 20.46 kg/m    Weight is 119 lbs 3.2 oz  Body mass index is 20.46 kg/m .    Orthostatic Vitals         Most Recent      Standing Orthostatic /83 08/02 0844    Standing Orthostatic Pulse (bpm) 74 08/02 0844           Appearance: awake, alert, adequately groomed, and appeared as age stated  Attitude: more cooperative  Eye Contact:  good  Mood:  better, not as euphoric  Affect: less labile and more congruent to the topic of conversation  Speech:  clear, coherent,    Psychomotor Behavior:  no evidence of tardive dyskinesia, dystonia, or tics  Throught Process:  circumstantial, slightly more organized  Associations:  no loose associations  Thought Content:  no evidence of suicidal ideation or homicidal ideation and no evidence of psychotic thought  Insight:  partial  Judgement:  limited  Oriented to:  time, person, and place  Attention Span and Concentration:  intact  Recent and Remote Memory:  fair    Clinical Global Impressions  First: 5/3 7/31/2023      Most recent:            Precautions:     Behavioral Orders   Procedures    Cheeking Precautions (behavioral units)     Patient Observed swallowing PO medications; Patient asked to drink water after swallowing medication; Patient in Staff line of sight for 15 minutes after medication given; Mouth checks after PO administration (patient asked to open mouth and stick out their tongue).    Code 1 - Restrict to Unit    Routine Programming     As clinically indicated    Seizure precautions    Seizure precautions    Status 15     Every 15 minutes.    Suicide precautions     Patients on Suicide Precautions should have a Combination Diet ordered that includes a Diet selection(s) AND a Behavioral Tray selection for Safe Tray " - with utensils, or Safe Tray - NO utensils      Suicide precautions     Patients on Suicide Precautions should have a Combination Diet ordered that includes a Diet selection(s) AND a Behavioral Tray selection for Safe Tray - with utensils, or Safe Tray - NO utensils            DIagnoses:     Bipolar affective disorder, manic, moderate severity          Plan:     Will continue increased dose of Zyprexa - was increased on 7/31/2023. No medication changes today 8/2/2023. Will continue to provide support and structure. Patient is agreeable to stay at this hospital voluntarily.    Total time spent was 35 minutes. Over 50% of times was spent counseling and coordination of care regarding coping skills, medication and discharge planning.

## 2023-08-02 NOTE — PLAN OF CARE
Problem: Adult Behavioral Health Plan of Care  Goal: Plan of Care Review  Flowsheets (Taken 8/2/2023 1152)  Plan of Care Reviewed With: patient   Goal Outcome Evaluation:          Goal Outcome Evaluation:     Plan of Care Reviewed With: patient     Patient presents with a flat blunted affect but brightens upon approach.  Pt is adequately groomed.  No PRN's given. Pt denies auditory and visual hallucinations.  Pt denies thoughts to hurt herself.  Pt went to groups and was engaged in group activities, observed in unit milieu, appropriate with peers and staff.  Reports appetite is good.  Pt reported that she is still grieving her son's death.  No behaviors noted. Will continue to monitor behavior and encourage engagement.

## 2023-08-02 NOTE — PROGRESS NOTES
Behavioral Health  Note   Behavioral Health  Spirituality Group Note     Unit 32    Name: Kimmy Mendez    YOB: 1985   MRN: [id number]    Age: 37 year old     Patient attended -led group, which included discussion of spirituality, coping with illness and building resilience.   Patient attended group for [facility] - spirituality groups are not billed.   patient demonstrated an appreciation of topic's application for their personal circumstances.     Sherice Aly  Staff    Page [phone]

## 2023-08-03 ENCOUNTER — APPOINTMENT (OUTPATIENT)
Dept: INTERPRETER SERVICES | Facility: CLINIC | Age: 38
End: 2023-08-03
Attending: PSYCHIATRY & NEUROLOGY
Payer: COMMERCIAL

## 2023-08-03 PROCEDURE — 250N000013 HC RX MED GY IP 250 OP 250 PS 637: Performed by: PSYCHIATRY & NEUROLOGY

## 2023-08-03 PROCEDURE — 99233 SBSQ HOSP IP/OBS HIGH 50: CPT | Performed by: PSYCHIATRY & NEUROLOGY

## 2023-08-03 PROCEDURE — 124N000002 HC R&B MH UMMC

## 2023-08-03 RX ORDER — OLANZAPINE 10 MG/1
10 TABLET ORAL AT BEDTIME
Status: DISCONTINUED | OUTPATIENT
Start: 2023-08-03 | End: 2023-08-10 | Stop reason: HOSPADM

## 2023-08-03 RX ADMIN — OLANZAPINE 2.5 MG: 2.5 TABLET, FILM COATED ORAL at 08:47

## 2023-08-03 RX ADMIN — Medication 25 MCG: at 08:47

## 2023-08-03 RX ADMIN — OLANZAPINE 10 MG: 10 TABLET, FILM COATED ORAL at 20:38

## 2023-08-03 RX ADMIN — ACETAMINOPHEN 650 MG: 325 TABLET, FILM COATED ORAL at 11:47

## 2023-08-03 ASSESSMENT — ACTIVITIES OF DAILY LIVING (ADL)
ADLS_ACUITY_SCORE: 28
ADLS_ACUITY_SCORE: 28
LAUNDRY: WITH SUPERVISION
HYGIENE/GROOMING: SHOWER;INDEPENDENT
ORAL_HYGIENE: INDEPENDENT
DRESS: INDEPENDENT
ADLS_ACUITY_SCORE: 28
ADLS_ACUITY_SCORE: 28
LAUNDRY: WITH SUPERVISION
ADLS_ACUITY_SCORE: 28
ORAL_HYGIENE: INDEPENDENT
ADLS_ACUITY_SCORE: 28
HYGIENE/GROOMING: SHOWER;INDEPENDENT
ADLS_ACUITY_SCORE: 28
DRESS: INDEPENDENT
ADLS_ACUITY_SCORE: 28

## 2023-08-03 NOTE — PLAN OF CARE
"  Problem: Adult Behavioral Health Plan of Care  Goal: Plan of Care Review  Recent Flowsheet Documentation  Taken 8/3/2023 1000 by Dianne Banks RN  Patient Agreement with Plan of Care: agrees  Taken 8/3/2023 0627 by Dianne Banks, RN  Patient Agreement with Plan of Care: agrees   Goal Outcome Evaluation:    Plan of Care Reviewed With: patient            Presentation:  Patient is observed in the lounge a majority of the shift.  Patient affect is flat and animated when approached and talking to staff.  Patient is appropriately dressed in outside clothing.  Patient showered X1 and has asked to take a second shower this afternoon.  Patient is social with staff.  Her speech is clear, coherent, disorganized and has poverty of content. Patient content includes, \"girl\" and \"Mmmhmmm.\" Patient is encouraged to complete her menu.  Patient required assistance.  Writer read the listed items.  Showed the patient how to highlight her choices.        During team, patients trauma and mental health history was discussed.  Patient arrived to the ED for mental health symptoms.  It was discussed, the patients mother murdered her 6 year old son.  Please refer to the EMAR for details.  Please be aware of this sensitive information among the patient and when discussing family history.       Mental health symptoms: Patient denies SI, SIB, HI, and hallucinations.  Patient reports depression and anxiety \"very low.\"  \"The medicine is helping.\"       Medication compliance: Patient is complaint with all medications.        Medical Concerns: Denies medical concerns, including; pain and SOB.     PRN's: None      Precautions: seizure, suicide, cheeking.    Continue with plan of care          "

## 2023-08-03 NOTE — PLAN OF CARE
"Problem: Suicidal Behavior  Goal: Suicidal Behavior is Absent or Managed  Outcome: Progressing     Patient presents with flat, restricted affect. She has been calm and cooperative on approach. Denies any symptoms or concerns, at this time, and asks when she will be able to go home. Patient denies thoughts of harm to herself or others. Denies auditory or visual hallucinations. Patient has been observed out in [facility] area, socializing with select peers. Patient did some laundry and spent time on the stationary bike. She did not attend group. She is present at meal times. Appetite is good. No reports of pain or discomfort this evening. Patient is medication compliant. No behavioral concerns noted this shift.     /77 (Patient Position: Sitting)   Pulse 72   Temp 98.1  F (36.7  C)   Resp 17   Ht 1.626 m (5' 4\")   Wt 54.1 kg (119 lb 3.2 oz)   LMP  (LMP Unknown)   SpO2 98%   BMI 20.46 kg/m      "

## 2023-08-03 NOTE — PLAN OF CARE
Care Coordinator scheduled the following appointment:     Individual Therapy appointment: Thursday, August 10, 2023 @ 1pm via Telehealth  **A link for your appointment will be sent to your email, and an appointment reminder will be sent via text.   Provider: RONAK Painter  Location: [facility] [place] [address], [address], [place] [address]  Phone: [phone]  Fax: [phone]    CC updated CTC and AVS    Ruby Lopez  Care Coordinator  [email or web address]  [phone]

## 2023-08-03 NOTE — PLAN OF CARE
Problem: Adult Behavioral Health Plan of Care  Goal: Plan of Care Review  Outcome: Progressing     Problem: Sleep Disturbance  Goal: Adequate Sleep/Rest  Outcome: Progressing   Goal Outcome Evaluation:       Pt appears to be sleeping comfortably with even and unlabored respirations during all safety checks. Pt slept for 6.25 hours. No concerns noted or verbalized. Will continue to monitor.

## 2023-08-03 NOTE — PLAN OF CARE
Assessment/Intervention/Current Symtoms and Care Coordination:  Chart review and met with team, discussed pt progress, symptomology, and response to treatment.  Discussed the discharge plan and any potential impediments to discharge.     Tasks Completed:   -  Tried to reach pt sister, Arabella, again over the phone with . Left voicemail for sister.   - Called pt brother, Tammie, and spoke to him about pt progress. He is upset that we are unable to hold her here and reported there is fear for her returning to her sister's home after discharge as she would be alone at home during the day. Explained goals of hospitalization and that this is not a long-term placement option and discussed pt rights with him. Shared that pt is wanting to return to her sister's home and is only accepting referrals for outpatient therapy and psychiatry. Tammie reported he is looking into possible guardianship but this will take time. Tammie also asked CTC to contact pt Verena guzmán, to discuss discharge plans. Cho requested a phone call from the provider.  - Spoke to pt and got ROIs for outpatient therapy, psychiatry, and contact with Verena (in chart). Pt reported she wants to be discharged soon to home with family.   - Spoke to Dr. Hardy and shared that pt brother would like a call from him.  - Put in request with care coordinators to schedule appointments with outpatient therapy and psychiatry.  - Left message for pt Verena guzmán.  - Individual therapy appointment has been scheduled for pt and will be Thursday, August 10 at 1 PM virtually.  - Spoke to pt arielle Verena, who reported that her mother (Arabella) and uncle (Tammie) do not want pt to return to the family's home where pt was living prior to hospitalization. Verena reported she would like pt to go to another placement. Verena reported they have not communicated this to pt and that pt shared with her earlier that she wants to come home. Explained pt  rights and shared that pt is here voluntarily and we cannot legally hold her if she asks to discharge home. Verena said she will try to speak with pt tonight to share that pt cannot return home.     Discharge Plan or Goal:  Pt needs further stabilization on the unit and medication management. Pt is reporting she would like to return home following hospitalization. Pt family has concerns about pt returning and will be communicating this with pt. Outpatient therapy and psychiatry referrals have been made.     Barriers to Discharge:  Pt needs further stabilization on the unit and medication management. Pt is reporting she would like to return home following hospitalization. Pt family has concerns about pt returning and will be communicating this with pt.     Referral Status:  Outpatient therapy and psychiatry referrals have been made. Pt has an outpatient therapy appointment scheduled for Thursday, August 10 at 1 PM.     Legal Status:  Pt is voluntary     Contacts:  Primary Care Provider:   Calos Trujillo, APRN, CNP  [address] [place] [address]  [phone] (Work)  [phone] (Fax)    Family Contacts:   Tammie Mendez (brother - DORIAN on file)  Phone: [phone]  Email: [email or web address]    Yadimsshayy Rao (sister - DORIAN on file)  Phone: [phone]     Verena (niece - DORIAN on file)  Phone: [phone]     Upcoming Meetings and Dates/Important Information and next steps:  Individual Therapy appointment: Thursday, August 10, 2023 @ 1pm via Telehealth  [facility]  Location: [facility] [place] [address], [address], [place] [address]  Phone: [phone]  Fax: [phone]

## 2023-08-03 NOTE — PLAN OF CARE
"Problem: Suicidal Behavior  Goal: Suicidal Behavior is Absent or Managed  Outcome: Progressing    Patient denies anxious or depressed mood at this time, but presents with a flat, depressed affect. Patient encouraged to discuss feelings or concerns if needed. Patient agrees and states that she is just feeling tired. She denies SI/SIB/HI/AH/VH and contracts for safety. Patient showered for second time this afternoon and has been resting in room intermittently. She declined invitation to group and spent time in room reading. Patient has been out at meal times and has been observed using the phone in the hallway. Patient states that she wants to go home soon and has been trying to get a hold of family members before discussing with doctor tomorrow.     /73 (BP Location: Right arm, Patient Position: Sitting, Cuff Size: Adult Regular)   Pulse 75   Temp 97.3  F (36.3  C) (Temporal)   Resp 17   Ht 1.626 m (5' 4\")   Wt 54.5 kg (120 lb 1.6 oz)   LMP  (LMP Unknown)   SpO2 100%   BMI 20.62 kg/m       "

## 2023-08-03 NOTE — PROGRESS NOTES
"[facility], [place]   Psychiatric Progress Note        Interim History:     The patient's care was discussed with the treatment team during the daily team meeting and/or staff's chart notes were reviewed. Staff report patient slept for about 6.25 hours last night. She was compliant with meds and spent somewhat more time outside of her room, went to few groups. Appears to be somewhat calmer and slightly more focused, less singing or humming in corridors. Compliant with meds and denies having any side effects. Told staff repeatedly that she would like to leave this hospital.     Met with patient: she was seen in communal area. Was pretty pleasant and cooperative. Told me during our visit about her experience growing in [facility] where her family had moved during [place] civil war. Again described her mood as \"great\", but after that started talking how much she is missing her son who passed about 4 years ago at age of 3. Her affect was rather blunted while she was telling me about her son. She confirmed that she would like to be discharged from this hospital. I discussed with Kimmy that we see her as making progress, however, she still has symptoms that concern us and her family and that her family doesn't feel that she is ready for discharge. I suggested that she stays with us for at least few more days. Kimmy took my suggestions calmly: \"you are doctor, I am your patient. If you feel that I need to stay, I will stay\". She had no questions or concerns and went to groups.    After visit with Kimmy I had an approximately 25 min long phone conversation with her brother Sai. We discussed Kimmy symptoms and diagnosis. Brother indicated that Kimmy was still not making too much sense and suggested that she was minimizing her symptoms while talking to us. I agreed that this might be a case, but also informed Sai that Kimmy was admitted and stays at this hospital as a voluntary patient " "and if she demands to be discharged from this hospital, she would have to be discharged. Brother said that he understood. He promised to talk to Kimmy's cousin with whom Kimmy had been living prior to admission about taking her back. We agreed to refer Kimmy for case management. I promised that we would be in touch and inform family about our plans and provide with updates.          Medications:      OLANZapine  2.5 mg Oral QAM    OLANZapine  7.5 mg Oral At Bedtime    Vitamin D3  25 mcg Oral Daily          Allergies:     Allergies   Allergen Reactions    Dust Mites Other (See Comments)     congestion    Pork-Derived Products Other (See Comments)     Pt does not ingest of these products.           Labs:   No results found for this or any previous visit (from the past 24 hour(s)).       Psychiatric Examination:     /77 (Patient Position: Sitting)   Pulse 72   Temp 97  F (36.1  C) (Temporal)   Resp 17   Ht 1.626 m (5' 4\")   Wt 54.5 kg (120 lb 1.6 oz)   LMP  (LMP Unknown)   SpO2 100%   BMI 20.62 kg/m    Weight is 120 lbs 1.6 oz  Body mass index is 20.62 kg/m .    Orthostatic Vitals         Most Recent      Sitting Orthostatic /77 08/03 0915    Sitting Orthostatic Pulse (bpm) 75 08/03 0915    Standing Orthostatic /80 08/03 0915    Standing Orthostatic Pulse (bpm) 67 08/03 0915           Appearance: awake, alert, adequately groomed, and appeared as age stated  Attitude: more cooperative  Eye Contact:  good  Mood:  better, not as euphoric  Affect: less labile and more congruent to the topic of conversation  Speech:  clear, coherent,    Psychomotor Behavior:  no evidence of tardive dyskinesia, dystonia, or tics  Throught Process:  circumstantial, slightly more organized  Associations:  no loose associations, more linear  Thought Content:  no evidence of suicidal ideation or homicidal ideation and no evidence of psychotic thought  Insight:  partial  Judgement:  limited  Oriented to:  time, " person, and place  Attention Span and Concentration: improving  Recent and Remote Memory:  fair    Clinical Global Impressions  First: 5/3 7/31/2023      Most recent:            Precautions:     Behavioral Orders   Procedures    Cheeking Precautions (behavioral units)     Patient Observed swallowing PO medications; Patient asked to drink water after swallowing medication; Patient in Staff line of sight for 15 minutes after medication given; Mouth checks after PO administration (patient asked to open mouth and stick out their tongue).    Code 1 - Restrict to Unit    Routine Programming     As clinically indicated    Seizure precautions    Seizure precautions    Status 15     Every 15 minutes.    Suicide precautions     Patients on Suicide Precautions should have a Combination Diet ordered that includes a Diet selection(s) AND a Behavioral Tray selection for Safe Tray - with utensils, or Safe Tray - NO utensils      Suicide precautions     Patients on Suicide Precautions should have a Combination Diet ordered that includes a Diet selection(s) AND a Behavioral Tray selection for Safe Tray - with utensils, or Safe Tray - NO utensils            DIagnoses:     Bipolar affective disorder, manic vs mixed, moderate severity          Plan:     Will increase Zyprexa dose as of 8/3/2023. Will continue to provide support and structure. Patient is agreeable to stay at this hospital voluntarily.    Total time spent was 54 minutes. Over 50% of times was spent counseling and coordination of care regarding coping skills, medication and discharge planning.

## 2023-08-03 NOTE — PROGRESS NOTES
"0600:   [facility].  Pleasant, smiling, appeared rested.  Shared that she had a son that  at the age of 3 yrs. He would be 8 yrs old now.  Stated this was her only child. Felt sad about it. Singing a song that she said she sang to him all the time.  \"I love you, you love me\" was the song that Kimmy was humming.  Offered no complaints. Sat in [facility] talking to staff. Walked hallway at intervals.    "

## 2023-08-04 PROCEDURE — 250N000013 HC RX MED GY IP 250 OP 250 PS 637: Performed by: PSYCHIATRY & NEUROLOGY

## 2023-08-04 PROCEDURE — 124N000002 HC R&B MH UMMC

## 2023-08-04 PROCEDURE — 99232 SBSQ HOSP IP/OBS MODERATE 35: CPT | Mod: GC | Performed by: PSYCHIATRY & NEUROLOGY

## 2023-08-04 RX ADMIN — OLANZAPINE 10 MG: 10 TABLET, FILM COATED ORAL at 20:02

## 2023-08-04 RX ADMIN — Medication 25 MCG: at 08:16

## 2023-08-04 RX ADMIN — OLANZAPINE 2.5 MG: 2.5 TABLET, FILM COATED ORAL at 08:16

## 2023-08-04 ASSESSMENT — ACTIVITIES OF DAILY LIVING (ADL)
ADLS_ACUITY_SCORE: 28
ORAL_HYGIENE: INDEPENDENT
HYGIENE/GROOMING: INDEPENDENT
DRESS: STREET CLOTHES
ADLS_ACUITY_SCORE: 28
LAUNDRY: WITH SUPERVISION

## 2023-08-04 NOTE — PLAN OF CARE
Problem: Adult Behavioral Health Plan of Care  Goal: Plan of Care Review  Outcome: Progressing     Problem: Sleep Disturbance  Goal: Adequate Sleep/Rest  Outcome: Progressing   Goal Outcome Evaluation:       He appears to be sleeping comfortably with even and unlabored respirations during all safety checks. He slept for 6 hours. No concerns noted or verbalized. Will continue to monitor.

## 2023-08-04 NOTE — PLAN OF CARE
Coordination of Care NOTE:    Care Coordinator scheduled the following appointments:    Psychiatry Appointment: Tuesday August 22nd at 3:00pm in-person  [facility]  [address] [place] [address], [facility] [address]  [place] [address]  Phone: [phone]  Fax: [phone]  Notes: Please plan on arriving 15 minutes early for your appointment and bring your photo ID/insurance card.    Care Coordinator updated pt's AVS

## 2023-08-04 NOTE — PLAN OF CARE
"Problem: Adult Behavioral Health Plan of Care  Goal: Plan of Care Review  Outcome: Progressing   Goal Outcome Evaluation:                 Patient reports she is educated and demonstrated good insight into her mental illness.  Patient stated, I will listen to the doctor and will stay until the doctor says the patient can leave.  When patient leaves. She reported plans to go out into the community and tell them that mental health is real.  It happened to my mom and happened to me.  Patient stated, \"God gave me a son and God took my son away.\"  \"One day, I will die and be with my son again.\"    I have told my uncles and aunts. I have been telling them aunts and uncles for many many years that my mom is sick.  Patient reports her mother presents as a good reader and beautiful person.  Patient reports the mom was good at hiding her mental illness.  Patient stated, \"I took care of her.\"  \"I know she had a mental illness.\"      Patient remained in the lounge, social with peers.  Meet with the provider and CTC to discuss discharge planning.  Patient denies any questions or concerns.      Continue with plan of care.     Addendum:    Patient received news that she can not return to the home where she was living prior to admission.  Patient has a flat, blunted and restricted affect.  Pacing the halls.  Patient is withdrawn and refuses to communicate with the writer.  Patient has remained in the hallway.  Patient is encouraged to speak with the writer when needed.  Will continue to monitor and patient remains on Status 15.  "

## 2023-08-04 NOTE — PLAN OF CARE
"Assessment/Intervention/Current Symtoms and Care Coordination:  Chart review and met with team, discussed pt progress, symptomology, and response to treatment.  Discussed the discharge plan and any potential impediments to discharge.     Tasks Completed:   - Natalie, pt cousin (not sister, as had previously been reported), called and requested an . Called back with [facility] . Natalie reported that pt is not allowed back at her home and she will not accept her back if she comes. Natalie said that she does not feel safe with pt being at home as she runs a  out of her home. Natalie would not expand on why she felt unsafe and often interrupted the  when interpreting. Natalie said she had been told that if they bring pt to ED, the government would step in and take care of it. [facility] explained that this is not the case and we do not have reason to hold her here and pt is voluntary. [facility] explained that currently pt is requesting to return to her family and asked if family have told pt she is not allowed to return. Natalie said that they have told her but she is not accepting it. Natalie requested we do not call her about pt anymore.  - Spoke to pt brother, Tammie, and provided update. Tammie reported that he just spoke to pt and she hung up on him. Provided update regarding conversation with Natalie. [facility] shared concern that if pt is not allowed to return to family and is refusing other placement options, she may not have any other place to go. [facility] shared that we will continue to discuss other placement options but if pt refuses these placements, we cannot legally force her to go to them. Tammie asked about power of  and [facility] explained that this would not allow him to make decisions for pt regarding placement options. Tammie stated he understood.  - Spoke to pt who reported feeling angry. Pt said \"people only want you when you're healthy.\" Pt reported that she will do what the doctor " "recommends because \"I have no power.\" Pt shared more about her life, the loss of her son, and her culture with [facility]. Pt was tearful during conversation and spoke softly. Pt had a flat affect and speech was clear and logical. Pt continues to express that she wants to go home to her family but shared that the doctor and her niece said it is not safe for her to return. Pt was visibly upset about this as she was tearful. [facility] discussed IRTS placement option. Pt again reiterated she will do whatever the doctor says because \"I have no power, I have no voice\". [facility] encouraged pt to spend time outside of her room or call her friends who pt identifies as strong support system for her.   - Received DORIAN from pt for IRTS programs at [facility] and [facility] (in chart) and submitted referrals via fax.     Discharge Plan or Goal:  Pt is reporting she would like to return home following hospitalization. Pt family has concerns about pt returning and will be communicating this with pt. Possible IRTS placement.     Barriers to Discharge:  Pt needs further stabilization on the unit and medication management. Pt is reporting she would like to return home following hospitalization. Pt family has concerns about pt returning and will be communicating this with pt. Possible IRTS placement, per Dr. Hardy recommendation.     Referral Status:  Pt has received referral and appointments have been scheduled for outpatient therapy and psychiatry. Pt family has concerns about pt returning home and compliance with medications and outpatient appointments. Possible IRTS placement and referrals have been submitted to [facility] and [facility].     Legal Status:  Pt is voluntary     Contacts:  Primary Care Provider:   Calos Trujillo, APRN, CNP  [address] [place] [address]  [phone] (Work)  [phone] (Fax)    Family Contacts:   Tammie Mendez (brother - DORIAN on file)  Phone: [phone]  Email: " [email or web address]    Natalie Rao (cousin - DORIAN on file) - has requested no further communication regarding pt.  Phone: [phone]     Verena (niece - DORIAN on file)  Phone: [phone]     Upcoming Meetings and Dates/Important Information and next steps:  Individual Therapy appointment: Thursday, August 10, 2023 @ 1pm via Telehealth  Provider: [facility]  Location: [facility] [place] [address], [address], [place] [address]  Phone: [phone]  Fax: [phone]     Psychiatry Appointment: Tuesday August 22nd at 3:00pm in-person  [facility]  [address] [place] [address], [address]  [place] [address]  Phone: [phone]  Fax: [phone]  Notes: Please plan on arriving 15 minutes early for your appointment and bring your photo ID/insurance card.

## 2023-08-04 NOTE — PROVIDER NOTIFICATION
08/04/23 1143   Individualization/Patient Specific Goals   Patient Personal Strengths expressive of emotions;expressive of needs;resilient;interests/hobbies;humor   Patient Vulnerabilities family/relationship conflict;lacks insight into illness;limited support system;traumatic event;housing insecurity   Anxieties, Fears or Concerns Pt denies mental health symptoms. Pt is anxious to leave the hospital and return home.   Individualized Care Needs Pt still displays age-inappropriate behaviors on the unit. Pt needs further symptom stabilization and medication management.   Interprofessional Rounds   Summary Pt was up early this morning and is social on the unit. Pt is medication compliant. Pt continues to express childlike behaviors and sings children songs on the unit.   Participants nursing;OT;psychiatrist;[facility]   Behavioral Team Discussion   Participants Cisco Hardy MD; Delia Vences MSW, LGSW; Vickie Walker MSW, LICSW; Rachael Nguyen, OTR/L; Dianne Banks RN   Progress Pt continues to show age-inappropriate behaviors on the unit and sing children songs. Pt denies all mental health symptoms.   Anticipated length of stay Pt needs further symptom stabilization and medication management on the unit. Pt is voluntary and is anxious to leave the hospital.   Continued Stay Criteria/Rationale Pt continues to show age-inappropriate behaviors on the unit and sing children songs. Pt denies all mental health symptoms.   Medical/Physical No issues noted   Precautions Seizure, suicide, cheeking precautions   Plan Multidisciplinary evaluation, medication management, care coordination   Rationale for change in precautions or plan N/A   Safety Plan Per unit protocol   Anticipated Discharge Disposition home with family;other (see comments)  (Pt is hoping to discharge home with family but this may not be an option. Will continue to discuss alternative placement options with pt.)

## 2023-08-04 NOTE — PROGRESS NOTES
[facility], [place]   Psychiatric Progress Note        Interim History:   The patient's care was discussed with the treatment team during the daily team meeting and/or staff's chart notes were reviewed.     According to Nursing Report: Patient slept for approximately 6 hours last night. She has been social with peers. Patient has been compliant with her medications. She has been calm and cooperative with staff. She denies all MI symptoms. Per CTC, CTC talked to both patient's brother and cousin. Cousin confirmed that Kimmy was not welcome back due to concerns of her mental health stability.     On Patient Interview: Patient is seen in the group room prior to interview. She is pleasant upon interview. She states her mood has been up and down, but overall she feels good. She repeatedly tells us she would like to go home and cook. We discuss with her that we are unsure if she will return to her family following discharge, due to concerns family has with her mental health. Kimmy seems understanding and when an IRTS placement is discussed, she is agreeable. We discuss with her that an IRTS placement is a home with other residents where they often cook for her. She again states all she wants to do is cook her own food, but she is agreeable to assist in cooking should she be placed there. She states she talked with her niece yesterday who advised Kimmy to continue the management and recommendations provided by her doctor. She attempted to call her sister, but has not been able to reach her. When discussing what actions occurred prior to admission, patient states she understands why she is here, due to her actions of running in the street barefoot and cussing at cars. She states she believes she did this because she was on her menstrual cycle at the time and did not take her anxiety or depression medications that day. She states she will never do anything similar again and is agreeable to  "continue her medications following discharge. Patient denies suicidal or homicidal ideation. She agrees to continue to stay here voluntarily. She has no side effects or concerns with her current medication regimen.          Medications:      OLANZapine  10 mg Oral At Bedtime    OLANZapine  2.5 mg Oral QAM    Vitamin D3  25 mcg Oral Daily          Allergies:     Allergies   Allergen Reactions    Dust Mites Other (See Comments)     congestion    Pork-Derived Products Other (See Comments)     Pt does not ingest of these products.           Labs:   No results found for this or any previous visit (from the past 24 hour(s)).       Psychiatric Examination:     /60   Pulse 66   Temp 97.1  F (36.2  C) (Temporal)   Resp 17   Ht 1.626 m (5' 4\")   Wt 54.5 kg (120 lb 1.6 oz)   LMP  (LMP Unknown)   SpO2 99%   BMI 20.62 kg/m    Weight is 120 lbs 1.6 oz  Body mass index is 20.62 kg/m .    Orthostatic Vitals         Most Recent      Sitting Orthostatic /77 08/03 0915    Sitting Orthostatic Pulse (bpm) 75 08/03 0915    Standing Orthostatic /80 08/04 0819    Standing Orthostatic Pulse (bpm) 65 08/04 0819            Appearance: awake, alert, adequately groomed, and appeared as age stated  Attitude:  cooperative  Eye Contact:  good  Mood:  \"good\"  Affect: somewhat exaggerated  Speech:  clear, coherent and normal prosody  Psychomotor Behavior:  no evidence of tardive dyskinesia, dystonia, or tics and intact station, gait and muscle tone  Throught Process:  logical, goal oriented, and less disorganized  Associations:  no loose associations  Thought Content:  no evidence of suicidal ideation or homicidal ideation, no auditory hallucinations present, no visual hallucinations present, and no obvious delusions  Insight:  partial  Judgement:  limited, improving  Oriented to:  time, person, and place  Attention Span and Concentration:  fair  Recent and Remote Memory:  intact    Clinical Global Impressions  First: 5/3 " 8/4/2023      Most recent:            Precautions:     Behavioral Orders   Procedures    Cheeking Precautions (behavioral units)     Patient Observed swallowing PO medications; Patient asked to drink water after swallowing medication; Patient in Staff line of sight for 15 minutes after medication given; Mouth checks after PO administration (patient asked to open mouth and stick out their tongue).    Code 1 - Restrict to Unit    Routine Programming     As clinically indicated    Seizure precautions    Seizure precautions    Status 15     Every 15 minutes.    Suicide precautions     Patients on Suicide Precautions should have a Combination Diet ordered that includes a Diet selection(s) AND a Behavioral Tray selection for Safe Tray - with utensils, or Safe Tray - NO utensils      Suicide precautions     Patients on Suicide Precautions should have a Combination Diet ordered that includes a Diet selection(s) AND a Behavioral Tray selection for Safe Tray - with utensils, or Safe Tray - NO utensils            DIagnoses:     Bipolar affective disorder, manic vs mixed, moderate severity          Plan:     Will continue current medication regimen of Zyprexa 2.5 mg in the morning and Zyprexa 10 mg at night with no adjustments as of 08/04/2023. Patient is agreeable to stay at this hospital voluntarily. Will discuss with CTC and family regarding placement following discharge. Discussed IRTS today as a possibility, patient was agreeable. She is also agreeable with case management.    Continue to review case in multi-disciplinary treatment team. Continue to offer structure and support.     CLARA Gruber-S  [facility] Psychiatry   [facility]     I was present with PA student who participated in the service and in the documentation of the note. I have verified the history and personally performed the physical exam and medical decision making. I agree with the assessment and plan of care as  documented in the note.     Cisco Hardy MD  [facility] Psychiatry     Total time spent was 37 minutes. Over 50% of times was spent counseling and coordination of care regarding coping skills, medication and discharge planning.

## 2023-08-05 PROCEDURE — 124N000002 HC R&B MH UMMC

## 2023-08-05 PROCEDURE — 250N000013 HC RX MED GY IP 250 OP 250 PS 637: Performed by: PSYCHIATRY & NEUROLOGY

## 2023-08-05 RX ADMIN — OLANZAPINE 2.5 MG: 2.5 TABLET, FILM COATED ORAL at 08:06

## 2023-08-05 RX ADMIN — OLANZAPINE 10 MG: 10 TABLET, FILM COATED ORAL at 19:49

## 2023-08-05 RX ADMIN — Medication 25 MCG: at 08:06

## 2023-08-05 ASSESSMENT — ACTIVITIES OF DAILY LIVING (ADL)
ADLS_ACUITY_SCORE: 28
LAUNDRY: WITH SUPERVISION
ADLS_ACUITY_SCORE: 28
LAUNDRY: WITH SUPERVISION
DRESS: INDEPENDENT
HYGIENE/GROOMING: INDEPENDENT
ADLS_ACUITY_SCORE: 28
DRESS: INDEPENDENT
ADLS_ACUITY_SCORE: 28
ADLS_ACUITY_SCORE: 28
ORAL_HYGIENE: INDEPENDENT
ADLS_ACUITY_SCORE: 28
ORAL_HYGIENE: INDEPENDENT
ADLS_ACUITY_SCORE: 28
HYGIENE/GROOMING: SHOWER;INDEPENDENT
ADLS_ACUITY_SCORE: 28
ADLS_ACUITY_SCORE: 28

## 2023-08-05 NOTE — PLAN OF CARE
Problem: Adult Behavioral Health Plan of Care  Goal: Plan of Care Review  Recent Flowsheet Documentation  Taken 8/5/2023 0900 by Dianne Banks RN  Patient Agreement with Plan of Care: agrees   Goal Outcome Evaluation:    Plan of Care Reviewed With: patient             Presentation: Patient is observed in the lounge a majority of the shift.  Patient has a flat and blunted affect.  Speech is clear and less animated.  Mood is flat.  Thoughts are preoccupied.  Patient is less animated, less social, and has a decrease in energy.  Patient body movement is slower.  Writer check in with the patient.  Patient denies depression.  Denies a decrease in mood and behavior.      Patient has come to the nurses station to request a finger nail clipper and clothes from her locker.  Patient is snapping her fingers at staff.  Patient is irritable this shift.      Patient ate 100% of breakfast and lunch.  Showered once this shift.         Medication compliance: Patient is compliant with all medications.       Medical Concerns: Patient denies medical concerns.        PRN's: None      Precautions: Suicidal.     Continue with plan of care

## 2023-08-05 NOTE — PLAN OF CARE
"Goal Outcome Evaluation:    Plan of Care Reviewed With: patient    Problem: Adult Behavioral Health Plan of Care  Goal: Adheres to Safety Considerations for Self and Others  Outcome: Progressing  Flowsheets (Taken 8/4/2023 1926)  Adheres to Safety Considerations for Self and Others: making progress toward outcome    Pt was observed in the lounge socializing with peers. Intermittently pacing the angelo. Pt presents with a flat affect. Expressed wanting to go home and cook \"I am home sick\". Pt is aware she will not be going back home to live with family, however continues to perseverate on going home. Pt otherwise calm, polite and cooperative. Denies endorsing SI/SIB/HI/AVH and  anxiety. Expressed feeling depressed, due to missing her family. Pt was medication compliant , no cheeking noted. Vitals were within normal limits. Hygiene is clean and kept , she reports adequate bowel and bladder. Pt remains on seizure, cheeking and SI precautions. No behavioral concern.      "

## 2023-08-05 NOTE — PLAN OF CARE
"  Problem: Suicidal Behavior  Goal: Suicidal Behavior is Absent or Managed  Outcome: Progressing      Patient was observed intermittently pacing on the unit at the beginning of the shift, denies pain and discomfort. Has been visible in the milieu with limited interaction with peers and staff, had no aggressive and assaultive behavior. No episode of snapping fingers at staff, no intrusiveness and poor boundaries occurs. Affect flat/blunted with calm/pleasant mood. Denies SI/SIB/HI and hallucination, contract for safety and medication compliant with no cheeking after medication administration.    /83 (BP Location: Right arm)   Pulse 95   Temp 98  F (36.7  C) (Temporal)   Resp 17   Ht 1.626 m (5' 4\")   Wt 54.5 kg (120 lb 1.6 oz)   LMP  (LMP Unknown)   SpO2 100%   BMI 20.62 kg/m                        "

## 2023-08-05 NOTE — CARE PLAN
Patient appeared asleep during most of the 15mins rounding, breathing regularly, no report of any concerns, was up to the bathroom x2

## 2023-08-06 PROCEDURE — 250N000013 HC RX MED GY IP 250 OP 250 PS 637: Performed by: PSYCHIATRY & NEUROLOGY

## 2023-08-06 PROCEDURE — 124N000002 HC R&B MH UMMC

## 2023-08-06 RX ADMIN — OLANZAPINE 10 MG: 10 TABLET, FILM COATED ORAL at 19:10

## 2023-08-06 RX ADMIN — Medication 25 MCG: at 08:05

## 2023-08-06 RX ADMIN — OLANZAPINE 2.5 MG: 2.5 TABLET, FILM COATED ORAL at 08:05

## 2023-08-06 ASSESSMENT — ACTIVITIES OF DAILY LIVING (ADL)
LAUNDRY: WITH SUPERVISION
LAUNDRY: WITH SUPERVISION
DRESS: INDEPENDENT
ADLS_ACUITY_SCORE: 28
DRESS: SCRUBS (BEHAVIORAL HEALTH)
ORAL_HYGIENE: INDEPENDENT
ADLS_ACUITY_SCORE: 28
ORAL_HYGIENE: INDEPENDENT
HYGIENE/GROOMING: INDEPENDENT
ADLS_ACUITY_SCORE: 28
HYGIENE/GROOMING: INDEPENDENT
ADLS_ACUITY_SCORE: 28

## 2023-08-06 NOTE — PLAN OF CARE
"  Problem: Suicidal Behavior  Goal: Suicidal Behavior is Absent or Managed  Outcome: Progressing   Goal Outcome Evaluation:    Plan of Care Reviewed With: patient      Patient alert and conversant. She appeared well-kempt and in labile mood. She was very animated during the conversations. She reported that she feels good. She denied pain and all mental health symptoms, saying \"hell no\" after each question. She also shared about losing her son in 2019. She, however, told the writer that she has long accepted what happened because that's just how life is. She was medication and care compliant. She was visible in the milieu, social with select peers. She's eating and drinking well. Patient asked if she can walk outside the unit with staff. Informed her that this is a locked unit and patients are not allowed to go outside. Patient did not respond but just continued pacing in the hallway.                "

## 2023-08-06 NOTE — PLAN OF CARE
The pt appeared sleeping without apparent difficulty or safety concern most of the night. The pt slept 7 hours during the night. 15 minutes safety check on going and continue to monitor.    Problem: Sleep Disturbance  Goal: Adequate Sleep/Rest  Outcome: Progressing   Goal Outcome Evaluation:

## 2023-08-07 PROCEDURE — G0177 OPPS/PHP; TRAIN & EDUC SERV: HCPCS

## 2023-08-07 PROCEDURE — 124N000002 HC R&B MH UMMC

## 2023-08-07 PROCEDURE — 250N000013 HC RX MED GY IP 250 OP 250 PS 637: Performed by: PSYCHIATRY & NEUROLOGY

## 2023-08-07 PROCEDURE — H2032 ACTIVITY THERAPY, PER 15 MIN: HCPCS

## 2023-08-07 PROCEDURE — 99232 SBSQ HOSP IP/OBS MODERATE 35: CPT | Mod: GC | Performed by: PSYCHIATRY & NEUROLOGY

## 2023-08-07 RX ORDER — OLANZAPINE 5 MG/1
5 TABLET ORAL EVERY MORNING
Status: DISCONTINUED | OUTPATIENT
Start: 2023-08-08 | End: 2023-08-10 | Stop reason: HOSPADM

## 2023-08-07 RX ADMIN — OLANZAPINE 10 MG: 10 TABLET, FILM COATED ORAL at 21:32

## 2023-08-07 RX ADMIN — Medication 25 MCG: at 07:37

## 2023-08-07 RX ADMIN — OLANZAPINE 2.5 MG: 2.5 TABLET, FILM COATED ORAL at 07:37

## 2023-08-07 ASSESSMENT — ACTIVITIES OF DAILY LIVING (ADL)
HYGIENE/GROOMING: INDEPENDENT
DRESS: INDEPENDENT
ADLS_ACUITY_SCORE: 28
HYGIENE/GROOMING: INDEPENDENT
ADLS_ACUITY_SCORE: 28
DRESS: INDEPENDENT
ORAL_HYGIENE: INDEPENDENT
ADLS_ACUITY_SCORE: 28
ADLS_ACUITY_SCORE: 28
LAUNDRY: WITH SUPERVISION
ADLS_ACUITY_SCORE: 28
ORAL_HYGIENE: INDEPENDENT
ADLS_ACUITY_SCORE: 28

## 2023-08-07 NOTE — CARE PLAN
08/07/23 1438   Group Therapy Session   Group Attendance attended group session   Time Session Began 1300   Time Session Ended 1345   Total Time (minutes) 30 - no charge   Total # Attendees 6   Group Type life skill;task skill;other (see comments)  (OT)   Group Topic Covered balanced lifestyle;leisure exploration/use of leisure time;structured socialization   Group Session Detail OT Clinic: Pt actively participated in occupational therapy clinic to facilitate coping skill exploration, creative expression within personally meaningful activities, and clinical observation of social, cognitive, and kinesthetic performance skills.   Patient Response/Contribution able to recall/repeat info presented;cooperative with task;discussed personal experience with topic;expressed understanding of topic   Patient Participation Detail Pt came in late yet was pleasant and cooperative and social with other patients noting various activities that others were engaged in. She did choose to engage in a simple structured creative activity while she did social with another patient.

## 2023-08-07 NOTE — PLAN OF CARE
"Problem: Adult Behavioral Health Plan of Care  Goal: Optimized Coping Skills in Response to Life Stressors  Outcome: Progressing     Problem: Suicidal Behavior  Goal: Suicidal Behavior is Absent or Managed  Outcome: Progressing     Patient has been visible on the unit, walking in the angelo, attending group and socializing with peers. Patient is calm and cooperative. Presents with a flat affect, but brightens some on approach. Patient reports she is feeling well today. She denies anxiety, depression, SI/SIB/HI, hallucinations and contracts for safety. Patient looked somewhat sad when stating she would like to go home, but is open to looking into going to a group home. Patient states she has been enjoying her routine here and hopes to continue doing whatever helps her stay well.   Patient denies any pain or medical concerns this evening. She was out at dinner time. Appetite is good. Patient is medication compliant. No PRN requests this shift. Patient is on suicide and cheeking precautions. No behaviors noted.     /82 (Patient Position: Sitting, Cuff Size: Adult Regular)   Pulse 87   Temp 98.1  F (36.7  C) (Oral)   Resp 14   Ht 1.626 m (5' 4\")   Wt 54.9 kg (121 lb)   LMP  (LMP Unknown)   SpO2 100%   BMI 20.77 kg/m      "

## 2023-08-07 NOTE — PLAN OF CARE
"Assessment/Intervention/Current Symtoms and Care Coordination:  Chart review and met with team, discussed pt progress, symptomology, and response to treatment.  Discussed the discharge plan and any potential impediments to discharge.     Tasks Completed:   - Spoke to pt brotherTammie, and provided update. Shared that Natalie is reporting that pt is not allowed back home per previous conversation. CTC explained that IRTS placement may not take pt if there is no stable housing for pt to return to following treatment and explored family goals. Tammie shared that they would like for pt to be on medications for at least two months before returning home and said that Natalie would allow pt to come back as long as she is on medications and they are working to stabilize her symptoms. CTC asked what family would need to see in pt to feel she has stabilized. Tammie had a difficult time identifying what this would look like but reported that she would not be singing and dancing. Tammie shared that she is not compliant with medications following hospital stays and it is too much for the family to manage. CTC shared concern that following IRTS placement, pt may not be compliant with medications and family concerns would not be fully addressed. [facility] encouraged Tammie to begin thinking about long-term options as IRTS is a short-term placement.  - Spoke to pt who continues to report that she does not want to go to an IRTS placement. Shared current barriers including family concerns regarding medication compliance when she leaves the hospital. Pt reports that she made a promise to her son that she will \"stay happy and healthy\" for him and that includes taking medications. Discussed case management services, as well. Pt signed DORIAN for [facility] Adult Case Management. Pt shared more about her life, trauma she has experienced, and the strengths she has leaned on. Pt was clear and logical during the conversation and mood " was appropriate.   - Called and left message with [facility] to make referral for adult case management services.  - [place] back from [facility] IRTS program via email to check pt status and readiness for intake. Followed up via email to schedule intake appointment.     Discharge Plan or Goal:  Pt is reporting she would like to return home following hospitalization. Pt family has concerns about pt returning and will be communicating this with pt. Possible IRTS placement and case management with [facility].     Barriers to Discharge:  Pt needs further stabilization on the unit and medication management. Pt is reporting she would like to return home following hospitalization. Pt family has concerns about pt returning home due to possible medication noncompliance. Currently looking into IRTS placement and adult case management referral.      Referral Status:  Pt has received referral and appointments have been scheduled for outpatient therapy and psychiatry. Pt family has concerns about pt returning home and compliance with medications and outpatient appointments. IRTS referrals have been submitted to [facility] and  [facility]. [facility] have followed-up to schedule intake. Still awaiting intake date. Voicemail left with [facility] Adult Case Management to submit referral.      Legal Status:  Pt is voluntary     Contacts:  Primary Care Provider:   Calos Trujillo, APRN, CNP  [address] [place] [address]  [phone] (Work)  [phone] (Fax)    Family Contacts:   Tammie Mendez (brother - DORIAN on file)  Phone: [phone]  Email: [email or web address]    Natalie Rao (cousin - DORIAN on file) - has requested no further communication regarding pt.  Phone: [phone]     Verena (niece - DORIAN on file)  Phone: [phone]     Upcoming Meetings and Dates/Important Information and next steps:  - Cancel outpatient therapy and psychiatry appointments;   - Follow-up with  IRTS and case management referrals

## 2023-08-07 NOTE — PROGRESS NOTES
"[facility], [place]   Psychiatric Progress Note        Interim History:   The patient's care was discussed with the treatment team during the daily team meeting and/or staff's chart notes were reviewed.     According to Nursing Report: Patient slept for approximately 5.75 hours last night. She has been social with peers. Patient has been compliant with her medications. Was reported to be animated with at times inappropriate, child like affect, poor boundaries, however, cooperative and polite. See RN's note below.    \"Pt presents with a blunted flat affect , but brighten on approach and during interactions. Mood is labile and speech is animated, making faces and hand gestures during conversations/interactions. Pt however polite and cooperative on the unit, socializing with peers. Pt spent this shift watching TV, pacing at times and resting intermittently. Pt continues to express missing home, cooking and family. Denies endorsing depression and anxiety ,SI/SIB/HI/AVH. Hygiene is clean and kept. Pt was medication compliant , denies pain and discomfort. No behavioral concerns.\"    On Patient Interview: Patient is seen in milieu in presence of PA student. Kimmy presented as bright, talkative, but not pressured. Talked in length about her desire to leave this hospital and clean and cook and about different dishes she use to cook. She briefly stated that she would like to go home and didn't understand why family didn't want her there. We reminded her that she exhibited very bizarre behavior prior to coming here and family was understandably concerned about safety. Suggested Kimmy to focus on going temporarily to IRTS, said that she might be able to help with cooking there. Kimmy passively accepted our suggestion: \"you are professionals, you know more than I do\". Patient denied suicidal or homicidal ideation. She agreed to continue to stay here voluntarily. She had no side effects or concerns " "with her current medication regimen.          Medications:      OLANZapine  10 mg Oral At Bedtime    [START ON 8/8/2023] OLANZapine  5 mg Oral QAM    Vitamin D3  25 mcg Oral Daily          Allergies:     Allergies   Allergen Reactions    Dust Mites Other (See Comments)     congestion    Pork-Derived Products Other (See Comments)     Pt does not ingest of these products.           Labs:   No results found for this or any previous visit (from the past 24 hour(s)).       Psychiatric Examination:     /72 (Patient Position: Sitting)   Pulse 93   Temp 98.2  F (36.8  C) (Oral)   Resp 17   Ht 1.626 m (5' 4\")   Wt 54.9 kg (121 lb)   LMP  (LMP Unknown)   SpO2 100%   BMI 20.77 kg/m    Weight is 121 lbs 0 oz  Body mass index is 20.77 kg/m .    Orthostatic Vitals         Most Recent      Sitting Orthostatic /82 08/07 0839    Sitting Orthostatic Pulse (bpm) 93 08/07 0839    Standing Orthostatic /72 08/07 0839    Standing Orthostatic Pulse (bpm) 83 08/07 0839           Appearance: awake, alert, adequately groomed, and appeared as age stated  Attitude:  cooperative  Eye Contact:  good  Mood:  \"good\"  Affect: somewhat exaggerated, dramatic  Speech:  clear, coherent and normal prosody, fast but not pressured  Psychomotor Behavior:  no evidence of tardive dyskinesia, dystonia, or tics and intact station, gait and muscle tone  Throught Process: more logical, goal oriented, and less disorganized  Associations:  no loose associations  Thought Content:  no evidence of suicidal ideation or homicidal ideation, no auditory hallucinations present, no visual hallucinations present, and no obvious delusions  Insight:  partial  Judgement:  limited, improving  Oriented to:  time, person, and place  Attention Span and Concentration: better  Recent and Remote Memory:  intact    Clinical Global Impressions  First: 5/3 8/4/2023      Most recent:            Precautions:     Behavioral Orders   Procedures    Cheeking " Precautions (behavioral units)     Patient Observed swallowing PO medications; Patient asked to drink water after swallowing medication; Patient in Staff line of sight for 15 minutes after medication given; Mouth checks after PO administration (patient asked to open mouth and stick out their tongue).    Code 1 - Restrict to Unit    Routine Programming     As clinically indicated    Seizure precautions    Seizure precautions    Status 15     Every 15 minutes.    Suicide precautions     Patients on Suicide Precautions should have a Combination Diet ordered that includes a Diet selection(s) AND a Behavioral Tray selection for Safe Tray - with utensils, or Safe Tray - NO utensils      Suicide precautions     Patients on Suicide Precautions should have a Combination Diet ordered that includes a Diet selection(s) AND a Behavioral Tray selection for Safe Tray - with utensils, or Safe Tray - NO utensils            DIagnoses:     Bipolar affective disorder, manic vs mixed, moderate severity          Plan:     Will increase Zyprexa to 5 mg in the morning and Zyprexa 10 mg at night as of 8/7/2023. Patient is agreeable to stay at this hospital voluntarily. Discussed IRTS today as a possibility, patient was agreeable. She [facility]    Continue to review case in multi-disciplinary treatment team. Continue to offer structure and support.     МАРИНА GruberS  [facility] Psychiatry   [facility]     I was present with PA student who participated in the service and in the documentation of the note. I have verified the history and personally performed the physical exam and medical decision making. I agree with the assessment and plan of care as documented in the note.     Cisco Hardy MD  [facility] Psychiatry     Total time spent was 37 minutes. Over 50% of times was spent counseling and coordination of care regarding coping skills,  medication and discharge planning.

## 2023-08-07 NOTE — PLAN OF CARE
RN(11:00 AM-3:00 PM): Pt alert and oriented, VSS, calm and cooperative. Pt attended groups this shift, was visible in lounge watching TV and social with peers. Pt presented with full range affect, mood was calm and pleasant. Pt denied all MH symptoms, including: SI/SIB/HI, hallucinations, anxiety and depression. Pt was medication compliant, no reported or observed side effects. Pt ate lunch in the DR, appetite good. Pt denied pain or physical discomfort. Continue with POC.      Goal Outcome Evaluation:    Plan of Care Reviewed With: patient

## 2023-08-07 NOTE — CARE PLAN
08/07/23 1428   Group Therapy Session   Group Attendance attended group session   Time Session Began 1115   Time Session Ended 1200   Total Time (minutes) 45   Total # Attendees 3   Group Type life skill;other (see comments)  (OT)   Group Topic Covered balanced lifestyle;coping skills/lifestyle management;structured socialization;relaxation techniques   Group Session Detail OT - Coping: Topic of group was to recognize body/mind/spirit signals that indicate the need for a change of pace and then specific stress management techniques that can help change this tempo and help develop healthy lifestyle and management of symptoms.    Patient Response/Contribution able to recall/repeat info presented;cooperative with task;discussed personal experience with topic;expressed readiness to alter behaviors;expressed understanding of topic;listened actively   Patient Participation Detail Pt was very engaged for the entire session in the discussion. Able to identify signs of stress of feeling low, or too fast paced, or tension. She did share various techniques that she has used in the past to manage. She noted her great interest and benefits of music and how singing and movement help her to manage. She shared about her mother and her illness in general and patient's growing up and living in various places. She also shared in general about her son's death and briefly about its impact on her and that she is working now to take care of herself vs taking care of others. Also shared about a good friend she has and how they support each other. She also shared therapy work she has done with her individual therapist. She was calm and easily focused on the topic and sharing appropriately.

## 2023-08-07 NOTE — PLAN OF CARE
Goal Outcome Evaluation:    Plan of Care Reviewed With: patient    Problem: Suicidal Behavior  Goal: Suicidal Behavior is Absent or Managed  Outcome: Progressing  Flowsheets (Taken 8/6/2023 1922)  Mutually Determined Action Steps (Suicidal Behavior Absent/Managed):   verbalizes safety check rationale   shares suicidal thoughts   sets future-oriented goal     Pt presents with a blunted flat affect , but brighten on approach and during interactions. Mood is labile and speech is animated, making faces and hand gestures during conversations/interactions. Pt however polite and cooperative on the unit, socializing with peers. Pt spent this shift watching TV, pacing at times and resting intermittently. Pt continues to express missing home, cooking and family. Denies endorsing depression and anxiety ,SI/SIB/HI/AVH. Hygiene is clean and kept. Pt was medication compliant , denies pain and discomfort. No behavioral concerns.

## 2023-08-08 PROCEDURE — 124N000002 HC R&B MH UMMC

## 2023-08-08 PROCEDURE — 99232 SBSQ HOSP IP/OBS MODERATE 35: CPT | Performed by: PSYCHIATRY & NEUROLOGY

## 2023-08-08 PROCEDURE — 250N000013 HC RX MED GY IP 250 OP 250 PS 637: Performed by: PSYCHIATRY & NEUROLOGY

## 2023-08-08 RX ADMIN — OLANZAPINE 10 MG: 10 TABLET, FILM COATED ORAL at 20:28

## 2023-08-08 RX ADMIN — OLANZAPINE 5 MG: 5 TABLET, FILM COATED ORAL at 08:16

## 2023-08-08 RX ADMIN — Medication 25 MCG: at 08:16

## 2023-08-08 ASSESSMENT — ACTIVITIES OF DAILY LIVING (ADL)
HYGIENE/GROOMING: INDEPENDENT
ADLS_ACUITY_SCORE: 28
HYGIENE/GROOMING: INDEPENDENT
ADLS_ACUITY_SCORE: 28
ORAL_HYGIENE: INDEPENDENT
DRESS: INDEPENDENT
ORAL_HYGIENE: INDEPENDENT
DRESS: INDEPENDENT
ADLS_ACUITY_SCORE: 28
LAUNDRY: WITH SUPERVISION
LAUNDRY: WITH SUPERVISION

## 2023-08-08 NOTE — PLAN OF CARE
Goal Outcome Evaluation:         Pt is in the milieu with happy affect. She is calm and cooperative. Denies pain, anxiety, depression, SI/HI/SIB and contracted for safety. She ate adequately for dinner. Medication compliant. No PRN this shift. Will continue to monitor.

## 2023-08-08 NOTE — PROGRESS NOTES
08/07/23 1900   Group Therapy Session   Group Attendance attended group session   Time Session Began 1700   Time Session Ended 1750   Total Time (minutes) 30   Total # Attendees 9   Group Type recreation   Group Topic Covered leisure exploration/use of leisure time   Group Session Detail TR leisure group   Patient Response/Contribution cooperative with task   Patient Participation Detail Pt attended the structured Therapeutic Recreation group, participating in a group activity. Pt participated in group discussion, leisure participation, and social engagement to gain self-esteem, manage behaviors, improve social skills, decrease isolation, and reduce anxiety/depression.   Pt remained focused and engaged throughout group activity, but left FDC through group.  Pt was quiet during her time in group, minimally interacting with others. Pt chose not to take any turns in the activity, but remained attentive.

## 2023-08-08 NOTE — PLAN OF CARE
Problem: Sleep Disturbance  Goal: Adequate Sleep/Rest  Outcome: Progressing     Patient monitored on 15 minute checks. Patient was up once to get water, but otherwise appeared to be sleep for 5.25 hours. Respirations are even and unlabored.   Patient denied symptoms and did not voice any concerns while awake.  No safety concerns noted or reported.  Will continue to monitor.

## 2023-08-08 NOTE — PLAN OF CARE
Assessment/Intervention/Current Symtoms and Care Coordination:  Chart review and met with team, discussed pt progress, symptomology, and response to treatment.  Discussed the discharge plan and any potential impediments to discharge.     Tasks Completed:   - [place] back from [facility] and scheduled intake for this morning at 11:30 by phone. Informed pt about IRTS intake and answered questions. Pt was anxious that she would not be able to cook at IRTS placement and CTC encouraged pt to ask this during the intake.  - Called [facility] to make referral for adult case management. They will send the DA and referral information needed to complete the referral via email.   - Spoke to pt brother, Tammie, and provided update regarding IRTS intake and [place] case management referral.   - Completed DA and submitted referral for adult case management services through [facility].  - Intake for adult case management scheduled for 9 AM on 8/9/23 with Ab from [facility].  - Called to cancel outpatient therapy and psychiatry appointments.    Discharge Plan or Goal:  Pt is reporting she would like to return home following hospitalization. Pt family has concerns about pt returning and will be communicating this with pt. Possible IRTS placement and case management with [facility].     Barriers to Discharge:  Pt needs further stabilization on the unit and medication management. Pt is reporting she would like to return home following hospitalization. Pt family has concerns about pt returning home due to possible medication noncompliance. Currently looking into IRTS placement and adult case management referral.      Referral Status:  Pt has received referral and appointments have been scheduled for outpatient therapy and psychiatry. Pt family has concerns about pt returning home and compliance with medications and outpatient appointments. IRTS referrals have been submitted to [facility] and  [facility].  [employer] [facility] have followed-up to schedule intake. Still awaiting intake date. Voicemail left with [facility] Adult Case Management to submit referral.      Legal Status:  Pt is voluntary     Contacts:  Primary Care Provider:   Calos Trujillo APRN, CNP  [address] [place] [address]  [phone] (Work)  [phone] (Fax)    Family Contacts:   Tammie Mendez (brother - DORIAN on file)  Phone: [phone]  Email: [email or web address]    Natalie Rao (cousin - DORIAN on file) - has requested no further communication regarding pt.  Phone: [phone]     Verena (niece - DORIAN on file)  Phone: [phone]     Upcoming Meetings and Dates/Important Information and next steps:  Phone intake for 9 a.m. on 8/9/23 for [facility] Adult Case Management.

## 2023-08-08 NOTE — PLAN OF CARE
"  Problem: Adult Behavioral Health Plan of Care  Goal: Plan of Care Review  Recent Flowsheet Documentation  Taken 8/8/2023 1000 by Dianne Banks RN  Patient Agreement with Plan of Care: agrees   Goal Outcome Evaluation:    Plan of Care Reviewed With: patient               Presentation: Patient is observed adequately dressed and social in the lounge.  Patient has a full range affect.  Speech is clear and coherent.  Thoughts are logical and relevant.  Mood is calm.        Mental health symptoms: Patient denies SI, SIB, HI, and hallucinations.  Patient denies depression and anxiety.  Patient stated, \"I love who I am.\"  \"I wont change who I am for nobody.\"        Medication compliance: Patient is compliant with all medications.        Medical Concerns: Patient has a good appetite.  Adequate bowel and bladder.  Denies pain.        PRN's: None      Precautions: Suicide    Continue with plan of care      Addendum:  Patient is transferred to [facility]. Without incident.     "

## 2023-08-08 NOTE — PROGRESS NOTES
"[facility], [place]   Psychiatric Progress Note        Interim History:   The patient's care was discussed with the treatment team during the daily team meeting and/or staff's chart notes were reviewed.     According to Nursing Report: Patient slept for approximately 5.25 hours last night. She has been social with peers. Patient has been compliant with her medications. Was reported to be animated with at times inappropriate, child like affect, poor boundaries, however, easily redirectable and cooperative and polite. She goes to groups. Per [facility], patient will have an interview today with [employer]     On Patient Interview: Patient is seen in her room in presence of PA student. Kimmy appeared to be sad and mildly irritable and admitted that: \"I am pissed today\". Said that she didn't like the idea of going to \"another facility\" because it would interfere with her freedom, she would not be able to go shopping, to do cooking for self and other family members. We asked her why she changed her mind about going to IRTS or what made her change her mind. Kimmy didn't or would not give us a direct answer, but, instead, talked about multiple losses she had over short period of time: losing her son, being an immigrant in this country, not getting enough support from her family members. She appeared to be very sad that her sister with whom Kimmy was living recently didn't want Kimmy to return to her place. We tried to explain that IRTS placement is temporary and while Kimmy would have some limitations of her ability to come and move, do things that she used to do, those limitation would be temporary and she would benefit from going to IRTS. We encouraged Kimmy to participate in an interview. Kimmy was minimally receptive, then, said: \" if you want me to go there, I would go there\". She didn't want to continue our conversation.          Medications:      OLANZapine  10 mg Oral At Bedtime    " "OLANZapine  5 mg Oral QAM    Vitamin D3  25 mcg Oral Daily          Allergies:     Allergies   Allergen Reactions    Dust Mites Other (See Comments)     congestion    Pork-Derived Products Other (See Comments)     Pt does not ingest of these products.           Labs:   No results found for this or any previous visit (from the past 24 hour(s)).       Psychiatric Examination:     /82 (Patient Position: Sitting, Cuff Size: Adult Regular)   Pulse 87   Temp 98  F (36.7  C) (Temporal)   Resp 14   Ht 1.626 m (5' 4\")   Wt 57.2 kg (126 lb 1.6 oz)   LMP  (LMP Unknown)   SpO2 100%   BMI 21.65 kg/m    Weight is 126 lbs 1.6 oz  Body mass index is 21.65 kg/m .    Orthostatic Vitals         Most Recent      Sitting Orthostatic /81 08/08 0848    Sitting Orthostatic Pulse (bpm) 91 08/08 0848    Standing Orthostatic /75 08/08 0848    Standing Orthostatic Pulse (bpm) 100 08/08 0848           Appearance: awake, alert, adequately groomed, and appeared as age stated  Attitude:  somewhat cooperative  Eye Contact: partial, tense at times  Mood:  \"good\"  Affect: labile  Speech:  clear, coherent and normal prosody, fast   Psychomotor Behavior:  no evidence of tardive dyskinesia, dystonia, or tics and intact station, gait and muscle tone  Throught Process: more logical, goal oriented, and less disorganized  Associations:  no loose associations  Thought Content:  no evidence of suicidal ideation or homicidal ideation, no auditory hallucinations present, no visual hallucinations present, and no obvious delusions  Insight:  partial  Judgement:  limited,   Oriented to:  time, person, and place  Attention Span and Concentration: better  Recent and Remote Memory:  intact    Clinical Global Impressions  First: 5/3 8/4/2023      Most recent:            Precautions:     Behavioral Orders   Procedures    Cheeking Precautions (behavioral units)     Patient Observed swallowing PO medications; Patient asked to drink water after " swallowing medication; Patient in Staff line of sight for 15 minutes after medication given; Mouth checks after PO administration (patient asked to open mouth and stick out their tongue).    Code 1 - Restrict to Unit    Routine Programming     As clinically indicated    Seizure precautions    Seizure precautions    Status 15     Every 15 minutes.    Suicide precautions     Patients on Suicide Precautions should have a Combination Diet ordered that includes a Diet selection(s) AND a Behavioral Tray selection for Safe Tray - with utensils, or Safe Tray - NO utensils      Suicide precautions     Patients on Suicide Precautions should have a Combination Diet ordered that includes a Diet selection(s) AND a Behavioral Tray selection for Safe Tray - with utensils, or Safe Tray - NO utensils            DIagnoses:     Bipolar affective disorder, manic vs mixed, moderate severity          Plan:     Will continue increased Zyprexa 5 mg in the morning and Zyprexa 10 mg at night- was increased on 8/7/2023. Patient is agreeable to stay at this hospital voluntarily. Today was ambivalent about going to IR, See discussion above. Will continue to discuss with her and her family discharge options and case management and [facility] worker.    Continue to review case in multi-disciplinary treatment team. Continue to offer structure and support.     CLARA Gruber-S  [facility] Psychiatry   [facility]     I was present with PA student who participated in the service and in the documentation of the note. I have verified the history and personally performed the physical exam and medical decision making. I agree with the assessment and plan of care as documented in the note.     Cisco Hardy MD  [facility] Psychiatry     Total time spent was 38 minutes. Over 50% of times was spent counseling and coordination of care regarding coping skills, medication and discharge planning.

## 2023-08-09 PROCEDURE — G0177 OPPS/PHP; TRAIN & EDUC SERV: HCPCS

## 2023-08-09 PROCEDURE — 99232 SBSQ HOSP IP/OBS MODERATE 35: CPT | Performed by: PSYCHIATRY & NEUROLOGY

## 2023-08-09 PROCEDURE — 250N000013 HC RX MED GY IP 250 OP 250 PS 637: Performed by: PSYCHIATRY & NEUROLOGY

## 2023-08-09 PROCEDURE — 124N000002 HC R&B MH UMMC

## 2023-08-09 RX ORDER — VITAMIN B COMPLEX
1 TABLET ORAL DAILY
Qty: 30 TABLET | Refills: 1 | Status: SHIPPED | OUTPATIENT
Start: 2023-08-09 | End: 2023-08-22

## 2023-08-09 RX ORDER — CHLORAL HYDRATE 500 MG
1 CAPSULE ORAL DAILY
Qty: 30 CAPSULE | Refills: 1 | Status: SHIPPED | OUTPATIENT
Start: 2023-08-10 | End: 2023-08-22

## 2023-08-09 RX ORDER — OLANZAPINE 10 MG/1
10 TABLET ORAL 3 TIMES DAILY PRN
Qty: 30 TABLET | Refills: 1 | Status: ON HOLD | OUTPATIENT
Start: 2023-08-09 | End: 2023-12-05

## 2023-08-09 RX ORDER — CHLORAL HYDRATE 500 MG
1 CAPSULE ORAL DAILY
Status: DISCONTINUED | OUTPATIENT
Start: 2023-08-09 | End: 2023-08-10 | Stop reason: HOSPADM

## 2023-08-09 RX ORDER — OLANZAPINE 5 MG/1
5 TABLET ORAL EVERY MORNING
Qty: 30 TABLET | Refills: 1 | Status: ON HOLD | OUTPATIENT
Start: 2023-08-10 | End: 2023-12-05

## 2023-08-09 RX ORDER — AMOXICILLIN 250 MG
1 CAPSULE ORAL 2 TIMES DAILY PRN
Qty: 60 TABLET | Refills: 1 | Status: SHIPPED | OUTPATIENT
Start: 2023-08-09 | End: 2023-08-22

## 2023-08-09 RX ORDER — OLANZAPINE 10 MG/1
10 TABLET ORAL AT BEDTIME
Qty: 30 TABLET | Refills: 1 | Status: ON HOLD | OUTPATIENT
Start: 2023-08-09 | End: 2023-12-08

## 2023-08-09 RX ORDER — HYDROXYZINE HYDROCHLORIDE 25 MG/1
25 TABLET, FILM COATED ORAL 3 TIMES DAILY PRN
Qty: 90 TABLET | Refills: 1 | Status: ON HOLD | OUTPATIENT
Start: 2023-08-09 | End: 2023-12-05

## 2023-08-09 RX ADMIN — OLANZAPINE 5 MG: 5 TABLET, FILM COATED ORAL at 08:46

## 2023-08-09 RX ADMIN — OLANZAPINE 10 MG: 10 TABLET, FILM COATED ORAL at 20:40

## 2023-08-09 RX ADMIN — Medication 25 MCG: at 08:47

## 2023-08-09 RX ADMIN — Medication 1 G: at 14:32

## 2023-08-09 ASSESSMENT — ACTIVITIES OF DAILY LIVING (ADL)
ADLS_ACUITY_SCORE: 28
DRESS: INDEPENDENT
ADLS_ACUITY_SCORE: 28
ADLS_ACUITY_SCORE: 29
ADLS_ACUITY_SCORE: 28
DRESS: STREET CLOTHES;INDEPENDENT
HYGIENE/GROOMING: INDEPENDENT
ADLS_ACUITY_SCORE: 29
HYGIENE/GROOMING: HANDWASHING;SHOWER;INDEPENDENT
ORAL_HYGIENE: INDEPENDENT
ADLS_ACUITY_SCORE: 28
LAUNDRY: WITH SUPERVISION
LAUNDRY: WITH SUPERVISION
ADLS_ACUITY_SCORE: 29
ADLS_ACUITY_SCORE: 28
ADLS_ACUITY_SCORE: 29
ORAL_HYGIENE: INDEPENDENT
ADLS_ACUITY_SCORE: 28
ADLS_ACUITY_SCORE: 29
ADLS_ACUITY_SCORE: 29

## 2023-08-09 NOTE — CARE PLAN
08/09/23 1251   Group Therapy Session   Group Attendance attended group session   Time Session Began 1115   Time Session Ended 1200   Total Time (minutes) 45   Total # Attendees 6   Group Type life skill;task skill;other (see comments)  (OT)   Group Topic Covered balanced lifestyle;leisure exploration/use of leisure time;structured socialization   Group Session Detail OT Clinic: Pt actively participated in occupational therapy clinic to facilitate coping skill exploration, creative expression within personally meaningful activities, and clinical observation of social, cognitive, and kinesthetic performance skills.    Patient Response/Contribution able to recall/repeat info presented;cooperative with task;discussed personal experience with topic;expressed understanding of topic   Patient Participation Detail Pt initiated a simple structured creative activity. She asked for materials as needed. She tended to be quiet and focused on her work yet did listen to others on occasion and shared about her plans for colors for her project which were organized. Thanked staff for the opportunity to engage in activities.

## 2023-08-09 NOTE — CARE PLAN
08/09/23 1438   Group Therapy Session   Group Attendance attended group session   Time Session Began 1315   Time Session Ended 1400   Total Time (minutes) 45   Total # Attendees 5   Group Type life skill;other (see comments)  (OT)   Group Topic Covered balanced lifestyle;coping skills/lifestyle management;other (see comments)  (sensorimotor)   Group Session Detail OT - Coping: Group focus was learning and practice of use of a variety of sensorimotor techniques for calming and self regulation. Emphasis was on activation of the proprioceptive and vestibular senses.    Patient Response/Contribution able to recall/repeat info presented;cooperative with task;discussed personal experience with topic;expressed understanding of topic;listened actively   Patient Participation Detail Pt easily engaged in the practice and noted engaging in similar type practice for herself. She followed directions well and thanked staff at end, noting she felt very calm.

## 2023-08-09 NOTE — PLAN OF CARE
Care Coordinator scheduled the following Med Ride:     Insurance/ Name: Christ/Liya  [employer]/ Name: [employer]Wendy  Ride Confirmation # [id number]  [employer] Phone: [phone]  P/u Date/Time: Thursday, August 10, 2023 @ 11:30am  P/u Address: [place] [id number]  Destination: [address] [address]  Destination Phone: [phone]  **Cab instructed to call Nursing Station upon arrival: [phone]    CC updated CTC    Ruby Lopez  Care Coordinator  [email or web address]  [phone]

## 2023-08-09 NOTE — PROGRESS NOTES
Behavioral Health  Note   Behavioral Health  Spirituality Group Note     Unit 30    Name: Kimmy Mendez    YOB: 1985   MRN: [id number]    Age: 37 year old     Patient attended -led group, which included discussion of spirituality, coping with illness and building resilience.   Patient attended group for On license of [facility] - spirituality groups are not billed.   The patient actively participated in group discussion     Sherice [facility]  Staff    Page [phone]

## 2023-08-09 NOTE — PLAN OF CARE
Goal Outcome Evaluation:    Plan of Care Reviewed With: patient    Pt spent most of the shift in common areas of the unit. Pt had full range of affect. Pt was medication compliant. Pt attended and participated in select groups.  Pt was social with peers. Pt frequently observed walking the hallway while visiting with peers. Pt reported in 1:1 with writer that she is grateful for her stay here. Pt stated that she is here to take care of self. Pt stated has not been able to be happy since her baby  and she is hoping that with help she will heal. Pt reiterated that she has evy in the Dr and the staff here.

## 2023-08-09 NOTE — PLAN OF CARE
Goal Outcome Evaluation:    Problem: Sleep Disturbance  Goal: Adequate Sleep/Rest  Outcome: Progressing   Patient slept for the most part of the night. Even and non labored respirations noted. Observed to have slept for 6 hours.

## 2023-08-09 NOTE — PLAN OF CARE
Assessment/Intervention/Current Symtoms and Care Coordination:    Will from [facility] offered pt move in date of 8/10/23 at 10 am and Noon. Will emailed back and confirmed intake for 12pm at [place].  Placed post hospitalization transportation to the workqueue of the care coordinators.  1130 for a 12pm arrive time.  Called therapy provider and verified there is no appointment scheduled. Asked to schedule one a month out, was told that pt can call when she is ready.  Called psychiatry clinic and verified next appointment is on 8/30 at 11:40am.  Updated AVS  Pt was scheduled to speak with [facility] for case management intake today. Unclear if this happened as W not working on unit today.  Updated assigned RN on discharge date and need for 30 days of medications in hand.    Discharge Plan or Goal:  Pt is reporting she would like to return home following hospitalization. Pt family has concerns about pt returning and will be communicating this with pt. Possible IRTS placement and case management with [facility].     Barriers to Discharge:  Pt needs further stabilization on the unit and medication management. Pt is reporting she would like to return home following hospitalization. Pt family has concerns about pt returning home due to possible medication noncompliance. Currently looking into IRTS placement and adult case management referral.      Referral Status:  Pt has received referral and appointments have been scheduled for outpatient therapy and psychiatry. Pt family has concerns about pt returning home and compliance with medications and outpatient appointments. IRTS referrals have been submitted to "[employer] and  [facility]. "[employer] have followed-up to schedule intake. Admission date 8/10 at 12p. Voicemail left with [facility] Adult Case Management to submit referral.       Legal Status:  Pt is voluntary     Contacts:  Primary Care Provider:   Calos Trujillo APRN,  [facility]  [address] [place] [address]  [phone] (Work)  [phone] (Fax)    Family Contacts:   Tammie Mendez (brother - DORIAN on file)  Phone: [phone]  Email: [email or web address]    Yadimsshayy Rao (cousin - DORIAN on file) - has requested no further communication regarding pt.  Phone: [phone]     Verena (niece - DORIAN on file)  Phone: [phone]     Upcoming Meetings and Dates/Important Information and next steps:  Phone intake for 9 a.m. on 8/9/23 for [facility] Adult Case Management.  Intake at [facility] 8/10/23 at 12pm

## 2023-08-09 NOTE — CONSULTS
"SPIRITUAL HEALTH SERVICES  SPIRITUAL ASSESSMENT Progress Note  [facility] ([facility]) 30NR       REFERRAL SOURCE: Follow up from on-call routine consult for  visit, Orthodoxy specific.     DEMOGRAPHIC: Pt Kimmy Mendez identifies as Orthodoxy and is of [place] descent.        ILLNESS CIRCUMSTANCE: I introduced myself to Kimmy as the [facility]  and oriented her to [facility].  Assessed emotional/spiritual needs and resources while offering reflective conversation, which integrated elements of illness and family narratives.     Kimmy shared that, \"my son passed away in 2019 and it was very tragic\". She wished not to discuss the details at this time but stated that her mom was taken away from her and subsequently, losing her too.     Kimmy stated that she is grateful for the cares that she has received her and also credits prayer/Synagogue as a positive contribution to her mental health.     SPIRITUAL INTERVENTIONS: Kimmy welcome Islamic incantations/prayer at bedside. We prayed together at her request. I also provided her with an Islamic prayer booklet for Orthodoxy patients.      PLAN: I will follow up with Kimmy for the duration of her stay. [facility] is available to Kimmy per request.     Santos Arrington  [facility]   Pager [phone]    [facility] remains available 24/7 for emergent requests/referrals, either by having the switchboard page the on-call  or by entering an ASAP/STAT consult in Epic (this will also page the on-call ).    "

## 2023-08-09 NOTE — PROGRESS NOTES
"[facility], [place]   Psychiatric Progress Note        Interim History:   The patient's care was discussed with the treatment team during the daily team meeting and/or staff's chart notes were reviewed.     According to Nursing Report: Patient slept for approximately 6 hours last night. She has been social with peers. Patient has been compliant with her medications. Was reported to be animated and less labile. Went to groups and participated in groups discussion. Appeared to be more organized, cooperative.     On Patient Interview: Patient was seen in her room. She immediately apologized for being agitated and irritable with us yesterday. Stated that she realized that we had her best interests in mind when we suggested her to go to [facility] and said that she would follow our recommendations. She appeared to be more organized and focused during our visit. She denied Auditory hallucinations, Visual hallucinations, Suicidal ideation, Homicidal thoughts and had no further questions or concerns. She also denied med side effects.    Shortly after our visit we were informed by [facility] that patient was accepted to [facility] with planned admission tomorrow, 8/10.         Medications:      fish oil-omega-3 fatty acids  1 g Oral Daily    OLANZapine  10 mg Oral At Bedtime    OLANZapine  5 mg Oral QAM    Vitamin D3  25 mcg Oral Daily          Allergies:     Allergies   Allergen Reactions    Dust Mites Other (See Comments)     congestion    Pork-Derived Products Other (See Comments)     Pt does not ingest of these products.           Labs:   No results found for this or any previous visit (from the past 24 hour(s)).       Psychiatric Examination:     /73   Pulse 82   Temp 97  F (36.1  C)   Resp 14   Ht 1.626 m (5' 4\")   Wt 57.2 kg (126 lb 1.6 oz)   LMP  (LMP Unknown)   SpO2 100%   BMI 21.65 kg/m    Weight is 126 lbs 1.6 oz  Body mass index is 21.65 kg/m .    Orthostatic Vitals         " "Most Recent      Sitting Orthostatic /81 08/08 0848    Sitting Orthostatic Pulse (bpm) 91 08/08 0848    Standing Orthostatic /75 08/09 0941    Standing Orthostatic Pulse (bpm) 85 08/09 0941           Appearance: awake, alert, adequately groomed, and appeared as age stated  Attitude:  more cooperative  Eye Contact: better  Mood:  \"good\"  Affect: less labile and congruent with mood  Speech:  clear, coherent and normal prosody,   Psychomotor Behavior:  no evidence of tardive dyskinesia, dystonia, or tics and intact station, gait and muscle tone  Throught Process: more logical, goal oriented, and less disorganized  Associations:  no loose associations  Thought Content:  no evidence of suicidal ideation or homicidal ideation, no auditory hallucinations present, no visual hallucinations present, and no obvious delusions  Insight:  partial  Judgement:  limited, improving  Oriented to:  time, person, and place  Attention Span and Concentration: better  Recent and Remote Memory:  intact    Clinical Global Impressions  First: 5/3 8/4/2023      Most recent:            Precautions:     Behavioral Orders   Procedures    Cheeking Precautions (behavioral units)     Patient Observed swallowing PO medications; Patient asked to drink water after swallowing medication; Patient in Staff line of sight for 15 minutes after medication given; Mouth checks after PO administration (patient asked to open mouth and stick out their tongue).    Code 1 - Restrict to Unit    Routine Programming     As clinically indicated    Seizure precautions    Seizure precautions    Status 15     Every 15 minutes.    Suicide precautions     Patients on Suicide Precautions should have a Combination Diet ordered that includes a Diet selection(s) AND a Behavioral Tray selection for Safe Tray - with utensils, or Safe Tray - NO utensils      Suicide precautions     Patients on Suicide Precautions should have a Combination Diet ordered that includes a " Diet selection(s) AND a Behavioral Tray selection for Safe Tray - with utensils, or Safe Tray - NO utensils            DIagnoses:     Bipolar affective disorder, manic vs mixed, moderate severity          Plan:     Will continue increased Zyprexa 5 mg in the morning and Zyprexa 10 mg at night- was increased on 8/7/2023. Patient is agreeable to stay at this hospital voluntarily. Today was more cooperative and open to going to IR. Will be discharged to [facility] tomorrow. Discharge meds were reconciled and sent to our discharge pharmacy.    Continue to review case in multi-disciplinary treatment team. Continue to offer structure and support.     YANI Gruber  [facility]   [facility]     I was present with PA student who participated in the service and in the documentation of the note. I have verified the history and personally performed the physical exam and medical decision making. I agree with the assessment and plan of care as documented in the note.     Cisco Hardy MD  [facility]     Total time spent was 39 minutes. Over 50% of times was spent counseling and coordination of care regarding coping skills, medication and discharge planning.

## 2023-08-10 VITALS
OXYGEN SATURATION: 100 % | TEMPERATURE: 98.3 F | DIASTOLIC BLOOD PRESSURE: 82 MMHG | RESPIRATION RATE: 14 BRPM | BODY MASS INDEX: 21.34 KG/M2 | HEIGHT: 64 IN | WEIGHT: 125 LBS | SYSTOLIC BLOOD PRESSURE: 142 MMHG | HEART RATE: 64 BPM

## 2023-08-10 PROCEDURE — 99238 HOSP IP/OBS DSCHRG MGMT 30/<: CPT | Performed by: PSYCHIATRY & NEUROLOGY

## 2023-08-10 PROCEDURE — 250N000013 HC RX MED GY IP 250 OP 250 PS 637: Performed by: PSYCHIATRY & NEUROLOGY

## 2023-08-10 RX ADMIN — OLANZAPINE 5 MG: 5 TABLET, FILM COATED ORAL at 08:06

## 2023-08-10 RX ADMIN — Medication 25 MCG: at 08:06

## 2023-08-10 RX ADMIN — ACETAMINOPHEN 650 MG: 325 TABLET, FILM COATED ORAL at 09:26

## 2023-08-10 RX ADMIN — Medication 1 G: at 08:06

## 2023-08-10 ASSESSMENT — ACTIVITIES OF DAILY LIVING (ADL)
ADLS_ACUITY_SCORE: 29
ORAL_HYGIENE: INDEPENDENT
LAUNDRY: UNABLE TO COMPLETE
ADLS_ACUITY_SCORE: 29
DRESS: STREET CLOTHES;INDEPENDENT
ADLS_ACUITY_SCORE: 29

## 2023-08-10 NOTE — PLAN OF CARE
Assessment/Intervention/Current Symtoms and Care Coordination:  Chart review and met with team, discussed pt progress, symptomology, and response to treatment.  Discussed the discharge plan and any potential impediments to discharge.     Tasks Completed:   - During team meeting, it was noted that pt was confused about where she was being discharged to. Met with pt to discuss discharge and pt noted that she knew she would be discharged to [facility] and appeared to accept this. Pt reported that she was grateful for her care here and shared that she was hopeful for her future.   - Called pt brotherTammie, and reported that pt will be discharging today. Shared information about I[facility]S via email, per his request. Answered questions and shared update about pt mood and affect.  - Called [facility] Case [facility] to check status of referral and update following intake call yesterday morning. Ab, , was unavailable and was unable to get an update. Provided [facility] address and phone number for continued follow-up with pt.  - Pt was discharged today to [facility]: [address], [place] [address].    Discharge Plan or Goal:  Pt was discharged today to [facility] in [place].     Barriers to Discharge:  Pt was discharged today to [facility] in [place].     Referral Status:  Pt was discharged today to [facility] in [place]. Pt  was also referred for adult case management through [facility] and is in the process of establishing services.      Legal Status:  Pt is voluntary     Contacts:  Primary Care Provider:   Calos Trujillo, APRN, CNP  [address] [place] [address]  [phone] (Work)  [phone] (Fax)    Family Contacts:   Tammie Mendez (brother - DORIAN on file)  Phone: [phone]  Email: [email or web address]    Natalie Rao (cousin - DORIAN on file) - has requested no further communication regarding pt  Phone: [phone]     Verena (arielle  - DORIAN on file)  Phone: [phone]     Upcoming Meetings and Dates/Important Information and next steps:  N/A

## 2023-08-10 NOTE — PLAN OF CARE
Problem: Suicidal Behavior  Goal: Suicidal Behavior is Absent or Managed  Outcome: Progressing   Goal Outcome Evaluation:    Plan of Care Reviewed With: patient      Kimmy has spent the majority of the shift ambulating in the angelo and listening to music. She has had a few episodes of spontaneously crying and being tearful. She was medication compliant this evening. She is looking forward to discharge tomorrow am. She is appropriately dressed and groomed. She has been social with her peers. She continues to grieve for her son. Kimmy continues on Seizure , Suicide, and cheeking precautions. Nursing to continue to support current plan of care.

## 2023-08-10 NOTE — PLAN OF CARE
Goal Outcome Evaluation:    Plan of Care Reviewed With: patient    Pt was discharged to [facility] as transported by [facility] @ 1220 today with 30 day supply of medications and all of her belongings.  Pt has a bright affect. Pt reports feeling anxious about going to a new place and feeling appreciation for the peer group that befriended her on the unit and staff that provided care.  Pt voices hopefulness for the future. Pt denies any suicidal thoughts of any kind. Writer reviewed all discharge instructions with pt including all medication instructions and aftercare appointments.  Pt was escorted to [facility] for transportation to [facility].

## 2023-08-10 NOTE — PLAN OF CARE
Night Nursing Note   8/9/23 - 8/10/23        Kimmy was in be at beginning of shift and appeared asleep.  Monitored q15 mins for safety.  Appeared to sleep majority of night without difficulty.  Planned discharge for today.  Continue to monitor, offer support and reassurance per care plan.    Slept 6.25 hrs.
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
## 2023-08-22 ENCOUNTER — VIRTUAL VISIT (OUTPATIENT)
Dept: FAMILY MEDICINE | Facility: CLINIC | Age: 38
End: 2023-08-22
Payer: COMMERCIAL

## 2023-08-22 DIAGNOSIS — K59.09 OTHER CONSTIPATION: ICD-10-CM

## 2023-08-22 DIAGNOSIS — E87.1 HYPONATREMIA: ICD-10-CM

## 2023-08-22 DIAGNOSIS — E56.9 VITAMIN DEFICIENCY: ICD-10-CM

## 2023-08-22 DIAGNOSIS — Z91.018 FOOD ALLERGY: Primary | ICD-10-CM

## 2023-08-22 PROCEDURE — 99204 OFFICE O/P NEW MOD 45 MIN: CPT | Mod: VID | Performed by: FAMILY MEDICINE

## 2023-08-22 RX ORDER — AMOXICILLIN 250 MG
1 CAPSULE ORAL 2 TIMES DAILY PRN
Qty: 60 TABLET | Refills: 1 | Status: ON HOLD | OUTPATIENT
Start: 2023-08-22 | End: 2023-12-08

## 2023-08-22 RX ORDER — VITAMIN B COMPLEX
1 TABLET ORAL DAILY
Qty: 30 TABLET | Refills: 1 | Status: SHIPPED | OUTPATIENT
Start: 2023-08-22 | End: 2023-10-30

## 2023-08-22 RX ORDER — CHLORAL HYDRATE 500 MG
1 CAPSULE ORAL DAILY
Qty: 30 CAPSULE | Refills: 1 | Status: SHIPPED | OUTPATIENT
Start: 2023-08-22 | End: 2023-10-30

## 2023-08-22 ASSESSMENT — PATIENT HEALTH QUESTIONNAIRE - PHQ9
SUM OF ALL RESPONSES TO PHQ QUESTIONS 1-9: 8
10. IF YOU CHECKED OFF ANY PROBLEMS, HOW DIFFICULT HAVE THESE PROBLEMS MADE IT FOR YOU TO DO YOUR WORK, TAKE CARE OF THINGS AT HOME, OR GET ALONG WITH OTHER PEOPLE: SOMEWHAT DIFFICULT
SUM OF ALL RESPONSES TO PHQ QUESTIONS 1-9: 8

## 2023-08-22 NOTE — PROGRESS NOTES
Kimmy is a 37 year old who is being evaluated via a billable video visit.      How would you like to obtain your AVS? MyChart  If the video visit is dropped, the invitation should be resent by: Text to cell phone: [phone]  Will anyone else be joining your video visit? Yes: Home Care RN. How would they like to receive their invitation? Other e-mail: N/A--with patient      Assessment & Plan     Vitamin deficiency  Refilled meds today  - Vitamin D3 (CHOLECALCIFEROL) 25 mcg (1000 units) tablet; Take 1 tablet (25 mcg) by mouth daily  - fish oil-omega-3 fatty acids 1000 MG capsule; Take 1 capsule (1 g) by mouth daily    Other constipation  Refilled meds today  - senna-docusate (SENOKOT-S/PERICOLACE) 8.6-50 MG tablet; Take 1 tablet by mouth 2 times daily as needed for constipation    Food allergy  She's worried she might have a food allergy  - Allergy adult food panel; Future    Hyponatremia  New problem noted 7/25/23  New, previously undiagnosed problem with uncertain prognosis and additional work-up planned.   Medication side effect vs excessive water intake vs other metabolic/endocrine (unlikely given other normal labs)  Recommended restricting fluids to 24 oz daily, using ice chips as needed for dry mouth  Recheck labs  Will fu as indicated.   - Comprehensive metabolic panel (BMP + Alb, Alk Phos, ALT, AST, Total. Bili, TP); Future    Olga Harper MD  [facility]   Kimmy is a 37 year old, presenting for the following health issues:  Hyponatremia  Kimmy had low sodium 126 7/25/23, noted on admission, appeared to resolve without specific treatment. She reports she was diagnosed with hormone problem in [place], she was treated with thyroid medication for awhile. She is not currently on thyroid medication and most recent labs have been normal (last done 6/23/2023).    Recheck Medication (Refills)        8/22/2023     9:36 AM   Additional Questions   Roomed by Mariya    Accompanied by Yes--home care nurse       History of Present Illness       Reason for visit:  Med refills   She is taking medications regularly.         Review of Systems   Constitutional, HEENT, cardiovascular, pulmonary, GI, , musculoskeletal, neuro, skin, endocrine and psych systems are negative, except as otherwise noted.      Objective         Vitals:  No vitals were obtained today due to virtual visit.    Physical Exam   GENERAL: Healthy, alert and no distress, accompanied by her nurse Luma.  EYES: Eyes grossly normal to inspection.  No discharge or erythema, or obvious scleral/conjunctival abnormalities.  RESP: No audible wheeze, cough, or visible cyanosis.  No visible retractions or increased work of breathing.    SKIN: Visible skin clear. No significant rash, abnormal pigmentation or lesions.  NEURO: Cranial nerves grossly intact.  Mentation and speech appropriate for age.  PSYCH: Mentation appears normal, affect normal/bright, judgement and insight intact, normal speech and appearance well-groomed.    Sodium   Date Value Ref Range Status   07/29/2023 134 (L) 136 - 145 mmol/L Final   07/13/2015 139 133 - 144 mmol/L Final           Video-Visit Details    Type of service:  Video Visit   Video Start Time: 10:05 AM  Video End Time:10:23 AM    Originating Location (pt. Location): Home    Distant Location (provider location):  Off-site  Platform used for Video Visit: Kristan

## 2023-08-24 ENCOUNTER — OFFICE VISIT (OUTPATIENT)
Dept: FAMILY MEDICINE | Facility: CLINIC | Age: 38
End: 2023-08-24
Payer: COMMERCIAL

## 2023-08-24 VITALS
TEMPERATURE: 96.9 F | BODY MASS INDEX: 21.68 KG/M2 | WEIGHT: 127 LBS | HEIGHT: 64 IN | RESPIRATION RATE: 18 BRPM | SYSTOLIC BLOOD PRESSURE: 125 MMHG | OXYGEN SATURATION: 98 % | DIASTOLIC BLOOD PRESSURE: 84 MMHG | HEART RATE: 79 BPM

## 2023-08-24 DIAGNOSIS — T14.8XXA BRUISING: Primary | ICD-10-CM

## 2023-08-24 LAB
ALBUMIN SERPL BCG-MCNC: 4.3 G/DL (ref 3.5–5.2)
ALP SERPL-CCNC: 86 U/L (ref 35–104)
ALT SERPL W P-5'-P-CCNC: 27 U/L (ref 0–50)
ANION GAP SERPL CALCULATED.3IONS-SCNC: 9 MMOL/L (ref 7–15)
AST SERPL W P-5'-P-CCNC: 28 U/L (ref 0–45)
BILIRUB SERPL-MCNC: <0.2 MG/DL
BUN SERPL-MCNC: 11 MG/DL (ref 6–20)
CALCIUM SERPL-MCNC: 9.5 MG/DL (ref 8.6–10)
CHLORIDE SERPL-SCNC: 104 MMOL/L (ref 98–107)
CREAT SERPL-MCNC: 0.57 MG/DL (ref 0.51–0.95)
DEPRECATED HCO3 PLAS-SCNC: 25 MMOL/L (ref 22–29)
GFR SERPL CREATININE-BSD FRML MDRD: >90 ML/MIN/1.73M2
GLUCOSE SERPL-MCNC: 90 MG/DL (ref 70–99)
POTASSIUM SERPL-SCNC: 4.6 MMOL/L (ref 3.4–5.3)
PROT SERPL-MCNC: 6.6 G/DL (ref 6.4–8.3)
SODIUM SERPL-SCNC: 138 MMOL/L (ref 136–145)

## 2023-08-24 PROCEDURE — 36415 COLL VENOUS BLD VENIPUNCTURE: CPT | Performed by: PHYSICIAN ASSISTANT

## 2023-08-24 PROCEDURE — 99213 OFFICE O/P EST LOW 20 MIN: CPT | Performed by: PHYSICIAN ASSISTANT

## 2023-08-24 PROCEDURE — 80053 COMPREHEN METABOLIC PANEL: CPT | Performed by: PHYSICIAN ASSISTANT

## 2023-08-24 PROCEDURE — 86003 ALLG SPEC IGE CRUDE XTRC EA: CPT | Performed by: PHYSICIAN ASSISTANT

## 2023-08-24 ASSESSMENT — PAIN SCALES - GENERAL: PAINLEVEL: NO PAIN (0)
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
## 2023-08-24 NOTE — Clinical Note
Please abstract the following data from this visit with this patient into the appropriate field in Epic:  Tests that can be patient reported without a hard copy:  Pap smear done on this date: 03/06/2018 (approximately), by this group: [facility].

## 2023-08-25 LAB
ALMOND IGE QN: <0.1 KU(A)/L
CASHEW NUT IGE QN: <0.1 KU(A)/L
CODFISH IGE QN: <0.1 KU(A)/L
COW MILK IGE QN: <0.1 KU(A)/L
EGG WHITE IGE QN: <0.1 KU(A)/L
HAZELNUT IGE QN: <0.1 KU(A)/L
IGE SERPL-ACNC: 5 KU/L (ref 0–114)
PEANUT IGE QN: <0.1 KU(A)/L
SALMON IGE QN: <0.1 KU(A)/L
SCALLOP IGE QN: <0.1 KU(A)/L
SESAME SEED IGE QN: <0.1 KU(A)/L
SHRIMP IGE QN: <0.1 KU(A)/L
SOYBEAN IGE QN: <0.1 KU(A)/L
TUNA IGE QN: <0.1 KU(A)/L
WALNUT IGE QN: <0.1 KU(A)/L
WHEAT IGE QN: <0.1 KU(A)/L

## 2023-09-05 ENCOUNTER — ANCILLARY PROCEDURE (OUTPATIENT)
Dept: NEUROLOGY | Facility: CLINIC | Age: 38
End: 2023-09-05
Attending: PSYCHIATRY & NEUROLOGY
Payer: COMMERCIAL

## 2023-09-05 DIAGNOSIS — R56.9 SEIZURES (H): ICD-10-CM

## 2023-09-13 ENCOUNTER — OFFICE VISIT (OUTPATIENT)
Dept: FAMILY MEDICINE | Facility: CLINIC | Age: 38
End: 2023-09-13
Payer: COMMERCIAL

## 2023-09-13 VITALS
HEART RATE: 63 BPM | WEIGHT: 127 LBS | TEMPERATURE: 97.3 F | BODY MASS INDEX: 21.68 KG/M2 | RESPIRATION RATE: 18 BRPM | OXYGEN SATURATION: 100 % | SYSTOLIC BLOOD PRESSURE: 105 MMHG | HEIGHT: 64 IN | DIASTOLIC BLOOD PRESSURE: 70 MMHG

## 2023-09-13 DIAGNOSIS — R14.0 ABDOMINAL BLOATING: ICD-10-CM

## 2023-09-13 DIAGNOSIS — Z23 NEED FOR VACCINATION: ICD-10-CM

## 2023-09-13 DIAGNOSIS — R14.2 FLATULENCE, ERUCTATION AND GAS PAIN: ICD-10-CM

## 2023-09-13 DIAGNOSIS — R14.3 FLATULENCE, ERUCTATION AND GAS PAIN: ICD-10-CM

## 2023-09-13 DIAGNOSIS — R14.1 FLATULENCE, ERUCTATION AND GAS PAIN: ICD-10-CM

## 2023-09-13 PROCEDURE — 90471 IMMUNIZATION ADMIN: CPT | Performed by: FAMILY MEDICINE

## 2023-09-13 PROCEDURE — 99213 OFFICE O/P EST LOW 20 MIN: CPT | Mod: 25 | Performed by: FAMILY MEDICINE

## 2023-09-13 PROCEDURE — 90682 RIV4 VACC RECOMBINANT DNA IM: CPT | Performed by: FAMILY MEDICINE

## 2023-09-13 NOTE — PROGRESS NOTES
Assessment & Plan     Flatulence, eructation and gas pain  Abdominal bloating  - Reviewed chart - normal wheat allergy panel unlikely due to celiac. Normal TSH labs  - We discussed continuing fiber/water intake and Senokot PRN for daily BM.  - Follow up in 2 months.     Need for vaccination  - INFLUENZA VACCINE 18-64Y (FLUBLOK)       Pavan Gibbs MD  [facility]    Briseyda Oneal is a 37 year old, presenting for the following health issues:  Hospital F/U      9/13/2023    10:16 AM   Additional Questions   Roomed by sesar   Accompanied by self       HPI       She feels that her stomach intermittent bloated, gassy and increase passing gas after eating. She has daily BM after starting senokot once at night. She feels that symptoms are better. Normal diet. She used to drink lactose free milk.       Review of Systems         Objective    LMP 08/18/2023 (Approximate)   There is no height or weight on file to calculate BMI.  Physical Exam

## 2023-09-18 ENCOUNTER — VIRTUAL VISIT (OUTPATIENT)
Dept: NEUROLOGY | Facility: CLINIC | Age: 38
End: 2023-09-18
Attending: STUDENT IN AN ORGANIZED HEALTH CARE EDUCATION/TRAINING PROGRAM
Payer: COMMERCIAL

## 2023-09-18 DIAGNOSIS — R40.4 NONSPECIFIC PAROXYSMAL SPELL: Primary | ICD-10-CM

## 2023-09-18 RX ORDER — MAGNESIUM 30 MG
30 TABLET ORAL DAILY
Status: ON HOLD | COMMUNITY
End: 2023-12-05

## 2023-09-18 RX ORDER — OLANZAPINE 20 MG/1
20 TABLET ORAL 3 TIMES DAILY PRN
Status: ON HOLD | COMMUNITY
End: 2023-12-05
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
## 2023-09-21 ENCOUNTER — TELEPHONE (OUTPATIENT)
Dept: NEUROLOGY | Facility: CLINIC | Age: 38
End: 2023-09-21

## 2023-09-21 NOTE — LETTER
[facility]  [address], [address]  [facility] [address]  Phone: [phone]  Fax: [phone]                 SEIZURE PLAN AND PROTOCOL    Type of Seizure(s):  Possible non-epileptic spells       Plan of Care:    Follow general seizure care and First Aid guidelines for seizures.  Anticonvulsant medications will be administered as prescribed.  All seizures will be documented on a Seizure Report including:  date, time, length of seizure, specific body movements and behaviors exhibited during the seizure, and post-seizure state.  Antiepileptic blood levels will be ordered by the physician based upon client's condition and medications.    Missed Doses:   (No anticonvulsants prescribed at this time)    IF YOU REALIZE WITHIN 24 HOURS:    ...You MISSED ONE DOSE of your anticonvulsant medication(s), take the missed dose immediately unless it is time for your next scheduled dose. If that is the case, take your next scheduled dose, wait two hours, and then take the missed dose.    ...You MISSED TWO OR MORE DOSES, take one of the missed doses immediately, even if it is time for your next scheduled dose. Then call the [facility] clinic to schedule an appointment.     IF YOU REALIZE NOW YOU MISSED A DOSE MORE THAN 24 HOURS AGO, jake it on your calendar. DO NOT take an extra dose.    Medication Errors:    Your facility nurse should be notified of any medication errors. The nurse should observe the urgency of the error. It is not necessary to notify the MD on call unless the facility nurse or delegate believes it to be life threatening or could possibly result in a serious complication. Routine notices required by regulation should be telephoned to the clinic during office hours.    Extra Dose:    An extra dose of an antiepileptic drug is not serious. Ordinarily, at most, the client may experience increased side effects for several hours. Contact your facility nurse immediately  if an extra dose was given.        When to call 911 for Seizures:    Call 911 if Kimmy:    ... does not start breathing within 1 minute after the seizure. If this happens call 911 immediately and start CPR.    ...sustains an injury    ... has one seizure right after another without regaining consciousness in between or a convulsive seizure that lasts over 5 minutes    ... requests an ambulance    Or facility protocol requires activation of emergency medical services        Provider:      Jacy Ruelas M.D.

## 2023-09-21 NOTE — TELEPHONE ENCOUNTER
What is the concern that needs to be addressed by a nurse? Luma from [facility] is looking for seizure protocol and how appointment went with doctor.. call back number is [phone]    May a detailed message be left on voicemail? yes    Date of last office visit:     Message routed to: mincep

## 2023-10-03 NOTE — TELEPHONE ENCOUNTER
Writer called and spoke with Luma.  Reviewed appointment and next follow up appointment.  Will send seizure protocol once a provider signs it.    Fax number [phone]

## 2023-10-30 DIAGNOSIS — E56.9 VITAMIN DEFICIENCY: ICD-10-CM

## 2023-10-30 RX ORDER — VITAMIN B COMPLEX
1 TABLET ORAL DAILY
Qty: 90 TABLET | Refills: 1 | Status: ON HOLD | OUTPATIENT
Start: 2023-10-30 | End: 2023-12-08

## 2023-10-30 RX ORDER — CHLORAL HYDRATE 500 MG
1 CAPSULE ORAL DAILY
Qty: 30 CAPSULE | Refills: 1 | OUTPATIENT
Start: 2023-10-30

## 2023-12-02 ENCOUNTER — HOSPITAL ENCOUNTER (INPATIENT)
Facility: CLINIC | Age: 38
LOS: 6 days | Discharge: PSYCHIATRIC OR SUBSTANCE USE FACILITY | End: 2023-12-08
Attending: EMERGENCY MEDICINE | Admitting: INTERNAL MEDICINE
Payer: COMMERCIAL

## 2023-12-02 ENCOUNTER — APPOINTMENT (OUTPATIENT)
Dept: GENERAL RADIOLOGY | Facility: CLINIC | Age: 38
End: 2023-12-02
Attending: EMERGENCY MEDICINE
Payer: COMMERCIAL

## 2023-12-02 ENCOUNTER — APPOINTMENT (OUTPATIENT)
Dept: CT IMAGING | Facility: CLINIC | Age: 38
End: 2023-12-02
Attending: EMERGENCY MEDICINE
Payer: COMMERCIAL

## 2023-12-02 DIAGNOSIS — T50.902A OVERDOSE, INTENTIONAL SELF-HARM, INITIAL ENCOUNTER (H): ICD-10-CM

## 2023-12-02 DIAGNOSIS — F41.1 GAD (GENERALIZED ANXIETY DISORDER): ICD-10-CM

## 2023-12-02 DIAGNOSIS — F31.12 BIPOLAR 1 DISORDER, MANIC, MODERATE (H): Primary | ICD-10-CM

## 2023-12-02 DIAGNOSIS — J96.00 ACUTE RESPIRATORY FAILURE, UNSPECIFIED WHETHER WITH HYPOXIA OR HYPERCAPNIA (H): ICD-10-CM

## 2023-12-02 DIAGNOSIS — F33.2 SEVERE EPISODE OF RECURRENT MAJOR DEPRESSIVE DISORDER, WITHOUT PSYCHOTIC FEATURES (H): ICD-10-CM

## 2023-12-02 LAB
ALBUMIN SERPL BCG-MCNC: 4.6 G/DL (ref 3.5–5.2)
ALBUMIN UR-MCNC: 20 MG/DL
ALP SERPL-CCNC: 67 U/L (ref 40–150)
ALT SERPL W P-5'-P-CCNC: 15 U/L (ref 0–50)
AMPHETAMINES UR QL SCN: ABNORMAL
ANION GAP BLD CALCULATED.3IONS-SCNC: 18 MMOL/L (ref 3–14)
ANION GAP SERPL CALCULATED.3IONS-SCNC: 16 MMOL/L (ref 7–15)
APAP SERPL-MCNC: <5 UG/ML (ref 10–30)
APPEARANCE UR: ABNORMAL
AST SERPL W P-5'-P-CCNC: 23 U/L (ref 0–45)
BACTERIA #/AREA URNS HPF: ABNORMAL /HPF
BARBITURATES UR QL SCN: ABNORMAL
BASOPHILS # BLD AUTO: 0 10E3/UL (ref 0–0.2)
BASOPHILS NFR BLD AUTO: 0 %
BENZODIAZ UR QL SCN: ABNORMAL
BILIRUB SERPL-MCNC: 0.5 MG/DL
BILIRUB UR QL STRIP: NEGATIVE
BUN SERPL-MCNC: 8 MG/DL (ref 7–30)
BUN SERPL-MCNC: 9.1 MG/DL (ref 6–20)
BZE UR QL SCN: ABNORMAL
CA-I BLD-MCNC: 5.1 MG/DL (ref 4.4–5.2)
CALCIUM SERPL-MCNC: 10.5 MG/DL (ref 8.6–10)
CANNABINOIDS UR QL SCN: ABNORMAL
CHLORIDE BLD-SCNC: 99 MMOL/L (ref 94–109)
CHLORIDE SERPL-SCNC: 96 MMOL/L (ref 98–107)
CK SERPL-CCNC: 39 U/L (ref 26–192)
CO2 BLD-SCNC: 23 MMOL/L (ref 20–32)
COLOR UR AUTO: YELLOW
CREAT BLD-MCNC: 0.5 MG/DL (ref 0.5–1)
CREAT SERPL-MCNC: 0.51 MG/DL (ref 0.51–0.95)
DEPRECATED HCO3 PLAS-SCNC: 23 MMOL/L (ref 22–29)
EGFRCR SERPLBLD CKD-EPI 2021: >90 ML/MIN/1.73M2
EOSINOPHIL # BLD AUTO: 0 10E3/UL (ref 0–0.7)
EOSINOPHIL NFR BLD AUTO: 0 %
ERYTHROCYTE [DISTWIDTH] IN BLOOD BY AUTOMATED COUNT: 13.4 % (ref 10–15)
ETHANOL SERPL-MCNC: <0.01 G/DL
FENTANYL UR QL: ABNORMAL
FLUAV RNA SPEC QL NAA+PROBE: NEGATIVE
FLUBV RNA RESP QL NAA+PROBE: NEGATIVE
GLUCOSE BLD-MCNC: 153 MG/DL (ref 70–99)
GLUCOSE BLDC GLUCOMTR-MCNC: 109 MG/DL (ref 70–99)
GLUCOSE BLDC GLUCOMTR-MCNC: 151 MG/DL (ref 70–99)
GLUCOSE BLDC GLUCOMTR-MCNC: 96 MG/DL (ref 70–99)
GLUCOSE SERPL-MCNC: 151 MG/DL (ref 70–99)
GLUCOSE UR STRIP-MCNC: NEGATIVE MG/DL
HCG SER QL IA.RAPID: NEGATIVE
HCO3 BLDV-SCNC: 22 MMOL/L (ref 21–28)
HCO3 BLDV-SCNC: 23 MMOL/L (ref 21–28)
HCT VFR BLD AUTO: 40.8 % (ref 35–47)
HCT VFR BLD CALC: 42 % (ref 35–47)
HGB BLD-MCNC: 14.3 G/DL (ref 11.7–15.7)
HGB BLD-MCNC: 14.5 G/DL (ref 11.7–15.7)
HGB UR QL STRIP: NEGATIVE
HOLD SPECIMEN: NORMAL
IMM GRANULOCYTES # BLD: 0 10E3/UL
IMM GRANULOCYTES NFR BLD: 0 %
KETONES UR STRIP-MCNC: 40 MG/DL
LACTATE BLD-SCNC: 3.4 MMOL/L
LACTATE BLD-SCNC: 3.4 MMOL/L
LEUKOCYTE ESTERASE UR QL STRIP: NEGATIVE
LYMPHOCYTES # BLD AUTO: 0.7 10E3/UL (ref 0.8–5.3)
LYMPHOCYTES NFR BLD AUTO: 7 %
MCH RBC QN AUTO: 29.4 PG (ref 26.5–33)
MCHC RBC AUTO-ENTMCNC: 35.5 G/DL (ref 31.5–36.5)
MCV RBC AUTO: 83 FL (ref 78–100)
MONOCYTES # BLD AUTO: 0.5 10E3/UL (ref 0–1.3)
MONOCYTES NFR BLD AUTO: 5 %
MUCOUS THREADS #/AREA URNS LPF: PRESENT /LPF
NEUTROPHILS # BLD AUTO: 9.2 10E3/UL (ref 1.6–8.3)
NEUTROPHILS NFR BLD AUTO: 88 %
NITRATE UR QL: NEGATIVE
NRBC # BLD AUTO: 0 10E3/UL
NRBC BLD AUTO-RTO: 0 /100
OPIATES UR QL SCN: ABNORMAL
PCO2 BLDV: 32 MM HG (ref 40–50)
PCO2 BLDV: 42 MM HG (ref 40–50)
PCP QUAL URINE (ROCHE): ABNORMAL
PH BLDV: 7.33 [PH] (ref 7.32–7.43)
PH BLDV: 7.46 [PH] (ref 7.32–7.43)
PH UR STRIP: 7 [PH] (ref 5–7)
PLATELET # BLD AUTO: 270 10E3/UL (ref 150–450)
PO2 BLDV: 45 MM HG (ref 25–47)
PO2 BLDV: 50 MM HG (ref 25–47)
POTASSIUM BLD-SCNC: 3.6 MMOL/L (ref 3.4–5.3)
POTASSIUM SERPL-SCNC: 3.7 MMOL/L (ref 3.4–5.3)
PROT SERPL-MCNC: 7.2 G/DL (ref 6.4–8.3)
RBC # BLD AUTO: 4.93 10E6/UL (ref 3.8–5.2)
RBC URINE: 2 /HPF
RSV RNA SPEC NAA+PROBE: NEGATIVE
SALICYLATES SERPL-MCNC: <0.3 MG/DL
SAO2 % BLDV: 77 % (ref 94–100)
SAO2 % BLDV: 87 % (ref 94–100)
SARS-COV-2 RNA RESP QL NAA+PROBE: NEGATIVE
SODIUM BLD-SCNC: 135 MMOL/L (ref 133–144)
SODIUM SERPL-SCNC: 135 MMOL/L (ref 135–145)
SP GR UR STRIP: 1.02 (ref 1–1.03)
SQUAMOUS EPITHELIAL: <1 /HPF
UROBILINOGEN UR STRIP-MCNC: NORMAL MG/DL
WBC # BLD AUTO: 10.4 10E3/UL (ref 4–11)
WBC CLUMPS #/AREA URNS HPF: PRESENT /HPF
WBC URINE: 4 /HPF
YEAST #/AREA URNS HPF: ABNORMAL /HPF

## 2023-12-02 PROCEDURE — 99223 1ST HOSP IP/OBS HIGH 75: CPT | Performed by: INTERNAL MEDICINE

## 2023-12-02 PROCEDURE — 80179 DRUG ASSAY SALICYLATE: CPT | Performed by: EMERGENCY MEDICINE

## 2023-12-02 PROCEDURE — 85014 HEMATOCRIT: CPT | Performed by: EMERGENCY MEDICINE

## 2023-12-02 PROCEDURE — 250N000011 HC RX IP 250 OP 636: Mod: JZ | Performed by: EMERGENCY MEDICINE

## 2023-12-02 PROCEDURE — 82803 BLOOD GASES ANY COMBINATION: CPT

## 2023-12-02 PROCEDURE — 93005 ELECTROCARDIOGRAM TRACING: CPT

## 2023-12-02 PROCEDURE — 99292 CRITICAL CARE ADDL 30 MIN: CPT

## 2023-12-02 PROCEDURE — 250N000011 HC RX IP 250 OP 636

## 2023-12-02 PROCEDURE — 96368 THER/DIAG CONCURRENT INF: CPT

## 2023-12-02 PROCEDURE — 94002 VENT MGMT INPAT INIT DAY: CPT

## 2023-12-02 PROCEDURE — 96375 TX/PRO/DX INJ NEW DRUG ADDON: CPT

## 2023-12-02 PROCEDURE — 250N000013 HC RX MED GY IP 250 OP 250 PS 637: Performed by: EMERGENCY MEDICINE

## 2023-12-02 PROCEDURE — 71045 X-RAY EXAM CHEST 1 VIEW: CPT

## 2023-12-02 PROCEDURE — 99291 CRITICAL CARE FIRST HOUR: CPT | Mod: 25

## 2023-12-02 PROCEDURE — 84703 CHORIONIC GONADOTROPIN ASSAY: CPT

## 2023-12-02 PROCEDURE — 200N000001 HC R&B ICU

## 2023-12-02 PROCEDURE — 258N000003 HC RX IP 258 OP 636: Performed by: INTERNAL MEDICINE

## 2023-12-02 PROCEDURE — 80143 DRUG ASSAY ACETAMINOPHEN: CPT | Performed by: EMERGENCY MEDICINE

## 2023-12-02 PROCEDURE — 999N000157 HC STATISTIC RCP TIME EA 10 MIN

## 2023-12-02 PROCEDURE — 250N000011 HC RX IP 250 OP 636: Performed by: INTERNAL MEDICINE

## 2023-12-02 PROCEDURE — 250N000011 HC RX IP 250 OP 636: Performed by: EMERGENCY MEDICINE

## 2023-12-02 PROCEDURE — 36415 COLL VENOUS BLD VENIPUNCTURE: CPT | Performed by: EMERGENCY MEDICINE

## 2023-12-02 PROCEDURE — 5A1935Z RESPIRATORY VENTILATION, LESS THAN 24 CONSECUTIVE HOURS: ICD-10-PCS | Performed by: EMERGENCY MEDICINE

## 2023-12-02 PROCEDURE — 81001 URINALYSIS AUTO W/SCOPE: CPT | Performed by: EMERGENCY MEDICINE

## 2023-12-02 PROCEDURE — 87040 BLOOD CULTURE FOR BACTERIA: CPT | Performed by: EMERGENCY MEDICINE

## 2023-12-02 PROCEDURE — 83605 ASSAY OF LACTIC ACID: CPT

## 2023-12-02 PROCEDURE — 82077 ASSAY SPEC XCP UR&BREATH IA: CPT | Performed by: EMERGENCY MEDICINE

## 2023-12-02 PROCEDURE — 82962 GLUCOSE BLOOD TEST: CPT

## 2023-12-02 PROCEDURE — 80053 COMPREHEN METABOLIC PANEL: CPT | Performed by: EMERGENCY MEDICINE

## 2023-12-02 PROCEDURE — 999N000065 XR CHEST PORT 1 VIEW

## 2023-12-02 PROCEDURE — 80047 BASIC METABLC PNL IONIZED CA: CPT

## 2023-12-02 PROCEDURE — 96365 THER/PROPH/DIAG IV INF INIT: CPT | Mod: 59

## 2023-12-02 PROCEDURE — 96376 TX/PRO/DX INJ SAME DRUG ADON: CPT

## 2023-12-02 PROCEDURE — 258N000003 HC RX IP 258 OP 636: Performed by: EMERGENCY MEDICINE

## 2023-12-02 PROCEDURE — 80307 DRUG TEST PRSMV CHEM ANLYZR: CPT | Performed by: EMERGENCY MEDICINE

## 2023-12-02 PROCEDURE — 31500 INSERT EMERGENCY AIRWAY: CPT

## 2023-12-02 PROCEDURE — 70450 CT HEAD/BRAIN W/O DYE: CPT

## 2023-12-02 PROCEDURE — 250N000009 HC RX 250: Performed by: EMERGENCY MEDICINE

## 2023-12-02 PROCEDURE — 99291 CRITICAL CARE FIRST HOUR: CPT | Mod: 95 | Performed by: INTERNAL MEDICINE

## 2023-12-02 PROCEDURE — 96374 THER/PROPH/DIAG INJ IV PUSH: CPT

## 2023-12-02 PROCEDURE — 82550 ASSAY OF CK (CPK): CPT | Performed by: INTERNAL MEDICINE

## 2023-12-02 PROCEDURE — 87637 SARSCOV2&INF A&B&RSV AMP PRB: CPT | Performed by: EMERGENCY MEDICINE

## 2023-12-02 RX ORDER — ETOMIDATE 2 MG/ML
20 INJECTION INTRAVENOUS ONCE
Status: COMPLETED | OUTPATIENT
Start: 2023-12-02 | End: 2023-12-02

## 2023-12-02 RX ORDER — ONDANSETRON 4 MG/1
4 TABLET, ORALLY DISINTEGRATING ORAL EVERY 6 HOURS PRN
Status: DISCONTINUED | OUTPATIENT
Start: 2023-12-02 | End: 2023-12-09 | Stop reason: HOSPADM

## 2023-12-02 RX ORDER — ACETAMINOPHEN 650 MG/1
650 SUPPOSITORY RECTAL ONCE
Status: COMPLETED | OUTPATIENT
Start: 2023-12-02 | End: 2023-12-02

## 2023-12-02 RX ORDER — CEFTRIAXONE 2 G/1
2 INJECTION, POWDER, FOR SOLUTION INTRAMUSCULAR; INTRAVENOUS ONCE
Status: COMPLETED | OUTPATIENT
Start: 2023-12-02 | End: 2023-12-02

## 2023-12-02 RX ORDER — PROCHLORPERAZINE MALEATE 5 MG
10 TABLET ORAL EVERY 6 HOURS PRN
Status: DISCONTINUED | OUTPATIENT
Start: 2023-12-02 | End: 2023-12-09 | Stop reason: HOSPADM

## 2023-12-02 RX ORDER — HYDROMORPHONE HYDROCHLORIDE 1 MG/ML
INJECTION, SOLUTION INTRAMUSCULAR; INTRAVENOUS; SUBCUTANEOUS
Status: COMPLETED
Start: 2023-12-02 | End: 2023-12-02

## 2023-12-02 RX ORDER — DEXTROSE MONOHYDRATE 25 G/50ML
25-50 INJECTION, SOLUTION INTRAVENOUS
Status: DISCONTINUED | OUTPATIENT
Start: 2023-12-02 | End: 2023-12-09 | Stop reason: HOSPADM

## 2023-12-02 RX ORDER — MAGNESIUM SULFATE HEPTAHYDRATE 40 MG/ML
2 INJECTION, SOLUTION INTRAVENOUS ONCE
Status: COMPLETED | OUTPATIENT
Start: 2023-12-02 | End: 2023-12-02

## 2023-12-02 RX ORDER — ONDANSETRON 2 MG/ML
4 INJECTION INTRAMUSCULAR; INTRAVENOUS EVERY 6 HOURS PRN
Status: DISCONTINUED | OUTPATIENT
Start: 2023-12-02 | End: 2023-12-09 | Stop reason: HOSPADM

## 2023-12-02 RX ORDER — LORAZEPAM 2 MG/ML
2 INJECTION INTRAMUSCULAR ONCE
Status: COMPLETED | OUTPATIENT
Start: 2023-12-02 | End: 2023-12-02

## 2023-12-02 RX ORDER — NALOXONE HYDROCHLORIDE 0.4 MG/ML
0.2 INJECTION, SOLUTION INTRAMUSCULAR; INTRAVENOUS; SUBCUTANEOUS
Status: DISCONTINUED | OUTPATIENT
Start: 2023-12-02 | End: 2023-12-09 | Stop reason: HOSPADM

## 2023-12-02 RX ORDER — HYDROMORPHONE HYDROCHLORIDE 1 MG/ML
.5-1 INJECTION, SOLUTION INTRAMUSCULAR; INTRAVENOUS; SUBCUTANEOUS EVERY 30 MIN PRN
Status: COMPLETED | OUTPATIENT
Start: 2023-12-02 | End: 2023-12-03

## 2023-12-02 RX ORDER — NALOXONE HYDROCHLORIDE 0.4 MG/ML
0.4 INJECTION, SOLUTION INTRAMUSCULAR; INTRAVENOUS; SUBCUTANEOUS
Status: DISCONTINUED | OUTPATIENT
Start: 2023-12-02 | End: 2023-12-09 | Stop reason: HOSPADM

## 2023-12-02 RX ORDER — MIDAZOLAM HCL IN 0.9 % NACL/PF 1 MG/ML
1-8 PLASTIC BAG, INJECTION (ML) INTRAVENOUS CONTINUOUS
Status: DISCONTINUED | OUTPATIENT
Start: 2023-12-02 | End: 2023-12-04

## 2023-12-02 RX ORDER — HYDROMORPHONE HCL IN WATER/PF 6 MG/30 ML
.2-1 PATIENT CONTROLLED ANALGESIA SYRINGE INTRAVENOUS EVERY 30 MIN PRN
Status: DISCONTINUED | OUTPATIENT
Start: 2023-12-02 | End: 2023-12-02

## 2023-12-02 RX ORDER — NICOTINE POLACRILEX 4 MG
15-30 LOZENGE BUCCAL
Status: DISCONTINUED | OUTPATIENT
Start: 2023-12-02 | End: 2023-12-09 | Stop reason: HOSPADM

## 2023-12-02 RX ORDER — PROCHLORPERAZINE 25 MG
25 SUPPOSITORY, RECTAL RECTAL EVERY 12 HOURS PRN
Status: DISCONTINUED | OUTPATIENT
Start: 2023-12-02 | End: 2023-12-09 | Stop reason: HOSPADM

## 2023-12-02 RX ORDER — LORAZEPAM 2 MG/ML
INJECTION INTRAMUSCULAR
Status: COMPLETED
Start: 2023-12-02 | End: 2023-12-02

## 2023-12-02 RX ORDER — MIDAZOLAM HCL IN 0.9 % NACL/PF 1 MG/ML
1-8 PLASTIC BAG, INJECTION (ML) INTRAVENOUS CONTINUOUS
Status: DISCONTINUED | OUTPATIENT
Start: 2023-12-02 | End: 2023-12-02

## 2023-12-02 RX ORDER — SODIUM CHLORIDE 9 MG/ML
INJECTION, SOLUTION INTRAVENOUS CONTINUOUS
Status: DISCONTINUED | OUTPATIENT
Start: 2023-12-02 | End: 2023-12-03

## 2023-12-02 RX ADMIN — HYDROMORPHONE HYDROCHLORIDE 1 MG: 1 INJECTION, SOLUTION INTRAMUSCULAR; INTRAVENOUS; SUBCUTANEOUS at 16:34

## 2023-12-02 RX ADMIN — LORAZEPAM 2 MG: 2 INJECTION INTRAMUSCULAR; INTRAVENOUS at 16:19

## 2023-12-02 RX ADMIN — SODIUM CHLORIDE 1000 ML: 9 INJECTION, SOLUTION INTRAVENOUS at 16:53

## 2023-12-02 RX ADMIN — SODIUM CHLORIDE 1000 ML: 9 INJECTION, SOLUTION INTRAVENOUS at 18:29

## 2023-12-02 RX ADMIN — Medication 1 MG: at 16:34

## 2023-12-02 RX ADMIN — HYDROMORPHONE HYDROCHLORIDE 0.5 MG: 1 INJECTION, SOLUTION INTRAMUSCULAR; INTRAVENOUS; SUBCUTANEOUS at 23:22

## 2023-12-02 RX ADMIN — LORAZEPAM 2 MG: 2 INJECTION INTRAMUSCULAR at 16:19

## 2023-12-02 RX ADMIN — ETOMIDATE 20 MG: 2 INJECTION, SOLUTION INTRAVENOUS at 16:29

## 2023-12-02 RX ADMIN — MIDAZOLAM HYDROCHLORIDE 4 MG: 1 INJECTION, SOLUTION INTRAMUSCULAR; INTRAVENOUS at 16:33

## 2023-12-02 RX ADMIN — HYDROMORPHONE HYDROCHLORIDE 1 MG: 1 INJECTION, SOLUTION INTRAMUSCULAR; INTRAVENOUS; SUBCUTANEOUS at 16:50

## 2023-12-02 RX ADMIN — ACETAMINOPHEN 650 MG: 650 SUPPOSITORY RECTAL at 18:56

## 2023-12-02 RX ADMIN — CEFTRIAXONE 2 G: 2 INJECTION, POWDER, FOR SOLUTION INTRAMUSCULAR; INTRAVENOUS at 19:25

## 2023-12-02 RX ADMIN — SODIUM CHLORIDE: 9 INJECTION, SOLUTION INTRAVENOUS at 23:16

## 2023-12-02 RX ADMIN — MAGNESIUM SULFATE HEPTAHYDRATE 2 G: 2 INJECTION, SOLUTION INTRAVENOUS at 16:22

## 2023-12-02 RX ADMIN — SUCCINYLCHOLINE CHLORIDE 80 MG: 20 INJECTION, SOLUTION INTRAMUSCULAR; INTRAVENOUS; PARENTERAL at 16:29

## 2023-12-02 RX ADMIN — MIDAZOLAM IN SODIUM CHLORIDE 2 MG/HR: 1 INJECTION INTRAVENOUS at 16:41

## 2023-12-02 ASSESSMENT — ACTIVITIES OF DAILY LIVING (ADL)
ADLS_ACUITY_SCORE: 35
ADLS_ACUITY_SCORE: 45
ADLS_ACUITY_SCORE: 35
ADLS_ACUITY_SCORE: 35

## 2023-12-02 NOTE — ED TRIAGE NOTES
Arrives via EMS for possible zyprexa overdose. Pt was found by mom unresponsive with empty pill bottles nearby PTA. Unknown how many pills or what time the pills were ingested. Tachycardic, 100% O2 sat on room air. ABCs intact. Blood sugar: 160.

## 2023-12-02 NOTE — PROGRESS NOTES
[facility] ED VENTILATOR RESPIRATORY NOTE  Date of Admission: 12/2/2023  Date of Intubation (most recent): 12/2/2023  Reason for Mechanical Ventilation: Airway protection/AMS  Number of Days on Mechanical Ventilation: 1  Significant Events Today: Pt arrived to ED on room air with a decreased LOC/AMS due to suspected overdose.  Intubation was more challenging than expected due to her small and anterior airway.  SpO2 was maintained during the procedure.  ETT is a 7.0 secured 22 cm @ the teeth.  Initial vent settings are VC f 16, Vt 450, P5, FiO2 60%, currently on 25% with SpO2 100%.  Breath sounds are equal and clear bilaterally.  Pt was transported to CT on mechanical ventilation.  She tolerated the transport well without incident.  ETT appearance on chest x-ray: Endotracheal tube tip terminates in the lower thoracic trachea, approximately 1.5 cm from the tete.

## 2023-12-02 NOTE — ED PROVIDER NOTES
History     Chief Complaint:  Drug Overdose       The history is provided by the patient.      Kimmy Mendez is a 38 year old female with history of depression with suicidal ideation who presents to the ED with drug overdose and altered mental status. Patient was not able to participate in interview due to condition. Patient was found by mom unresponsive with empty bottles bottles. Unknown when pills were taken or how many. EMS was able to find 6 empty bottles of 30 mg Zyprexa and an additional empty bottle of Vitamin D3. En route, patient's vitals were the following: /58, , tachycardic, 98% O2 on room air. Ems denies patient vomiting. Denies history of overdose.    Independent Historian:   None - Patient Only    Review of External Notes:      I reviewed the discharge summary from [facility] from July 28 to August 11 when she was hospitalized for bizarre behavior and suicidal threats.  Diagnosis during that hospitalization included bipolar affective disorder jennifer.  Plan at discharge that time was to go to the [facility].      Medications:    Zyprexa  Atarax  Senokot  Hypothyroidism   Lamictal  Remeron  Synthroid     Past Medical History:    Anxiety   Depression with suicidal ideation   Diabetes mellitus gestational  Depression   Duodenal ulcer due to Helicobacter pylori  Psychiatric pseudoseizures  Hypothyroidism  Premature atrial contractions   PTSD  Seizures  Bipolar 1 disorder   Vitamin D deficiency   Hypothyroidism     Past Surgical History:    No past surgical history on file.     Physical Exam   Patient Vitals for the past 24 hrs:   BP Temp Temp src Pulse Resp SpO2 Height Weight   12/02/23 2220 -- (!) 101.1  F (38.4  C) -- -- -- -- -- --   12/02/23 2210 -- (!) 101.1  F (38.4  C) -- -- -- -- -- --   12/02/23 2200 116/78 (!) 101.1  F (38.4  C) Rectal (!) 138 18 -- -- --   12/02/23 2150 -- (!) 101.3  F (38.5  C) -- (!) 125 -- 100 % -- --   12/02/23 2145 111/74 (!)  "101.5  F (38.6  C) -- (!) 125 15 100 % -- --   12/02/23 2140 -- -- -- (!) 124 -- 100 % -- --   12/02/23 2130 112/73 (!) 101.3  F (38.5  C) Rectal (!) 128 17 100 % 1.62 m (5' 3.78\") 59.1 kg (130 lb 4.7 oz)   12/02/23 2129 111/73 -- -- (!) 126 17 -- -- --   12/02/23 2115 -- (!) 101.1  F (38.4  C) -- 115 11 -- -- --   12/02/23 2050 112/79 (!) 101.8  F (38.8  C) -- 105 -- 100 % -- --   12/02/23 2045 113/79 (!) 101.8  F (38.8  C) -- 105 -- 100 % -- --   12/02/23 2040 111/76 (!) 101.8  F (38.8  C) -- 106 -- 100 % -- --   12/02/23 2035 114/78 (!) 101.8  F (38.8  C) -- 108 -- 100 % -- --   12/02/23 2030 116/77 (!) 101.8  F (38.8  C) -- 111 -- 100 % -- --   12/02/23 2025 120/77 (!) 101.8  F (38.8  C) -- 112 -- 100 % -- --   12/02/23 2020 121/78 (!) 102  F (38.9  C) -- 116 -- 100 % -- --   12/02/23 2015 118/84 (!) 101.7  F (38.7  C) -- (!) 121 -- 100 % -- --   12/02/23 2010 115/77 (!) 102  F (38.9  C) -- 117 -- 100 % -- --   12/02/23 2005 118/80 (!) 101.8  F (38.8  C) -- 118 -- 100 % -- --   12/02/23 2000 122/81 (!) 101.8  F (38.8  C) -- 120 -- 100 % -- --   12/02/23 1955 122/80 (!) 101.8  F (38.8  C) -- 120 -- 100 % -- --   12/02/23 1950 126/80 (!) 101.8  F (38.8  C) -- (!) 121 -- 100 % -- --   12/02/23 1945 120/80 (!) 101.8  F (38.8  C) -- (!) 123 -- 100 % -- --   12/02/23 1940 117/79 (!) 101.8  F (38.8  C) -- (!) 124 -- 100 % -- --   12/02/23 1935 121/78 (!) 101.8  F (38.8  C) -- (!) 123 -- 100 % -- --   12/02/23 1930 125/76 (!) 102  F (38.9  C) -- (!) 123 -- 100 % -- --   12/02/23 1925 116/77 (!) 101.8  F (38.8  C) -- (!) 125 -- 100 % -- --   12/02/23 1920 119/79 (!) 101.8  F (38.8  C) -- (!) 129 -- 100 % -- --   12/02/23 1905 118/81 (!) 101.8  F (38.8  C) -- (!) 130 -- 100 % -- --   12/02/23 1900 125/81 (!) 101.8  F (38.8  C) -- (!) 138 -- 100 % -- --   12/02/23 1855 128/70 (!) 101.8  F (38.8  C) -- (!) 131 -- 99 % -- --   12/02/23 1840 128/82 (!) 101.7  F (38.7  C) -- (!) 130 -- 100 % -- --   12/02/23 1830 113/77 (!) " 101.7  F (38.7  C) -- (!) 131 -- 99 % -- --   12/02/23 1825 109/77 (!) 101.7  F (38.7  C) -- (!) 131 -- 100 % -- --   12/02/23 1746 -- (!) 100.9  F (38.3  C) -- (!) 131 -- 100 % -- --   12/02/23 1745 116/79 -- -- (!) 134 -- 99 % -- --   12/02/23 1735 -- -- -- -- -- 100 % -- --   12/02/23 1730 122/81 100.4  F (38  C) -- (!) 133 -- 100 % -- --   12/02/23 1725 125/85 100.2  F (37.9  C) -- (!) 134 -- 100 % -- --   12/02/23 1720 122/84 100  F (37.8  C) -- (!) 133 -- 100 % -- --   12/02/23 1710 133/85 -- -- (!) 134 -- 100 % -- --   12/02/23 1645 -- -- -- -- -- 100 % -- --   12/02/23 1641 -- -- -- (!) 146 17 100 % -- --   12/02/23 1640 119/80 -- -- -- -- -- -- --   12/02/23 1635 120/89 -- -- (!) 149 16 100 % -- --   12/02/23 1630 (!) 160/105 -- -- (!) 146 13 100 % -- --   12/02/23 1626 -- -- -- -- 14 -- -- --   12/02/23 1625 129/89 -- -- (!) 142 -- 100 % -- --   12/02/23 1621 -- -- -- (!) 144 11 99 % -- --   12/02/23 1620 (!) 121/94 -- -- -- 10 100 % -- 57.2 kg (126 lb 1.7 oz)   12/02/23 1614 (!) 149/114 98  F (36.7  C) Temporal (!) 168 12 99 % -- --        Physical Exam    Somnolent, pooling secretions in upper airway    HENT: Atraumatic, no rhinorrhea, pupils 4 mm, no nystagmus  Neck: supple, no abnormal swelling  Lungs: Lungs clear anteriorly  CV: Tachycardic, no m/r/g, ppi  Abd: soft, nontender, nondistended, no rebound/masses/guarding/hsm  Ext: no peripheral edema, skeletal survey unremarkable for significant soft tissue swelling, wounds or major joint effusion.  Skin: warm well perfused, no rashes/bruising/lesions on exposed skin  Neuro: Significant CNS depression, withdraws to noxious stimuli in all 4 extremities.  Does not follow commands.  5-6 beats clonus bilateral lower extremities, no other neuromuscular rigidity.        Emergency Department Course   ECG 1  ECG taken at 1617, ECG read at 1618  Sinus tachycardia with premature atrial complexes   Nonspecific ST and T wave abnormality    Rate 155 bpm. MT interval  146 ms. QRS duration 62 ms. QT/QTc 334/536 ms. P-R-T axes 97 31 90.     ECG 2  ECG taken at 1818, ECG read at 1824  Sinus tachycardia  T wave abnormality, consider anterior ischemia    No significant change as compared to prior, dated 12/2/2023.  Rate 135 bpm. AL interval 192 ms. QRS duration 68 ms. QT/QTc 242/363 ms. P-R-T axes 92 61 76.     Imaging:  XR Chest Port 1 View   Final Result   IMPRESSION: Nasogastric tube terminates over the stomach. Endotracheal tube terminates 5 mm above the tete. Repositioning recommended. Heart size is normal. There is new airspace disease within the medial right lung base, atelectasis versus infiltrate.      Tube positioning was called to MD [place] by Dr. Del Kevin on 12/2/2023 8:36 PM CST.      Head CT w/o contrast   Final Result   IMPRESSION:   1.  No acute intracranial process.      XR Chest Port 1 View   Final Result   IMPRESSION: Endotracheal tube tip terminates in the lower thoracic trachea, approximately 1.5 cm from the tete. Enteric tube tip and side-port are within the stomach.      No focal airspace opacity. No pleural effusion or pneumothorax. Cardiac silhouette and mediastinal contours are normal.         Laboratory:  Labs Ordered and Resulted from Time of ED Arrival to Time of ED Departure   GLUCOSE BY METER - Abnormal       Result Value    GLUCOSE BY METER POCT 151 (*)    ISTAT BASIC CHEM ICA HEMATOCRIT POCT - Abnormal    Chloride POCT 99      Potassium POCT 3.6      Sodium POCT 135      UREA NITROGEN POCT 8      Calcium, Ionized Whole Blood POCT 5.1      Glucose Whole Blood POCT 153 (*)     Anion Gap POCT 18.0 (*)     Hemoglobin POCT 14.3      Hematocrit POCT 42      Creatinine POCT 0.5      TOTAL CO2 POCT 23     ISTAT GASES LACTATE VENOUS POCT - Abnormal    Lactic Acid POCT 3.4 (*)     Bicarbonate Venous POCT 23      O2 Sat, Venous POCT 87 (*)     pCO2 Venous POCT 32 (*)     pH Venous POCT 7.46 (*)     pO2 Venous POCT 50 (*)    ROUTINE UA WITH  MICROSCOPIC REFLEX TO CULTURE - Abnormal    Color Urine Yellow      Appearance Urine Slightly Cloudy (*)     Glucose Urine Negative      Bilirubin Urine Negative      Ketones Urine 40 (*)     Specific Gravity Urine 1.019      Blood Urine Negative      pH Urine 7.0      Protein Albumin Urine 20 (*)     Urobilinogen Urine Normal      Nitrite Urine Negative      Leukocyte Esterase Urine Negative      Bacteria Urine Moderate (*)     WBC Clumps Urine Present (*)     Budding Yeast Urine Moderate (*)     Mucus Urine Present (*)     RBC Urine 2      WBC Urine 4      Squamous Epithelials Urine <1     ACETAMINOPHEN LEVEL - Abnormal    Acetaminophen <5.0 (*)    COMPREHENSIVE METABOLIC PANEL - Abnormal    Sodium 135      Potassium 3.7      Carbon Dioxide (CO2) 23      Anion Gap 16 (*)     Urea Nitrogen 9.1      Creatinine 0.51      GFR Estimate >90      Calcium 10.5 (*)     Chloride 96 (*)     Glucose 151 (*)     Alkaline Phosphatase 67      AST 23      ALT 15      Protein Total 7.2      Albumin 4.6      Bilirubin Total 0.5     CBC WITH PLATELETS AND DIFFERENTIAL - Abnormal    WBC Count 10.4      RBC Count 4.93      Hemoglobin 14.5      Hematocrit 40.8      MCV 83      MCH 29.4      MCHC 35.5      RDW 13.4      Platelet Count 270      % Neutrophils 88      % Lymphocytes 7      % Monocytes 5      % Eosinophils 0      % Basophils 0      % Immature Granulocytes 0      NRBCs per 100 WBC 0      Absolute Neutrophils 9.2 (*)     Absolute Lymphocytes 0.7 (*)     Absolute Monocytes 0.5      Absolute Eosinophils 0.0      Absolute Basophils 0.0      Absolute Immature Granulocytes 0.0      Absolute NRBCs 0.0     URINE DRUG SCREEN PANEL - Abnormal    Amphetamines Urine Screen Negative      Barbituates Urine Screen Negative      Benzodiazepine Urine Screen Positive (*)     Cannabinoids Urine Screen Negative      Cocaine Urine Screen Negative      Fentanyl Qual Urine Screen Negative      Opiates Urine Screen Negative      PCP Urine Screen  "Negative     ISTAT GASES LACTATE VENOUS POCT - Abnormal    Lactic Acid POCT 3.4 (*)     Bicarbonate Venous POCT 22      O2 Sat, Venous POCT 77 (*)     pCO2 Venous POCT 42      pH Venous POCT 7.33      pO2 Venous POCT 45     ISTAT HCG QUALITATIVE PREGNANCY POCT - Normal    HCG Qualitative POCT Negative     ETHYL ALCOHOL LEVEL - Normal    Alcohol ethyl <0.01     SALICYLATE LEVEL - Normal    Salicylate <0.3     INFLUENZA A/B, RSV, & SARS-COV2 PCR - Normal    Influenza A PCR Negative      Influenza B PCR Negative      RSV PCR Negative      SARS CoV2 PCR Negative     LACTIC ACID WHOLE BLOOD   BLOOD CULTURE   BLOOD CULTURE        Procedures      Intubation      INDICATION: Airway protection.    PERFORMED BY: Christo Salvador MD      CONSENT: The procedure was performed in an emergent situation.    TIMEOUT: Immediately prior to procedure a \"time out\" was called to verify the correct patient, procedure, equipment, support staff and site/side marked as required.    INTUBATION METHOD: Glidescope    PATIENT STATUS: RSI    PREOXYGENATION: Mask    PRETREATMENT MEDICATIONS: None    SEDATIVES: Etomidate    PARALYTIC: rocuronium    LARYNGOSCOPE SIZE: LoPro 2    TUBE SIZE: 7.0 cuffed with cuff inflated after placement  Number of attempts: 1  Cricoid pressure: yes  Cords visualized: yes    POST-PROCEDURE ASSESSMENT: Breath sounds equal bilaterally with chest rise and absent over the epigastrium, Chest x-ray interpreted by me demonstrating endotracheal tube in appropriate position, and CO2 detector.    ETT TO TEETH: 23 cm  Tube secured with: ETT quintana    Anterior airway with small oropharynx requiring going from a MAC 3 to a low pro 2 blade and a hyper angulated stylette.  COMPLICATIONS:  None    Emergency Department Course & Assessments:    Interventions:  Medications   pantoprazole (PROTONIX) 2 mg/mL suspension 40 mg (has no administration in time range)     Or   pantoprazole (PROTONIX) IV push injection 40 mg (has no " administration in time range)   glucose gel 15-30 g (has no administration in time range)     Or   dextrose 50 % injection 25-50 mL (has no administration in time range)     Or   glucagon injection 1 mg (has no administration in time range)   midazolam (VERSED) 100mg/100mL NS infusion - ADULT (2 mg/hr Intravenous Rate/Dose Verify 12/2/23 2108)   midazolam (VERSED) 1 mg/mL bolus from syringe/bag pump ADULT (has no administration in time range)   ondansetron (ZOFRAN ODT) ODT tab 4 mg (has no administration in time range)     Or   ondansetron (ZOFRAN) injection 4 mg (has no administration in time range)   prochlorperazine (COMPAZINE) injection 10 mg (has no administration in time range)     Or   prochlorperazine (COMPAZINE) tablet 10 mg (has no administration in time range)     Or   prochlorperazine (COMPAZINE) suppository 25 mg (has no administration in time range)   sodium chloride 0.9 % infusion ( Intravenous $New Bag 12/2/23 2316)   HYDROmorphone (PF) (DILAUDID) injection 0.5-1 mg (0.5 mg Intravenous $Given 12/2/23 2322)   naloxone (NARCAN) injection 0.2 mg (has no administration in time range)     Or   naloxone (NARCAN) injection 0.4 mg (has no administration in time range)     Or   naloxone (NARCAN) injection 0.2 mg (has no administration in time range)     Or   naloxone (NARCAN) injection 0.4 mg (has no administration in time range)   LORazepam (ATIVAN) injection 2 mg (2 mg Intravenous $Given 12/2/23 1619)   magnesium sulfate 2 g in 50 mL sterile water intermittent infusion (0 g Intravenous Stopped 12/2/23 1700)   midazolam (VERSED) injection 4 mg (4 mg Intravenous $Given 12/2/23 1633)   HYDROmorphone (DILAUDID) injection 1 mg (1 mg Intravenous $Given 12/2/23 1634)   etomidate (AMIDATE) injection 20 mg (20 mg Intravenous $Given 12/2/23 1629)   succinylcholine (ANECTINE) injection 80 mg (80 mg Intravenous $Given 12/2/23 1629)   sodium chloride 0.9% BOLUS 1,000 mL (0 mLs Intravenous Stopped 12/2/23 2044)   sodium  chloride 0.9% BOLUS 1,000 mL (1,000 mLs Intravenous $New Bag 23 182)   acetaminophen (TYLENOL) Suppository 650 mg (650 mg Rectal $Given 23 1856)   cefTRIAXone (ROCEPHIN) 2 g vial to attach to  ml bag for ADULTS or NS 50 ml bag for PEDS (0 g Intravenous Stopped 23)        Assessments:  1610 I obtained the history and examined the patient as noted above.  1623 Air mask placed on patient.  1630 Patient was intubated.      Independent Interpretation (X-rays, CTs, rhythm strip):  I independently reviewed the Head CT and Chest X-ray and note no large intracranial hemorrhage, no significant cerebral edema, chest radiograph with adequate endotracheal tube placement and orogastric tube placement, no significant pleural effusion, pulmonary edema, pneumothorax or lobar opacity.    Consultations/Discussion of Management or Tests:  Discussed presenting symptoms, work-up, plan of care with the admitting hospitalist         Social Determinants of Health affecting care:   None    Disposition:  The patient was admitted to the hospital under the care of Dr. Melendrez.     Impression & Plan    CMS Diagnoses:     Lactic acid elevated due to overdose and respiratory failure rather than due to sepsis.    Medical Decision Makin-year-old female here with what is presumed to be an intentional overdose of olanzapine.  Significant CNS depression here only withdrawing to noxious stimuli not following commands.  She had a growing sounds in the oropharynx suggesting inability to adequately protect airway and so was intubated.  Interestingly airway was quite anterior with very small oral and hyperemic pharynx requiring a low pro to glide scope blade a hyper angulated stylette.    Acetaminophen level was negative.  Initial mildly prolonged QT improved with administration of magnesium and corrected.  No significant acidosis noted on her BMP.  She did develop a fever during her time in the ER which may well be due to  the overdose.  We did expand workup to see if there was a concomitant obvious infectious process.  Urinalysis without significant pyuria but does have white blood cell clumps.  Will cover with a dose of ceftriaxone for now obtain blood cultures and can determine course of antibiotics on the inpatient side.  She did not have any neuromuscular rigidity on repeat examinations and her clonus had resolved after intubation.  Maintain on a benzo drip and as needed Dilaudid for postintubation sedation and analgesia.      Critical Care time was 57 minutes for this patient excluding procedures.      Diagnosis:    ICD-10-CM    1. Overdose, intentional self-harm, initial encounter (H)  T50.902A       2. Acute respiratory failure, unspecified whether with hypoxia or hypercapnia (H)  J96.00          Scribe Disclosure:  I, Yobani Schmidt, am serving as a scribe at 5:32 PM on 12/2/2023 to document services personally performed by Christo Salvador MD based on my observations and the provider's statements to me.     12/2/2023   Christo Salvador MD Walker, Jerome Richard, MD  12/02/23 7035

## 2023-12-03 LAB
ACANTHOCYTES BLD QL SMEAR: NORMAL
ALBUMIN SERPL BCG-MCNC: 3.5 G/DL (ref 3.5–5.2)
ALP SERPL-CCNC: 54 U/L (ref 40–150)
ALT SERPL W P-5'-P-CCNC: 11 U/L (ref 0–50)
ANION GAP SERPL CALCULATED.3IONS-SCNC: 12 MMOL/L (ref 7–15)
AST SERPL W P-5'-P-CCNC: 28 U/L (ref 0–45)
AUER BODIES BLD QL SMEAR: NORMAL
BASO STIPL BLD QL SMEAR: NORMAL
BILIRUB SERPL-MCNC: 0.4 MG/DL
BITE CELLS BLD QL SMEAR: NORMAL
BLISTER CELLS BLD QL SMEAR: NORMAL
BUN SERPL-MCNC: 5.5 MG/DL (ref 6–20)
BURR CELLS BLD QL SMEAR: NORMAL
CALCIUM SERPL-MCNC: 8.1 MG/DL (ref 8.6–10)
CHLORIDE SERPL-SCNC: 109 MMOL/L (ref 98–107)
CREAT SERPL-MCNC: 0.56 MG/DL (ref 0.51–0.95)
DACRYOCYTES BLD QL SMEAR: NORMAL
DEPRECATED HCO3 PLAS-SCNC: 20 MMOL/L (ref 22–29)
EGFRCR SERPLBLD CKD-EPI 2021: >90 ML/MIN/1.73M2
ELLIPTOCYTES BLD QL SMEAR: NORMAL
ERYTHROCYTE [DISTWIDTH] IN BLOOD BY AUTOMATED COUNT: 13.8 % (ref 10–15)
FRAGMENTS BLD QL SMEAR: NORMAL
GLUCOSE BLDC GLUCOMTR-MCNC: 106 MG/DL (ref 70–99)
GLUCOSE BLDC GLUCOMTR-MCNC: 108 MG/DL (ref 70–99)
GLUCOSE BLDC GLUCOMTR-MCNC: 162 MG/DL (ref 70–99)
GLUCOSE BLDC GLUCOMTR-MCNC: 67 MG/DL (ref 70–99)
GLUCOSE BLDC GLUCOMTR-MCNC: 83 MG/DL (ref 70–99)
GLUCOSE BLDC GLUCOMTR-MCNC: 88 MG/DL (ref 70–99)
GLUCOSE SERPL-MCNC: 83 MG/DL (ref 70–99)
HCT VFR BLD AUTO: 34.2 % (ref 35–47)
HGB BLD-MCNC: 12.2 G/DL (ref 11.7–15.7)
HGB C CRYSTALS: NORMAL
HOWELL-JOLLY BOD BLD QL SMEAR: NORMAL
LACTATE SERPL-SCNC: 1.4 MMOL/L (ref 0.7–2)
LACTATE SERPL-SCNC: 4.1 MMOL/L (ref 0.7–2)
MAGNESIUM SERPL-MCNC: 1.8 MG/DL (ref 1.7–2.3)
MCH RBC QN AUTO: 29.3 PG (ref 26.5–33)
MCHC RBC AUTO-ENTMCNC: 35.7 G/DL (ref 31.5–36.5)
MCV RBC AUTO: 82 FL (ref 78–100)
NEUTS HYPERSEG BLD QL SMEAR: NORMAL
PHOSPHATE SERPL-MCNC: 3.5 MG/DL (ref 2.5–4.5)
PLAT MORPH BLD: NORMAL
PLATELET # BLD AUTO: 209 10E3/UL (ref 150–450)
POLYCHROMASIA BLD QL SMEAR: NORMAL
POTASSIUM SERPL-SCNC: 4.2 MMOL/L (ref 3.4–5.3)
PROT SERPL-MCNC: 5.6 G/DL (ref 6.4–8.3)
RBC # BLD AUTO: 4.16 10E6/UL (ref 3.8–5.2)
RBC AGGLUT BLD QL: NORMAL
RBC MORPH BLD: NORMAL
ROULEAUX BLD QL SMEAR: NORMAL
SICKLE CELLS BLD QL SMEAR: NORMAL
SMUDGE CELLS BLD QL SMEAR: NORMAL
SODIUM SERPL-SCNC: 141 MMOL/L (ref 135–145)
SPHEROCYTES BLD QL SMEAR: NORMAL
STOMATOCYTES BLD QL SMEAR: NORMAL
T4 FREE SERPL-MCNC: 1.36 NG/DL (ref 0.9–1.7)
TARGETS BLD QL SMEAR: NORMAL
TOXIC GRANULES BLD QL SMEAR: NORMAL
TSH SERPL DL<=0.005 MIU/L-ACNC: 7.71 UIU/ML (ref 0.3–4.2)
VARIANT LYMPHS BLD QL SMEAR: NORMAL
WBC # BLD AUTO: 11 10E3/UL (ref 4–11)

## 2023-12-03 PROCEDURE — C9113 INJ PANTOPRAZOLE SODIUM, VIA: HCPCS | Performed by: INTERNAL MEDICINE

## 2023-12-03 PROCEDURE — 99291 CRITICAL CARE FIRST HOUR: CPT | Performed by: SURGERY

## 2023-12-03 PROCEDURE — 36415 COLL VENOUS BLD VENIPUNCTURE: CPT | Performed by: INTERNAL MEDICINE

## 2023-12-03 PROCEDURE — 99233 SBSQ HOSP IP/OBS HIGH 50: CPT | Performed by: INTERNAL MEDICINE

## 2023-12-03 PROCEDURE — 83605 ASSAY OF LACTIC ACID: CPT | Performed by: INTERNAL MEDICINE

## 2023-12-03 PROCEDURE — 999N000157 HC STATISTIC RCP TIME EA 10 MIN

## 2023-12-03 PROCEDURE — 258N000003 HC RX IP 258 OP 636: Performed by: INTERNAL MEDICINE

## 2023-12-03 PROCEDURE — 85027 COMPLETE CBC AUTOMATED: CPT | Performed by: INTERNAL MEDICINE

## 2023-12-03 PROCEDURE — 250N000013 HC RX MED GY IP 250 OP 250 PS 637: Performed by: SURGERY

## 2023-12-03 PROCEDURE — 84443 ASSAY THYROID STIM HORMONE: CPT | Performed by: INTERNAL MEDICINE

## 2023-12-03 PROCEDURE — 250N000011 HC RX IP 250 OP 636: Performed by: INTERNAL MEDICINE

## 2023-12-03 PROCEDURE — 250N000011 HC RX IP 250 OP 636: Mod: JZ | Performed by: INTERNAL MEDICINE

## 2023-12-03 PROCEDURE — 200N000001 HC R&B ICU

## 2023-12-03 PROCEDURE — 258N000001 HC RX 258: Performed by: INTERNAL MEDICINE

## 2023-12-03 PROCEDURE — 94003 VENT MGMT INPAT SUBQ DAY: CPT

## 2023-12-03 PROCEDURE — 84439 ASSAY OF FREE THYROXINE: CPT | Performed by: INTERNAL MEDICINE

## 2023-12-03 PROCEDURE — 80053 COMPREHEN METABOLIC PANEL: CPT | Performed by: INTERNAL MEDICINE

## 2023-12-03 PROCEDURE — 84100 ASSAY OF PHOSPHORUS: CPT | Performed by: INTERNAL MEDICINE

## 2023-12-03 PROCEDURE — 83735 ASSAY OF MAGNESIUM: CPT | Performed by: INTERNAL MEDICINE

## 2023-12-03 RX ORDER — PIPERACILLIN SODIUM, TAZOBACTAM SODIUM 3; .375 G/15ML; G/15ML
3.38 INJECTION, POWDER, LYOPHILIZED, FOR SOLUTION INTRAVENOUS EVERY 6 HOURS
Status: DISCONTINUED | OUTPATIENT
Start: 2023-12-03 | End: 2023-12-05

## 2023-12-03 RX ORDER — CHLORHEXIDINE GLUCONATE ORAL RINSE 1.2 MG/ML
15 SOLUTION DENTAL 2 TIMES DAILY
Status: DISCONTINUED | OUTPATIENT
Start: 2023-12-03 | End: 2023-12-04

## 2023-12-03 RX ADMIN — HYDROMORPHONE HYDROCHLORIDE 0.5 MG: 1 INJECTION, SOLUTION INTRAMUSCULAR; INTRAVENOUS; SUBCUTANEOUS at 01:26

## 2023-12-03 RX ADMIN — PANTOPRAZOLE SODIUM 40 MG: 40 INJECTION, POWDER, FOR SOLUTION INTRAVENOUS at 06:46

## 2023-12-03 RX ADMIN — PIPERACILLIN AND TAZOBACTAM 3.38 G: 3; .375 INJECTION, POWDER, FOR SOLUTION INTRAVENOUS at 15:23

## 2023-12-03 RX ADMIN — HYDROMORPHONE HYDROCHLORIDE 0.5 MG: 1 INJECTION, SOLUTION INTRAMUSCULAR; INTRAVENOUS; SUBCUTANEOUS at 07:06

## 2023-12-03 RX ADMIN — CHLORHEXIDINE GLUCONATE 15 ML: 1.2 SOLUTION ORAL at 21:11

## 2023-12-03 RX ADMIN — PIPERACILLIN AND TAZOBACTAM 3.38 G: 3; .375 INJECTION, POWDER, FOR SOLUTION INTRAVENOUS at 09:39

## 2023-12-03 RX ADMIN — MIDAZOLAM HYDROCHLORIDE 4 MG/HR: 1 INJECTION, SOLUTION INTRAVENOUS at 17:29

## 2023-12-03 RX ADMIN — HYDROMORPHONE HYDROCHLORIDE 0.5 MG: 1 INJECTION, SOLUTION INTRAMUSCULAR; INTRAVENOUS; SUBCUTANEOUS at 03:53

## 2023-12-03 RX ADMIN — HYDROMORPHONE HYDROCHLORIDE 0.5 MG: 1 INJECTION, SOLUTION INTRAMUSCULAR; INTRAVENOUS; SUBCUTANEOUS at 12:44

## 2023-12-03 RX ADMIN — DEXTROSE AND SODIUM CHLORIDE: 5; 900 INJECTION, SOLUTION INTRAVENOUS at 18:47

## 2023-12-03 RX ADMIN — PIPERACILLIN AND TAZOBACTAM 3.38 G: 3; .375 INJECTION, POWDER, FOR SOLUTION INTRAVENOUS at 21:22

## 2023-12-03 RX ADMIN — SODIUM CHLORIDE 500 ML: 9 INJECTION, SOLUTION INTRAVENOUS at 01:57

## 2023-12-03 RX ADMIN — DEXTROSE AND SODIUM CHLORIDE: 5; 900 INJECTION, SOLUTION INTRAVENOUS at 12:30

## 2023-12-03 RX ADMIN — DEXTROSE MONOHYDRATE 50 ML: 25 INJECTION, SOLUTION INTRAVENOUS at 12:25

## 2023-12-03 RX ADMIN — SODIUM CHLORIDE: 9 INJECTION, SOLUTION INTRAVENOUS at 07:57

## 2023-12-03 ASSESSMENT — ACTIVITIES OF DAILY LIVING (ADL)
ADLS_ACUITY_SCORE: 45

## 2023-12-03 NOTE — ED NOTES
Pt has noted fever,  38.8C, without improvement with tylenol. No muscle rigidity or clonic noted on the exam. Pt has diminished lung sound at at right lower based, hard to heard air movement, left lung clear. Counseled with Dr. Salvador, pt had clear bilateral lung sound right after intubation. Will have rpt xray for confirmation

## 2023-12-03 NOTE — PHARMACY-CONSULT NOTE
Pharmacy Poison Control Note     Kimmy Mendez is a 38 year old female presenting to the emergency department with an olanzapine ingestion. At unknown time, patient ingested up to #180 tablets of 15 mg tablets. Poison Control was contacted by the ED pharmacist with recommendations below.      Is this a concerning ingestion? yes    Concerning symptoms: sedation, heart arrythmias, seizure     Monitoring parameters: EKG      Treatment: rsi with midazolam for sedation     Time to peak: 6 hours     Half-life: 30 hours    Time to medical clearance: monitor     Poison Control Staff: Aramis

## 2023-12-03 NOTE — PLAN OF CARE
Goal Outcome Evaluation:      Plan of Care Reviewed With: patient              ICU End of Shift Summary.  For vital signs and complete assessments, please see documentation flowsheets.      Pertinent assessments:   Neuro:JAE.Does not track,or follow commands  Cardiac:ST,all night,stable BP  Resp:Full Vent support RR16  02 25% Peep 5,small amt blood tinged secretions from ET tube.Pt bites down when suctioned  GI:Abdomen soft no BM . OG with brown drainage  :Hogan draining good amts clear urine  Skin:No issues  Lines:PIV +SL  Drips:Versed at 3mgs/hr    Major Shift Events: Tx from ER. Lactic 4.1 called to Tele hub 500cc NS bolus given  Temp 101.8 on Tx cooling blanket applied Temp down to 100.  Plan (Upcoming Events): Wean from vent as able  Discharge/Transfer Needs: TBD     Bedside Shift Report Completed : yes  Bedside Safety Check Completed: yes        Right wrist and Left wrist restraints continued 12/3/2023    Clinical Justification: Pulling lines, pulling tubes, and pulling equipment  Less Restrictive Alternative: 1:1 patient care, Repositioning, Pain management, Disguise equipment, Re-evaluate equipment, Alarm  Attending Physician Notified: MD ordered restraint,     New orders placed No  Length of Order: 1 Day      Vickie Aldrich, RN

## 2023-12-03 NOTE — PLAN OF CARE
Goal Outcome Evaluation:       2115 Pt Tx from ER intubated and sedated,soft bilateral wrist restraints applied for Pt safety and maintenance of ET tube ( unable to assess Pt's level of understanding of situation and need for ET tube)

## 2023-12-03 NOTE — PROGRESS NOTES
"[facility]    Medicine Progress Note - Hospitalist Service    Date of Admission:  2023    Primary Care Physician   [facility]  CONSULTANTS: critical care    Assessment & Plan     Kimmy Mendez is a 38 year old woman with history of prior suicidal ideation and postpartum depression who came to the ER her mother found her unresponsive at home with multiple bottles of Olanzapine and Vitamin D lying around her.  She has a medical history including anxiety,depression, post-partum depression and extreme bereavement related to the death of a child.   in the emergency room, on presentation vitals were: , /114, RR 12 with O2 sat 99%.  T 98.0.  She was subseqeuntly (after intubation) -130, /80, T 101.8, RR 16 with vent.   Initial VB.46/pCO2 32/pO2 50.  After intubation, VBG 7.33/42/45.  patient  was intubated in the ED \"for airway protection\", sedated with midazolam.  Per Poison control who confirmed that the tachycardia, sedation and fever is related to anticholinergic effects of the Olanzapine.  Also need to be worried about seizures.           Suicide attempt by overdose of olanzapine depression, general anxiety disorder, ptsd, respiratory failure, history of noncompliance with medications  Patient was intubated in the emergency room after presenting with an overdose from home.  Issues that we need to concerned about from overdosing on olazapine include decrease mentation, respiratory failure, tachycardia, and possible seizures.  Patient currently is on sedation with Versed while being intubated.  Saturations are good currently.  She is being followed on telemetry showing tachycardia up to the 140s.  Will keep her intubated until her cardiac status improves.  Patient has history of previous severe depression with suicidal ideation.  Will need to be seen by psychiatry once she is extubated.  She will likely benefit from inpatient psychiatric care.  " Electrolytes checked and currently normal.  Patient has a history of being seen by psychiatry 8/10/2023 with a discharge summary.  Patient at that time had bizarre behavior and likely hypomanic.  She also has a history of not taking medications for psychiatric illnesses.  Pregnancy test negative  12/2/2023.  Patient has a history of being on Zyprexa and Atarax.   Per brother, she can not go home.  Was d/c'd on 11/8/2023 she had been staying at some kind of facility either a group home or a stepdown unit to home.  Per the patient's brother she has a tendency of acting normal just to get home but then has a propensity to harm herself.  Obviously this time she took a number of pills and overdosed and we need to everything we can to help her treat her demons and depression.  Will have case management and social work involved as well for discharge planning.    Toxic encephalopathy  Due to overdose.  Currently is being sedated with Versed.  Hopefully will be able to wean in the next couple days so she can return to her baseline mentation    Sinus tachycardia  Due to her overdose, follow on telemetry.  Is on IV fluids, will increase her fluids.  If the patient is good to be intubated for prolonged period time need to consider tube feeds    History gestational diabetes   History noted    History pseudoseizures per chart    hypothyroidism  Per the chart, but does not seem to be taking Synthroid at home.  TSH was elevated 7.71 here.  Will check a free T4.  T4 is abnormal needs to be started back on Synthroid    Lactic acidosis  Patient presented with a lactic acid of 4.1 in the setting of her overdose.  Was given IV fluids and her lactic acids come down to normal at 1.4.  This has resolved    Fever, possible aspiration pneumonitis  Patient presents with drug overdose.  Has a fever up to 101.5.  Chest x-ray showed possible atelectasis versus infiltrate within the right lung and this would be consistent with possible aspiration  event.  Patient's white blood cell count is 11.  Will place the patient on empiric Zosyn to cover an aspiration pneumonia.    Discussed plan of care with night nursing, Dr. Parra, whole medical team on bedside team rounds, updated brother by phone      Diet: NPO for Medical/Clinical Reasons Except for: No Exceptions    DVT Prophylaxis: Pneumatic Compression Devices  Hogan Catheter: PRESENT, indication: Strict 1-2 Hour I&O  Lines: None     Cardiac Monitoring: ACTIVE order. Indication: ICU    RESTRAINTS: indicated and reordered  Code Status: Full Code        Follow up plan: Needs close psychiatric follow-up     This document was created using voice recognition technology.  Please excuse any typographical errors that may have occurred.  Please call with any questions.         Clinically Significant Risk Factors Present on Admission          # Hypocalcemia: Lowest Ca = 8.1 mg/dL in last 2 days, will monitor and replace as appropriate  # Hypercalcemia: Highest Ca = 10.5 mg/dL in last 2 days, will monitor as appropriate                        Disposition Plan      Expected Discharge Date: 12/04/2023                  Barrier to discharge: Needs to have drugs out of her system so she wakes up and can be extubated.  Will need close psychiatric follow-up and plan.    Shady Mortensen MD  Hospitalist Service  [facility]  Securely message with Linq[employer] (more info)  Text page via [employer] Paging/Directory   ______________________________________________________________________    Interval History   Patient is new to me, history obtained from the chart.  Patient sedated intubated cannot give any history.      ROS: A comprehensive review of systems was unobtainable    Physical Exam   Vital Signs: Temp: (!) 101.5  F (38.6  C) Temp src: Rectal BP: 109/76 Pulse: (!) 144   Resp: 17 SpO2: 100 % O2 Device: Mechanical Ventilator    Weight: 129 lbs 13.62 oz      General appearance: Patient is  sedated and  unresponsive, lying in bed, ET tube in place on ventilator support, appears stated age   HEENT: Mucous membranes are moist  RESPIRATORY: Clear to auscultation bilateral, good air movement  CARDIOVASCULAR: Regular  rhythm and tachycardic, normal S1/S2, no murmurs, rubs, or gallops present, peripheral pulses intact  GASTROINTESTINAL: Non-distended, non-tender, soft, bowel sounds present throughout, no mass or hepatosplenomegaly  MUSCULOSKELETAL: without deformity, normal range of motion  SKIN:  Warm, dry, no rashes, no mottling  NEUROLOGIC:  unable to test, sedated  EXTREMITIES:  Moves all extremities, no clubbing, cyanosis, nor edema  :  Hogan is present         Data     I have personally reviewed the following data over the past 24 hrs:    11.0  \   12.2   / 270     141 109 (H) 5.5 (L) /  83   4.2 20 (L) 0.56 \     ALT: 11 AST: 28 AP: 54 TBILI: 0.4   ALB: 3.5 TOT PROTEIN: 5.6 (L) LIPASE: N/A     TSH: 7.71 (H) T4: N/A A1C: N/A     Procal: N/A CRP: N/A Lactic Acid: 1.4         Imaging:   Results for orders placed or performed during the hospital encounter of 12/02/23   XR Chest Port 1 View    Narrative    EXAM: XR CHEST PORT 1 VIEW  LOCATION: [facility]  DATE: 12/2/2023    INDICATION: intubation  COMPARISON: None.      Impression    IMPRESSION: Endotracheal tube tip terminates in the lower thoracic trachea, approximately 1.5 cm from the tete. Enteric tube tip and side-port are within the stomach.    No focal airspace opacity. No pleural effusion or pneumothorax. Cardiac silhouette and mediastinal contours are normal.   Head CT w/o contrast    Narrative    EXAM: CT HEAD W/O CONTRAST  LOCATION: [facility]  DATE: 12/2/2023    INDICATION: AMS/confusion  COMPARISON: None.  TECHNIQUE: Routine CT Head without IV contrast. Multiplanar reformats. Dose reduction techniques were used.    FINDINGS:  INTRACRANIAL CONTENTS: No intracranial hemorrhage, extraaxial collection, or mass  effect.  No CT evidence of acute infarct. Normal parenchymal attenuation. Normal ventricles and sulci.     VISUALIZED ORBITS/SINUSES/MASTOIDS: No intraorbital abnormality. No paranasal sinus mucosal disease. No middle ear or mastoid effusion.    BONES/SOFT TISSUES: No acute abnormality. Partially visualized endotracheal tube and enteric tube.      Impression    IMPRESSION:  1.  No acute intracranial process.   XR Chest Port 1 View    Narrative    EXAM: XR CHEST PORT 1 VIEW  LOCATION: [facility]  DATE: 12/2/2023    INDICATION: check tube placement  COMPARISON: 12/20/2023 at 1643 hours      Impression    IMPRESSION: Nasogastric tube terminates over the stomach. Endotracheal tube terminates 5 mm above the tete. Repositioning recommended. Heart size is normal. There is new airspace disease within the medial right lung base, atelectasis versus infiltrate.    Tube positioning was called to [place] by Dr. Del Kevin on 12/2/2023 8:36 PM CST.     Procedures: none     I have personally have reviewed the patient's most up to date radiologic exams, labs, orders, and medications myself

## 2023-12-03 NOTE — PROGRESS NOTES
[facility] ICU VENTILATOR RESPIRATORY NOTE    Date of Admission: 12/2/23    Date of Intubation (most recent): 12/2/23    Reason for Mechanical Ventilation: Airway protection    Number of Days on Mechanical Ventilation: 2    Met Criteria for Pressure Support Trial: no    Reason for No Pressure Support Trial: airway protection    ABG Results:   No lab results found in last 7 days.    ETT appearance on chest x-ray: 5 cm above tete    Plan:  Patient remains on ventilator with settings of:  Vent Mode: CMV/AC  (Continuous Mandatory Ventilation/ Assist Control)  FiO2 (%): 25 %  Resp Rate (Set): 16 breaths/min  Tidal Volume (Set, mL): 450 mL  PEEP (cm H2O): 5 cmH2O  Resp: 16

## 2023-12-03 NOTE — PROGRESS NOTES
"[facility] ICU VENTILATOR RESPIRATORY NOTE  Date of Admission: 12/2/2023  Date of Intubation (most recent): 12/2/2023  Reason for Mechanical Ventilation: airway protection  Number of Days on Mechanical Ventilation: 2    Vent Mode: CMV/AC  (Continuous Mandatory Ventilation/ Assist Control)  FiO2 (%): 25 %  Resp Rate (Set): 16 breaths/min  Tidal Volume (Set, mL): 450 mL  PEEP (cm H2O): 5 cmH2O  Resp: 15    ETT: 7.0 secured 22 @ teeth  PPlat: 17  PEEPi: 0.8  BS: clear, equal  Secretions: scant, red streaked, thin    /68   Pulse (!) 128   Temp (!) 100.9  F (38.3  C)   Resp 15   Ht 1.62 m (5' 3.78\")   Wt 58.9 kg (129 lb 13.6 oz)   SpO2 100%   BMI 22.44 kg/m      Plan:  Assess ability for SBT        "
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
## 2023-12-03 NOTE — PROGRESS NOTES
ICU staff  DOS 12/3/2023    Ms Mendez was admitted overnight following an intentional overdose of olanzapine. She was tachycardic and hypertensive on presentation in the emergency department. She was intubated for airway protection as she was unresponsive. She remains tachycardic and febrile, which are secondary to the anticholinergic effect of olanzapine.    PMHx otherwise includes depression, gestational DM, peptic ulcer disease, pseudoseizures, hypothyroidism, and PTSD. No surgical history noted. She does not smoke or use alcohol. She has previous psychiatric hospitalizations, most recently last month.    Vitals:   Temp:  [97.7  F (36.5  C)-102.2  F (39  C)] 102.2  F (39  C)  Pulse:  [101-168] 135  Resp:  [9-27] 16  BP: ()/() 120/81  FiO2 (%):  [25 %-60 %] 25 %  SpO2:  [99 %-100 %] 100 %     Vent Mode: CMV/AC  (Continuous Mandatory Ventilation/ Assist Control)  FiO2 (%): 25 %  Resp Rate (Set): 16 breaths/min  Tidal Volume (Set, mL): 450 mL  PEEP (cm H2O): 5 cmH2O  Resp: 16    I/O last 3 completed shifts:  In: 1210.63 [I.V.:710.63; IV Piggyback:500]  Out: 1313 [Urine:1113; Emesis/NG output:200]    Midazolam 3    Exam:  Gen: Intubated, sedated, unresponsive  HEENT: NC/AT, anicteric  Pulm: Clear  Cor: Regular tachycardia, appears sinus  Abdomen/GI: Soft, nondistended  : Deferred  Extremities: Warm, nonedematous  Skin: Well-perfused  Neuro: Unresponsive, stiff extremities without cogwheeling, no clonus  Psych: Unable to assess    Data:   Labs reviewed  - Lactate 4 -> 1.4  - TSH 7.7  Imaging reviewed  - CXR shows right base infiltrate, ETT deep (5mm above tete)  - Head CT without acute process    Assessment/plan:  39 y/o woman with history of depression and psychiatric hospitalizations admitted to the ICU 12/2 with acute hypoxemic respiratory failure following an intentional olanzapine overdose.   CNS: Intubated, sedated.  # Suicide attempt  # Olanzapine toxicity  # PTSD  # Toxic encephalopathy  -  Continue sedation for today; plan to lighten once autonomic effects of olanzapine (fevers, tachycardia) settle down  - Will need sitter, suicide precautions and psychiatry assessment once extubated  Pulm: Tolerating mechanical ventilation, on minimal support  # Acute hypoxemic respiratory failure  - Not a candidate for ventilator liberation today  - CXR with ETT 5 mm from tete, looks OK on viewing film (and was called to ED); follow for now  CV: Tachycardic but BP looks OK  # Tachycardia  - Likely secondary to olanzapine per poison control, supportive management  GI: Abdomen benign  # Nutrition  - NPO for today, will need RD for tube feeding consult if intubated more than another 24-48 hours  : UOP adequate.  Heme: Hb 12.2.   Msk: Extremities warm, well-perfused.   Endo: Glucose . Elevated TSH.  # Hypothyroidism  - Checking free T4  - Routine glycemic management.  ID: Febrile.  # Fevers, likely drug-related  # ?Aspiration pneumonia  - On pip/tazo empirically  ICU: SCDs, PPI    This patient is critically ill to my assessment and requires ICU monitoring and cares. A total of 50 minutes critical care time spent on 12/3/2023, exclusive of procedures.     Aidan Parra MD, PhD  Surgical critical care  12/3/2023, 11:17 AM    Clinically Significant Risk Factors Present on Admission          # Hypocalcemia: Lowest Ca = 8.1 mg/dL in last 2 days, will monitor and replace as appropriate  # Hypercalcemia: Highest Ca = 10.5 mg/dL in last 2 days, will monitor as appropriate

## 2023-12-03 NOTE — PROGRESS NOTES
"Tele-ICU note  Patient admitted to [facility] ICU from [facility] ED; I am at [facility] ICU.    By report she had a large overdose of olanzapine (up to 180 tablets, 15 mgms each). She was intubated for depression of consciousness and airway protection in ED.    Risk factors from olanzapine from poison control center: sedation, arrhythmias, seizure.    PMHx  -prior suicide attempts  -recent 14 day admission (8/2023) for bipolar affective disorder, manic, moderate severity.   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     Plan  Supportive care including vent support, IV fluids, monitoring for seizures, arrhythmias, hypotension    35 minutes of critical care time spent with patient.     Jerica orr  December 2, 2023  "

## 2023-12-03 NOTE — PLAN OF CARE
Patient remains intubated and sedated. Adequate urine output.   Remains tachy and febrile during the day- MD' s aware. Iv fluids adjusted for low blood sugar. Antibiotics initiated.  Continue to monitor.   Discharge plan to be determined    soft restraints restraints continued 12/3/2023    Clinical Justification: Pulling lines, pulling tubes, and pulling equipment  Less Restrictive Alternative: 1:1 patient care, Repositioning, Re-evaluate equipment, Disguise equipment, Pain management, Alarm, De-escalation, Reorientation  Attending Physician Notified: MD ordered restraint,     New orders placed Yes  Length of Order: 1 Day      Julianna Nguyen RN

## 2023-12-04 LAB
ATRIAL RATE - MUSE: 135 BPM
ATRIAL RATE - MUSE: 155 BPM
DIASTOLIC BLOOD PRESSURE - MUSE: NORMAL MMHG
DIASTOLIC BLOOD PRESSURE - MUSE: NORMAL MMHG
ERYTHROCYTE [DISTWIDTH] IN BLOOD BY AUTOMATED COUNT: 13.8 % (ref 10–15)
GLUCOSE BLDC GLUCOMTR-MCNC: 111 MG/DL (ref 70–99)
GLUCOSE BLDC GLUCOMTR-MCNC: 121 MG/DL (ref 70–99)
GLUCOSE BLDC GLUCOMTR-MCNC: 133 MG/DL (ref 70–99)
GLUCOSE BLDC GLUCOMTR-MCNC: 136 MG/DL (ref 70–99)
GLUCOSE BLDC GLUCOMTR-MCNC: 141 MG/DL (ref 70–99)
GLUCOSE BLDC GLUCOMTR-MCNC: 150 MG/DL (ref 70–99)
GLUCOSE BLDC GLUCOMTR-MCNC: 174 MG/DL (ref 70–99)
HCT VFR BLD AUTO: 33.6 % (ref 35–47)
HGB BLD-MCNC: 11.8 G/DL (ref 11.7–15.7)
INTERPRETATION ECG - MUSE: NORMAL
INTERPRETATION ECG - MUSE: NORMAL
MCH RBC QN AUTO: 29.3 PG (ref 26.5–33)
MCHC RBC AUTO-ENTMCNC: 35.1 G/DL (ref 31.5–36.5)
MCV RBC AUTO: 83 FL (ref 78–100)
P AXIS - MUSE: 92 DEGREES
P AXIS - MUSE: 97 DEGREES
PLATELET # BLD AUTO: 216 10E3/UL (ref 150–450)
PR INTERVAL - MUSE: 146 MS
PR INTERVAL - MUSE: 192 MS
QRS DURATION - MUSE: 62 MS
QRS DURATION - MUSE: 68 MS
QT - MUSE: 242 MS
QT - MUSE: 334 MS
QTC - MUSE: 363 MS
QTC - MUSE: 536 MS
R AXIS - MUSE: 31 DEGREES
R AXIS - MUSE: 61 DEGREES
RBC # BLD AUTO: 4.03 10E6/UL (ref 3.8–5.2)
SYSTOLIC BLOOD PRESSURE - MUSE: NORMAL MMHG
SYSTOLIC BLOOD PRESSURE - MUSE: NORMAL MMHG
T AXIS - MUSE: 76 DEGREES
T AXIS - MUSE: 90 DEGREES
VENTRICULAR RATE- MUSE: 135 BPM
VENTRICULAR RATE- MUSE: 155 BPM
WBC # BLD AUTO: 11.3 10E3/UL (ref 4–11)

## 2023-12-04 PROCEDURE — 999N000157 HC STATISTIC RCP TIME EA 10 MIN

## 2023-12-04 PROCEDURE — 85027 COMPLETE CBC AUTOMATED: CPT | Performed by: INTERNAL MEDICINE

## 2023-12-04 PROCEDURE — 99291 CRITICAL CARE FIRST HOUR: CPT | Performed by: STUDENT IN AN ORGANIZED HEALTH CARE EDUCATION/TRAINING PROGRAM

## 2023-12-04 PROCEDURE — 999N000259 HC STATISTIC EXTUBATION

## 2023-12-04 PROCEDURE — 36415 COLL VENOUS BLD VENIPUNCTURE: CPT | Performed by: INTERNAL MEDICINE

## 2023-12-04 PROCEDURE — 250N000011 HC RX IP 250 OP 636: Mod: JZ | Performed by: INTERNAL MEDICINE

## 2023-12-04 PROCEDURE — 99233 SBSQ HOSP IP/OBS HIGH 50: CPT | Performed by: INTERNAL MEDICINE

## 2023-12-04 PROCEDURE — 94640 AIRWAY INHALATION TREATMENT: CPT

## 2023-12-04 PROCEDURE — C9113 INJ PANTOPRAZOLE SODIUM, VIA: HCPCS | Performed by: INTERNAL MEDICINE

## 2023-12-04 PROCEDURE — 94640 AIRWAY INHALATION TREATMENT: CPT | Mod: 76

## 2023-12-04 PROCEDURE — 258N000003 HC RX IP 258 OP 636: Performed by: INTERNAL MEDICINE

## 2023-12-04 PROCEDURE — 200N000001 HC R&B ICU

## 2023-12-04 PROCEDURE — 250N000011 HC RX IP 250 OP 636: Performed by: INTERNAL MEDICINE

## 2023-12-04 PROCEDURE — 250N000009 HC RX 250: Performed by: INTERNAL MEDICINE

## 2023-12-04 PROCEDURE — 94003 VENT MGMT INPAT SUBQ DAY: CPT

## 2023-12-04 RX ORDER — IPRATROPIUM BROMIDE AND ALBUTEROL SULFATE 2.5; .5 MG/3ML; MG/3ML
3 SOLUTION RESPIRATORY (INHALATION) EVERY 4 HOURS PRN
Status: DISCONTINUED | OUTPATIENT
Start: 2023-12-04 | End: 2023-12-09 | Stop reason: HOSPADM

## 2023-12-04 RX ADMIN — DEXTROSE AND SODIUM CHLORIDE: 5; 900 INJECTION, SOLUTION INTRAVENOUS at 21:23

## 2023-12-04 RX ADMIN — DEXTROSE AND SODIUM CHLORIDE: 5; 900 INJECTION, SOLUTION INTRAVENOUS at 14:44

## 2023-12-04 RX ADMIN — PIPERACILLIN AND TAZOBACTAM 3.38 G: 3; .375 INJECTION, POWDER, FOR SOLUTION INTRAVENOUS at 08:40

## 2023-12-04 RX ADMIN — PANTOPRAZOLE SODIUM 40 MG: 40 INJECTION, POWDER, FOR SOLUTION INTRAVENOUS at 06:51

## 2023-12-04 RX ADMIN — PIPERACILLIN AND TAZOBACTAM 3.38 G: 3; .375 INJECTION, POWDER, FOR SOLUTION INTRAVENOUS at 20:44

## 2023-12-04 RX ADMIN — PIPERACILLIN AND TAZOBACTAM 3.38 G: 3; .375 INJECTION, POWDER, FOR SOLUTION INTRAVENOUS at 03:35

## 2023-12-04 RX ADMIN — IPRATROPIUM BROMIDE AND ALBUTEROL SULFATE 3 ML: .5; 3 SOLUTION RESPIRATORY (INHALATION) at 23:44

## 2023-12-04 RX ADMIN — IPRATROPIUM BROMIDE AND ALBUTEROL SULFATE 3 ML: .5; 3 SOLUTION RESPIRATORY (INHALATION) at 20:51

## 2023-12-04 RX ADMIN — DEXTROSE AND SODIUM CHLORIDE: 5; 900 INJECTION, SOLUTION INTRAVENOUS at 08:05

## 2023-12-04 RX ADMIN — IPRATROPIUM BROMIDE AND ALBUTEROL SULFATE 3 ML: .5; 3 SOLUTION RESPIRATORY (INHALATION) at 16:18

## 2023-12-04 RX ADMIN — PIPERACILLIN AND TAZOBACTAM 3.38 G: 3; .375 INJECTION, POWDER, FOR SOLUTION INTRAVENOUS at 14:42

## 2023-12-04 RX ADMIN — DEXTROSE AND SODIUM CHLORIDE: 5; 900 INJECTION, SOLUTION INTRAVENOUS at 01:24

## 2023-12-04 ASSESSMENT — ACTIVITIES OF DAILY LIVING (ADL)
ADLS_ACUITY_SCORE: 45
ADLS_ACUITY_SCORE: 41
ADLS_ACUITY_SCORE: 45
ADLS_ACUITY_SCORE: 41
ADLS_ACUITY_SCORE: 45
ADLS_ACUITY_SCORE: 41

## 2023-12-04 NOTE — PLAN OF CARE
Patient sleepy but arrousable. Adequate urine output. Heart rate and temperature improved from yesterday.  Patient extubated this morning. Versed off. Patient status downgraded  Transfer to tele bed when available. To start diet when awake  Discharge plan to be determined after psych consult.

## 2023-12-04 NOTE — PROGRESS NOTES
Patient extubated at 10:50 AM to a room air wit no complications. Pt  is tolerating well.       Roberth Castellano, RT

## 2023-12-04 NOTE — PROGRESS NOTES
"[facility]    Medicine Progress Note - Hospitalist Service    Date of Admission:  2023    Primary Care Physician   [facility]  CONSULTANTS: critical care    Assessment & Plan     Kimmy Mendez is a 38 year old woman with history of prior suicidal ideation and postpartum depression who came to the ER her mother found her unresponsive at home with multiple bottles of Olanzapine and Vitamin D lying around her.  She has a medical history including anxiety,depression, post-partum depression and extreme bereavement related to the death of a child.   in the emergency room, on presentation vitals were: , /114, RR 12 with O2 sat 99%.  T 98.0.  She was subseqeuntly (after intubation) -130, /80, T 101.8, RR 16 with vent.   Initial VB.46/pCO2 32/pO2 50.  After intubation, VBG 7.33/42/45.  patient  was intubated in the ED \"for airway protection\", sedated with midazolam.  Per Poison control who confirmed that the tachycardia, sedation and fever is related to anticholinergic effects of the Olanzapine.  Also need to be worried about seizures.           Suicide attempt by overdose of olanzapine depression, general anxiety disorder, ptsd, acute respiratory failure due to overdose, history of noncompliance with medications  Patient was intubated in the emergency room after presenting with an overdose from home.  Issues that we need to concerned about from overdosing on olazapine include decrease mentation, respiratory failure, tachycardia, and possible seizures.  Patient currently is on sedation with Versed while being intubated.  Saturations are good currently.  She is being followed on telemetry showing tachycardia up to the 140s.  Kept her intubated until her cardiac status improves.  Patient has history of previous severe depression with suicidal ideation.  Will need to be seen by psychiatry once she is extubated.  She will likely benefit from inpatient " psychiatric care.  Electrolytes checked and currently normal.  Patient has a history of being seen by psychiatry 8/10/2023 with a discharge summary.  Patient at that time had bizarre behavior and likely hypomanic.  She also has a history of not taking medications for psychiatric illnesses.  Pregnancy test negative  12/2/2023.  Patient has a history of being on Zyprexa and Atarax. Per her brother, she can not go home.  Was d/c'd on 11/8/2023 she had been staying at some kind of facility either a group home or a stepdown unit to home.  Per the patient's brother she has a tendency of acting normal just to get home but then has a propensity to harm herself.  Obviously this time she took a number of pills and overdosed and we need to everything we can to help her treat her demons and depression.  Will have case management and social work involved as well for discharge planning.  At this point, patient's tachycardia is improving so I think it safe to start weaning to extubate.  Discussed with nursing to start weaning from the vent and sedation.  Once extubated will need to have psychiatry see the patient, will need her diet to be advanced, and will certainly will need a sitter.   Addendum:  extubated, plan to advance diet, place transfer orders.  Likely change to oral antibiotics tomorrow when more awake and once eating discontinue IV fluids.  Will ask psych to see tomorrow.   Has some wheezing after extubation, will give duonebs as needed. Denies dyspnea.     Toxic encephalopathy  Due to overdose.  Currently is being sedated with Versed.  Hopefully will be able to wean in the next couple days so she can return to her baseline mentation, wean sedation to extubate today.    Sinus tachycardia, hypoglycemia  Due to her overdose, follow on telemetry.  Is on IV fluids, will increase her fluids.  If the patient is good to be intubated for prolonged period time need to consider tube feeds, much improved today.  IV fluids changed  to D5 saline as her blood sugars were down to 67.    History gestational diabetes   History noted    History pseudoseizures per chart    Hypothyroidism, subclinical  Per the chart, but does not seem to be taking Synthroid at home.  TSH was elevated 7.71 here.  Had a normal free T4 of 1.36.  Given the patient's ongoing depression this is likely something that we should be treating.  Will discuss more with the patient when she is extubated to know what she has been doing at home.    Lactic acidosis  Patient presented with a lactic acid of 4.1 in the setting of her overdose.  Was given IV fluids and her lactic acids come down to normal at 1.4.  This has resolved    Fever, possible aspiration pneumonitis  Patient presents with drug overdose.  Has a fever up to 101.5.  Chest x-ray showed possible atelectasis versus infiltrate within the right lung and this would be consistent with possible aspiration event.  Patient's white blood cell count is 11.  Patient was placed on empiric Zosyn 12/3/2023 to cover an aspiration pneumonia.  Fever has improved.  Once taking orals can switch to Augmentin.    Discussed plan of care with night nursing, Dr. Quinones, whole medical team on bedside team rounds       Diet: NPO for Medical/Clinical Reasons Except for: No Exceptions    DVT Prophylaxis: Pneumatic Compression Devices  Hogan Catheter: PRESENT, indication: Strict 1-2 Hour I&O  Lines: None     Cardiac Monitoring: ACTIVE order. Indication: ICU    RESTRAINTS: indicated and reordered  Code Status: Full Code        Follow up plan: Needs close psychiatric follow-up and good safe plan for discharge.       This document was created using voice recognition technology.  Please excuse any typographical errors that may have occurred.  Please call with any questions.         Clinically Significant Risk Factors          # Hypocalcemia: Lowest Ca = 8.1 mg/dL in last 2 days, will monitor and replace as appropriate  # Hypercalcemia: Highest Ca = 10.5  mg/dL in last 2 days, will monitor as appropriate                          Disposition Plan     Expected Discharge Date: 12/04/2023                  Barrier to discharge: wean to extubate,  Will need close psychiatric follow-up and plan. Need safe discharge, can not go home.     Shady Mortensen MD  Hospitalist Service  [facility]  Securely message with [employer] (more info)  Text page via [email or web address] Paging/Directory   ______________________________________________________________________    Interval History   Patient had a improving diet per nursing.  Her blood sugars did drop to the 60s so was placed on D5 saline drip.  Her pulse is improved.  Will be starting to wean her hopefully be able to extubate later today versus tomorrow.      ROS: A comprehensive review of systems was unobtainable patient is sedated    Physical Exam   Vital Signs: Temp: 100  F (37.8  C) Temp src: Bladder BP: 122/82 Pulse: 104   Resp: (!) 6 SpO2: 98 % O2 Device: Mechanical Ventilator (PS 10/5)    Weight: 124 lbs 5.43 oz    Exam is stable from yesterday but her tachycardia is actually improved  General appearance: Patient is  sedated and unresponsive, lying in bed, ET tube in place on ventilator support, appears stated age   HEENT: Mucous membranes are moist  RESPIRATORY: Clear to auscultation bilateral, good air movement  CARDIOVASCULAR: Regular  rhythm and much less tachycardic, normal S1/S2, no murmurs, rubs, or gallops present, peripheral pulses intact  GASTROINTESTINAL: Non-distended, non-tender, soft, bowel sounds present throughou  NEUROLOGIC:  unable to test, sedated  EXTREMITIES:  Moves all extremities, no clubbing, cyanosis, nor edema  :  Hogan is present         Data     I have personally reviewed the following data over the past 24 hrs:    11.3 (H)  \   11.8   / 216     N/A N/A N/A /  121 (H)   N/A N/A N/A \     ALT: N/A AST: N/A AP: N/A TBILI: N/A   ALB: N/A TOT PROTEIN: N/A LIPASE: N/A     TSH: N/A T4: N/A  A1C: N/A       Imaging:   Results for orders placed or performed during the hospital encounter of 12/02/23   XR Chest Port 1 View    Narrative    EXAM: XR CHEST PORT 1 VIEW  LOCATION: [facility]  DATE: 12/2/2023    INDICATION: intubation  COMPARISON: None.      Impression    IMPRESSION: Endotracheal tube tip terminates in the lower thoracic trachea, approximately 1.5 cm from the tete. Enteric tube tip and side-port are within the stomach.    No focal airspace opacity. No pleural effusion or pneumothorax. Cardiac silhouette and mediastinal contours are normal.   Head CT w/o contrast    Narrative    EXAM: CT HEAD W/O CONTRAST  LOCATION: [facility]  DATE: 12/2/2023    INDICATION: AMS/confusion  COMPARISON: None.  TECHNIQUE: Routine CT Head without IV contrast. Multiplanar reformats. Dose reduction techniques were used.    FINDINGS:  INTRACRANIAL CONTENTS: No intracranial hemorrhage, extraaxial collection, or mass effect.  No CT evidence of acute infarct. Normal parenchymal attenuation. Normal ventricles and sulci.     VISUALIZED ORBITS/SINUSES/MASTOIDS: No intraorbital abnormality. No paranasal sinus mucosal disease. No middle ear or mastoid effusion.    BONES/SOFT TISSUES: No acute abnormality. Partially visualized endotracheal tube and enteric tube.      Impression    IMPRESSION:  1.  No acute intracranial process.   XR Chest Port 1 View    Narrative    EXAM: XR CHEST PORT 1 VIEW  LOCATION: [facility]  DATE: 12/2/2023    INDICATION: check tube placement  COMPARISON: 12/20/2023 at 1643 hours      Impression    IMPRESSION: Nasogastric tube terminates over the stomach. Endotracheal tube terminates 5 mm above the tete. Repositioning recommended. Heart size is normal. There is new airspace disease within the medial right lung base, atelectasis versus infiltrate.    Tube positioning was called to MD [place] by Dr. Del Kevin on 12/2/2023 8:36  PM CST.     Procedures: none     I have personally have reviewed the patient's most up to date radiologic exams, labs, orders, and medications myself

## 2023-12-04 NOTE — PROGRESS NOTES
"[facility] ICU VENTILATOR RESPIRATORY NOTE  Date of Admission: 12/2/2023  Date of Intubation (most recent): 12/2/2023  Reason for Mechanical Ventilation: airway protection  Number of Days on Mechanical Ventilation: 3    Pt on a PS trial 10/5 since 0620     Vent Mode: (S) CPAP/PS  (Continuous positive airway pressure with Pressure Support)  FiO2 (%): 25 %  Resp Rate (Set): 16 breaths/min  Tidal Volume (Set, mL): 450 mL  PEEP (cm H2O): 5 cmH2O  Pressure Support (cm H2O): 10 cmH2O  Resp: (!) 6    ETT: 7.0 secured 22 @ teeth  PPlat: 17  PEEPi: 0.8  BS: clear, equal  Secretions: scant, red streaked, thin     /82   Pulse 104   Temp 100  F (37.8  C)   Resp (!) 6   Ht 1.62 m (5' 3.78\")   Wt 56.4 kg (124 lb 5.4 oz)   SpO2 98%   BMI 21.49 kg/m         Plan:  Continue SBT    Rocío Andrea RT    "

## 2023-12-04 NOTE — CONSULTS
"Care Management Follow Up    Length of Stay (days): 2    Expected Discharge Date: 12/04/2023     Concerns to be Addressed:       Patient plan of care discussed at interdisciplinary rounds: Yes    Anticipated Discharge Disposition:       Anticipated Discharge Services:    Anticipated Discharge DME:      Patient/family educated on Medicare website which has current facility and service quality ratings:    Education Provided on the Discharge Plan:    Patient/Family in Agreement with the Plan:      Referrals Placed by CM/SW:    Private pay costs discussed: Not applicable    Additional Information:    SW consulted for \"Will need inpatient psychiatry and then living situation rather than home alone\". Pt will need a psych consult in order to be placed on IP psych list. Pt will need to be medically stable and independent with mobility prior to being placed on IP psych list. Please place appropriate psychiatry consult when pt able to participate.     LEIGHA Cohen, JAZZ  Inpatient Care Coordination  Emergency Room /[place] LGSW     "

## 2023-12-04 NOTE — PROGRESS NOTES
"ICU Progress Note    Date of Service: 12/04/23    A/P:  38F depression/anxiety admitted 12/2 w/ intentional overdose  of olanzapine. Pt intubated for airway protection in the ED. Has had persistent tachycardia and fever felt to be 2/2 anticholinergic effects.     I have personally reviewed the daily labs, imaging studies, cultures and discussed the case with referring physician and consulting physicians.     Neuro  # Sedation for mechanical ventilation  # Olanzapine toxicity  # Suicide attempt  # Toxic metabolic encephalopathy   - sedation weaned off  - will need psych assessment     Pulmonary  # Acute hypoxemic respiratory failure  - PST  - extubated this AM    Cardiac  # Tachycardia - 2/2 olanzapine toxicity  - MAP > 65    Renal  # Lactic acidosis - resolved  - monitor UOP, Cr, I/O  - electrolyte replacement protocols     GI  # No acute issues    - ADAT    Heme/Onc  # Leukcytosis - likely stress mediated   - monitor CBC    ID  # Fever - likely drug mediated  # Possible aspiration pneumonia   - empiric pip-tazo     Endo  # No acute issues   - monitor BG    Clinically Significant Risk Factors          # Hypocalcemia: Lowest Ca = 8.1 mg/dL in last 2 days, will monitor and replace as appropriate  # Hypercalcemia: Highest Ca = 10.5 mg/dL in last 2 days, will monitor as appropriate                                 PPX  1. DVT: SCDs   2. VAP: HOB 30 degrees  3. Stress Ulcer: PPI  4. Restraints: not necessary   5. Wound care - per unit routine   6. Feeding - NPO    Interval Hx:  Extubated this AM.     Unable to obtain ROS 2/2 encephalopathy.      /84   Pulse 120   Temp 100  F (37.8  C)   Resp 24   Ht 1.62 m (5' 3.78\")   Wt 56.4 kg (124 lb 5.4 oz)   SpO2 100%   BMI 21.49 kg/m    Gen: supine, NAD  Neuro: pupils equal  HEENT: anicteric  Card: RRR  Pulm: clear b/l  Abd: soft, non-distended  MSK: no edema, no acute joint abnormality  Skin: no obvious rash    Vent Mode: (S) CPAP/PS  (Continuous positive airway " pressure with Pressure Support)  FiO2 (%): 25 %  Resp Rate (Set): 16 breaths/min  Tidal Volume (Set, mL): 450 mL  PEEP (cm H2O): 5 cmH2O  Pressure Support (cm H2O): 10 cmH2O  Resp: 24        Intake/Output Summary (Last 24 hours) at 12/4/2023 0847  Last data filed at 12/4/2023 0841  Gross per 24 hour   Intake 4211.24 ml   Output 3450 ml   Net 761.24 ml       Labs: reviewed    Imaging: reviewed    Billing: Patient is critically ill. Total critical care time today, excluding procedures, was 60 minutes.    Nakul Quinones MD  Pulmonary Disease and Critical Care Medicine   [facility]

## 2023-12-04 NOTE — PLAN OF CARE
Goal Outcome Evaluation:      Plan of Care Reviewed With: patient              ICU End of Shift Summary.  For vital signs and complete assessments, please see documentation flowsheets.      Pertinent assessments: Temp max 101.5  Neuro:Moving all extremities,less rigidity.JAE  Cardiac:HR labile but much less Tachy, mostly 100-120  Resp:Full vent support RR16  02 25% Peep 5,breath sounds coarse  GI:Abdomen soft no BM this shift  :Hogan draining large amts clear yellow urine  Skin:No issues  Lines:PIVsx2  Drips:Versed at 4mgs/hr  D5NS at 150    Major Shift Events:     Plan (Upcoming Events): Wean from vent and sedation as able  Discharge/Transfer Needs: TBD     Bedside Shift Report Completed : yes  Bedside Safety Check Completed: yes    Right wrist and Left wrist restraints continued 12/4/2023    Clinical Justification: Pulling lines, pulling tubes, and pulling equipment  Less Restrictive Alternative: 1:1 patient care, Repositioning, Re-evaluate equipment, Disguise equipment, Pain management, Alarm, De-escalation, Reorientation  Attending Physician Notified: MD ordered restraint,     New orders placed No  Length of Order: 1 Day  Soft bilateral wrist restraints continued for Pt safety and maintenance of ET tube and lines    Vickie Aldrich RN

## 2023-12-04 NOTE — PROGRESS NOTES
soft restraints restraints discontinued at 12:14 PM on 12/4/2023.    Restraint discontinue criteria met, patient is calm, cooperative and safe. Restraints removed.     Patient's Response: No evidence of learning  Family Notification: Other  Attending Physician Notified: MD ordered restraint,      Julianna Nguyen RN

## 2023-12-05 ENCOUNTER — TELEPHONE (OUTPATIENT)
Dept: BEHAVIORAL HEALTH | Facility: CLINIC | Age: 38
End: 2023-12-05
Payer: COMMERCIAL

## 2023-12-05 LAB
ANION GAP SERPL CALCULATED.3IONS-SCNC: 12 MMOL/L (ref 7–15)
ANION GAP SERPL CALCULATED.3IONS-SCNC: 9 MMOL/L (ref 7–15)
BUN SERPL-MCNC: 5.5 MG/DL (ref 6–20)
BUN SERPL-MCNC: <1.4 MG/DL (ref 6–20)
CALCIUM SERPL-MCNC: 8.1 MG/DL (ref 8.6–10)
CALCIUM SERPL-MCNC: 8.4 MG/DL (ref 8.6–10)
CHLORIDE SERPL-SCNC: 109 MMOL/L (ref 98–107)
CHLORIDE SERPL-SCNC: 109 MMOL/L (ref 98–107)
CREAT SERPL-MCNC: 0.42 MG/DL (ref 0.51–0.95)
CREAT SERPL-MCNC: 0.56 MG/DL (ref 0.51–0.95)
DEPRECATED HCO3 PLAS-SCNC: 20 MMOL/L (ref 22–29)
DEPRECATED HCO3 PLAS-SCNC: 23 MMOL/L (ref 22–29)
EGFRCR SERPLBLD CKD-EPI 2021: >90 ML/MIN/1.73M2
EGFRCR SERPLBLD CKD-EPI 2021: >90 ML/MIN/1.73M2
GLUCOSE BLDC GLUCOMTR-MCNC: 122 MG/DL (ref 70–99)
GLUCOSE BLDC GLUCOMTR-MCNC: 123 MG/DL (ref 70–99)
GLUCOSE BLDC GLUCOMTR-MCNC: 141 MG/DL (ref 70–99)
GLUCOSE BLDC GLUCOMTR-MCNC: 155 MG/DL (ref 70–99)
GLUCOSE BLDC GLUCOMTR-MCNC: 160 MG/DL (ref 70–99)
GLUCOSE SERPL-MCNC: 135 MG/DL (ref 70–99)
GLUCOSE SERPL-MCNC: 83 MG/DL (ref 70–99)
MAGNESIUM SERPL-MCNC: 1.9 MG/DL (ref 1.7–2.3)
POTASSIUM SERPL-SCNC: 3.1 MMOL/L (ref 3.4–5.3)
POTASSIUM SERPL-SCNC: 3.1 MMOL/L (ref 3.4–5.3)
POTASSIUM SERPL-SCNC: 4.2 MMOL/L (ref 3.4–5.3)
SODIUM SERPL-SCNC: 141 MMOL/L (ref 135–145)
SODIUM SERPL-SCNC: 141 MMOL/L (ref 135–145)

## 2023-12-05 PROCEDURE — 83735 ASSAY OF MAGNESIUM: CPT | Performed by: INTERNAL MEDICINE

## 2023-12-05 PROCEDURE — 250N000011 HC RX IP 250 OP 636: Performed by: INTERNAL MEDICINE

## 2023-12-05 PROCEDURE — 36415 COLL VENOUS BLD VENIPUNCTURE: CPT | Performed by: INTERNAL MEDICINE

## 2023-12-05 PROCEDURE — 200N000001 HC R&B ICU

## 2023-12-05 PROCEDURE — 250N000011 HC RX IP 250 OP 636: Mod: JZ | Performed by: INTERNAL MEDICINE

## 2023-12-05 PROCEDURE — 250N000009 HC RX 250: Performed by: INTERNAL MEDICINE

## 2023-12-05 PROCEDURE — 258N000003 HC RX IP 258 OP 636: Performed by: INTERNAL MEDICINE

## 2023-12-05 PROCEDURE — 80048 BASIC METABOLIC PNL TOTAL CA: CPT | Performed by: INTERNAL MEDICINE

## 2023-12-05 PROCEDURE — 99232 SBSQ HOSP IP/OBS MODERATE 35: CPT | Performed by: INTERNAL MEDICINE

## 2023-12-05 PROCEDURE — 999N000157 HC STATISTIC RCP TIME EA 10 MIN

## 2023-12-05 PROCEDURE — 250N000013 HC RX MED GY IP 250 OP 250 PS 637: Performed by: INTERNAL MEDICINE

## 2023-12-05 PROCEDURE — C9113 INJ PANTOPRAZOLE SODIUM, VIA: HCPCS | Performed by: INTERNAL MEDICINE

## 2023-12-05 PROCEDURE — 84132 ASSAY OF SERUM POTASSIUM: CPT | Performed by: INTERNAL MEDICINE

## 2023-12-05 PROCEDURE — 99222 1ST HOSP IP/OBS MODERATE 55: CPT | Performed by: PSYCHIATRY & NEUROLOGY

## 2023-12-05 PROCEDURE — 94640 AIRWAY INHALATION TREATMENT: CPT | Mod: 76

## 2023-12-05 RX ORDER — POTASSIUM CHLORIDE 1500 MG/1
40 TABLET, EXTENDED RELEASE ORAL ONCE
Status: COMPLETED | OUTPATIENT
Start: 2023-12-05 | End: 2023-12-05

## 2023-12-05 RX ORDER — OLANZAPINE 2.5 MG/1
2.5 TABLET, FILM COATED ORAL EVERY MORNING
Status: ON HOLD | COMMUNITY
End: 2023-12-08

## 2023-12-05 RX ORDER — MAGNESIUM OXIDE 400 MG/1
400 TABLET ORAL EVERY 4 HOURS
Status: COMPLETED | OUTPATIENT
Start: 2023-12-05 | End: 2023-12-05

## 2023-12-05 RX ORDER — ACETAMINOPHEN 325 MG/1
325-650 TABLET ORAL EVERY 6 HOURS PRN
Status: ON HOLD | COMMUNITY
End: 2023-12-05

## 2023-12-05 RX ADMIN — DEXTROSE AND SODIUM CHLORIDE: 5; 900 INJECTION, SOLUTION INTRAVENOUS at 04:06

## 2023-12-05 RX ADMIN — MAGNESIUM OXIDE TAB 400 MG (241.3 MG ELEMENTAL MG) 400 MG: 400 (241.3 MG) TAB at 18:19

## 2023-12-05 RX ADMIN — IPRATROPIUM BROMIDE AND ALBUTEROL SULFATE 3 ML: .5; 3 SOLUTION RESPIRATORY (INHALATION) at 04:17

## 2023-12-05 RX ADMIN — POTASSIUM CHLORIDE 40 MEQ: 1500 TABLET, EXTENDED RELEASE ORAL at 23:35

## 2023-12-05 RX ADMIN — DEXTROSE AND SODIUM CHLORIDE: 5; 900 INJECTION, SOLUTION INTRAVENOUS at 10:44

## 2023-12-05 RX ADMIN — PANTOPRAZOLE SODIUM 40 MG: 40 INJECTION, POWDER, FOR SOLUTION INTRAVENOUS at 09:18

## 2023-12-05 RX ADMIN — DEXTROSE AND SODIUM CHLORIDE: 5; 900 INJECTION, SOLUTION INTRAVENOUS at 23:58

## 2023-12-05 RX ADMIN — DEXTROSE AND SODIUM CHLORIDE: 5; 900 INJECTION, SOLUTION INTRAVENOUS at 17:27

## 2023-12-05 RX ADMIN — MAGNESIUM OXIDE TAB 400 MG (241.3 MG ELEMENTAL MG) 400 MG: 400 (241.3 MG) TAB at 21:39

## 2023-12-05 RX ADMIN — AMOXICILLIN AND CLAVULANATE POTASSIUM 1 TABLET: 875; 125 TABLET, FILM COATED ORAL at 20:38

## 2023-12-05 RX ADMIN — POTASSIUM CHLORIDE 40 MEQ: 1500 TABLET, EXTENDED RELEASE ORAL at 18:19

## 2023-12-05 RX ADMIN — PIPERACILLIN AND TAZOBACTAM 3.38 G: 3; .375 INJECTION, POWDER, FOR SOLUTION INTRAVENOUS at 03:55

## 2023-12-05 RX ADMIN — AMOXICILLIN AND CLAVULANATE POTASSIUM 1 TABLET: 875; 125 TABLET, FILM COATED ORAL at 09:18

## 2023-12-05 ASSESSMENT — ACTIVITIES OF DAILY LIVING (ADL)
ADLS_ACUITY_SCORE: 43
ADLS_ACUITY_SCORE: 43
ADLS_ACUITY_SCORE: 41
ADLS_ACUITY_SCORE: 44
ADLS_ACUITY_SCORE: 46
ADLS_ACUITY_SCORE: 43
ADLS_ACUITY_SCORE: 44
ADLS_ACUITY_SCORE: 46
ADLS_ACUITY_SCORE: 45
ADLS_ACUITY_SCORE: 44
ADLS_ACUITY_SCORE: 44
ADLS_ACUITY_SCORE: 43

## 2023-12-05 NOTE — PROGRESS NOTES
"Pt started on a cool aerosol overnight for persistent barky/harsh cough. Tolerating it well and seems to be improving. Pt given Q4 nebs as well for inspiratory and expiratory wheezes, increasing aeration post treatment. Currently on 21% via face tent.     /82   Pulse 101   Temp 97.7  F (36.5  C)   Resp 19   Ht 1.62 m (5' 3.78\")   Wt 56.4 kg (124 lb 5.4 oz)   SpO2 100%   BMI 21.49 kg/m      Rocío Andrea, RT    "

## 2023-12-05 NOTE — PROGRESS NOTES
"[facility]    Medicine Progress Note - Hospitalist Service    Date of Admission:  2023    Primary Care Physician   [facility]  CONSULTANTS: critical care    Assessment & Plan     Kimmy Mendez is a 38 year old woman with history of prior suicidal ideation and postpartum depression who came to the ER her mother found her unresponsive at home with multiple bottles of Olanzapine and Vitamin D lying around her.  She has a medical history including anxiety,depression, post-partum depression and extreme bereavement related to the death of a child.   in the emergency room, on presentation vitals were: , /114, RR 12 with O2 sat 99%.  T 98.0.  She was subseqeuntly (after intubation) -130, /80, T 101.8, RR 16 with vent.   Initial VB.46/pCO2 32/pO2 50.  After intubation, VBG 7.33/42/45.  patient  was intubated in the ED \"for airway protection\", sedated with midazolam.  Per Poison control who confirmed that the tachycardia, sedation and fever is related to anticholinergic effects of the Olanzapine.  Also need to be worried about seizures.      ADDENDUM:  patient flipped into bigemeny. Will check mg and potassium and follow on telemetry       Suicide attempt by overdose of olanzapine depression, general anxiety disorder, ptsd, acute respiratory failure due to overdose, history of noncompliance with medications  Patient was intubated in the emergency room after presenting with an overdose from home.  Issues that we need to concerned about from overdosing on olazapine include decrease mentation, respiratory failure, tachycardia, and possible seizures.  Patient  required sedation with Versed while being intubated.  Saturations were good.  She was being followed on telemetry showing tachycardia up to the 140s.  Kept her intubated until her cardiac status improved which it did.  Patient was able to be extubated 2023.  She continues to be lethargic.  Over " time her tachycardia has improved as well.  She still is somewhat lethargic I expect this to improve daily.  Patient has history of previous severe depression with suicidal ideation.  Will need to be seen by psychiatry.  She will need inpatient psychiatric care.  Electrolytes checked and currently normal.  Patient has a history of being seen by psychiatry 8/10/2023 with a discharge summary.  Patient at that time of admission had bizarre behavior and likely hypomanic.  She also has a history of not taking medications for psychiatric illnesses as prescribed.  Pregnancy test negative  12/2/2023.  Patient has a history of being on Zyprexa and Atarax. Per her brother, she can not go home.  She was  recently d/c'd on 11/8/202 to home after she had been staying at some kind of facility either a group home or a stepdown unit to home.  Per the patient's brother she has a tendency of acting normal just to get home but then has a propensity to harm herself.  Obviously this time she took a number of pills and overdosed and we need to everything we can to help her treat her demons and depression.  Will have case management and social work involved as well for discharge planning. Had a fever so on antibiotics for possible aspiration, will finish oral course.  Psych to see.      Toxic encephalopathy  Due to overdose.  Currently is being sedated with Versed.  Hopefully will be able to wean in the next couple days so she can return to her baseline mentation     Sinus tachycardia, hypoglycemia  Due to her overdose, follow on telemetry.  Is on IV fluids, will increase her fluids.  If the patient is good to be intubated for prolonged period time need to consider tube feeds, much improved yet again today.       History gestational diabetes   History noted    History pseudoseizures per chart    Hypothyroidism, subclinical  Per the chart, but does not seem to be taking Synthroid at home.  TSH was elevated 7.71 here.  Had a normal free T4  of 1.36.  Given the patient's ongoing depression this is likely something that we should be treating.  Will discuss more with the patient when she is extubated to know what she has been doing at home.    Lactic acidosis  Patient presented with a lactic acid of 4.1 in the setting of her overdose.  Was given IV fluids and her lactic acids come down to normal at 1.4.  This has resolved    Fever, possible aspiration pneumonitis  Patient presents with drug overdose.  Has a fever up to 101.5.  Chest x-ray showed possible atelectasis versus infiltrate within the right lung and this would be consistent with possible aspiration event.  Patient's white blood cell count is 11.  Patient was placed on empiric Zosyn 12/3/2023 to cover an aspiration pneumonia.  Fever has improved.  Once taking orals can switch to Augmentin to finish a course for 3 more days    Discussed plan of care with night nursing, Dr. Quinones, whole medical team on bedside team rounds       Diet: Advance Diet as Tolerated: Clear Liquid Diet    DVT Prophylaxis: Pneumatic Compression Devices  Hogan Catheter: PRESENT, indication: Strict 1-2 Hour I&O  Lines: None     Cardiac Monitoring: ACTIVE order. Indication: ICU    RESTRAINTS: not indicated  Code Status: Full Code        Follow up plan: Needs close psychiatric follow-up and good safe plan for discharge.       This document was created using voice recognition technology.  Please excuse any typographical errors that may have occurred.  Please call with any questions.         Clinically Significant Risk Factors                                    Disposition Plan            Barrier to discharge:  Need safe discharge, can not go home.  Transfer to the floor when a bed is available.     Shady Mortensen MD  Hospitalist Service  [facility]  Securely message with [employer] (more info)  Text page via [employer] Paging/Directory    ______________________________________________________________________    Interval History   Patient overnight continues to do okay.  She continues to have some lethargy at times.  Has been able to advance her diet some.  Transfer orders are done and awaiting a bed on the floor.  Awaiting psychiatry to see the patient.      ROS: A comprehensive review of systems was done but not sure how accurate this was due to the patient's lethargy    Physical Exam   Vital Signs: Temp: 99  F (37.2  C) Temp src: Temporal BP: 115/76 Pulse: 87   Resp: 15 SpO2: 99 % O2 Device: Cool Aerosol Oxygen Delivery: 10 LPM (AIR FLOWMETER - NO O2)  Weight: 124 lbs 5.43 oz    Exam is stable from yesterday but her tachycardia is actually improved but still continues to be lethargic  General appearance: Patient is lethargic, sitting up in bed, ET tube has been removed since her last visit, appears stated age   HEENT: Mucous membranes are moist  RESPIRATORY: Clear to auscultation bilateral, good air movement  CARDIOVASCULAR: Regular  rhythm and no longer tachycardic, normal S1/S2, no murmurs, rubs, or gallops present, peripheral pulses intact  GASTROINTESTINAL: Non-distended, non-tender, soft, bowel sounds present throughout  NEUROLOGIC: Patient is lethargic, no focal deficits  EXTREMITIES:  Moves all extremities, no clubbing, cyanosis, nor edema      Data         Imaging:   Results for orders placed or performed during the hospital encounter of 12/02/23   XR Chest Port 1 View    Narrative    EXAM: XR CHEST PORT 1 VIEW  LOCATION: [facility]  DATE: 12/2/2023    INDICATION: intubation  COMPARISON: None.      Impression    IMPRESSION: Endotracheal tube tip terminates in the lower thoracic trachea, approximately 1.5 cm from the tete. Enteric tube tip and side-port are within the stomach.    No focal airspace opacity. No pleural effusion or pneumothorax. Cardiac silhouette and mediastinal contours are normal.   Head CT w/o  contrast    Narrative    EXAM: CT HEAD W/O CONTRAST  LOCATION: [facility]  DATE: 12/2/2023    INDICATION: AMS/confusion  COMPARISON: None.  TECHNIQUE: Routine CT Head without IV contrast. Multiplanar reformats. Dose reduction techniques were used.    FINDINGS:  INTRACRANIAL CONTENTS: No intracranial hemorrhage, extraaxial collection, or mass effect.  No CT evidence of acute infarct. Normal parenchymal attenuation. Normal ventricles and sulci.     VISUALIZED ORBITS/SINUSES/MASTOIDS: No intraorbital abnormality. No paranasal sinus mucosal disease. No middle ear or mastoid effusion.    BONES/SOFT TISSUES: No acute abnormality. Partially visualized endotracheal tube and enteric tube.      Impression    IMPRESSION:  1.  No acute intracranial process.   XR Chest Port 1 View    Narrative    EXAM: XR CHEST PORT 1 VIEW  LOCATION: [facility]  DATE: 12/2/2023    INDICATION: check tube placement  COMPARISON: 12/20/2023 at 1643 hours      Impression    IMPRESSION: Nasogastric tube terminates over the stomach. Endotracheal tube terminates 5 mm above the tete. Repositioning recommended. Heart size is normal. There is new airspace disease within the medial right lung base, atelectasis versus infiltrate.    Tube positioning was called to [place] by Dr. Del Kevin on 12/2/2023 8:36 PM CST.     Procedures: none     I have personally have reviewed the patient's most up to date radiologic exams, labs, orders, and medications myself

## 2023-12-05 NOTE — CONSULTS
"Ms. Kimmy Mendez is a 38 year old woman seen for psychiatry consult after overdose on Olanzapine.  She was extubated yesterday.  History:  She was hospitalized for jennifer on station 30 in July and was discharged to an [facility], [facility].  After leaving the [facility] she changed doctors and had decreased doses of meds.  Increasing depression developed.    According to her brother David:  Family history positive for a mother with \"bipolar schizophrenia\" (and was the cause of patient's son's death.  He would like her to have a 3-6 month stay in a residential setting after the psych hospital stay..    Blood pressure 120/80, pulse 85, temperature 99  F (37.2  C), temperature source Temporal, resp. rate 17, height 1.62 m (5' 3.78\"), weight 56.4 kg (124 lb 5.4 oz), SpO2 100%, not currently breastfeeding.    General appearance: groggy  She is sleepy but coherent  Affect: sad  Mood: depressed   Speech:  decreased  Eye contact:  low   Psychomotor behavior: decreased  Gait: not observed  Abnormal movements:  none  Delusions: none  Hallucinations:  denied  Thoughts: linear  Associations: intact  Judgement: poor  Insight: fair  Cognitions: intact in conversation  Memory:  intact in conversation  Orientation: normal    Not suicidal.    IMP:  Bipolar depressed, severe, without psychotic features  And:   Patient Active Problem List   Diagnosis    Gestational diabetes    Depression affecting pregnancy, antepartum    Poor weight gain of pregnancy, third trimester    Gestational diabetes mellitus, antepartum    Depression with suicidal ideation    Seizure-like activity (H)    Seizure (H)    Recurrent major depression (H24)    ROWAN (generalized anxiety disorder)    Bipolar 1 disorder, manic, moderate (H)    Acute respiratory failure, unspecified whether with hypoxia or hypercapnia (H)    Overdose, intentional self-harm, initial encounter (H)     Recommend inpatient psychiatry stay, involuntary if necessary.  I would not add meds for a few " more days, and look at mood leveling meds at that time.    Current Facility-Administered Medications   Medication    amoxicillin-clavulanate (AUGMENTIN) 875-125 MG per tablet 1 tablet    dextrose 5% and 0.9% NaCl infusion    glucose gel 15-30 g    Or    dextrose 50 % injection 25-50 mL    Or    glucagon injection 1 mg    ipratropium - albuterol 0.5 mg/2.5 mg/3 mL (DUONEB) neb solution 3 mL    naloxone (NARCAN) injection 0.2 mg    Or    naloxone (NARCAN) injection 0.4 mg    Or    naloxone (NARCAN) injection 0.2 mg    Or    naloxone (NARCAN) injection 0.4 mg    ondansetron (ZOFRAN ODT) ODT tab 4 mg    Or    ondansetron (ZOFRAN) injection 4 mg    pantoprazole (PROTONIX) 2 mg/mL suspension 40 mg    prochlorperazine (COMPAZINE) injection 10 mg    Or    prochlorperazine (COMPAZINE) tablet 10 mg    Or    prochlorperazine (COMPAZINE) suppository 25 mg     Recent Results (from the past 168 hour(s))   Glucose by meter    Collection Time: 12/02/23  4:15 PM   Result Value Ref Range    GLUCOSE BY METER POCT 151 (H) 70 - 99 mg/dL   iStat Basic Chem ICA Hematocrit, POCT    Collection Time: 12/02/23  4:16 PM   Result Value Ref Range    Chloride POCT 99 94 - 109 mmol/L    Potassium POCT 3.6 3.4 - 5.3 mmol/L    Sodium POCT 135 133 - 144 mmol/L    UREA NITROGEN POCT 8 7 - 30 mg/dL    Calcium, Ionized Whole Blood POCT 5.1 4.4 - 5.2 mg/dL    Glucose Whole Blood POCT 153 (H) 70 - 99 mg/dL    Anion Gap POCT 18.0 (H) 3.0 - 14.0 mmol/L    Hemoglobin POCT 14.3 11.7 - 15.7 g/dL    Hematocrit POCT 42 35 - 47 %    Creatinine POCT 0.5 0.5 - 1.0 mg/dL    TOTAL CO2 POCT 23 20 - 32 mmol/L   iStat HCG Qualitative Pregnancy, POCT    Collection Time: 12/02/23  4:16 PM   Result Value Ref Range    HCG Qualitative POCT Negative Negative, Indeterminate   iStat Gases (lactate) venous, POCT    Collection Time: 12/02/23  4:17 PM   Result Value Ref Range    Lactic Acid POCT 3.4 (H) <=2.0 mmol/L    Bicarbonate Venous POCT 23 21 - 28 mmol/L    O2 Sat, Venous  POCT 87 (L) 94 - 100 %    pCO2 Venous POCT 32 (L) 40 - 50 mm Hg    pH Venous POCT 7.46 (H) 7.32 - 7.43    pO2 Venous POCT 50 (H) 25 - 47 mm Hg   EKG 12 lead    Collection Time: 12/02/23  4:17 PM   Result Value Ref Range    Systolic Blood Pressure  mmHg    Diastolic Blood Pressure  mmHg    Ventricular Rate 155 BPM    Atrial Rate 155 BPM    W[place] Interval 146 ms    QRS Duration 62 ms     ms    QTc 536 ms    P Axis 97 degrees    R AXIS 31 degrees    T Axis 90 degrees    Interpretation ECG       Sinus tachycardia with Premature atrial complexes  Nonspecific ST and T wave abnormality  Abnormal ECG  When compared with ECG of 29-JUL-2023 10:55,  Vent. rate has increased BY  68 BPM  Non-specific change in ST segment in Lateral leads  T wave inversion now evident in Anterolateral leads  Confirmed by - EMERGENCY ROOM, PHYSICIAN ([id number]),  NINA COBB ([id number]) on 12/4/2023 6:46:30 AM     Extra Blue Top Tube    Collection Time: 12/02/23  4:47 PM   Result Value Ref Range    Hold Specimen JIC    Extra Red Top Tube    Collection Time: 12/02/23  4:47 PM   Result Value Ref Range    Hold Specimen JIC    Extra Green Top (Lithium Heparin) Tube    Collection Time: 12/02/23  4:47 PM   Result Value Ref Range    Hold Specimen JIC    Extra Purple Top Tube    Collection Time: 12/02/23  4:47 PM   Result Value Ref Range    Hold Specimen JIC    Extra Green Top (Lithium Heparin) ON ICE    Collection Time: 12/02/23  4:47 PM   Result Value Ref Range    Hold Specimen JIC    Acetaminophen level    Collection Time: 12/02/23  4:47 PM   Result Value Ref Range    Acetaminophen <5.0 (L) 10.0 - 30.0 ug/mL   Comprehensive metabolic panel    Collection Time: 12/02/23  4:47 PM   Result Value Ref Range    Sodium 135 135 - 145 mmol/L    Potassium 3.7 3.4 - 5.3 mmol/L    Carbon Dioxide (CO2) 23 22 - 29 mmol/L    Anion Gap 16 (H) 7 - 15 mmol/L    Urea Nitrogen 9.1 6.0 - 20.0 mg/dL    Creatinine 0.51 0.51 - 0.95 mg/dL    GFR Estimate >90 >60  mL/min/1.73m2    Calcium 10.5 (H) 8.6 - 10.0 mg/dL    Chloride 96 (L) 98 - 107 mmol/L    Glucose 151 (H) 70 - 99 mg/dL    Alkaline Phosphatase 67 40 - 150 U/L    AST 23 0 - 45 U/L    ALT 15 0 - 50 U/L    Protein Total 7.2 6.4 - 8.3 g/dL    Albumin 4.6 3.5 - 5.2 g/dL    Bilirubin Total 0.5 <=1.2 mg/dL   Ethyl Alcohol Level    Collection Time: 12/02/23  4:47 PM   Result Value Ref Range    Alcohol ethyl <0.01 <=0.01 g/dL   Salicylate level    Collection Time: 12/02/23  4:47 PM   Result Value Ref Range    Salicylate <0.3   mg/dL   CBC with platelets and differential    Collection Time: 12/02/23  4:47 PM   Result Value Ref Range    WBC Count 10.4 4.0 - 11.0 10e3/uL    RBC Count 4.93 3.80 - 5.20 10e6/uL    Hemoglobin 14.5 11.7 - 15.7 g/dL    Hematocrit 40.8 35.0 - 47.0 %    MCV 83 78 - 100 fL    MCH 29.4 26.5 - 33.0 pg    MCHC 35.5 31.5 - 36.5 g/dL    RDW 13.4 10.0 - 15.0 %    Platelet Count 270 150 - 450 10e3/uL    % Neutrophils 88 %    % Lymphocytes 7 %    % Monocytes 5 %    % Eosinophils 0 %    % Basophils 0 %    % Immature Granulocytes 0 %    NRBCs per 100 WBC 0 <1 /100    Absolute Neutrophils 9.2 (H) 1.6 - 8.3 10e3/uL    Absolute Lymphocytes 0.7 (L) 0.8 - 5.3 10e3/uL    Absolute Monocytes 0.5 0.0 - 1.3 10e3/uL    Absolute Eosinophils 0.0 0.0 - 0.7 10e3/uL    Absolute Basophils 0.0 0.0 - 0.2 10e3/uL    Absolute Immature Granulocytes 0.0 <=0.4 10e3/uL    Absolute NRBCs 0.0 10e3/uL   CK total    Collection Time: 12/02/23  4:47 PM   Result Value Ref Range    CK 39 26 - 192 U/L   UA with Microscopic reflex to Culture    Collection Time: 12/02/23  5:27 PM    Specimen: Urine, Hogan Catheter   Result Value Ref Range    Color Urine Yellow Colorless, Straw, Light Yellow, Yellow    Appearance Urine Slightly Cloudy (A) Clear    Glucose Urine Negative Negative mg/dL    Bilirubin Urine Negative Negative    Ketones Urine 40 (A) Negative mg/dL    Specific Gravity Urine 1.019 1.003 - 1.035    Blood Urine Negative Negative    pH Urine  7.0 5.0 - 7.0    Protein Albumin Urine 20 (A) Negative mg/dL    Urobilinogen Urine Normal Normal, 2.0 mg/dL    Nitrite Urine Negative Negative    Leukocyte Esterase Urine Negative Negative    Bacteria Urine Moderate (A) None Seen /HPF    WBC Clumps Urine Present (A) None Seen /HPF    Budding Yeast Urine Moderate (A) None Seen /HPF    Mucus Urine Present (A) None Seen /LPF    RBC Urine 2 <=2 /HPF    WBC Urine 4 <=5 /HPF    Squamous Epithelials Urine <1 <=1 /HPF   Urine Drug Screen Panel    Collection Time: 12/02/23  5:27 PM   Result Value Ref Range    Amphetamines Urine Screen Negative Screen Negative    Barbituates Urine Screen Negative Screen Negative    Benzodiazepine Urine Screen Positive (A) Screen Negative    Cannabinoids Urine Screen Negative Screen Negative    Cocaine Urine Screen Negative Screen Negative    Fentanyl Qual Urine Screen Negative Screen Negative    Opiates Urine Screen Negative Screen Negative    PCP Urine Screen Negative Screen Negative   Symptomatic Influenza A/B, RSV, & SARS-CoV2 PCR (COVID-19) Nasopharyngeal    Collection Time: 12/02/23  5:46 PM    Specimen: Nasopharyngeal; Swab   Result Value Ref Range    Influenza A PCR Negative Negative    Influenza B PCR Negative Negative    RSV PCR Negative Negative    SARS CoV2 PCR Negative Negative   Blood Culture Peripheral Blood    Collection Time: 12/02/23  5:47 PM    Specimen: Peripheral Blood   Result Value Ref Range    Culture No growth after 2 days    iStat Gases (lactate) venous, POCT    Collection Time: 12/02/23  6:17 PM   Result Value Ref Range    Lactic Acid POCT 3.4 (H) <=2.0 mmol/L    Bicarbonate Venous POCT 22 21 - 28 mmol/L    O2 Sat, Venous POCT 77 (L) 94 - 100 %    pCO2 Venous POCT 42 40 - 50 mm Hg    pH Venous POCT 7.33 7.32 - 7.43    pO2 Venous POCT 45 25 - 47 mm Hg   EKG 12 lead    Collection Time: 12/02/23  6:18 PM   Result Value Ref Range    Systolic Blood Pressure  mmHg    Diastolic Blood Pressure  mmHg    Ventricular Rate 135  BPM    Atrial Rate 135 BPM    [place] Interval 192 ms    QRS Duration 68 ms     ms    QTc 363 ms    P Axis 92 degrees    R AXIS 61 degrees    T Axis 76 degrees    Interpretation ECG       Sinus tachycardia  T wave abnormality, consider anterior ischemia  Abnormal ECG  When compared with ECG of 02-DEC-2023 16:17, (unconfirmed)  Premature atrial complexes are no longer Present  Nonspecific T wave abnormality, worse in Inferior leads  Confirmed by - EMERGENCY ROOM, PHYSICIAN ([id number]),  NINA COBB ([id number]) on 12/4/2023 6:46:28 AM     Blood Culture Hand, Left    Collection Time: 12/02/23  7:03 PM    Specimen: Hand, Left; Blood   Result Value Ref Range    Culture No growth after 2 days    Glucose by meter    Collection Time: 12/02/23  9:09 PM   Result Value Ref Range    GLUCOSE BY METER POCT 109 (H) 70 - 99 mg/dL   Glucose by meter    Collection Time: 12/02/23 11:41 PM   Result Value Ref Range    GLUCOSE BY METER POCT 96 70 - 99 mg/dL   Lactic acid whole blood    Collection Time: 12/03/23  1:06 AM   Result Value Ref Range    Lactic Acid 4.1 (HH) 0.7 - 2.0 mmol/L   Glucose by meter    Collection Time: 12/03/23  4:04 AM   Result Value Ref Range    GLUCOSE BY METER POCT 88 70 - 99 mg/dL   Comprehensive metabolic panel    Collection Time: 12/03/23  5:42 AM   Result Value Ref Range    Sodium 141 135 - 145 mmol/L    Potassium 4.2 3.4 - 5.3 mmol/L    Carbon Dioxide (CO2) 20 (L) 22 - 29 mmol/L    Anion Gap 12 7 - 15 mmol/L    Urea Nitrogen 5.5 (L) 6.0 - 20.0 mg/dL    Creatinine 0.56 0.51 - 0.95 mg/dL    GFR Estimate >90 >60 mL/min/1.73m2    Calcium 8.1 (L) 8.6 - 10.0 mg/dL    Chloride 109 (H) 98 - 107 mmol/L    Glucose 83 70 - 99 mg/dL    Alkaline Phosphatase 54 40 - 150 U/L    AST 28 0 - 45 U/L    ALT 11 0 - 50 U/L    Protein Total 5.6 (L) 6.4 - 8.3 g/dL    Albumin 3.5 3.5 - 5.2 g/dL    Bilirubin Total 0.4 <=1.2 mg/dL   CBC with platelets    Collection Time: 12/03/23  5:42 AM   Result Value Ref Range    WBC Count 11.0  4.0 - 11.0 10e3/uL    RBC Count 4.16 3.80 - 5.20 10e6/uL    Hemoglobin 12.2 11.7 - 15.7 g/dL    Hematocrit 34.2 (L) 35.0 - 47.0 %    MCV 82 78 - 100 fL    MCH 29.3 26.5 - 33.0 pg    MCHC 35.7 31.5 - 36.5 g/dL    RDW 13.8 10.0 - 15.0 %    Platelet Count 209 150 - 450 10e3/uL   Magnesium    Collection Time: 12/03/23  5:42 AM   Result Value Ref Range    Magnesium 1.8 1.7 - 2.3 mg/dL   Phosphorus    Collection Time: 12/03/23  5:42 AM   Result Value Ref Range    Phosphorus 3.5 2.5 - 4.5 mg/dL   TSH    Collection Time: 12/03/23  5:42 AM   Result Value Ref Range    TSH 7.71 (H) 0.30 - 4.20 uIU/mL   Lactic acid whole blood    Collection Time: 12/03/23  5:42 AM   Result Value Ref Range    Lactic Acid 1.4 0.7 - 2.0 mmol/L   T4 free    Collection Time: 12/03/23  5:42 AM   Result Value Ref Range    Free T4 1.36 0.90 - 1.70 ng/dL   RBC and Platelet Morphology    Collection Time: 12/03/23  5:42 AM   Result Value Ref Range    Platelet Assessment  Automated Count Confirmed. Platelet morphology is normal.     Automated Count Confirmed. Platelet morphology is normal.    Acanthocytes      Anna Rods      Basophilic Stippling      Bite Cells      Blister Cells      Andrae Cells      Elliptocytes      Hgb C Crystals      Beckham-Jolly Bodies      Hypersegmented Neutrophils      Polychromasia      RBC agglutination      RBC Fragments      Reactive Lymphocytes      Rouleaux      Sickle Cells      Smudge Cells      Spherocytes      Stomatocytes      Target Cells      Teardrop Cells      Toxic Neutrophils      RBC Morphology Confirmed RBC Indices    Glucose by meter    Collection Time: 12/03/23  7:41 AM   Result Value Ref Range    GLUCOSE BY METER POCT 83 70 - 99 mg/dL   Glucose by meter    Collection Time: 12/03/23 12:18 PM   Result Value Ref Range    GLUCOSE BY METER POCT 67 (L) 70 - 99 mg/dL   Glucose by meter    Collection Time: 12/03/23 12:44 PM   Result Value Ref Range    GLUCOSE BY METER POCT 162 (H) 70 - 99 mg/dL   Glucose by meter     Collection Time: 12/03/23  3:53 PM   Result Value Ref Range    GLUCOSE BY METER POCT 106 (H) 70 - 99 mg/dL   Glucose by meter    Collection Time: 12/03/23  7:53 PM   Result Value Ref Range    GLUCOSE BY METER POCT 108 (H) 70 - 99 mg/dL   Glucose by meter    Collection Time: 12/04/23 12:01 AM   Result Value Ref Range    GLUCOSE BY METER POCT 111 (H) 70 - 99 mg/dL   Glucose by meter    Collection Time: 12/04/23  3:55 AM   Result Value Ref Range    GLUCOSE BY METER POCT 121 (H) 70 - 99 mg/dL   CBC with platelets    Collection Time: 12/04/23  5:18 AM   Result Value Ref Range    WBC Count 11.3 (H) 4.0 - 11.0 10e3/uL    RBC Count 4.03 3.80 - 5.20 10e6/uL    Hemoglobin 11.8 11.7 - 15.7 g/dL    Hematocrit 33.6 (L) 35.0 - 47.0 %    MCV 83 78 - 100 fL    MCH 29.3 26.5 - 33.0 pg    MCHC 35.1 31.5 - 36.5 g/dL    RDW 13.8 10.0 - 15.0 %    Platelet Count 216 150 - 450 10e3/uL   Glucose by meter    Collection Time: 12/04/23  7:26 AM   Result Value Ref Range    GLUCOSE BY METER POCT 141 (H) 70 - 99 mg/dL   Glucose by meter    Collection Time: 12/04/23 12:05 PM   Result Value Ref Range    GLUCOSE BY METER POCT 133 (H) 70 - 99 mg/dL   Glucose by meter    Collection Time: 12/04/23  3:58 PM   Result Value Ref Range    GLUCOSE BY METER POCT 136 (H) 70 - 99 mg/dL   Glucose by meter    Collection Time: 12/04/23  8:25 PM   Result Value Ref Range    GLUCOSE BY METER POCT 150 (H) 70 - 99 mg/dL   Glucose by meter    Collection Time: 12/04/23 11:44 PM   Result Value Ref Range    GLUCOSE BY METER POCT 174 (H) 70 - 99 mg/dL   Glucose by meter    Collection Time: 12/05/23  3:55 AM   Result Value Ref Range    GLUCOSE BY METER POCT 160 (H) 70 - 99 mg/dL   Glucose by meter    Collection Time: 12/05/23  8:00 AM   Result Value Ref Range    GLUCOSE BY METER POCT 155 (H) 70 - 99 mg/dL   Glucose by meter    Collection Time: 12/05/23 11:47 AM   Result Value Ref Range    GLUCOSE BY METER POCT 141 (H) 70 - 99 mg/dL       Video-Visit Details    Type of  service:  Video Visit    Video Start Time (time video started): 1150    Video End Time (time video stopped): 1210    Originating Location (pt. Location): [facility]    Distant Location (provider location): Provider remote location    Mode of Communication:  Video Conference via [employer]    Physician has received verbal consent for a Video Visit from the patient? Yes      Travon Lima MD

## 2023-12-05 NOTE — PROGRESS NOTES
Care Management Follow Up    Length of Stay (days): 3    Expected Discharge Date: 12/04/2023     Concerns to be Addressed:       Patient plan of care discussed at interdisciplinary rounds: Yes    Anticipated Discharge Disposition:       Anticipated Discharge Services:    Anticipated Discharge DME:      Patient/family educated on Medicare website which has current facility and service quality ratings:    Education Provided on the Discharge Plan:    Patient/Family in Agreement with the Plan:      Referrals Placed by CM/SW:    Private pay costs discussed: Not applicable    Additional Information:    Per chart review, psych MD recommends inpatient psych stay, involuntary if necessary. Pt unable to be placed on IP psych list at this time. When medically ready, pt will need an MD note stating that she is medically stable for a transfer to IP psych and pt will need to be independent with mobility. SW following.     LEIGHA Cohen, JAZZ  Inpatient Care Coordination  Emergency Room /Float  [phone]   [place] Day, JAZZ

## 2023-12-05 NOTE — PHARMACY-ADMISSION MEDICATION HISTORY
Pharmacist Admission Medication History    Admission medication history is complete. The information provided in this note is only as accurate as the sources available at the time of the update.    Information Source(s): Patient, Hospital records, Facility (U/NH/) medication list/MAR, and CareEverywhere/SureScripts via in-person    Pertinent Information: pt had multiple rx for olanzapine on PTA med list and has fills of multiple strengths in recent 6-8 weeks per sure scripts.  However, when interviewed pt, she was specific in stating she takes 10mg at night and 2.5mg in am.    Changes made to PTA medication list:  Added: tylenol prn, olanzapine 2.5mg qam  Deleted: olanzapine 10mg tid prn, olanzapine 20mg tid prn, olanzapine 5mg am  Changed: added frequency to magnesium per pt    Medication Affordability:  Not including over the counter (OTC) medications, was there a time in the past 3 months when you did not take your medications as prescribed because of cost?: Unable to Assess    Allergies reviewed with patient and updates made in EHR: yes    Medication History Completed By: Jennifer Lozano [facility] 12/5/2023 9:11 AM    Prior to Admission medications    Medication Sig Last Dose Taking? Auth Provider Long Term End Date   acetaminophen (TYLENOL) 325 MG tablet Take 325-650 mg by mouth every 6 hours as needed for mild pain Unknown at no recent usage Yes Unknown, Entered By History     fish oil-omega-3 fatty acids 1000 MG capsule Take 1 capsule (1 g) by mouth daily Past Week at ? Yes Pavan Gibbs MD     hydrOXYzine (ATARAX) 25 MG tablet Take 1 tablet (25 mg) by mouth 3 times daily as needed for anxiety (sleep) Unknown at pt has, but no recent usage Yes Cisco Hardy MD No    magnesium 30 MG tablet Take 30 mg by mouth daily Past Week at ? Yes Reported, Patient     OLANZapine (ZYPREXA) 10 MG tablet Take 1 tablet (10 mg) by mouth At Bedtime Past Week at ? Yes Cisco Hardy MD Yes    OLANZapine  (ZYPREXA) 2.5 MG tablet Take 2.5 mg by mouth every morning Past Week at am Yes Unknown, Entered By History Yes    senna-docusate (SENOKOT-S/PERICOLACE) 8.6-50 MG tablet Take 1 tablet by mouth 2 times daily as needed for constipation Unknown at ? Yes Olga Harper MD     Vitamin D3 (CHOLECALCIFEROL) 25 mcg (1000 units) tablet Take 1 tablet (25 mcg) by mouth daily Past Week at am Yes Olga Harper MD

## 2023-12-05 NOTE — PROGRESS NOTES
"Had a good night - rested well, indicates her needs. Has had a good urinary output of clear meagan urine. First part of sift had a dry croupy cough, applied cool humidity cough dubside after about 45\". LS fairl clear diminished. Continues to have bedside sitter until evaluated by psyc. Right arm IV started to leak dc'd and restarted new in left am. CM remains stable. Poison control called first part of shift updated on status.  "

## 2023-12-06 LAB
ANION GAP SERPL CALCULATED.3IONS-SCNC: 10 MMOL/L (ref 7–15)
BUN SERPL-MCNC: 1.5 MG/DL (ref 6–20)
C DIFF TOX B STL QL: NEGATIVE
CALCIUM SERPL-MCNC: 8.6 MG/DL (ref 8.6–10)
CHLORIDE SERPL-SCNC: 110 MMOL/L (ref 98–107)
CREAT SERPL-MCNC: 0.42 MG/DL (ref 0.51–0.95)
DEPRECATED HCO3 PLAS-SCNC: 21 MMOL/L (ref 22–29)
EGFRCR SERPLBLD CKD-EPI 2021: >90 ML/MIN/1.73M2
GLUCOSE BLDC GLUCOMTR-MCNC: 117 MG/DL (ref 70–99)
GLUCOSE BLDC GLUCOMTR-MCNC: 122 MG/DL (ref 70–99)
GLUCOSE BLDC GLUCOMTR-MCNC: 137 MG/DL (ref 70–99)
GLUCOSE SERPL-MCNC: 125 MG/DL (ref 70–99)
MAGNESIUM SERPL-MCNC: 1.8 MG/DL (ref 1.7–2.3)
POTASSIUM SERPL-SCNC: 4 MMOL/L (ref 3.4–5.3)
POTASSIUM SERPL-SCNC: 4 MMOL/L (ref 3.4–5.3)
SODIUM SERPL-SCNC: 141 MMOL/L (ref 135–145)

## 2023-12-06 PROCEDURE — 99233 SBSQ HOSP IP/OBS HIGH 50: CPT | Performed by: INTERNAL MEDICINE

## 2023-12-06 PROCEDURE — 80048 BASIC METABOLIC PNL TOTAL CA: CPT | Performed by: INTERNAL MEDICINE

## 2023-12-06 PROCEDURE — 250N000013 HC RX MED GY IP 250 OP 250 PS 637: Performed by: INTERNAL MEDICINE

## 2023-12-06 PROCEDURE — 250N000009 HC RX 250: Performed by: INTERNAL MEDICINE

## 2023-12-06 PROCEDURE — 250N000013 HC RX MED GY IP 250 OP 250 PS 637: Performed by: HOSPITALIST

## 2023-12-06 PROCEDURE — 120N000001 HC R&B MED SURG/OB

## 2023-12-06 PROCEDURE — 83735 ASSAY OF MAGNESIUM: CPT | Performed by: INTERNAL MEDICINE

## 2023-12-06 PROCEDURE — 87493 C DIFF AMPLIFIED PROBE: CPT | Performed by: INTERNAL MEDICINE

## 2023-12-06 PROCEDURE — 36415 COLL VENOUS BLD VENIPUNCTURE: CPT | Performed by: INTERNAL MEDICINE

## 2023-12-06 PROCEDURE — 258N000003 HC RX IP 258 OP 636: Performed by: INTERNAL MEDICINE

## 2023-12-06 PROCEDURE — 250N000011 HC RX IP 250 OP 636: Mod: JZ | Performed by: INTERNAL MEDICINE

## 2023-12-06 RX ORDER — MAGNESIUM OXIDE 400 MG/1
400 TABLET ORAL EVERY 4 HOURS
Status: DISCONTINUED | OUTPATIENT
Start: 2023-12-06 | End: 2023-12-06 | Stop reason: SINTOL

## 2023-12-06 RX ORDER — MAGNESIUM SULFATE HEPTAHYDRATE 40 MG/ML
2 INJECTION, SOLUTION INTRAVENOUS ONCE
Status: COMPLETED | OUTPATIENT
Start: 2023-12-06 | End: 2023-12-06

## 2023-12-06 RX ORDER — LANOLIN ALCOHOL/MO/W.PET/CERES
3 CREAM (GRAM) TOPICAL
Status: DISCONTINUED | OUTPATIENT
Start: 2023-12-06 | End: 2023-12-09 | Stop reason: HOSPADM

## 2023-12-06 RX ADMIN — TOPICAL ANESTHETIC 0.5 ML: 200 SPRAY DENTAL; PERIODONTAL at 13:00

## 2023-12-06 RX ADMIN — Medication 3 MG: at 22:01

## 2023-12-06 RX ADMIN — Medication 40 MG: at 09:55

## 2023-12-06 RX ADMIN — TOPICAL ANESTHETIC 0.5 ML: 200 SPRAY DENTAL; PERIODONTAL at 20:12

## 2023-12-06 RX ADMIN — AMOXICILLIN AND CLAVULANATE POTASSIUM 1 TABLET: 875; 125 TABLET, FILM COATED ORAL at 20:02

## 2023-12-06 RX ADMIN — DEXTROSE AND SODIUM CHLORIDE: 5; 900 INJECTION, SOLUTION INTRAVENOUS at 06:06

## 2023-12-06 RX ADMIN — MAGNESIUM SULFATE HEPTAHYDRATE 2 G: 2 INJECTION, SOLUTION INTRAVENOUS at 05:25

## 2023-12-06 RX ADMIN — AMOXICILLIN AND CLAVULANATE POTASSIUM 1 TABLET: 875; 125 TABLET, FILM COATED ORAL at 08:35

## 2023-12-06 ASSESSMENT — ACTIVITIES OF DAILY LIVING (ADL)
ADLS_ACUITY_SCORE: 36
ADLS_ACUITY_SCORE: 18
ADLS_ACUITY_SCORE: 22
ADLS_ACUITY_SCORE: 22
ADLS_ACUITY_SCORE: 44
ADLS_ACUITY_SCORE: 42
ADLS_ACUITY_SCORE: 38
ADLS_ACUITY_SCORE: 46
ADLS_ACUITY_SCORE: 44
ADLS_ACUITY_SCORE: 38
ADLS_ACUITY_SCORE: 42
ADLS_ACUITY_SCORE: 22

## 2023-12-06 NOTE — PLAN OF CARE
"Goal Outcome Evaluation:      Plan of Care Reviewed With: patient      Pt. A&O, SBA for mobility, denied pain,  but very depressed, on regular diet ate 75% of her meal, voided adequately,  has PIV line on left low arm, PRN Benzocaine spray was administered for mouth sores, has setter at bed side, CMS intact.    /79 (BP Location: Right arm)   Pulse 95   Temp 98.2  F (36.8  C) (Oral)   Resp 16   Ht 1.62 m (5' 3.78\")   Wt 56.4 kg (124 lb 5.4 oz)   SpO2 98%   BMI 21.49 kg/m              "

## 2023-12-06 NOTE — PLAN OF CARE
Goal Outcome Evaluation:    ICU End of Shift Summary.  For vital signs and complete assessments, please see documentation flowsheets.     Pertinent assessments: AO x 4. Afbebrile, Room air sating well LS: clear. SB when sleeping  and  SR when awake with PAC's. BP: WDL. Clear liquid tolerating well.Bowel sounds audible, BM 3x loose. Passing urine. SBA to bathroom. 2 PIVS. Skin intact.   Major Shift Events: Patient had Diarrhea started at 5am .    0700 complaints of right leg tremors for 5 secs and stopped and tongue numbness.   Plan (Upcoming Events): Continue sitter for suicide prec. Continue abx and continue ongoing icu care, transfer  Discharge/Transfer Needs: TBD    Bedside Shift Report Completed : Y  Bedside Safety Check Completed: Y

## 2023-12-06 NOTE — PROGRESS NOTES
"[facility]    Medicine Progress Note - Hospitalist Service    Date of Admission:  2023    Primary Care Physician   [facility]  CONSULTANTS: critical care    Assessment & Plan     Kimmy Mendez is a 38 year old woman with history of prior suicidal ideation and postpartum depression who came to the ER her mother found her unresponsive at home with multiple bottles of Olanzapine and Vitamin D lying around her.  She has a medical history including anxiety,depression, post-partum depression and extreme bereavement related to the death of a child.   in the emergency room, on presentation vitals were: , /114, RR 12 with O2 sat 99%.  T 98.0.  She was subseqeuntly (after intubation) -130, /80, T 101.8, RR 16 with vent.   Initial VB.46/pCO2 32/pO2 50.  After intubation, VBG 7.33/42/45.  patient  was intubated in the ED \"for airway protection\", sedated with midazolam.  Per Poison control who confirmed that the tachycardia, sedation and fever is related to anticholinergic effects of the Olanzapine.  Also need to be worried about seizures.      ADDENDUM:  patient flipped into bigemeny. Will check mg and potassium and follow on telemetry       Suicide attempt by overdose of olanzapine depression, general anxiety disorder, ptsd, acute respiratory failure due to overdose, history of noncompliance with medications  --Patient was intubated in the emergency room after presenting with an overdose from home.  Issues that we need to concerned about from overdosing on olazapine include decrease mentation, respiratory failure, tachycardia, and possible seizures.  Patient  required sedation with Versed while being intubated.  Saturations were good.  She was being followed on telemetry showing tachycardia up to the 140s.  Kept her intubated until her cardiac status improved which it did.  Patient was able to be extubated 2023.    -Currently patient is alert and " oriented x 3.  She was seen by psychiatry team yesterday and she may require inpatient psych admission.  Noted to be suicidal.  She requires bedside sitter.  Will transfer to the floor and continue to monitor until discharge to inpatient psych    Toxic encephalopathy  --Due to overdose.   -- Resolved  Sinus tachycardia, hypoglycemia  --Resolved    History gestational diabetes   History noted    History pseudoseizures per chart    Hypothyroidism, subclinical  Per the chart, but does not seem to be taking Synthroid at home.  TSH was elevated 7.71 here.  Had a normal free T4 of 1.36.  She needs follow-up with endocrinology as an outpatient    Lactic acidosis  --Patient presented with a lactic acid of 4.1 in the setting of her overdose.  Was given IV fluids and her lactic acids come down to normal at 1.4.  This has resolved    Fever, possible aspiration pneumonitis  --Patient presents with drug overdose.  Has a fever up to 101.5.  Chest x-ray showed possible atelectasis versus infiltrate within the right lung and this would be consistent with possible aspiration event.  Patient's white blood cell count is 11.  Patient was placed on empiric Zosyn 12/3/2023 to cover an aspiration pneumonia.  Fever has improved.  Will start oral Augmentin    9.  Diarrhea  -- Been having frequent diarrhea since last night.  Watery diarrhea with no significant abdominal pain.  Will check stool studies including C. difficile.  Continue oral hydration for now.    10.  Hypokalemia: Replace per protocol    Diet: Regular Diet Adult    DVT Prophylaxis: Pneumatic Compression Devices  Hogan Catheter: Not present  Lines: None     Cardiac Monitoring: Not needed    RESTRAINTS: not indicated  Code Status: Full Code        Disposition: Transfer to medical floor.  Anticipate discharge to inpatient psych when bed is available      Paul Frye MD  Hospitalist Service  [facility]  Securely message with "[employer]" (more info)  Text  "page via [employer] Paging/Directory   ______________________________________________________________________    Interval History     Patient Seen by responded.  Electronic medical records reviewed.  Patient was seen and examined at bedside.  She is feeling better but having frequent diarrhea since last night.  No abdominal pain or fever.    Physical Exam   Vital Signs: Temp: 98  F (36.7  C) Temp src: Oral BP: 137/84 Pulse: 64   Resp: 13 SpO2: 100 % O2 Device: None (Room air)    Weight: 124 lbs 5.43 oz        /84 (BP Location: Left arm)   Pulse 64   Temp 98  F (36.7  C) (Oral)   Resp 13   Ht 1.62 m (5' 3.78\")   Wt 56.4 kg (124 lb 5.4 oz)   SpO2 100%   BMI 21.49 kg/m      GEN:  Alert, oriented x 3, appears comfortable, NAD.  NECK:Supple ,no mass or thyromegaly   HEENT:  Normocephalic/atraumatic, no scleral icterus, no nasal discharge, mouth moist.  CV:  Regular rate and rhythm, no murmur or JVD.  S1 + S2 noted, no S3 or S4.  Normal peripheral pulses bilaterally with palpable pulse including dorsalis pedis and posterior tibial arteries  LUNGS:  Clear to auscultation bilaterally without rales/rhonchi/wheezing/retractions.  Symmetric chest rise on inhalation noted.  ABD:  Active bowel sounds, soft, non-tender/non-distended.  No rebound/guarding/rigidity.  EXT:  No edema.  No cyanosis.  No joint synovitis noted.  LGS: No cervical or axillary lymphadenopathy   SKIN:  Dry to touch, no exanthems noted in the visualized areas.  Neurologic:Grossly intact,non focal . No acute focal neurologic deficit   Psychaitric exam: Mood and affect normal           Data     I have personally reviewed the following data over the past 24 hrs:    N/A  \   N/A   / N/A     141 110 (H) 1.5 (L) /  137 (H)   4.0; 4.0 21 (L) 0.42 (L) \       Imaging:   Results for orders placed or performed during the hospital encounter of 12/02/23   XR Chest Port 1 View    Narrative    EXAM: XR CHEST PORT 1 VIEW  LOCATION: [facility]  DATE: 12/2/2023    INDICATION: intubation  COMPARISON: None.      Impression    IMPRESSION: Endotracheal tube tip terminates in the lower thoracic trachea, approximately 1.5 cm from the tete. Enteric tube tip and side-port are within the stomach.    No focal airspace opacity. No pleural effusion or pneumothorax. Cardiac silhouette and mediastinal contours are normal.   Head CT w/o contrast    Narrative    EXAM: CT HEAD W/O CONTRAST  LOCATION: [facility]  DATE: 12/2/2023    INDICATION: AMS/confusion  COMPARISON: None.  TECHNIQUE: Routine CT Head without IV contrast. Multiplanar reformats. Dose reduction techniques were used.    FINDINGS:  INTRACRANIAL CONTENTS: No intracranial hemorrhage, extraaxial collection, or mass effect.  No CT evidence of acute infarct. Normal parenchymal attenuation. Normal ventricles and sulci.     VISUALIZED ORBITS/SINUSES/MASTOIDS: No intraorbital abnormality. No paranasal sinus mucosal disease. No middle ear or mastoid effusion.    BONES/SOFT TISSUES: No acute abnormality. Partially visualized endotracheal tube and enteric tube.      Impression    IMPRESSION:  1.  No acute intracranial process.   XR Chest Port 1 View    Narrative    EXAM: XR CHEST PORT 1 VIEW  LOCATION: [facility]  DATE: 12/2/2023    INDICATION: check tube placement  COMPARISON: 12/20/2023 at 1643 hours      Impression    IMPRESSION: Nasogastric tube terminates over the stomach. Endotracheal tube terminates 5 mm above the tete. Repositioning recommended. Heart size is normal. There is new airspace disease within the medial right lung base, atelectasis versus infiltrate.    Tube positioning was called to MD [place] by Dr. Del Kevin on 12/2/2023 8:36 PM CST.     Procedures: none     I have personally have reviewed the patient's most up to date radiologic exams, labs, orders, and medications myself

## 2023-12-06 NOTE — PROGRESS NOTES
ICU End of Shift Summary.  For vital signs and complete assessments, please see documentation flowsheets.     Pertinent assessments: Patient alert. Awakes to voice. Has slept a lot. Disoriented to situation. Tremors present in upper extremities. Pt does well on RA and vitals stable but humification added for comfort for hoarse voice.   Major Shift Events:  patient sue removed and voiding fine, pot and mag rechecked, pt now in bigeminy for pacs with bradycardia when sleeping-electrolytes checked   Plan (Upcoming Events):  transfer, IV fluids continue, clear liquids, continue sitter for suicide precautions    Discharge/Transfer Needs:  inpatient psych, overflow orders in     Bedside Shift Report Completed :  y  Bedside Safety Check Completed: y

## 2023-12-06 NOTE — TELEPHONE ENCOUNTER
Missed Entry for 12/5/23 at 12:47 PM: Dr. Simon called to add Pt to work list. Call was disconnected d/t connection interruption on [place].      8:23 AM: Intake left a voicemail for Brian for a call back to verify location of Pt.

## 2023-12-06 NOTE — PLAN OF CARE
End of Shift Summary  For vital signs and complete assessments, please see documentation flowsheets.     Pertinent assessments: A/O. Flat. VSS. Denies pain, nausea and SOB. Matilde. Regular diet. Up standby, sitter at bedside. Denies SI.     Major Shift Events Transferred from ICU. Need stool sample. Sitter at bedside.     Treatment Plan: PCS. Symptom management. Sitter at bedside.

## 2023-12-07 ENCOUNTER — TELEPHONE (OUTPATIENT)
Dept: BEHAVIORAL HEALTH | Facility: CLINIC | Age: 38
End: 2023-12-07
Payer: COMMERCIAL

## 2023-12-07 LAB
ANION GAP SERPL CALCULATED.3IONS-SCNC: 10 MMOL/L (ref 7–15)
BACTERIA BLD CULT: NO GROWTH
BACTERIA BLD CULT: NO GROWTH
BUN SERPL-MCNC: 8.7 MG/DL (ref 6–20)
CALCIUM SERPL-MCNC: 8.9 MG/DL (ref 8.6–10)
CHLORIDE SERPL-SCNC: 105 MMOL/L (ref 98–107)
CREAT SERPL-MCNC: 0.48 MG/DL (ref 0.51–0.95)
DEPRECATED HCO3 PLAS-SCNC: 24 MMOL/L (ref 22–29)
EGFRCR SERPLBLD CKD-EPI 2021: >90 ML/MIN/1.73M2
GLUCOSE SERPL-MCNC: 106 MG/DL (ref 70–99)
MAGNESIUM SERPL-MCNC: 2 MG/DL (ref 1.7–2.3)
POTASSIUM SERPL-SCNC: 3.8 MMOL/L (ref 3.4–5.3)
POTASSIUM SERPL-SCNC: 3.8 MMOL/L (ref 3.4–5.3)
SARS-COV-2 RNA RESP QL NAA+PROBE: NEGATIVE
SODIUM SERPL-SCNC: 139 MMOL/L (ref 135–145)

## 2023-12-07 PROCEDURE — 82310 ASSAY OF CALCIUM: CPT | Performed by: INTERNAL MEDICINE

## 2023-12-07 PROCEDURE — 250N000013 HC RX MED GY IP 250 OP 250 PS 637: Performed by: HOSPITALIST

## 2023-12-07 PROCEDURE — 250N000013 HC RX MED GY IP 250 OP 250 PS 637: Performed by: INTERNAL MEDICINE

## 2023-12-07 PROCEDURE — 36415 COLL VENOUS BLD VENIPUNCTURE: CPT | Performed by: INTERNAL MEDICINE

## 2023-12-07 PROCEDURE — 87635 SARS-COV-2 COVID-19 AMP PRB: CPT | Performed by: STUDENT IN AN ORGANIZED HEALTH CARE EDUCATION/TRAINING PROGRAM

## 2023-12-07 PROCEDURE — 99231 SBSQ HOSP IP/OBS SF/LOW 25: CPT | Performed by: STUDENT IN AN ORGANIZED HEALTH CARE EDUCATION/TRAINING PROGRAM

## 2023-12-07 PROCEDURE — 250N000009 HC RX 250: Performed by: INTERNAL MEDICINE

## 2023-12-07 PROCEDURE — 120N000001 HC R&B MED SURG/OB

## 2023-12-07 PROCEDURE — 250N000013 HC RX MED GY IP 250 OP 250 PS 637: Performed by: STUDENT IN AN ORGANIZED HEALTH CARE EDUCATION/TRAINING PROGRAM

## 2023-12-07 PROCEDURE — 83735 ASSAY OF MAGNESIUM: CPT | Performed by: INTERNAL MEDICINE

## 2023-12-07 RX ORDER — POTASSIUM CHLORIDE 1500 MG/1
20 TABLET, EXTENDED RELEASE ORAL ONCE
Status: COMPLETED | OUTPATIENT
Start: 2023-12-07 | End: 2023-12-07

## 2023-12-07 RX ORDER — MAGNESIUM OXIDE 400 MG/1
400 TABLET ORAL EVERY 4 HOURS
Qty: 2 TABLET | Refills: 0 | Status: COMPLETED | OUTPATIENT
Start: 2023-12-07 | End: 2023-12-07

## 2023-12-07 RX ADMIN — Medication 3 MG: at 19:38

## 2023-12-07 RX ADMIN — TOPICAL ANESTHETIC 0.5 ML: 200 SPRAY DENTAL; PERIODONTAL at 11:32

## 2023-12-07 RX ADMIN — MAGNESIUM OXIDE TAB 400 MG (241.3 MG ELEMENTAL MG) 400 MG: 400 (241.3 MG) TAB at 14:29

## 2023-12-07 RX ADMIN — AMOXICILLIN AND CLAVULANATE POTASSIUM 1 TABLET: 875; 125 TABLET, FILM COATED ORAL at 08:09

## 2023-12-07 RX ADMIN — Medication 40 MG: at 11:27

## 2023-12-07 RX ADMIN — TOPICAL ANESTHETIC: 200 SPRAY DENTAL; PERIODONTAL at 23:58

## 2023-12-07 RX ADMIN — AMOXICILLIN AND CLAVULANATE POTASSIUM 1 TABLET: 875; 125 TABLET, FILM COATED ORAL at 19:38

## 2023-12-07 RX ADMIN — POTASSIUM CHLORIDE 20 MEQ: 1500 TABLET, EXTENDED RELEASE ORAL at 14:29

## 2023-12-07 RX ADMIN — MAGNESIUM OXIDE TAB 400 MG (241.3 MG ELEMENTAL MG) 400 MG: 400 (241.3 MG) TAB at 19:38

## 2023-12-07 ASSESSMENT — ACTIVITIES OF DAILY LIVING (ADL)
ADLS_ACUITY_SCORE: 24
ADLS_ACUITY_SCORE: 20
ADLS_ACUITY_SCORE: 21
ADLS_ACUITY_SCORE: 20
ADLS_ACUITY_SCORE: 22
ADLS_ACUITY_SCORE: 20
ADLS_ACUITY_SCORE: 24
ADLS_ACUITY_SCORE: 20
ADLS_ACUITY_SCORE: 21

## 2023-12-07 NOTE — PROVIDER NOTIFICATION
Page to MD to verify if it would appropriate to provide the patient with a Hijab if the sitter remains in place. At this time there is a 1 to 1 sitter. Pt has been provided with a Hijab to comply with her Yarsani beliefs.

## 2023-12-07 NOTE — PLAN OF CARE
To Do:  End of Shift Summary  For vital signs and complete assessments, please see documentation flowsheets.     Pertinent assessments: A/Ox4. Flat affect. VSS. Denies suicide idealizations, pain, SOB, and N/V. Sitter at bedside. Reports loose stools on 12/7, C-diff negative no enteric precautions. C/o dizziness upon standing, fatigue, and an infrequent cough. Prn melatonin given at bedtime. Sores on tongue, given benzocaine prn. No males involved in cares per pt request.     Major Shift Events: None.     Treatment Plan: PCS. Symptom management. Sitter at bedside. Awaiting C diff results. Po amoxicillin.      Bedside Nurse: Krupa Doty RN

## 2023-12-07 NOTE — PLAN OF CARE
Goal Outcome Evaluation:      Plan of Care Reviewed With: patient             End of Shift Summary  For vital signs and complete assessments, please see documentation flowsheets.     Pertinent assessments: A/Ox4. Flat affect. VSS. Ls clear, on RA,  Denies suicide idealizations, pain, SOB, and N/V. Sitter at bedside. Reports loose stools earlier this morning, c-diff negative, C/o  fatigue, and wanting to sleep but having a hard time falling sleep, wants Prn melatonin at bedtime. Sores on tongue, given benzocaine prn 1x. Pt  has Hijab on her head to cover her hair, educated on suicide  idealization will take away the privilege of her Hijab, pt agree, calm and cooperative this shift, took couple of walks down the [place], mag and k replaced, recheck tomorrow @6 am. .    Major Shift Events: None.     Treatment Plan: PCS. Symptom management. Sitter at bedside.Po amoxicillin.

## 2023-12-07 NOTE — PROGRESS NOTES
"[facility]    Medicine Progress Note - Hospitalist Service    Date of Admission:  2023    Primary Care Physician   [facility]  CONSULTANTS: critical care    Assessment & Plan     Kimmy Mendez is a 38 year old woman with history of prior suicidal ideation and postpartum depression who came to the ER after her mother found her unresponsive at home with multiple bottles of Olanzapine and Vitamin D lying around her.  She has a medical history including anxiety,depression, post-partum depression and extreme bereavement related to the death of a child.   in the emergency room, on presentation vitals were: , /114, RR 12 with O2 sat 99%.  T 98.0.  She was subseqeuntly (after intubation) -130, /80, T 101.8, RR 16 with vent.   Initial VB.46/pCO2 32/pO2 50.  After intubation, VBG 7.33/42/45.  patient  was intubated in the ED \"for airway protection\", sedated with midazolam.  Per Poison control who confirmed that the tachycardia, sedation and fever is related to anticholinergic effects of the Olanzapine.  Also need to be worried about seizures.      Medically stable for transfer to inpatient psychiatry when a bed is available.    Intentional Zyprexa Overdose  Suicidal Ideation  Generalized Anxiety Disorder  PTSD  Complicated Bereavement  --Patient was intubated in the emergency room after presenting with an overdose from home.  Issues that we need to concerned about from overdosing on olazapine include decrease mentation, respiratory failure, tachycardia, and possible seizures.  Patient  required sedation with Versed while being intubated.  Saturations were good.  She was being followed on telemetry showing tachycardia up to the 140s.  Kept her intubated until her cardiac status improved which it did.  Patient was able to be extubated 2023.    -Currently patient is alert and oriented x 3.  She was seen by psychiatry team who anticipate she will likely " require inpatient psych admission.  Noted to be suicidal.  She requires bedside sitter.     Fever, possible aspiration pneumonitis  --Patient presents with drug overdose.  Has a fever up to 101.5.  Chest x-ray showed possible atelectasis versus infiltrate within the right lung and this would be consistent with possible aspiration event.  Patient's white blood cell count is 11.  Patient was placed on empiric Zosyn 12/3/2023 to cover an aspiration pneumonia.  Fever has improved.  Will start oral Augmentin    Diarrhea  -- Been having frequent diarrhea since last night.  Watery diarrhea with no significant abdominal pain.  Will check stool studies including C. difficile.  Continue oral hydration for now.    Hypokalemia: Replace per protocol    Resolved:  Acute Respiratory Failure  Toxic encephalopathy  Sinus tachycardia  Hypoglycemia  Lactic acidosis    Chronic:  History gestational diabetes  History pseudoseizures per chart  Subclinical hypothyroidism: Not on levothyroxine. TSH 7.71; T4 1.36.  Endocrinology as an outpatient      Diet: Regular Diet Adult    DVT Prophylaxis: Pneumatic Compression Devices  Hogan Catheter: Not present  Lines: None     Cardiac Monitoring: Not needed    RESTRAINTS: not indicated  Code Status: Full Code        Disposition: Anticipate discharge to inpatient psych when bed is available      Cornelio Rhoades MD  Hospitalist Service  [facility]  Securely message with [employer] (more info)  Text page via [employer] Paging/Directory   ______________________________________________________________________    Interval History   Nursing notes reviewed; no acute events overnight. Patient given hijab by spiritual staff this morning.    Physical Exam   Vital Signs: Temp: 98.4  F (36.9  C) Temp src: Oral BP: 118/85 Pulse: 105   Resp: 18 SpO2: 97 % O2 Device: None (Room air)    Weight: 124 lbs 5.43 oz        /85 (BP Location: Right arm)   Pulse 105   Temp 98.4  F (36.9  C) (Oral)   Resp 18  "  Ht 1.62 m (5' 3.78\")   Wt 56.4 kg (124 lb 5.4 oz)   SpO2 97%   BMI 21.49 kg/m      GEN:  Alert, oriented x 3, appears comfortable, NAD.  NECK:Supple ,no mass or thyromegaly   HEENT:  Normocephalic/atraumatic, no scleral icterus, no nasal discharge, mouth moist.  CV:  Regular rate and rhythm, no murmur or JVD.  S1 + S2 noted, no S3 or S4.  Normal peripheral pulses bilaterally with palpable pulse including dorsalis pedis and posterior tibial arteries  LUNGS:  Clear to auscultation bilaterally without rales/rhonchi/wheezing/retractions.  Symmetric chest rise on inhalation noted.  ABD:  Active bowel sounds, soft, non-tender/non-distended.  No rebound/guarding/rigidity.  EXT:  No edema.  No cyanosis.  No joint synovitis noted.  LGS: No cervical or axillary lymphadenopathy   SKIN:  Dry to touch, no exanthems noted in the visualized areas.  Neurologic:Grossly intact,non focal . No acute focal neurologic deficit   Psychaitric exam: Mood and affect normal           Data     I have personally reviewed the following data over the past 24 hrs:    N/A  \   N/A   / N/A     139 105 8.7 /  106 (H)   3.8; 3.8 24 0.48 (L) \       Imaging:   Results for orders placed or performed during the hospital encounter of 12/02/23   XR Chest Port 1 View    Narrative    EXAM: XR CHEST PORT 1 VIEW  LOCATION: [facility]  DATE: 12/2/2023    INDICATION: intubation  COMPARISON: None.      Impression    IMPRESSION: Endotracheal tube tip terminates in the lower thoracic trachea, approximately 1.5 cm from the tete. Enteric tube tip and side-port are within the stomach.    No focal airspace opacity. No pleural effusion or pneumothorax. Cardiac silhouette and mediastinal contours are normal.   Head CT w/o contrast    Narrative    EXAM: CT HEAD W/O CONTRAST  LOCATION: [facility]  DATE: 12/2/2023    INDICATION: AMS/confusion  COMPARISON: None.  TECHNIQUE: Routine CT Head without IV contrast. Multiplanar " reformats. Dose reduction techniques were used.    FINDINGS:  INTRACRANIAL CONTENTS: No intracranial hemorrhage, extraaxial collection, or mass effect.  No CT evidence of acute infarct. Normal parenchymal attenuation. Normal ventricles and sulci.     VISUALIZED ORBITS/SINUSES/MASTOIDS: No intraorbital abnormality. No paranasal sinus mucosal disease. No middle ear or mastoid effusion.    BONES/SOFT TISSUES: No acute abnormality. Partially visualized endotracheal tube and enteric tube.      Impression    IMPRESSION:  1.  No acute intracranial process.   XR Chest Port 1 View    Narrative    EXAM: XR CHEST PORT 1 VIEW  LOCATION: [facility]  DATE: 12/2/2023    INDICATION: check tube placement  COMPARISON: 12/20/2023 at 1643 hours      Impression    IMPRESSION: Nasogastric tube terminates over the stomach. Endotracheal tube terminates 5 mm above the tete. Repositioning recommended. Heart size is normal. There is new airspace disease within the medial right lung base, atelectasis versus infiltrate.    Tube positioning was called to [place] by Dr. Del Kevin on 12/2/2023 8:36 PM CST.     Procedures: none     I have personally have reviewed the patient's most up to date radiologic exams, labs, orders, and medications myself

## 2023-12-07 NOTE — PROGRESS NOTES
Care Management Follow Up    Length of Stay (days): 5    Expected Discharge Date: 12/08/2023     Concerns to be Addressed:     Per provider patient is medically cleared for inpatient mental health.  Patient plan of care discussed at interdisciplinary rounds: Yes    Anticipated Discharge Disposition:   inpatient mental health.     Anticipated Discharge Services:   inpatient mental health.  Anticipated Discharge DME:  none    Patient/family educated on Medicare website which has current facility and service quality ratings:  NA  Education Provided on the Discharge Plan:  yes  Patient/Family in Agreement with the Plan:  yes    Referrals Placed by CM/SW:  yes patient was placed on the inpatient mental health waiting list  Private pay costs discussed: Not applicable    Additional Information:  Patient medically cleared by provider for inpatient mental health unit. Patient was placed on waiting list.    CAMDEN Snowden   Inpatient Care Coordination   Supervisor  [facility]  [phone]    CAMDEN Menjivar

## 2023-12-07 NOTE — TELEPHONE ENCOUNTER
S: [facility] ED , DEC  [place]   calling at 3:39 PM   about a 38 year old/Female presenting with Suicide attempt      B: Pt arrived via EMS. Presenting problem, stressors: PT ingested 180 tablets of Olanzapine 15 MG Each. Intubated and Extubated.  Pt was in the ICU from 12/2-12/6. Currently on a medical unit 1:1 suicide watch.      Pt affect in ED: Calm and Cooperative   Pt Dx: Major Depressive Disorder, Bipolar Disorder, and Overdose   Previous IP hx? Yes: 7/28/23 -8/10 discharged to IRT  Pt  Suicide  attempt     Hx of suicide attempt? Yes: Multiple attempts- admitted to [facility] 6/23    Pt denies SIB  Pt denies HI   Pt denies hallucinations .   Pt RARS Score: Unknown     Hx of aggression/violence, sexual offenses, legal concerns, Epic care plan? describe: NO   Current concerns for aggression this visit? No   Does pt have a history of Civil Commitment? No  Is Pt their own guardian? Yes    Pt is not prescribed medication. Is patient medication compliant? No  Pt denies OP services   CD concerns: None  Acute or chronic medical concerns: NO   Does Pt present with specific needs, assistive devices, or exclusionary criteria? None      Pt is ambulatory  Pt is able to perform ADLs independently      A: Pt to be reviewed for Critical access hospital admission. Pt is Voluntary  Preferred placement: Statewide    COVID Symptoms: No  If yes, COVID test required   Utox: Positive for Benzodiazepine Urine    CMP: Abnormalities: Creatinine 0.48, Glucose 106  CBC: Abnormalities: Chloride 110, Carbon Dioxide 21, Urea Nitrogen 1.5, Creatinine 0.42, Glucose 125  HCG: Not ordered, intake to request lab     R: Patient cleared and ready for behavioral bed placement: Yes  Pt placed on Critical access hospital worklist? Yes    Does Patient need a Transfer Center request created? Yes, writer completed Transfer Center request at:

## 2023-12-08 ENCOUNTER — TELEPHONE (OUTPATIENT)
Dept: BEHAVIORAL HEALTH | Facility: CLINIC | Age: 38
End: 2023-12-08
Payer: COMMERCIAL

## 2023-12-08 VITALS
WEIGHT: 124.34 LBS | BODY MASS INDEX: 21.23 KG/M2 | HEART RATE: 62 BPM | OXYGEN SATURATION: 99 % | SYSTOLIC BLOOD PRESSURE: 134 MMHG | RESPIRATION RATE: 18 BRPM | DIASTOLIC BLOOD PRESSURE: 70 MMHG | HEIGHT: 64 IN | TEMPERATURE: 98.5 F

## 2023-12-08 LAB
ANION GAP SERPL CALCULATED.3IONS-SCNC: 14 MMOL/L (ref 7–15)
BUN SERPL-MCNC: 7.4 MG/DL (ref 6–20)
CALCIUM SERPL-MCNC: 9.4 MG/DL (ref 8.6–10)
CHLORIDE SERPL-SCNC: 103 MMOL/L (ref 98–107)
CREAT SERPL-MCNC: 0.47 MG/DL (ref 0.51–0.95)
DEPRECATED HCO3 PLAS-SCNC: 23 MMOL/L (ref 22–29)
EGFRCR SERPLBLD CKD-EPI 2021: >90 ML/MIN/1.73M2
GLUCOSE SERPL-MCNC: 120 MG/DL (ref 70–99)
MAGNESIUM SERPL-MCNC: 2.1 MG/DL (ref 1.7–2.3)
POTASSIUM SERPL-SCNC: 4.1 MMOL/L (ref 3.4–5.3)
SODIUM SERPL-SCNC: 140 MMOL/L (ref 135–145)

## 2023-12-08 PROCEDURE — 250N000013 HC RX MED GY IP 250 OP 250 PS 637: Performed by: INTERNAL MEDICINE

## 2023-12-08 PROCEDURE — 83735 ASSAY OF MAGNESIUM: CPT | Performed by: STUDENT IN AN ORGANIZED HEALTH CARE EDUCATION/TRAINING PROGRAM

## 2023-12-08 PROCEDURE — 250N000009 HC RX 250: Performed by: INTERNAL MEDICINE

## 2023-12-08 PROCEDURE — 36415 COLL VENOUS BLD VENIPUNCTURE: CPT | Performed by: INTERNAL MEDICINE

## 2023-12-08 PROCEDURE — 250N000013 HC RX MED GY IP 250 OP 250 PS 637: Performed by: HOSPITALIST

## 2023-12-08 PROCEDURE — 99239 HOSP IP/OBS DSCHRG MGMT >30: CPT | Performed by: STUDENT IN AN ORGANIZED HEALTH CARE EDUCATION/TRAINING PROGRAM

## 2023-12-08 PROCEDURE — 80048 BASIC METABOLIC PNL TOTAL CA: CPT | Performed by: INTERNAL MEDICINE

## 2023-12-08 RX ORDER — PANTOPRAZOLE SODIUM 40 MG/1
40 TABLET, DELAYED RELEASE ORAL
Status: ON HOLD | DISCHARGE
Start: 2023-12-09 | End: 2023-12-21

## 2023-12-08 RX ORDER — PANTOPRAZOLE SODIUM 40 MG/1
40 TABLET, DELAYED RELEASE ORAL
Status: DISCONTINUED | OUTPATIENT
Start: 2023-12-08 | End: 2023-12-09 | Stop reason: HOSPADM

## 2023-12-08 RX ADMIN — PANTOPRAZOLE SODIUM 40 MG: 40 TABLET, DELAYED RELEASE ORAL at 06:54

## 2023-12-08 RX ADMIN — TOPICAL ANESTHETIC 0.5 ML: 200 SPRAY DENTAL; PERIODONTAL at 09:18

## 2023-12-08 RX ADMIN — Medication 3 MG: at 22:38

## 2023-12-08 RX ADMIN — TOPICAL ANESTHETIC 0.5 ML: 200 SPRAY DENTAL; PERIODONTAL at 15:58

## 2023-12-08 ASSESSMENT — ACTIVITIES OF DAILY LIVING (ADL)
ADLS_ACUITY_SCORE: 23
ADLS_ACUITY_SCORE: 21
ADLS_ACUITY_SCORE: 21
ADLS_ACUITY_SCORE: 23
ADLS_ACUITY_SCORE: 21

## 2023-12-08 NOTE — PROGRESS NOTES
Care Management Follow Up    Length of Stay (days): 6    Expected Discharge Date: 12/09/2023     Concerns to be Addressed:       Patient plan of care discussed at interdisciplinary rounds: Yes    Anticipated Discharge Disposition:  IP Psych      Anticipated Discharge Services:    Anticipated Discharge DME:      Referrals Placed by CM/ZENON:    Private pay costs discussed: Not applicable    Additional Information:    Pt requesting update on where she is on the waitlist. ZENON called Psych intake (P: [phone]) who explains that pt is 13 on the waitlist. Movement for IP psych is typically slower during the weekend. Pt provided permission for ZENON to call her brother Sai to update.    SW called pt brother Sai to update on where pt is on the waitlist. Pt brother request someone update once pt is being transferred.     LEIGHA Cohen, SW  Inpatient Care Coordination  Emergency Room /Float  [phone]  [place], JA[place]

## 2023-12-08 NOTE — TELEPHONE ENCOUNTER
R: [place]  Access Inpatient Bed Call Log 12/8/23 8:50 AM     Intake has called facilities that have not updated the bed status within the last 12 hours.                               [facility] is at capacity            [facility] is posting 0 beds. [phone]   [facility] is posting 0 beds. Negative covid required.                 [facility] is posting 0 bed. Neg covid. No high school or [facility]. [phone]   [place] is posting 0 beds. [phone]   [facility] is posting 0 beds. [phone]   [facility] is posting 7 beds. Negative covid. [phone] Per call @8:32am   [facility] is posting 0 beds           [facility] ([facility]) is posting 0 beds [phone]       Pt remains on the work list pending appropriate bed availability.

## 2023-12-08 NOTE — CONSULTS
"CLINICAL NUTRITION SERVICES  -  ASSESSMENT NOTE      Recommendations:   - Diet as ordered.  Appreciate any encouragement surrounding PO intakes.  - Ok for nursing staff to help order Ensure (suitable for lactose intolerance) or Naye Farms (dairy free supplement) prn.       MALNUTRITION:  % Weight Loss:  None noted  % Intake: Decreased intake does not meet criteria   Subcutaneous Fat Loss: None observed  Muscle Loss: None observed  Fluid Retention: None documented    Malnutrition Diagnosis: Patient does not meet two of the above criteria necessary for diagnosing malnutrition          REASON FOR ASSESSMENT  Kimmy Mendez is a 38 year old female seen by Registered Dietitian for Malnutrition Screening Tool (MST).     PMH of: Prior suicidal ideation, depression.    Admit 2/2: Intentional overdose.    NUTRITION HISTORY  - Information obtained from patient and chart.  - Meals TID is baseline.  Reports eating at baseline PTA without appetite concerns.  - Allergies: Pork-derived products.  Also lactose-intolerant.      CURRENT NUTRITION ORDERS  Diet Order:     Regular    Current Intake/Tolerance:  Noted intubated upon admit and extubated 12/04.  Psych consulted and recommended inpatient at discharge, pt medically stable for transfer when bed available.  No nausea per chart but does have \"tongue sore\" and is receiving medication for this.  Diet advanced to clears 12/04 and solids/regular 12/06.  PO intakes of mostly 50-75% w/ diet advancement, a few instances of 25%.       Kimmy reports her appetite has been ok so far.  RN and sitter in room.  We discussed the option of Ensure or Naye Farms if she feels like she is skipping meals or having smaller portions.  Discussed both are suitable for lactose intolerance and described the difference between the 2 + how to order prn via room service.        ANTHROPOMETRICS  Height: 5' 3.78\"  Weight: 124 lbs 5.43 oz  Body mass index is 21.49 kg/m .  Weight Status:  Normal BMI  Weight " History:  Wt Readings from Last 10 Encounters:   12/04/23 56.4 kg (124 lb 5.4 oz)   09/13/23 57.6 kg (127 lb)   08/24/23 57.6 kg (127 lb)   08/10/23 56.7 kg (125 lb)   06/23/23 56.7 kg (125 lb)   06/20/23 52.1 kg (114 lb 13.8 oz)   06/11/23 53.4 kg (117 lb 11.6 oz)   08/26/18 48.5 kg (107 lb)     - No current documentation of edema.  - Wt increase and overall relatively stable when compared to previous trends (past few months).      LABS: Reviewed.    MEDICATIONS: Reviewed.    GI: Stooling patterns noted.     SKIN: No current documentation of PI.       ASSESSED NUTRITION NEEDS PER APPROVED PRACTICE GUIDELINES:    Dosing Weight 56 kg   Estimated Energy Needs: 25-30 Kcal/Kg  Justification: maintenance  Estimated Protein Needs: 1-1.2 g pro/Kg  Justification: preservation of lean body mass  Estimated Fluid Needs: per MD      NUTRITION DIAGNOSIS:  Inadequate oral intake related to previous intubation without EN, slow return of appetite as evidenced by meeting <75% of nutrition needs during admission thus far.    NUTRITION INTERVENTIONS  Recommendations / Nutrition Prescription  See above.    Implementation  Nutrition education: Provided education on above.    Medical Food Supplement: As above.     Collaboration and Referral of Nutrition care: Discussed POC with team during rounds.      Nutrition goals:  PO intakes of at least 50-75% of meals or supplements TID.      MONITORING AND EVALUATION:  Progress towards goals will be monitored and evaluated per protocol and Practice Guidelines          Maria Guadalupe Fong RDN, LD  Clinical Dietitian  3rd floor/ICU: [phone]  All other floors: [phone]  Weekend/holiday: [phone]  Office: [phone]

## 2023-12-08 NOTE — TELEPHONE ENCOUNTER
R: [place]  Access Inpatient Bed Call Log 12/8/23 @ 12:15am   Intake has called facilities that have not updated their bed status within the last 12 hours.??      *METRO:  [place] -- [facility]: @ cap per website.  [place] -- [facility]:  @ cap per website.  [place] -- [facility]: @ cap per website.  [place] -- [facility]:  @ cap per website. Low acuity only.  [place] -- [facility]: @ cap per website.  [facility]: @ cap per website.  [facility]/ beds - POSTING 6 BEDS. Ages 18-25, Voluntary only, NO aggression/physical/sexual assault, violence hx or drug abuse. Negative Covid. Pt not age appropriate   [place] -- [facility]: @ cap per website.  [place] -- [facility]: @ cap per website.  [place] -- [facility]: POSTING 1 BED. Facility already reviewing another referral     Pt remains on waitlist pending appropriate placement availability

## 2023-12-08 NOTE — PLAN OF CARE
Goal Outcome Evaluation:      Plan of Care Reviewed With: patient        End of Shift Summary  For vital signs and complete assessments, please see documentation flowsheets.     Pertinent assessments: Pt A/O. VSS. Ls clear, denies pain, SOB and nausea, Hurricane spray given for tongue pain 1x, Denies suicidal ideation this shift, she is in a good mood, sister visited her.  Had 1x loose stool this shift. Up ind. Took a shower and Behayiuwa the support sitter braided her hair, pt was very happy! Walked the halls with the sitter.   Major Shift Events : none  Treatment Plan: awaiting inpt psych bed and symptom management.

## 2023-12-08 NOTE — PLAN OF CARE
For vital signs and complete assessments, please see documentation flowsheets.     Pertinent assessments: Aox4. Afebrile on RA. Denies suicidal ideation when asked. Denies pain SOB/chest pain, LS CTA. Up and ambulated in hallways with sitter. Tolerating regular diet, denies N/V/D this shift. PIV SL. Electrolytes replaced with next recheck scheduled for AM tomorrow. PRN melatonin administered per pt request at bedtime.    Major Shift Events: continues to have sitter in room    Treatment Plan: Symptom management. Sitter at bedside, awaiting inpatient psych placement

## 2023-12-08 NOTE — PLAN OF CARE
Goal Outcome Evaluation:      To Do:  End of Shift Summary  For vital signs and complete assessments, please see documentation flowsheets.     Pertinent assessments: Pt A/O. VSS. Hurricane spray given for tongue pain. Denies nausea and SOB. Denies suicidal ideation. Had 1x loose stool this shift. Up ind. Sitter at bedside.  Major Shift Events : none  Treatment Plan: awaiting inpt psych bed and symptom management.  Bedside Nurse: Aneta Tejada RN

## 2023-12-08 NOTE — TELEPHONE ENCOUNTER
R: [place]  Access Inpatient Bed Call Log 12/7/23 @ 9:30 pm:    Intake has called facilities that have not updated the bed status within the last 12 hours.                               [facility] is at capacity.              [facility] is posting 0 beds. [phone].    [facility] is posting 0 beds. Negative covid required.                 [facility] has 0 beds. Neg covid. No high school or Ning-psych. [phone]    [place] is posting 0 beds. [phone]   [facility] is posting 0 beds. [phone].    [facility] is posting 6 beds. Ages 18-26. Negative covid. [phone];    [facility] is posting 0 beds           [facility] is posting 1 bed [phone].  Per call with Mary at 9:50PM, unable to review at this time but will call us back if they are able to review later tonight as they are expecting a bed capacity update shortly            Pt remains on the work list pending appropriate bed availability.

## 2023-12-08 NOTE — TELEPHONE ENCOUNTER
R: 30/Hayley    [facility] Access Inpatient Bed Call Log 12/8/23 8:50 AM     Intake has called facilities that have not updated the bed status within the last 12 hours.                               [facility] is at capacity            [facility] is posting 0 beds. [phone]   [facility] is posting 0 beds. Negative covid required.                 [facility] is posting 0 bed. Neg covid. No high school or Ning-psych. [phone]   [place] is posting 0 beds. [phone]   [facility] is posting 0 beds. [phone]   [facility] is posting 7 beds for Young adults age 18-26. Negative covid. [phone] Per call @8:32am Pt is not age appropriate   [facility] is posting 0 beds           [facility] is posting 0 beds [phone]       Pt remains on the work list pending appropriate bed availability.         2:26 PM  [place] from [facility] #[phone] called for an update on Pts waiting status.     2:37 PM Paged unit 30 Provider    2:52 PM Discussed Pt with Hayley and he would like to do a doc to doc.    2:54 PM Per [address] RN Pts Hospitalist today is MD Rhoades and her pager is #[phone]    2:59 PM Paged Hospitalist info to Hayley    3:28 PM  Hayley called to updated Hospitalist [place] has not called back yet.    3:29 PM Called [address] #[phone] and the RN will try calling Hospitalist [place] directly and have them page Hayley or call Intake    3:38 PM Hospitalarturo Rhoades called Intake and stated no pages received from Hayley.  Intake gave her Hayley's contact info    3:42 PM Hayley accepts for 30/Hayley    Updated [facility] and added to the Admit board; Did Transfer Center and Bed Planning    3:55 PM Updated unit 30 and unit Charge stated they are not sure when bed will be open due to delay in a dsc.  Will update Intake.     3:52 PM Updated [facility]     5:13 pm UNIT 30 Charge RN called to  [facility] Pt is still coming to Unit but due to staffing Pt will have to be admitted onto overnight shift.    5:17 PM Called [address] #[phone] and updated that Pt will be admitted after shift change for overnights

## 2023-12-09 ENCOUNTER — HOSPITAL ENCOUNTER (INPATIENT)
Facility: CLINIC | Age: 38
LOS: 13 days | Discharge: PSYCHIATRIC OR SUBSTANCE USE FACILITY | End: 2023-12-22
Attending: PSYCHIATRY & NEUROLOGY | Admitting: PSYCHIATRY & NEUROLOGY
Payer: COMMERCIAL

## 2023-12-09 DIAGNOSIS — F32.3 CURRENT SEVERE EPISODE OF MAJOR DEPRESSIVE DISORDER WITH PSYCHOTIC FEATURES, UNSPECIFIED WHETHER RECURRENT (H): Primary | ICD-10-CM

## 2023-12-09 DIAGNOSIS — G47.00 INSOMNIA, UNSPECIFIED TYPE: ICD-10-CM

## 2023-12-09 DIAGNOSIS — K59.09 OTHER CONSTIPATION: ICD-10-CM

## 2023-12-09 DIAGNOSIS — F41.1 GAD (GENERALIZED ANXIETY DISORDER): ICD-10-CM

## 2023-12-09 PROBLEM — F32.A DEPRESSION: Status: ACTIVE | Noted: 2023-12-09

## 2023-12-09 LAB
CHOLEST SERPL-MCNC: 150 MG/DL
HBA1C MFR BLD: 5.7 %
HDLC SERPL-MCNC: 42 MG/DL
LDLC SERPL CALC-MCNC: 93 MG/DL
NONHDLC SERPL-MCNC: 108 MG/DL
TRIGL SERPL-MCNC: 74 MG/DL

## 2023-12-09 PROCEDURE — 250N000013 HC RX MED GY IP 250 OP 250 PS 637: Performed by: PSYCHIATRY & NEUROLOGY

## 2023-12-09 PROCEDURE — 36415 COLL VENOUS BLD VENIPUNCTURE: CPT | Performed by: PSYCHIATRY & NEUROLOGY

## 2023-12-09 PROCEDURE — 99223 1ST HOSP IP/OBS HIGH 75: CPT | Mod: AI | Performed by: PSYCHIATRY & NEUROLOGY

## 2023-12-09 PROCEDURE — 80061 LIPID PANEL: CPT | Performed by: PSYCHIATRY & NEUROLOGY

## 2023-12-09 PROCEDURE — 124N000002 HC R&B MH UMMC

## 2023-12-09 PROCEDURE — 250N000013 HC RX MED GY IP 250 OP 250 PS 637: Performed by: STUDENT IN AN ORGANIZED HEALTH CARE EDUCATION/TRAINING PROGRAM

## 2023-12-09 PROCEDURE — 83036 HEMOGLOBIN GLYCOSYLATED A1C: CPT | Performed by: PSYCHIATRY & NEUROLOGY

## 2023-12-09 RX ORDER — MAGNESIUM HYDROXIDE/ALUMINUM HYDROXICE/SIMETHICONE 120; 1200; 1200 MG/30ML; MG/30ML; MG/30ML
30 SUSPENSION ORAL EVERY 4 HOURS PRN
Status: DISCONTINUED | OUTPATIENT
Start: 2023-12-09 | End: 2023-12-22 | Stop reason: HOSPADM

## 2023-12-09 RX ORDER — TRAZODONE HYDROCHLORIDE 50 MG/1
50 TABLET, FILM COATED ORAL
Status: DISCONTINUED | OUTPATIENT
Start: 2023-12-09 | End: 2023-12-09

## 2023-12-09 RX ORDER — ACETAMINOPHEN 325 MG/1
650 TABLET ORAL EVERY 4 HOURS PRN
Status: DISCONTINUED | OUTPATIENT
Start: 2023-12-09 | End: 2023-12-22 | Stop reason: HOSPADM

## 2023-12-09 RX ORDER — OLANZAPINE 10 MG/2ML
10 INJECTION, POWDER, FOR SOLUTION INTRAMUSCULAR 3 TIMES DAILY PRN
Status: DISCONTINUED | OUTPATIENT
Start: 2023-12-09 | End: 2023-12-22 | Stop reason: HOSPADM

## 2023-12-09 RX ORDER — LANOLIN ALCOHOL/MO/W.PET/CERES
3 CREAM (GRAM) TOPICAL
Status: DISCONTINUED | OUTPATIENT
Start: 2023-12-09 | End: 2023-12-22 | Stop reason: HOSPADM

## 2023-12-09 RX ORDER — TRAZODONE HYDROCHLORIDE 50 MG/1
50 TABLET, FILM COATED ORAL AT BEDTIME
Status: DISCONTINUED | OUTPATIENT
Start: 2023-12-09 | End: 2023-12-11

## 2023-12-09 RX ORDER — PANTOPRAZOLE SODIUM 40 MG/1
40 TABLET, DELAYED RELEASE ORAL
Status: DISCONTINUED | OUTPATIENT
Start: 2023-12-09 | End: 2023-12-14

## 2023-12-09 RX ORDER — AMOXICILLIN 250 MG
1 CAPSULE ORAL 2 TIMES DAILY PRN
Status: DISCONTINUED | OUTPATIENT
Start: 2023-12-09 | End: 2023-12-22 | Stop reason: HOSPADM

## 2023-12-09 RX ORDER — OLANZAPINE 10 MG/1
10 TABLET ORAL 3 TIMES DAILY PRN
Status: DISCONTINUED | OUTPATIENT
Start: 2023-12-09 | End: 2023-12-22 | Stop reason: HOSPADM

## 2023-12-09 RX ORDER — HYDROXYZINE HYDROCHLORIDE 25 MG/1
25 TABLET, FILM COATED ORAL EVERY 4 HOURS PRN
Status: DISCONTINUED | OUTPATIENT
Start: 2023-12-09 | End: 2023-12-22 | Stop reason: HOSPADM

## 2023-12-09 RX ADMIN — Medication 3 MG: at 22:43

## 2023-12-09 RX ADMIN — PANTOPRAZOLE SODIUM 40 MG: 40 TABLET, DELAYED RELEASE ORAL at 08:15

## 2023-12-09 RX ADMIN — TRAZODONE HYDROCHLORIDE 50 MG: 50 TABLET ORAL at 19:49

## 2023-12-09 ASSESSMENT — ACTIVITIES OF DAILY LIVING (ADL)
DRESS: INDEPENDENT
LAUNDRY: WITH SUPERVISION
ADLS_ACUITY_SCORE: 28
HYGIENE/GROOMING: INDEPENDENT
DRESS: SCRUBS (BEHAVIORAL HEALTH)
ADLS_ACUITY_SCORE: 28
ORAL_HYGIENE: INDEPENDENT
ADLS_ACUITY_SCORE: 28
HYGIENE/GROOMING: INDEPENDENT
ADLS_ACUITY_SCORE: 28
ADLS_ACUITY_SCORE: 28
ADLS_ACUITY_SCORE: 45
ORAL_HYGIENE: INDEPENDENT
ADLS_ACUITY_SCORE: 28

## 2023-12-09 ASSESSMENT — LIFESTYLE VARIABLES: SKIP TO QUESTIONS 9-10: 1

## 2023-12-09 NOTE — CARE PLAN
12/09/23 1113   Patient Belongings   Did you bring any home meds/supplements to the hospital?  No   Patient Belongings other (see comments)   Patient Belongings Put in Hospital Secure Location (Security or Locker, etc.) other (see comments)   Belongings Search Yes   Clothing Search Yes   Second Staff Itzel     In locker #18:  Blue shirt  Scarves x5  Pink socks  Comb  White sandals  Black shoes  Prayer mat  Underwear x6  Pants x2  Dress  Misc. hygiene supplies    Brought in on 12/20:  Multi-colored fleece shirt  Purple long sleeve  Blue zip up fleece    Pink hijab with pt.    Nothing sent to security    A               Admission:  I am responsible for any personal items that are not sent to the safe or pharmacy.  [facility] is not responsible for loss, theft or damage of any property in my possession.    Signature:  _________________________________ Date: _______  Time: _____                                              Staff Signature:  ____________________________ Date: ________  Time: _____      2nd Staff person, if patient is unable/unwilling to sign:    Signature: ________________________________ Date: ________  Time: _____     Discharge:  [facility] has returned all of my personal belongings:    Signature: _________________________________ Date: ________  Time: _____                                          Staff Signature:  ____________________________ Date: ________  Time: _____

## 2023-12-09 NOTE — H&P
"ADMISSION PSYCHIATRIC EVALUATION  Kimmy Mendez  YOB: 1985  MRN: [id number]  DATE OF ADMISSION:  12/9/2023  DATE OF INTERVIEW AND THIS SUMMARY:  12/9/2023    IDENTIFICATION  Patient is a 38 year old year old  Black or  female.  Kimmy Mendez was admitted for suicide attempt from [facility].    Kimmy Mendez presented to the emergency department by way of EMS and was admitted voluntarily.      CHIEF COMPLAINT  \"Overdose my medication\"    HISTORY OF PRESENTING PROBLEM  HPI at initial presentation per Internal Medicine transfer notes and CL Psych:   Kimmy Mendez is a 38 year old woman with history of prior suicidal ideation and postpartum depression who came to the ER after her mother found her unresponsive at home with multiple bottles of Olanzapine and Vitamin D lying around her.  She has a medical history including anxiety,depression, post-partum depression and extreme bereavement related to the death of a child. She was intubated for airway protection and was extubated 12/4/2023. She was evaluated by consult psychiatry service who recommend transfer to inpatient psychiatry unit. Patient is medically stable for transfer. Doc-to-doc sign off was performed 12/8/2023 with Dr. Hardy.    C/L Psychiatry:  Ms. Kimmy Mendez is a 38 year old woman seen for psychiatry consult after overdose on Olanzapine.  She was extubated yesterday.  History:  She was hospitalized for jennifer on station 30 in July and was discharged to an [facility].  After leaving the [facility] she changed doctors and had decreased doses of meds.  Increasing depression developed.     According to her brother David:  Family history positive for a mother with \"bipolar schizophrenia\" (and was the cause of patient's son's death.  He would like her to have a 3-6 month stay in a residential setting after the psych hospital stay..     Today, patient reports history as above.  Pt said whenever she gets her period " "she acts more, and she was on her period and took all of her medication.  Pt said every time it comes it changes her thinking.  Pt said she feels very sad an down and this last for several weeks.  Pt takes olanzapine and lorazepam, and pt was taking them every day while at home.  Pt said there was a medication change lately and that made her mood go down, but she isn't sure what changed.     PSYCHIATRIC REVIEW OF SYMPTOMS  Sleep: \"I'm not sleeping\"    Appetite/Weight change: lost appetite  Libido: low      Energy level: low  Depression: Endorses depressed mood, loss of interest,  feelings of worthlessness, diminished concentration, indecisiveness, recurrent suicidal ideation without a specific plan  Laura: Denies sleepless periods with abundant energy, racing thoughts, flight of ideas, grandiosity, hyper-religiousity, or increased engagement in pleasurable activities. There has been no increase in risk taking behavior.   Psychotic symptoms: Denies hallucinations, paranoia, no delusional content expressed    Anxiety/panic attacks: Endorses excessive anxiety and worry, inability to manage the feelings, restlessness, feeling on edge, easily fatigued, difficulty concentrating, irritability, muscle tension. Denies panic attacks.   OCD: Denies obsessions, compulsions, or rituals.     PTSD: Denies flashbacks/nightmares  Maltreatment and Abuse History: Denies     Eating disorder: Denies previous restricting, binging, purging   Impulse control problems: Denies   ADHD: Denies previous diagnosis   Psychosocial stressors: Financial,                                     Current suicidal ideation: Denies                        History of Suicide Attempts: Yes, current only  Self Injurious Behaviors: Denies  History of Self-injurious behavior: Denies  Property destruction: Denies  Thoughts/threats to harm others: Denies   History of violence: Denies   Access to weapons: Denies         Able to contract for safety on the alexander: " endorses safety on unit   Ability to complete daily tasks (i.e. Laundry, dishes): yes       ADMISSION MEDICATIONS:    Medications Prior to Admission   Medication Sig Dispense Refill Last Dose    fish oil-omega-3 fatty acids 1000 MG capsule Take 1 capsule (1 g) by mouth daily 30 capsule 9 Past Week    pantoprazole (PROTONIX) 40 MG EC tablet Take 1 tablet (40 mg) by mouth every morning (before breakfast)   Unknown       CHEMICAL HEALTH HISTORY  Patient denies past or current issues with any substances.      PAST PSYCHIATRIC HISTORY  Patient was previously diagnosed with Bipolar Disorder, PTSD over loss of son  Previous psychiatric admissions - 2 with last at [facility] in July 2023.  Previous commitment history - Denies.   Patient's current psychiatric provider is Dr. Alejandra .   Current therapist is None.   Current [facility]  - Maria Esther from [place]   Previous medication trials include . Pt said she has only been on Zyprexa.  Per chart review:  Lamictal, Celexa, Remeron, Zyprexa, Zoloft, Ativan, Valium. Most recent prescribed meds were, apparently, Prozac and Vistaril. Denies taking them.    Patient denies previous ECT trials.    FAMILY HISTORY  Psychiatric problems: Mom with Bipolar Disorder  Chemical Dependency: Denies  Suicide Attempts/Completed Suicides: Denies      MEDICAL HISTORY  ALLERGIES:   Allergies   Allergen Reactions    Dust Mites Other (See Comments)     congestion    Pork-Derived Products Other (See Comments)     Pt does not ingest of these products.        Primary Care Provider: [facility]  Previous medical history:   Past Medical History:   Diagnosis Date    Anxiety     Depressive disorder     Gestational diabetes mellitus     h/o Duodenal ulcer due to Helicobacter pylori     h/o Psychiatric pseudoseizures     related to ongoing grief from the loss of her child - See PTSD comment    Hypothyroidism     Premature atrial contractions     PTSD (post-traumatic stress disorder)     in  2019 patient reports her 3-year-old child was murdered by her mom when her mom choked the child to death     Previous History of seizures or head injury: Pt reports pseudo-seizures.  Denies Head injury  Surgeries: No past surgical history on file.  Current Pain Issues:  Tongue    Vitals: /89 (Patient Position: Sitting)   Pulse 54   Temp 97  F (36.1  C) (Temporal)   Resp 15   Wt 54.8 kg (120 lb 12.8 oz)   SpO2 96%   BMI 20.88 kg/m       LABS:   Results for orders placed or performed during the hospital encounter of 12/09/23 (from the past 24 hour(s))   Hemoglobin A1c   Result Value Ref Range    Hemoglobin A1C 5.7 (H) <5.7 %   Lipid panel   Result Value Ref Range    Cholesterol 150 <200 mg/dL    Triglycerides 74 <150 mg/dL    Direct Measure HDL 42 (L) >=50 mg/dL    LDL Cholesterol Calculated 93 <=100 mg/dL    Non HDL Cholesterol 108 <130 mg/dL    Narrative    Cholesterol  Desirable:  <200 mg/dL    Triglycerides  Normal:  Less than 150 mg/dL  Borderline High:  150-199 mg/dL  High:  200-499 mg/dL  Very High:  Greater than or equal to 500 mg/dL    Direct Measure HDL  Female:  Greater than or equal to 50 mg/dL   Male:  Greater than or equal to 40 mg/dL    LDL Cholesterol  Desirable:  <100mg/dL  Above Desirable:  100-129 mg/dL   Borderline High:  130-159 mg/dL   High:  160-189 mg/dL   Very High:  >= 190 mg/dL    Non HDL Cholesterol  Desirable:  130 mg/dL  Above Desirable:  130-159 mg/dL  Borderline High:  160-189 mg/dL  High:  190-219 mg/dL  Very High:  Greater than or equal to 220 mg/dL         Review of organ systems:     ROS: 10 point ROS neg other than the symptoms noted above in the HPI.    PHYSICAL EXAM: See consulting note from internal medicine physician and/or emergency department physician.    SOCIAL HISTORY   Kimmy Mendez was born in [facility] [facility] and raised in [place] [facility] [place] and was 23 when she came to the .  Pt has lived in [place], [place], [place] and then came to [place] 2014.  .   Patient's current housing is Pt lives with her  sister.   Educational: Middle School.  Employment: unemployed.   History: Denies.  Legal History: Denies.   Patient's dad  in [place], and her mom is in [place] and lives in a facility. Patient has 1 brother and 3 sisters. Patient had one child that pt said her mom killed when he was age 3 in 2019     MENTAL STATUS EXAMINATION  Appearance: attire appropriate  General behavior: Cooperative  Eye Contact: Poor  Gait and Motor Coordination: Normal gait and motor coordination  Speech: decreased rate and volume  Language: appropriate, ESL   Attention/Concentration: fair  Orientation: A&O x 3  Thought process: goal directed, linear and logical  Thought content: SI. No HI or psychosis  Recent and Remote Memory: impaired/blocked  Associations: no loose association  Mood: Depressed  Affect: Depressed  Estimate of intelligence: average  Fund of knowledge: intact  Demonstration of insight/judgment: good  Self Danger: no   Suicidal risk: high  Homicidal risk: Denies    ASSESSMENT:  Patient is a 38 year old female with past history of Bipolar Disorder Type 1, and ROWAN who presents on transfer from  after pt overdosed on olanzapine.  She was recently admitted to station 30 in 2023 and discharged to an IRTS.  Medication changes were made recently which might have contributed to current presentation.  Pts son was also killed by her mother, and she has profound grief from this event which also contributes to her depression and SI.    DIAGNOSIS  Bipolar Disorder Type 1 current episode depressed    PLAN:  The patient is admitted to station 30 for evaluation, observation, and treatment.  Medication changes include: Will hold medications while recovering from overdose  Legal considerations: Vol  Precautions:  Suicide  Testing/Labs to complete:  Per IM  Consults ordered: Internal Medicine, ,  Groups  Estimated length of hospital stay: 6 days  Anticipated disposition: IRTS       Nakul Maldonado MD  2023  1:28 PM   Pager [phone]

## 2023-12-09 NOTE — PHARMACY-ADMISSION MEDICATION HISTORY
Admission Medication History Completed by Pharmacy    See [facility] Admission Navigator for allergy information, preferred outpatient pharmacy, prior to admission medications and immunization status.     Medication history sources:  Previous medication history completed on 12/5/23 at [facility]; Surescripts dispense report; Discharge Summary from 12/8 from [facility] ICU     Pertinent changes made to PTA medication list:  Added: N/A  Deleted: N/A  Changed: N/A    Additional medication history information:   - Olanzapine, vitamin D3, senna-docusate were all discontinued while on [facility] prior to transfer to inpatient mental health.   - Patient not interviewed as part of this medication history in light of recent medication history completed this last week/direct transfer.     Prior to Admission medications    Medication Sig Taking? Auth Provider Long Term End Date   fish oil-omega-3 fatty acids 1000 MG capsule Take 1 capsule (1 g) by mouth daily Yes Pavan Gibbs MD     pantoprazole (PROTONIX) 40 MG EC tablet Take 1 tablet (40 mg) by mouth every morning (before breakfast) Yes Cornelio Rhoades MD            Date completed: 12/09/23    Medication history completed by:   Alina Lim, PharmD   *[phone]

## 2023-12-09 NOTE — PLAN OF CARE
Goal Outcome Evaluation:         To Do:  End of Shift Summary  For vital signs and complete assessments, please see documentation flowsheets.     Pertinent assessments: Assumed care [id number]. Pt A/O. VSS.  Denies pain, nausea and SOB. Denies suicidal ideation. Up ind. Sitter at bedside.  Major Shift Events Bed ready at [facility] 30 rm 21. Report given to LARRY Lockett. Pt brother Enio aware of the transfer. PIV removed. Awaiting transport.  Treatment Plan: Transfer ti inpt Psych.  Bedside Nurse: Aneta Tejada RN

## 2023-12-09 NOTE — PLAN OF CARE
Night Nursing Note  12/8/23 - 12/9/23        Kimmy arrived from [facility] via EMS @ 0005.  Monitored q15 mins for safety.  Appeared to sleep majority of night without difficulty.  NPO for labs this morning.  Continue to monitor, offer support and reassurance per care plan.    Slept 5.5
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
## 2023-12-09 NOTE — DISCHARGE SUMMARY
[facility]  Hospitalist Discharge Summary      Date of Admission:  12/2/2023  Date of Discharge:  12/8/2023  Discharging Provider: Cornelio Rhoades MD  Discharge Service: Hospitalist Service    Discharge Diagnoses   Intentional Zyprexa Overdose  Suicidal Ideation  Generalized Anxiety Disorder  PTSD  Complicated Bereavement  Fever, possible aspiration pneumonitis   Diarrhea   Hypokalemia   Acute Respiratory Failure  Toxic encephalopathy  Sinus tachycardia  Hypoglycemia  Lactic acidosis  History gestational diabetes  History pseudoseizures per chart  Subclinical hypothyroidism    Clinically Significant Risk Factors          Follow-ups Needed After Discharge   Follow-up Appointments     Follow Up and recommended labs and tests      Follow up with psychiatric provider at facility          Discharge Disposition   Transferred to inpatient psychiatry  Condition at discharge: Stable    Hospital Course   Kimmy Mendez is a 38 year old woman with history of prior suicidal ideation and postpartum depression who came to the ER after her mother found her unresponsive at home with multiple bottles of Olanzapine and Vitamin D lying around her.  She has a medical history including anxiety,depression, post-partum depression and extreme bereavement related to the death of a child. She was intubated for airway protection and was extubated 12/4/2023. She was evaluated by consult psychiatry service who recommend transfer to inpatient psychiatry unit. Patient is medically stable for transfer. Doc-to-doc sign off was performed 12/8/2023 with Dr. Hardy.    Consultations This Hospital Stay   CARE MANAGEMENT / SOCIAL WORK IP CONSULT  PSYCHIATRY IP CONSULT    Code Status   Full Code    Time Spent on this Encounter   Cornelio COOLEY MD, personally saw the patient today and spent greater than 30 minutes discharging this patient.       MD KERRY [facility] MEDICAL SURGICAL  [address] [place]  "[address]  Phone: [phone]  Fax: [phone]  ______________________________________________________________________    Physical Exam   Temp: 98.5  F (36.9  C) Temp src: Oral BP: 106/73 Pulse: 74   Resp: 18 SpO2: 98 % O2 Device: None (Room air) Oxygen Delivery: 10 LPM (AIR FLOWMETER - NO O2) Height: 162 cm (5' 3.78\") Weight: 56.4 kg (124 lb 5.4 oz)  Estimated body mass index is 21.49 kg/m  as calculated from the following:    Height as of this encounter: 1.62 m (5' 3.78\").    Weight as of this encounter: 56.4 kg (124 lb 5.4 oz).    General: Very pleasant female resting comfortably in hospital bed.  Awake, alert, interactive. Pink Hijab in place.  HEENT: Normocephalic, atraumatic.  PERRL, EOMI.  Conjunctiva clear, sclerae anicteric.  Mucous membranes moist.  Cardiac: Regular rate and rhythm without murmur, gallop, or rub.  No peripheral edema.  Respiratory: Normal work of breathing.  Clear to auscultation bilaterally without wheezing, rales, or rhonchi.  GI: Normal, active bowel sounds.  Abdomen soft, nontender, nondistended.  : Deferred.  Musculoskeletal: Moving all extremities appropriately.  Skin: No rashes or abrasions on exposed skin.  Neurologic: Alert and oriented x4.  Cranial nerves II through XII grossly intact.  Psychologic: Appropriate mood and affect.    Primary Care Physician   [facility]    Discharge Orders      Reason for your hospital stay    You were admitted to the hospital after intentional zyprexa overdose in an attempt at suicide. You required intubation and developed a pneumonia. These have both improved and you are medically ready for discharge. Due to your depression and suicide attempt you will need psychiatric admission.     Activity - Up ad lucas     Follow Up and recommended labs and tests    Follow up with psychiatric provider at facility     Full Code     Diet    Follow this diet upon discharge: Orders Placed This Encounter      Snacks/Supplements Adult: Other; " Ok to order Ensure (suitable for lactose intolerance) or Awarepoint (dairy free) for patient prn; With Meals      Regular Diet Adult       Significant Results and Procedures   Most Recent 3 CBC's:  Recent Labs   Lab Test 12/04/23  0518 12/03/23  0542 12/02/23  1647   WBC 11.3* 11.0 10.4   HGB 11.8 12.2 14.5   MCV 83 82 83    209 270     Most Recent 3 BMP's:  Recent Labs   Lab Test 12/08/23  0744 12/07/23  0635 12/06/23  0800 12/06/23  0417    139  --  141   POTASSIUM 4.1 3.8  3.8  --  4.0  4.0   CHLORIDE 103 105  --  110*   CO2 23 24  --  21*   BUN 7.4 8.7  --  1.5*   CR 0.47* 0.48*  --  0.42*   ANIONGAP 14 10  --  10   CHARLIE 9.4 8.9  --  8.6   * 106* 137* 125*     Most Recent 2 LFT's:  Recent Labs   Lab Test 12/03/23  0542 12/02/23  1647   AST 28 23   ALT 11 15   ALKPHOS 54 67   BILITOTAL 0.4 0.5   ,   Results for orders placed or performed during the hospital encounter of 12/02/23   XR Chest Port 1 View    Narrative    EXAM: XR CHEST PORT 1 VIEW  LOCATION: [facility]  DATE: 12/2/2023    INDICATION: intubation  COMPARISON: None.      Impression    IMPRESSION: Endotracheal tube tip terminates in the lower thoracic trachea, approximately 1.5 cm from the tete. Enteric tube tip and side-port are within the stomach.    No focal airspace opacity. No pleural effusion or pneumothorax. Cardiac silhouette and mediastinal contours are normal.   Head CT w/o contrast    Narrative    EXAM: CT HEAD W/O CONTRAST  LOCATION: [facility]  DATE: 12/2/2023    INDICATION: AMS/confusion  COMPARISON: None.  TECHNIQUE: Routine CT Head without IV contrast. Multiplanar reformats. Dose reduction techniques were used.    FINDINGS:  INTRACRANIAL CONTENTS: No intracranial hemorrhage, extraaxial collection, or mass effect.  No CT evidence of acute infarct. Normal parenchymal attenuation. Normal ventricles and sulci.     VISUALIZED ORBITS/SINUSES/MASTOIDS: No intraorbital  abnormality. No paranasal sinus mucosal disease. No middle ear or mastoid effusion.    BONES/SOFT TISSUES: No acute abnormality. Partially visualized endotracheal tube and enteric tube.      Impression    IMPRESSION:  1.  No acute intracranial process.   XR Chest Port 1 View    Narrative    EXAM: XR CHEST PORT 1 VIEW  LOCATION: [facility]  DATE: 12/2/2023    INDICATION: check tube placement  COMPARISON: 12/20/2023 at 1643 hours      Impression    IMPRESSION: Nasogastric tube terminates over the stomach. Endotracheal tube terminates 5 mm above the tete. Repositioning recommended. Heart size is normal. There is new airspace disease within the medial right lung base, atelectasis versus infiltrate.    Tube positioning was called to [place] by Dr. Del Kevin on 12/2/2023 8:36 PM CST.       Discharge Medications   Current Discharge Medication List        START taking these medications    Details   pantoprazole (PROTONIX) 40 MG EC tablet Take 1 tablet (40 mg) by mouth every morning (before breakfast)    Associated Diagnoses: Overdose, intentional self-harm, initial encounter (H); Bipolar 1 disorder, manic, moderate (H); ROWAN (generalized anxiety disorder); Severe episode of recurrent major depressive disorder, without psychotic features (H)           CONTINUE these medications which have NOT CHANGED    Details   fish oil-omega-3 fatty acids 1000 MG capsule Take 1 capsule (1 g) by mouth daily  Qty: 30 capsule, Refills: 9    Associated Diagnoses: Vitamin deficiency           STOP taking these medications       OLANZapine (ZYPREXA) 10 MG tablet Comments:   Reason for Stopping:         OLANZapine (ZYPREXA) 2.5 MG tablet Comments:   Reason for Stopping:         senna-docusate (SENOKOT-S/PERICOLACE) 8.6-50 MG tablet Comments:   Reason for Stopping:         Vitamin D3 (CHOLECALCIFEROL) 25 mcg (1000 units) tablet Comments:   Reason for Stopping:             Allergies   Allergies   Allergen  Reactions    Dust Mites Other (See Comments)     congestion    Pork-Derived Products Other (See Comments)     Pt does not ingest of these products.

## 2023-12-09 NOTE — PLAN OF CARE
"  Problem: Suicide Risk  Goal: Absence of Self-Harm  Intervention: Assess Risk to Self and Maintain Safety  Recent Flowsheet Documentation  Taken 12/9/2023 0805 by Casalenda, Gina R, RN  Behavior Management: impulse control promoted  Self-Harm Prevention: environment modified for self-harm risk     Problem: Adult Behavioral Health Plan of Care  Goal: Adheres to Safety Considerations for Self and Others  Outcome: Progressing  Flowsheets (Taken 12/9/2023 1442)  Adheres to Safety Considerations for Self and Others: making progress toward outcome     Pulse 54 sitting. Pulse recheck 75 sitting. Pt reports anxiety and depression but is unable to rate at this time - pt declined pharmacological and nonpharmacological interventions at this time. Pt denies SI/SIB/HI/AH/VH. Pt reports feeling \"sad\" that she is back in the hospital. Pt contracts for safety. A/O.     Pt presents with a flat affect. Pt observed to be calm and cooperative. Pt observed to be pleasant and polite. Pt's speech observed to be soft/quiet but clear and coherent. Pt's eye contact observed to be appropriate.     Pt had labs drawn this morning - see results for further details. Pt visible on unit attending meals, watching television, walking the hallway, and socializing with select staff and peers. Pt visible intermittently resting in her room. Pt received an order to use her personal prayer mat and currently has it in her room - pt appreciative of this.     Pt is medication compliant. She denies any medication SE at this time. She denies any additional questions or concerns at this time. She remains on suicide precautions - no suicidal behaviors noted this shift. Will continue to monitor and assist as needed.   "

## 2023-12-09 NOTE — PLAN OF CARE
Patient arrived to [facility] via EMS. Pt presented calm and cooperative. Patient presented with a flat affect, soft spoken, and good eye contact. Patient cooperative with search, willingly changed into scrubs. Pt wears a hiijab, was cooperative removing it during the search and placed it back on. Pt was informed a physicians order is needed to wear the head covering on the unit, which will be obtained. Pt currently denies suicidal ideations and self injurious behavior. Pt denies all hallucinations and urges to harm others. Pt stated that she has a difficult time during her menstrual cycles and becomes increasingly depressed and mourns the loss of her murdered child. Pt denies all c/o pain at this time. Stated her menses ended yesterday. Denies loose stools stating she experienced diarrhea up until yesterday. Pt denies all other medical complaints. Pt expressed understanding of orientation to the unit. Pt given admission packet and toiletries. Pt is higinio for safety on the unit.

## 2023-12-09 NOTE — PLAN OF CARE
Goal Outcome Evaluation:       Pertinent assessments: Assumed cares from [id number]. Pt A/Ox4. VSS. Independent in the room with a sitter. Ls clear, BS active. Denies pain, SOB and nausea, Hurricane spray given for tongue pain 1x, Denies suicidal ideation this shift.     Major Shift Events : none  Treatment Plan: Discharge tonight to inpt psych bed   Bedside Nurse: Shannan Scott RN

## 2023-12-09 NOTE — PLAN OF CARE
Goal Outcome Evaluation:       To Do:  End of Shift Summary  For vital signs and complete assessments, please see documentation flowsheets.     Pertinent assessments: Assumed care [id number]. Pt A/O. VSS.  Denies pain, nausea and SOB. Denies suicidal ideation. Up ind. Sitter at bedside.  Major Shift Events Bed ready at [facility] unit 30 rm 21. Report given to LARRY Lockett. Pt brother Enio aware of the transfer. PIV removed.   2330- Pt transferred to [facility]. Pt brother and RN notified.  Treatment Plan: Transfer ti inpt Psych.  Bedside Nurse: Aneta Tejada RN

## 2023-12-10 PROCEDURE — 250N000013 HC RX MED GY IP 250 OP 250 PS 637: Performed by: PSYCHIATRY & NEUROLOGY

## 2023-12-10 PROCEDURE — 124N000002 HC R&B MH UMMC

## 2023-12-10 PROCEDURE — 250N000013 HC RX MED GY IP 250 OP 250 PS 637: Performed by: STUDENT IN AN ORGANIZED HEALTH CARE EDUCATION/TRAINING PROGRAM

## 2023-12-10 RX ADMIN — Medication 3 MG: at 23:52

## 2023-12-10 RX ADMIN — PANTOPRAZOLE SODIUM 40 MG: 40 TABLET, DELAYED RELEASE ORAL at 08:14

## 2023-12-10 RX ADMIN — TRAZODONE HYDROCHLORIDE 50 MG: 50 TABLET ORAL at 19:57

## 2023-12-10 ASSESSMENT — ACTIVITIES OF DAILY LIVING (ADL)
ADLS_ACUITY_SCORE: 28
LAUNDRY: WITH SUPERVISION
ADLS_ACUITY_SCORE: 28
ORAL_HYGIENE: INDEPENDENT
ORAL_HYGIENE: INDEPENDENT
DRESS: INDEPENDENT
ADLS_ACUITY_SCORE: 28
DRESS: INDEPENDENT
ADLS_ACUITY_SCORE: 28
HYGIENE/GROOMING: INDEPENDENT
ADLS_ACUITY_SCORE: 28
LAUNDRY: WITH SUPERVISION
ADLS_ACUITY_SCORE: 28
HYGIENE/GROOMING: INDEPENDENT
ADLS_ACUITY_SCORE: 28

## 2023-12-10 NOTE — PLAN OF CARE
"  Problem: Adult Behavioral Health Plan of Care  Goal: Adheres to Safety Considerations for Self and Others  Outcome: Progressing  Flowsheets (Taken 12/10/2023 1436)  Adheres to Safety Considerations for Self and Others: making progress toward outcome     Problem: Suicide Risk  Goal: Absence of Self-Harm  Intervention: Assess Risk to Self and Maintain Safety  Recent Flowsheet Documentation  Taken 12/10/2023 0818 by Casalenda, Gina R, RN  Behavior Management: impulse control promoted  Self-Harm Prevention: environment modified for self-harm risk     Pt reports anxiety and depression but is unable to rate - pt declined pharmacological and nonpharmacological interventions at this time. Pt states, \"I feel down today\" and \"I am sad.\" Pt denies SI/SIB/HI/AH/VH. Pt contracts for safety. Pt denies pain. A/O.     Pt presents with a flat, blunted affect. Pt observed to be calm and cooperative. Pt observed to be polite. Pt's eye contact observed to be appropriate. Pt's speech observed to be soft/quiet.     Pt visible on unit attending meals, watching television, walking the hallway, and socializing with select peers. Pt visible intermittently laying in bed. Pt reports she did not sleep well last night, despite NOC RN reporting pt slept around 6.75 hours. Pt reports she has been unable to sleep today as well and feels \"tired.\" Pt reports the PRN melatonin helped \"a little\" yesterday.     Pt is medication compliant. She denies any medication SE at this time. She denies any additional questions or concerns at this time. She remains on suicide precautions - no suicidal behaviors noted. Will continue to monitor and assist as needed.   "

## 2023-12-10 NOTE — PLAN OF CARE
"Problem: Adult Behavioral Health Plan of Care  Goal: Adheres to Safety Considerations for Self and Others  Outcome: Progressing    Kimmy is polite and cooperative. Affect is blunted. Speech is quiet. Eye contact is good. Pt reports depression and anxiety. Pt says she does not worry about anything specific. She declined PRNs. Pt denies AH/VH/SI/HI. Pt says she does not know why she attempted suicide. She says she was not having any specific thoughts at that time, \"I just did it.\" Pt says she does not do anything for fun and that she is \"always sad.\" Pt says her main concern right now is that she has trouble falling and staying asleep. Pt was present in the milieu, and socialized briefly with a peer. She declined to go to group this evening. She spent the shift pacing, watching TV, and napping. Appetite is good.    Pt took HS Trazodone and then went to her room. She came out of her room 2 hours later and stated she was not able to sleep. Pt says melatonin has helped in the past. Paged on-call MD [facility] and received order for PRN melatonin. Administered melatonin and pt retired to her room.       "

## 2023-12-10 NOTE — PLAN OF CARE
Night Nursing Note   12/9/23 - 12/10/23        Kimmy was in bed at beginning of shift and appeared asleep.  Monitored q15 mins for safety.  Appeared to sleep majority of night without difficulty.  Continue to monitor, offer support and reassurance per care plan.    Slept 6.75 hrs.

## 2023-12-11 ENCOUNTER — APPOINTMENT (OUTPATIENT)
Dept: INTERPRETER SERVICES | Facility: CLINIC | Age: 38
End: 2023-12-11
Attending: PSYCHIATRY & NEUROLOGY
Payer: COMMERCIAL

## 2023-12-11 PROCEDURE — 250N000013 HC RX MED GY IP 250 OP 250 PS 637: Performed by: PSYCHIATRY & NEUROLOGY

## 2023-12-11 PROCEDURE — 99232 SBSQ HOSP IP/OBS MODERATE 35: CPT | Performed by: PSYCHIATRY & NEUROLOGY

## 2023-12-11 PROCEDURE — 90853 GROUP PSYCHOTHERAPY: CPT

## 2023-12-11 PROCEDURE — 124N000002 HC R&B MH UMMC

## 2023-12-11 PROCEDURE — 90832 PSYTX W PT 30 MINUTES: CPT

## 2023-12-11 RX ORDER — MIRTAZAPINE 15 MG/1
15 TABLET, FILM COATED ORAL AT BEDTIME
Status: DISCONTINUED | OUTPATIENT
Start: 2023-12-11 | End: 2023-12-22 | Stop reason: HOSPADM

## 2023-12-11 RX ORDER — RISPERIDONE 1 MG/1
1 TABLET ORAL 2 TIMES DAILY
Status: DISCONTINUED | OUTPATIENT
Start: 2023-12-11 | End: 2023-12-22 | Stop reason: HOSPADM

## 2023-12-11 RX ORDER — BUPROPION HYDROCHLORIDE 150 MG/1
150 TABLET ORAL EVERY MORNING
Status: DISCONTINUED | OUTPATIENT
Start: 2023-12-12 | End: 2023-12-15

## 2023-12-11 RX ORDER — TRAZODONE HYDROCHLORIDE 50 MG/1
50-100 TABLET, FILM COATED ORAL AT BEDTIME
Status: DISCONTINUED | OUTPATIENT
Start: 2023-12-11 | End: 2023-12-13

## 2023-12-11 RX ADMIN — MIRTAZAPINE 15 MG: 15 TABLET, FILM COATED ORAL at 20:05

## 2023-12-11 RX ADMIN — PANTOPRAZOLE SODIUM 40 MG: 40 TABLET, DELAYED RELEASE ORAL at 08:30

## 2023-12-11 RX ADMIN — TRAZODONE HYDROCHLORIDE 50 MG: 50 TABLET ORAL at 20:05

## 2023-12-11 RX ADMIN — RISPERIDONE 1 MG: 1 TABLET ORAL at 20:05

## 2023-12-11 ASSESSMENT — ACTIVITIES OF DAILY LIVING (ADL)
DRESS: INDEPENDENT
HYGIENE/GROOMING: INDEPENDENT
ADLS_ACUITY_SCORE: 28
ADLS_ACUITY_SCORE: 28
LAUNDRY: WITH SUPERVISION
ADLS_ACUITY_SCORE: 28
ADLS_ACUITY_SCORE: 28
ORAL_HYGIENE: INDEPENDENT
ADLS_ACUITY_SCORE: 28
ADLS_ACUITY_SCORE: 28
ORAL_HYGIENE: INDEPENDENT
ADLS_ACUITY_SCORE: 28
HYGIENE/GROOMING: INDEPENDENT
ADLS_ACUITY_SCORE: 28
DRESS: INDEPENDENT

## 2023-12-11 NOTE — CARE PLAN
"Occupational Therapy Group Note:     12/11/23 1425   Group Therapy Session   Group Attendance attended group session   Time Session Began 1300   Time Session Ended 1345   Total Time (minutes) 10 (no charge)   Total # Attendees 3   Group Type psychotherapeutic;psychoeducation   Group Topic Covered balanced lifestyle;coping skills/lifestyle management;emotions/expression;leisure exploration/use of leisure time;structured socialization   Group Session Detail OT Topic Group: [facility] or [place]   Patient Response/Contribution confronted peers appropriately;cooperative with task;listened actively   Patient Participation Detail Patient was an active participant in discussion and exercise group this date. Object of today's group is to: maximize social engagement, challenge memory and recall, promote insight and self-reflection, and encourage physical fitness and therapeutic movement. Patient presented with a flat affect with subdued mood. Patient spoke in a soft-tone; difficult to understand without increased effort. No spontaneous conversation initiated with others in group; however, patient would respond briefly and superficially to game prompts. Patient reported a goal for the week is to: \"get better.\" Upon further prompting, patient was able to identify that \"getting better sleep\" will help to reach this overarching goal. Patient reported that a hobby she wishes to learn is: riding a bicycle. Patient also reported that one of her greatest fears is: \"not getting better.\" Patient demonstrated patience and active listening when peers were speaking. Patient left group early and was seen pacing the hallways. Patient was calm and polite in interactions.          "

## 2023-12-11 NOTE — PLAN OF CARE
BEH IP Unit Acuity Rating Score (UARS)  Patient is given one point for every criteria they meet.    CRITERIA SCORING   On a 72 hour hold, court hold, committed, stay of commitment, or revocation 0/1    Patient LOS on BEH unit exceeds 20 days 0/1  LOS: 2   Patient under guardianship, 55+, otherwise medically complex, or under age 11 0/1   Suicide ideation without relief of precipitating factors 1/1   Current plan for suicide 0/1   Current plan for homicide 0/1   Imminent risk or actual attempt to seriously harm another without relief of factors precipitating the attempt 0/1   Severe dysfunction in daily living (ex: complete neglect for self care, extreme disruption in vegetative function, extreme deterioration in social interactions) 1/1   Recent (last 7 days) or current physical aggression in the ED or on unit 0/1   Restraints or seclusion episode in past 72 hours 0/1   Recent (last 7 days) or current verbal aggression, agitation, yelling, etc., while in the ED or unit 0/1   Active psychosis 0/1   Need for constant or near constant redirection (from leaving, from others, etc).  0/1   Intrusive or disruptive behaviors 0/1   TOTAL 2

## 2023-12-11 NOTE — PROGRESS NOTES
"Adult Inpatient Safety Plan:     [facility] Adult Inpatient Mental Health Units           [address]                                [facility]    SAFETY PLAN:  Step 1: Warning signs / cues (Thoughts, images, mood, situation, behavior) that a crisis may be developing:  Mood: worsening depression and feeling down  Behaviors: not sleeping enough and lack of appetite  Situations: loss: 3 year old son  and she has manic and depressive episodes, this is a depressive episode      Step 2: Coping strategies - Things I can do to take my mind off of my problems without contacting another person (relaxation technique, physical activity):  Distress Tolerance Strategies:  relaxation activities: nature walks, pray, and enjoys cooking when feeling well.  Physical Activities: go for a [facility]    Step 3: Remind myself of people and things that are important to me and worth living for:    All family  Friends  Prayer and evy    Step 4: When I am in crisis, I can ask these people to help me use my safety plan:   Name: Sai Brother  Phone: [phone]   Name: Arabella Sister  Phone: [phone]   Name: Verena Lopes  Phone: [phone]    Step 5: Making the environment safe:   I will remove alcohol and drugs, secure my medications, dispose of old medications, remove access to firearms, remove things I could use to hurt myself, and identify supportive people  Other ways to make my environment safe: lock up medications, help distribute medications, supportive people, like being with family    Step 6: Professionals or agencies I can contact during a crisis:  [facility] Crisis Response [phone]  Crisis text line: Text \"[place]\" to 435275. Free, confidential, .  Primary Care Provider:  Pavan Gibbs MD with [address], [address], [place], [place], [address]  Phone: [phone] Fax: [phone]      Medication Management/Psychiatry:   Aston Alejandra MD " with [facility]  [address] [place] [address]  Phone [phone] Fax [phone]     Therapist  Has seen someone for 1 month. Someone from [facility]     Neurology- stating not current- she says she does not currently see, resources from last admission  Jacy Ruelas MD with [facility]  [address], [place]  Phone: [phone] Fax: [phone]     Case Management- contact info  Amal from [facility]         If unable to maintain safety despite working your plan, call 911 or go to my nearest emergency department.         Patient helped develop this safety plan and agreed to use it when needed.  Pt has been given a copy of this plan.          Today s date:  December 11, 2023  Completed by Clinician Name/ Credentials:  Ab Burciaga, LMFT- ATR-BC  Licensed Marriage and Family Therapist and   Registered and Board Certified Art Therapist          Adapted from Safety Plan Template 2008 Cyndy Abdul and Walter Barnes is reprinted with the express permission of the authors.  No portion of the Safety Plan Template may be reproduced without the express, written permission.  You can contact the authors at [email or web address] or [email or web address].

## 2023-12-11 NOTE — PLAN OF CARE
"Team Note Due:  Monday    Assessment/Intervention/Current Symtoms and Care Coordination:  Chart review and met with team, discussed pt progress, symptomology, and response to treatment.  Discussed the discharge plan and any potential impediments to discharge.    Tasks Completed:  - Called and spoke to pt's cousin, Arabella, with [employer] . Arabella reports pt returned to her home following [facility] placement on November 8. Since that time, Arabella reports pt \"wasn't doing well\" and noted that pt was very slow, isolative, in bed all the time, not taking showers, and not able to take care of herself. Arabella found pt unresponsive with \"a lot of foam\" in her mouth and empty pill bottles around her. Arabella called 911 to have them respond and pt was brought to the ED. Arabella reports pt is not able to return to her home as pt requires a higher level of care that Arabella is unable to provide. Arabella works full-time and is unable to watch pt. [facility] asked if family has communicated to pt that she is unable to return to family's home following discharge and Arabella reported that she has not told pt. CTC shared that this can make it challenging for pt to accept as pt is reporting she is returning home and Arabella reported that she is unable to tell pt because it is \"too hard\". [facility] will follow-up with pt's brother, as well.  - Met with pt to discuss goals for hospitalization and discharge plans. Pt reports she is hoping to feel better and is starting a new medication. Pt reports she does not like any psych medications because of how they make her feel and said \"It's really hard\". Pt reported that she continues to have pain in her throat and her tongue due to intubation. [facility] encouraged pt to reach out to staff if she has any concerns regarding medication side effects or pain. Pt reported that when she was at [facility] the food was terrible and \"I was the only one not on drugs\" and it was really hard for her there. As a result, pt " "reports she does not want to return to [facility] and shared that she plans to return with family.  - CTC left message for pt's brother, Tammie. Tammie called back and shared concerns regarding pt returning home. Tammie reported that pt had expressed suicidal ideation while at [facility] prior to discharge and he was concerned about her returning home. [facility] had attempted to extend her stay with them one more month but this was denied. Tammie shared that pt is not able to return home and he is hoping to meet with provider and CTC at the end of December/beginning of January. CTC shared that pt is not able to stay in the hospital for an extended period of time. CTC expressed concern that pt is reporting she will not return to [facility] and currently expresses that she will return home with family. [facility] asked Tammie if he has notified pt that she is unable to return with family and Tammie acknowledged that she has not been told. Tammie reported that \"the government needs to step in and take care of her\" and reported they need more long-term placement following hospital stay. [facility] discussed CADI waiver to pay for long-term placements but shared that this can take a few months to become active and pt would not be able to be assessed while in the hospital. CTC reiterated that hospitalization is for short-term crisis placement and shared that typical length of stay is only one week. Tammie reported that he will contact pt to notify her regarding concerns with returning home. [facility] asked about guardianship and CTC shared that he will need to contact  to get more information on this process.  Tammie requested call from provider and reported that he is interested in discussing long acting injection options for pt as pt is historically noncompliant with medications and concerns regarding overdose. CTC will pass this along to provider.    Discharge Plan or Goal:  Current plan is to stabilize symptoms and develop safe " disposition plan. Following hospitalization, plan is for [facility] placement or return to family.     Barriers to Discharge:  Pt continues to stabilize - pt presents with flat and blunted affect and continues to endorse depressive symptoms. Pt currently denies SI/SIB.     Referral Status:  Once pt stabilizes further, appropriate referrals will be assessed.     Legal Status:  Pt is voluntary    Contacts:    Medication Management/Psychiatry:   Aston Alejandra MD with [address] [place] [address]  Phone [phone] Fax [phone]    Family Contacts:   Name/Relationship: Tammie Mendez  Brother (DORIAN on file)  Phone: [phone]    Name/Relationship: Arabella  Cousin (DORIAN on file)  Phone: [phone]    Name/Relationship: Verena  Niece (No DORIAN on file)  Phone: [phone]     Upcoming Meetings and Dates/Important Information and next steps:  None

## 2023-12-11 NOTE — PROGRESS NOTES
"[facility] [place]   Psychiatric Progress Note        Interim History:   The patient's care was discussed with the treatment team during the daily team meeting and/or staff's chart notes were reviewed.  Staff report patient was transferred from a medical floor over weekend after Suicide attempt via med overdose and met with Dr. Martinez. Patient was admitted and remained at this hospital as a voluntary patient. She remained quiet and cooperative over weekend, was compliant with her meds and unit rules. Was reported to sleep for about 5.5 hours last night, but herself reported sleeping not more than 2 hours.    Met with patient: patient went to the conference room willingly. She recognized myself after being under my care on this unit this summer. Said that after her discharge she was sent to [facility] and for some time things went OK, then, they started getting worse. Kimmy had difficulties explaining what made things worse, but said that she had lost her appetite and her sleep had gotten worse as well. Admitted to overdosing on her Zyprexa: \"It was a mistake, dark thoughts got into me\". During our visit she denied Auditory hallucinations and Visual hallucinations, but appeared to be preoccupied and guarded. We had long discussion about her meds. Kimmy agreed with my recommendations to start Risperdal, Remeron and Wellbutrin XL. I wrote for her list of meds and explained in details why each of them would have to be taken (patient can't read). Kimmy confirmed that she would return to her sister's home where she lives after discharge. She had no more questions or concerns.          Medications:      [START ON 12/12/2023] buPROPion  150 mg Oral QAM    mirtazapine  15 mg Oral At Bedtime    pantoprazole  40 mg Oral QAM AC    risperiDONE  1 mg Oral BID    traZODone   mg Oral At Bedtime          Allergies:     Allergies   Allergen Reactions    Dust Mites Other (See Comments)     " congestion    Pork-Derived Products Other (See Comments)     Pt does not ingest of these products.           Labs:   No results found for this or any previous visit (from the past 24 hour(s)).       Psychiatric Examination:     /72 (BP Location: Left arm)   Pulse 65   Temp 97  F (36.1  C) (Temporal)   Resp 16   Wt 54.3 kg (119 lb 9.6 oz)   SpO2 98%   BMI 20.67 kg/m    Weight is 119 lbs 9.6 oz  Body mass index is 20.67 kg/m .    Orthostatic Vitals         Most Recent      Sitting Orthostatic /59 12/10 0818    Sitting Orthostatic Pulse (bpm) 104 12/10 0818    Standing Orthostatic /76 12/11 0829    Standing Orthostatic Pulse (bpm) 78 12/11 0829            Appearance: awake, alert, dressed in hospital scrubs, and appeared as age stated  Attitude:  cooperative  Eye Contact:  intense  Mood:  anxious and sad   Affect:  constricted mobility and guarded  Speech:  decreased prosody and paucity of speech  Psychomotor Behavior:  no evidence of tardive dyskinesia, dystonia, or tics and physical retardation  Throught Process:  linear  Associations:  no loose associations  Thought Content:   denies presence of active suicidal, Homicidal or psychotic symptoms.  Insight:  partial  Judgement:  limited  Oriented to:  time, person, and place  Attention Span and Concentration:  limited  Recent and Remote Memory:  limited    Clinical Global Impressions  First: 7/4 12/11/2023      Most recent:            Precautions:     Behavioral Orders   Procedures    Code 1 - Restrict to Unit    Routine Programming     As clinically indicated    Status 15     Every 15 minutes.    Suicide precautions     Patients on Suicide Precautions should have a Combination Diet ordered that includes a Diet selection(s) AND a Behavioral Tray selection for Safe Tray - with utensils, or Safe Tray - NO utensils            DIagnoses:     Bipolar Disorder Type 1 current episode depressed, severe.         Plan:     Will start on Risperdal, Remeron  and Wellbutrin XL. Will continue to provide support and structure. Will talk to family to get more information. Patient reports she has psychiatric prescriber and a therapist, though, she lately has not talked to therapist. Plans to return to her sister's place after discharge.    Total time spent was 36 minutes. Over 50% of times was spent counseling and coordination of care regarding coping skills, medication and discharge planning.

## 2023-12-11 NOTE — PROVIDER NOTIFICATION
12/11/23 1248   Individualization/Patient Specific Goals   Patient Personal Strengths coping skills;resilient;spiritual/[employer] support   Patient Vulnerabilities traumatic event;adverse childhood experience(s);family/relationship conflict   Anxieties, Fears or Concerns Pt rates depression at 5/10 and presents as quiet, with a flat and blunted affect.   Interprofessional Rounds   Summary Pt was admitted over the weekend. Pt transferred from medical unit following intentional overdose. Pt was intubated and contracted pneumonia while on the medical unit. Pt currently denies SI/SIB and rates depression at 5/10. Pt presents as quiet and withdrawn with a flat and blunted affect.   Participants CTC;nursing;psychiatrist   Behavioral Team Discussion   Participants Cisco Hardy MD; Hillary Rosenberg RN; CAMDEN Ramirez   Progress Pt was recently admitted and would benefit from contiued stabilization and medication management.   Continued Stay Criteria/Rationale N/A   Medical/Physical No issues noted   Precautions Suicide precautions   Plan Multidisciplinary team evaluation, medication management, care coordination   Rationale for change in precautions or plan N/A   Safety Plan Per unit protocol   Anticipated Discharge Disposition [facility];home with family     PRECAUTIONS AND SAFETY    Behavioral Orders   Procedures    Code 1 - Restrict to Unit    Routine Programming     As clinically indicated    Status 15     Every 15 minutes.    Suicide precautions     Patients on Suicide Precautions should have a Combination Diet ordered that includes a Diet selection(s) AND a Behavioral Tray selection for Safe Tray - with utensils, or Safe Tray - NO utensils         Safety  Safety WDL: WDL  Patient Location: patient room, own, hallway  Observed Behavior: calm  Observed Behavior (Comment): Watching television, walking hallway, socializing with select peers  Safety Measures: clinical history reviewed, safety rounds completed,  suicide assessment completed  Diversional Activity: television  Suicidality: Status 15, Unpredictable frequency of checking on patient

## 2023-12-11 NOTE — PLAN OF CARE
Night Nursing Note   12/10/23 - 12/11/23        Kimmy was in bed at beginning of shift and appeared asleep.  Monitored q15 mins for safety.  Awake in lounge briefly. Offered no complaints.  Returned back to room and appeared to sleep majority of night without difficulty.  Continue to monitor, offer support and reassurance per are plan.    Slept 5.5 hrs.

## 2023-12-11 NOTE — PLAN OF CARE
"Problem: Depressive Signs/Symptoms  Goal: Improved Mood Symptoms (Depressive Signs/Symptoms)  Outcome: Progressing    Problem: Suicide Risk  Goal: Absence of Self-Harm  Outcome: Progressing    Patient has been visible in the milieu, sitting in the lounge and walking in the angelo intermittently. Patient presents with a flat, depressed affect. Patient states that mood is \"not great, but not the worst either.\" Patient denies thoughts of harm to self or others and contracts for safety. No auditory or visual hallucinations. Patient states she is here because zyprexa was giving her suicidal thoughts. Patient hopes that new medications will be better, and that she is able to discharge to home with her sister soon. Patient has been calm and cooperative on approach. She is eating meals and snacks, and has been cooperative with taking medications. No reports of pain or medication side effects at this time.     /85 (BP Location: Left arm, Patient Position: Sitting, Cuff Size: Adult Regular)   Pulse 87   Temp 97.2  F (36.2  C) (Temporal)   Resp 16   Wt 54.3 kg (119 lb 9.6 oz)   SpO2 100%   BMI 20.67 kg/m       "

## 2023-12-11 NOTE — PLAN OF CARE
"Kimmy c/o poor sleep again last night and asks for medication assistance to help her improve her sleep. New orders received-see MAR. She estimates she got about 2 hours of sleep last night.   She describes her mood as \"ok.\" She presents with a flat, depressed affect. She denies anxiety, rates depression 5/10. She has been out in the milieu for meals. She walked laps in the angelo with a male peer but otherwise she is quiet and minimally interactive.   She denies SI/SIB/HI or psychotic sx.   She is calm and cooperative with care. Medication compliant.     VSS. /72 (BP Location: Left arm)   Pulse 65   Temp 97  F (36.1  C) (Temporal)   Resp 16   Wt 54.3 kg (119 lb 9.6 oz)   SpO2 98%   BMI 20.67 kg/m        Problem: Adult Behavioral Health Plan of Care  Goal: Plan of Care Review  Outcome: Progressing  Flowsheets (Taken 12/11/2023 0830)  Patient Agreement with Plan of Care: agrees     Problem: Depressive Signs/Symptoms  Goal: Improved Mood Symptoms (Depressive Signs/Symptoms)  Outcome: Progressing  Intervention: Promote Mood Improvement  Recent Flowsheet Documentation  Taken 12/11/2023 0830 by Hillary Rosenberg, RN  Supportive Measures: active listening utilized   Goal Outcome Evaluation:    Plan of Care Reviewed With: patient                   "

## 2023-12-11 NOTE — CARE PLAN
"   12/11/23 1605   Group Therapy Session   Group Attendance attended group session   Time Session Began 1400   Time Session Ended 1435   Total Time (minutes) 35   Total # Attendees 3   Group Type psychotherapeutic   Group Topic Covered leisure exploration/use of leisure time;structured socialization   Group Session Detail Process/ art   Patient Response/Contribution cooperative with task;discussed personal experience with topic;listened actively   Patient Participation Detail pt was soft spoken but did well in group with social discussion, \" Would you rather Wellness\" She used oil pastel to draw what she described as a landscape with grass. She also spoke about liking to study history       "

## 2023-12-11 NOTE — PLAN OF CARE
Goal Outcome Evaluation:    Plan of Care Reviewed With: patient            Problem: Adult Behavioral Health Plan of Care  Goal: Plan of Care Review  Recent Flowsheet Documentation  Taken 12/10/2023 1700 by Dianne Banks RN  Patient Agreement with Plan of Care: agrees      Patient is observed in the milieu.  Patient is observed walking with a peer, however, there is no verbal communication.  Patient watches tv with peers.  Appetite is good and is medication compliant.  Patient c/o poor sleep last night.  Has trazodone scheduled and is encouraged to ask for another dose after an hour if she is still struggling to sleep. Patient verbalizes understanding.      Patient has a flat affect.  Speech is clear and soft. Mood is calm.  Patient denies SI, SIB, HI, hallucinations and anxiety.  Patient endorsee depression 5/10.  Yes, I am really depressed stated the patient.  Patient is able to contract for safety.      Will continue to monitor.

## 2023-12-12 PROCEDURE — 124N000002 HC R&B MH UMMC

## 2023-12-12 PROCEDURE — 250N000013 HC RX MED GY IP 250 OP 250 PS 637: Performed by: PSYCHIATRY & NEUROLOGY

## 2023-12-12 PROCEDURE — 99232 SBSQ HOSP IP/OBS MODERATE 35: CPT | Performed by: PSYCHIATRY & NEUROLOGY

## 2023-12-12 RX ADMIN — BUPROPION HYDROCHLORIDE 150 MG: 150 TABLET, FILM COATED, EXTENDED RELEASE ORAL at 08:39

## 2023-12-12 RX ADMIN — RISPERIDONE 1 MG: 1 TABLET ORAL at 20:14

## 2023-12-12 RX ADMIN — TRAZODONE HYDROCHLORIDE 50 MG: 50 TABLET ORAL at 20:14

## 2023-12-12 RX ADMIN — MIRTAZAPINE 15 MG: 15 TABLET, FILM COATED ORAL at 20:15

## 2023-12-12 RX ADMIN — RISPERIDONE 1 MG: 1 TABLET ORAL at 08:39

## 2023-12-12 RX ADMIN — PANTOPRAZOLE SODIUM 40 MG: 40 TABLET, DELAYED RELEASE ORAL at 08:39

## 2023-12-12 ASSESSMENT — ACTIVITIES OF DAILY LIVING (ADL)
LAUNDRY: WITH SUPERVISION
ORAL_HYGIENE: INDEPENDENT
ADLS_ACUITY_SCORE: 28
ORAL_HYGIENE: INDEPENDENT
DRESS: INDEPENDENT
ADLS_ACUITY_SCORE: 28
LAUNDRY: WITH SUPERVISION
ADLS_ACUITY_SCORE: 28
HYGIENE/GROOMING: INDEPENDENT
DRESS: INDEPENDENT
HYGIENE/GROOMING: INDEPENDENT
ADLS_ACUITY_SCORE: 28

## 2023-12-12 NOTE — PLAN OF CARE
"Team Note Due:  Monday    Assessment/Intervention/Current Symtoms and Care Coordination:  Chart review and met with team, discussed pt progress, symptomology, and response to treatment.  Discussed the discharge plan and any potential impediments to discharge.    Tasks Completed:  - Called and spoke to Tammie to ask if he had a chance to notify pt that she is not able to return to family's home. Tammie reported he had not but will try to call this morning. [facility] asked Tammie to notify CTC once he has had this conversation with pt. [facility] shared that provider was notified regarding his request for a phone call.  - Spoke to pt regarding her assigned  with [facility]. Pt confirmed that she does have a  but would not provide any further details. When [facility] asked pt if it is okay to call [facility] to get her 's contact information, pt responded with \"why?\" [facility] told pt that we could notify her  that she is in the hospital and provide updates. Pt declined providing this information or allowing [facility] to contact her  stating that her sister already notified them. Pt appeared guarded and paranoid during conversation with [facility].    Discharge Plan or Goal:  Current plan is to stabilize symptoms and develop safe disposition plan. Following hospitalization, plan is for [facility] placement or return to family.     Barriers to Discharge:  Pt continues to stabilize - pt presents with flat and blunted affect and continues to endorse depressive symptoms. Pt currently denies SI/SIB.     Referral Status:  Once pt stabilizes further, appropriate referrals will be assessed.     Legal Status:  Pt is voluntary    Contacts:    Medication Management/Psychiatry:   Aston Alejandra MD with [address] [place] [address]  Phone [phone] Fax [phone]    Family Contacts:   Name/Relationship: Tammie Mendez  Brother (DORIAN on file)  Phone: " [phone]    Name/Relationship: Arabella  Cousin (DORIAN on file)  Phone: [phone]    Name/Relationship: Verena  Niece (No DORIAN on file)  Phone: [phone]     Upcoming Meetings and Dates/Important Information and next steps:  None

## 2023-12-12 NOTE — PLAN OF CARE
Night Nursing Note   12/11/23 - 12/12/23      Kimmy was in bed at beginning of shift and appeared asleep.  Monitored q15 mins for safety.  Appeared to sleep majority of night without difficulty.  Continue to monitor, offer support and reassurance per care plan.    Slept 6.75 hrs.

## 2023-12-12 NOTE — PLAN OF CARE
BEH IP Unit Acuity Rating Score (UARS)  Patient is given one point for every criteria they meet.    CRITERIA SCORING   On a 72 hour hold, court hold, committed, stay of commitment, or revocation 0/1    Patient LOS on BEH unit exceeds 20 days 0/1  LOS: 3   Patient under guardianship, 55+, otherwise medically complex, or under age 11 0/1   Suicide ideation without relief of precipitating factors 1/1   Current plan for suicide 0/1   Current plan for homicide 0/1   Imminent risk or actual attempt to seriously harm another without relief of factors precipitating the attempt 0/1   Severe dysfunction in daily living (ex: complete neglect for self care, extreme disruption in vegetative function, extreme deterioration in social interactions) 1/1   Recent (last 7 days) or current physical aggression in the ED or on unit 0/1   Restraints or seclusion episode in past 72 hours 0/1   Recent (last 7 days) or current verbal aggression, agitation, yelling, etc., while in the ED or unit 0/1   Active psychosis 0/1   Need for constant or near constant redirection (from leaving, from others, etc).  0/1   Intrusive or disruptive behaviors 0/1   TOTAL 2

## 2023-12-12 NOTE — PLAN OF CARE
Problem: Suicide Risk  Goal: Absence of Self-Harm  Intervention: Assess Risk to Self and Maintain Safety  Recent Flowsheet Documentation  Taken 12/12/2023 1289 by Casalenda, Gina R, RN  Behavior Management: impulse control promoted  Self-Harm Prevention: environment modified for self-harm risk     Problem: Adult Behavioral Health Plan of Care  Goal: Adheres to Safety Considerations for Self and Others  Outcome: Progressing  Flowsheets (Taken 12/12/2023 1525)  Adheres to Safety Considerations for Self and Others: making progress toward outcome     Pt reports anxiety and depression - scheduled medication given, pt declines further pharmacological and nonpharmacological interventions at this time. Pt denies SI/SIB/HI/AH/VH. Pt contracts for safety. Pt denies pain. A/O.     Pt presents with a flat affect. Pt observed to be calm and cooperative. Pt observed to be pleasant and polite. Pt's eye contact observed to be appropriate. Pt's speech observed to be soft/quiet. Pt visible on unit attending meals, watching television, and walking the hallway. Pt visible intermittently resting in her room. Pt reports that she slept well last night.     Pt is medication compliant. She denies any medication SE at this time. She denies any questions or concerns at this time. Pt continues to have a no roommate order. Pt remains on suicide precautions - no suicidal behaviors noted. Will continue to monitor and assist as needed.

## 2023-12-12 NOTE — PROGRESS NOTES
"[facility] [place]   Psychiatric Progress Note        Interim History:   The patient's care was discussed with the treatment team during the daily team meeting and/or staff's chart notes were reviewed.  Staff report patient slept much better with yesterday med changes: 6.75 hours. She socialized with select peers or was simply sitting alone in common area. Was compliant with meds. Per [facility], patient appeared to be guarded and paranoid during their visit, refused to allow to talk to CM, see [facility]'s note below:    \"- Called and spoke to Tammie to ask if he had a chance to notify pt that she is not able to return to family's home. Tammie reported he had not but will try to call this morning. [facility] asked Tammie to notify [facility] once he has had this conversation with pt. [facility] shared that provider was notified regarding his request for a phone call.  - Spoke to pt regarding her assigned  with [facility]. Pt confirmed that she does have a  but would not provide any further details. When [facility] asked pt if it is okay to call [facility] to get her 's contact information, pt responded with \"why?\" [facility] told pt that we could notify her  that she is in the hospital and provide updates. Pt declined providing this information or allowing [facility] to contact her  stating that her sister already notified them. Pt appeared guarded and paranoid during conversation with [facility].\"    Met with patient: patient went to the conference room willingly. She still presented with restricted and guarded affect. Confirmed that her sleep was better last night, thanked for that. Stated that mood was still sad, then, added that she realized that med change took place only yesterday. Denied presence of Suicidal ideation, Homicidal thoughts, Auditory hallucinations and Visual hallucinations. When asked about plans for discharge, patient directly told me that she would go " "back to her family. I didn't want to tell her upfront that she is not welcome at her family's home, suggested that she might benefit from spending some time at [facility]. Kimmy told me that she would not go to [facility], because of her poor past experience with it: \"everyone but me was using drugs there and food was bad\". She asked about discharge date. I suggested to spend more days at this hospital and that we would be in contact with her family working on discharge planning. Informed Kimmy that starting tomorrow she would be seen by a new provider. She said that she understood and had no more questions or concerns.         Medications:      buPROPion  150 mg Oral QAM    mirtazapine  15 mg Oral At Bedtime    pantoprazole  40 mg Oral QAM AC    risperiDONE  1 mg Oral BID    traZODone   mg Oral At Bedtime          Allergies:     Allergies   Allergen Reactions    Dust Mites Other (See Comments)     congestion    Pork-Derived Products Other (See Comments)     Pt does not ingest of these products.           Labs:   No results found for this or any previous visit (from the past 24 hour(s)).       Psychiatric Examination:     /79   Pulse 93   Temp 97  F (36.1  C) (Temporal)   Resp 17   Wt 54.4 kg (119 lb 14.4 oz)   SpO2 99%   BMI 20.72 kg/m    Weight is 119 lbs 14.4 oz  Body mass index is 20.72 kg/m .    Orthostatic Vitals         Most Recent      Sitting Orthostatic /80 12/12 1430    Sitting Orthostatic Pulse (bpm) 94 12/12 1430    Standing Orthostatic /71 12/12 1430    Standing Orthostatic Pulse (bpm) 122 12/12 1430           Appearance: awake, alert, dressed in hospital scrubs, and appeared as age stated  Attitude:  cooperative  Eye Contact: less intense  Mood: less? anxious and sad   Affect:  constricted mobility and guarded  Speech:  decreased prosody and paucity of speech, somewhat more verbal today  Psychomotor Behavior: no evidence of tardive dyskinesia, dystonia, or tics and physical " retardation  Throught Process:  linear  Associations:  no loose associations  Thought Content:   denies presence of active suicidal, Homicidal or psychotic symptoms.  Insight:  partial  Judgement:  limited  Oriented to:  time, person, and place  Attention Span and Concentration:  limited  Recent and Remote Memory:  limited    Clinical Global Impressions  First: 7/4 12/11/2023      Most recent:            Precautions:     Behavioral Orders   Procedures    Code 1 - Restrict to Unit    Routine Programming     As clinically indicated    Status 15     Every 15 minutes.    Suicide precautions     Patients on Suicide Precautions should have a Combination Diet ordered that includes a Diet selection(s) AND a Behavioral Tray selection for Safe Tray - with utensils, or Safe Tray - NO utensils            DIagnoses:     Bipolar Disorder Type 1 current episode depressed, severe.         Plan:     Was started on Risperdal, Remeron and Wellbutrin XL on 12/11/23. No medication changes today 12/12/2023. Will continue to provide support and structure. Will talk to family to get more information. Patient reports she has psychiatric prescriber and a therapist, though, she lately has not talked to therapist. Plans to return to her sister's place after discharge. This might not happen as family doesn't want her there. Patient doesn't know about that.    Total time spent was 36 minutes. Over 50% of times was spent counseling and coordination of care regarding coping skills, medication and discharge planning.

## 2023-12-13 PROCEDURE — 250N000013 HC RX MED GY IP 250 OP 250 PS 637: Performed by: PSYCHIATRY & NEUROLOGY

## 2023-12-13 PROCEDURE — 124N000002 HC R&B MH UMMC

## 2023-12-13 PROCEDURE — 99232 SBSQ HOSP IP/OBS MODERATE 35: CPT | Performed by: NURSE PRACTITIONER

## 2023-12-13 PROCEDURE — 250N000013 HC RX MED GY IP 250 OP 250 PS 637: Performed by: NURSE PRACTITIONER

## 2023-12-13 RX ORDER — TRAZODONE HYDROCHLORIDE 100 MG/1
100 TABLET ORAL AT BEDTIME
Status: DISCONTINUED | OUTPATIENT
Start: 2023-12-13 | End: 2023-12-18

## 2023-12-13 RX ADMIN — RISPERIDONE 1 MG: 1 TABLET ORAL at 08:49

## 2023-12-13 RX ADMIN — TRAZODONE HYDROCHLORIDE 100 MG: 100 TABLET ORAL at 20:04

## 2023-12-13 RX ADMIN — MIRTAZAPINE 15 MG: 15 TABLET, FILM COATED ORAL at 20:04

## 2023-12-13 RX ADMIN — PANTOPRAZOLE SODIUM 40 MG: 40 TABLET, DELAYED RELEASE ORAL at 08:49

## 2023-12-13 RX ADMIN — BUPROPION HYDROCHLORIDE 150 MG: 150 TABLET, FILM COATED, EXTENDED RELEASE ORAL at 08:48

## 2023-12-13 RX ADMIN — RISPERIDONE 1 MG: 1 TABLET ORAL at 20:04

## 2023-12-13 ASSESSMENT — ACTIVITIES OF DAILY LIVING (ADL)
ADLS_ACUITY_SCORE: 28
DRESS: INDEPENDENT
LAUNDRY: UNABLE TO COMPLETE
ADLS_ACUITY_SCORE: 28
ADLS_ACUITY_SCORE: 28
ORAL_HYGIENE: INDEPENDENT
ADLS_ACUITY_SCORE: 28
HYGIENE/GROOMING: INDEPENDENT
DRESS: INDEPENDENT
HYGIENE/GROOMING: INDEPENDENT
ADLS_ACUITY_SCORE: 28
ORAL_HYGIENE: INDEPENDENT
ADLS_ACUITY_SCORE: 28
ADLS_ACUITY_SCORE: 28
LAUNDRY: UNABLE TO COMPLETE
ADLS_ACUITY_SCORE: 28

## 2023-12-13 NOTE — PLAN OF CARE
Problem: Adult Behavioral Health Plan of Care  Goal: Optimized Coping Skills in Response to Life Stressors  Outcome: Progressing     Problem: Suicide Risk  Goal: Absence of Self-Harm  Outcome: Progressing     Patient has been visible on the unit at meals, walking in the hallway and interacting with select peers. Patient presents with a flat, depressed affect. Speech is soft/quiet. She is calm and cooperative on approach. Eye contact appropriate. Patient endorses depressed mood. Denies feeling anxious at this time. States that energy level is low today. Patient denies SI/SIB/HI/AH/VH at this time and contracts for safety. Patient is medication compliant. Reports that she slept better last night with sleep medications. No reports of medication side effects or medical concerns at this time.     /79   Pulse 93   Temp 97  F (36.1  C) (Temporal)   Resp 17   Wt 54.4 kg (119 lb 14.4 oz)   SpO2 99%   BMI 20.72 kg/m

## 2023-12-13 NOTE — PLAN OF CARE
Problem: Depressive Signs/Symptoms  Goal: Optimized Energy Level (Depressive Signs/Symptoms)  Intervention: Optimize Energy Level  Recent Flowsheet Documentation  Taken 12/13/2023 0900 by Dianne Banks RN  Activity (Behavioral Health): up ad lucas   Goal Outcome Evaluation:    Plan of Care Reviewed With: patient               Presentation  Patient is observed in the lounge, dressed in scrubs.  Patient has a flat and blunted affect.  Speech is soft, quiet, and poverty of content.  Mood is sad.  Thoughts are distracted.  Patient looks at writer, however, appears to have a far away look.  Patient pace is slow.  Patient remains in the milieu for a majority of the shift.  Patient is observed walking with a peer.  Patient sit at the dining room table with peers.  Patient is not observed communicating with peers.  Patient primary language is [place]al[place]. Patient is also able to communicate in english.        Mental health symptoms: Patient denies SI, SIB, HI, anxiety, and hallucinations. Nods her head to answer for depression.  Patient appears depressed.  Patient verbalize she will notify staff if she starts to have thoughts of SI or SIB.  Patient continues on Status 15 for safety monitoring.        Medication compliance: Patient is compliant with all medications.       Medical Concerns: Patient denies any pain or medical concerns.  Blood pressure 127/86, pulse 78, temperature 97.6  F (36.4  C), temperature source Temporal, resp. rate 17, weight 54.4 kg (119 lb 14.4 oz), SpO2 98%, not currently breastfeeding.        PRN's: None      Precautions: Suicidal    Continue with plan of care

## 2023-12-13 NOTE — PLAN OF CARE
"Team Note Due:  Monday    Assessment/Intervention/Current Symtoms and Care Coordination:  Chart review and met with team, discussed pt progress, symptomology, and response to treatment.  Discussed the discharge plan and any potential impediments to discharge.    Tasks Completed:  - [facility] called pt's brother, Tammie. He reports that he told pt she is not able to return to her family's home following hospitalization but that pt \"does not believe it\". CTC shared that this is a barrier to pt's discharge planning as pt is not accepting of other placement options as she believes she is returning home to family. Tammie reported he will talk to Arabella to encourage her to notify pt. [facility] will attempt to meet with pt and Tammie over the phone tomorrow to discuss next steps.  - [facility] met with pt to discuss discharge plans. Pt shared that Tammie shared with her that she can't go home. Pt reported that she does not want to go to [facility] because the last [facility] she was in was terrible. CTC shared that there are other programs and discussed options that may be closer to home (i.e. [place] and [place]). Pt reported \"I don't have a choice\" and shared that she would be more open to [facility]. Pt appeared less paranoid during conversation with CTC today. However, pt affect remains flat, pt reports low energy and endorses depression. Pt acknowledged during her previous admission she had a lot of energy and now she feels like sleeping all day \"even though I don't want to.\" Pt shared her tongue still hurts following intubation which makes it hard to talk. Pt also shared that she spoke to her  yesterday and openly shared that she has been working with her  on accessing social security and CADI waiver.     Discharge Plan or Goal:  Current plan is to stabilize symptoms and develop safe disposition plan. Following hospitalization, plan is for [facility] placement.     Barriers to Discharge:  Pt continues to " stabilize - pt presents with flat and blunted affect and continues to endorse depressive symptoms. Pt currently denies SI/SIB.     Referral Status:  Once pt stabilizes further, appropriate referrals will be assessed.     Legal Status:  Pt is voluntary    Contacts:    Medication Management/Psychiatry:   Aston Alejandra MD with [facility]  [address] [place] [address]  Phone [phone] Fax [phone]    Family Contacts:   Name/Relationship: Tammie Mendez  Brother (DORIAN on file)  Phone: [phone]    Name/Relationship: Arabella  Cousin (DORIAN on file)  Phone: [phone]    Name/Relationship: Verena  Niece (No DORIAN on file)  Phone: [phone]     Upcoming Meetings and Dates/Important Information and next steps:  None

## 2023-12-13 NOTE — PLAN OF CARE
12/13/23 1920     Group Psychotherapy Session   Group Attendance attended group session   Time Session Began 1300   Time Session Ended 1353   Total Time (minutes) 53   Total # Attendees 6-7   Group Type Psychotherapy   Group Session Detail This group psychotherapy session focused on identifying sources of stress, decreasing them, and enhancing communication for effective and sustainable treatment outcomes. Group started with patients introducing themselves and identifying their goals for the group. Each identified a particular stressor the individual would like to address, that is impacting their clinical outcomes. Each patient received feedback based on the nature of the stressor. Identified stressors included financial challenges, illnesses, being unsure of what will happen after treatment - (commitment, etc.). Facilitator applied a cognitive behavioral approach in leading patients to brainstorm on how to apply an ABC model in seeking to understand and address their stressors - A - being the current conceptualization of the challenge (how bad it is), B being what is being done about it and what could be changed, and C- being how to communicate their worries to their medical doctors and clinical staff, and utilize feedback for sustainable positive outcomes. Used illustrations on the board to guide patients in this brainstorming activity.   Patient Participation Detail  Pt attended and stayed. Introduced self but did not provide much verbal feedback. Received feedback from group members.    Emile Pardo, Ph.D., [facility]

## 2023-12-13 NOTE — PLAN OF CARE
"Goal Outcome Evaluation:    Patient presents with a flat affect. Reports mood as \"Okay.\" Is calm and cooperative. Thought content presents as poverty of content. Thought process presents as thought blocking. Speech presents as paucity, soft, quiet, and clear. Eye contact is fair. Patient endorses depression, but did not rate. Rates anxiety at a #5/10. Refused offered PRN medication for anxiety. Patient denies suicidal ideation, SIB, homicidal ideation, and auditory/visual hallucinations. Deedee for safety here in the hospital. No medical issues noted. Vital signs stable. Medication compliant. Ate evening meal. Patient was out in the milieu this shift walking the hallway with a peer and watching T.V. Social with select peer.     /80   Pulse 61   Temp 98.2  F (36.8  C) (Temporal)   Resp 17   Wt 54.4 kg (119 lb 14.4 oz)   SpO2 100%   BMI 20.72 kg/m                     "

## 2023-12-13 NOTE — PLAN OF CARE
BEH IP Unit Acuity Rating Score (UARS)  Patient is given one point for every criteria they meet.    CRITERIA SCORING   On a 72 hour hold, court hold, committed, stay of commitment, or revocation 0/1    Patient LOS on BEH unit exceeds 20 days 0/1  LOS: 4   Patient under guardianship, 55+, otherwise medically complex, or under age 11 0/1   Suicide ideation without relief of precipitating factors 1/1   Current plan for suicide 0/1   Current plan for homicide 0/1   Imminent risk or actual attempt to seriously harm another without relief of factors precipitating the attempt 0/1   Severe dysfunction in daily living (ex: complete neglect for self care, extreme disruption in vegetative function, extreme deterioration in social interactions) 1/1   Recent (last 7 days) or current physical aggression in the ED or on unit 0/1   Restraints or seclusion episode in past 72 hours 0/1   Recent (last 7 days) or current verbal aggression, agitation, yelling, etc., while in the ED or unit 0/1   Active psychosis 0/1   Need for constant or near constant redirection (from leaving, from others, etc).  0/1   Intrusive or disruptive behaviors 0/1   TOTAL 2

## 2023-12-13 NOTE — PROGRESS NOTES
"[facility], [place]   Psychiatric Progress Note        Interim History:     The patient's care was discussed with the treatment team during the daily team meeting and staff's chart notes were reviewed.  Pt was documented as sleeping 6.75 hours during the overnight shift.  She spent some time in the milieu but did not attend groups.  Staff report she appeared guarded, paranoid and sad.  Today, she reports that her mood is \"okay.\"  She rates depression 5/10.  She denies suicidal ideation.  She denies paranoia.  She denies hallucinations.  .  Energy is low.  She report difficulty concentrating.  She reports difficulty falling asleep.  Discussed increasing Trazodone to 100 mg.  She said she does not know her diagnosis.  Provider gave her education on bipolar disorder.  She appeared to have difficulty processing this.  She denies side effects from meds.  Stated that she lives with her sister and does not want to go to an [facility].  Family has not yet informed her that she cannot live with them.           Medications:       buPROPion  150 mg Oral QAM     mirtazapine  15 mg Oral At Bedtime     pantoprazole  40 mg Oral QAM AC     risperiDONE  1 mg Oral BID     traZODone  100 mg Oral At Bedtime          Allergies:     Allergies   Allergen Reactions     Dust Mites Other (See Comments)     congestion     Pork-Derived Products Other (See Comments)     Pt does not ingest of these products.           Labs:   No results found for this or any previous visit (from the past 24 hour(s)).       Psychiatric Examination:     /86   Pulse 78   Temp 97.6  F (36.4  C) (Temporal)   Resp 17   Wt 54.4 kg (119 lb 14.4 oz)   SpO2 98%   BMI 20.72 kg/m    Weight is 119 lbs 14.4 oz  Body mass index is 20.72 kg/m .    Orthostatic Vitals         Most Recent      Sitting Orthostatic /80 12/12 1430    Sitting Orthostatic Pulse (bpm) 94 12/12 1430    Standing Orthostatic /71 12/12 1430    Standing " "Orthostatic Pulse (bpm) 122 12/12 1430           Appearance: awake, alert, dressed in hospital scrubs, and appeared as age stated  Attitude:  cooperative  Eye Contact: good  Mood: \"okay,\" moderately depressed  Affect:  constricted mobility and guarded  Speech:  decreased prosody and paucity of speech   Psychomotor Behavior: no evidence of tardive dyskinesia, dystonia, or tics and physical retardation  Throught Process:  linear  Associations:  no loose associations  Thought Content:   denies suicidal and homicidal ideation, denies psychotic symptoms  Insight:  partial  Judgement:  limited  Oriented to:  time, person, and place  Attention Span and Concentration:  limited  Recent and Remote Memory:  limited           Precautions:     Behavioral Orders   Procedures     Code 1 - Restrict to Unit     Routine Programming     As clinically indicated     Status 15     Every 15 minutes.     Suicide precautions     Patients on Suicide Precautions should have a Combination Diet ordered that includes a Diet selection(s) AND a Behavioral Tray selection for Safe Tray - with utensils, or Safe Tray - NO utensils            DIagnoses:     Bipolar disorder type 1 current episode depressed, severe         Plan:     She was admitted following a suicide attempt by overdose on Zyprexa. She was started on Risperdal, Remeron and Wellbutrin XL on 12/11/23. Increase Trazodone to 100 mg.  Will continue to provide support and structure.  Patient reports she has psychiatric prescriber and a therapist in the community.  She wants to return to her sister's home; family has informed the treatment team that she may not return, though they have not yet informed her.  Likely plan for IRTS placement when stable.        The patient was seen and evaluated by me, Leta Amanda, APRN, CNP  Total time > 35 minutes      "

## 2023-12-13 NOTE — PLAN OF CARE
Patient in bed at the start of the shift and  appeared to be comfortably asleep and breathing with ease throughout the shift. Patient woke only to use the restroom. Patient slept for 6.75 hours of the over night shift with no s/s of acute distress. Patient experienced no safety events or escalations during the shift, no concerns reported or observed. Safety checks completed at least every 15 minutes. Nursing will continue to monitor.     Patient required no PRN medications, see MAR.

## 2023-12-14 PROCEDURE — 250N000013 HC RX MED GY IP 250 OP 250 PS 637: Performed by: PSYCHIATRY & NEUROLOGY

## 2023-12-14 PROCEDURE — 99232 SBSQ HOSP IP/OBS MODERATE 35: CPT | Performed by: NURSE PRACTITIONER

## 2023-12-14 PROCEDURE — 124N000002 HC R&B MH UMMC

## 2023-12-14 PROCEDURE — G0177 OPPS/PHP; TRAIN & EDUC SERV: HCPCS

## 2023-12-14 PROCEDURE — 250N000013 HC RX MED GY IP 250 OP 250 PS 637: Performed by: NURSE PRACTITIONER

## 2023-12-14 RX ADMIN — TRAZODONE HYDROCHLORIDE 100 MG: 100 TABLET ORAL at 19:58

## 2023-12-14 RX ADMIN — MIRTAZAPINE 15 MG: 15 TABLET, FILM COATED ORAL at 20:01

## 2023-12-14 RX ADMIN — RISPERIDONE 1 MG: 1 TABLET ORAL at 19:58

## 2023-12-14 RX ADMIN — PANTOPRAZOLE SODIUM 40 MG: 40 TABLET, DELAYED RELEASE ORAL at 08:36

## 2023-12-14 RX ADMIN — RISPERIDONE 1 MG: 1 TABLET ORAL at 08:36

## 2023-12-14 RX ADMIN — BUPROPION HYDROCHLORIDE 150 MG: 150 TABLET, FILM COATED, EXTENDED RELEASE ORAL at 08:36

## 2023-12-14 ASSESSMENT — ACTIVITIES OF DAILY LIVING (ADL)
ADLS_ACUITY_SCORE: 28
HYGIENE/GROOMING: INDEPENDENT
ADLS_ACUITY_SCORE: 28
ADLS_ACUITY_SCORE: 28
DRESS: INDEPENDENT
ADLS_ACUITY_SCORE: 28
ORAL_HYGIENE: INDEPENDENT
LAUNDRY: WITH SUPERVISION
ADLS_ACUITY_SCORE: 28

## 2023-12-14 NOTE — PLAN OF CARE
"  Problem: Depressive Signs/Symptoms  Goal: Optimized Energy Level (Depressive Signs/Symptoms)  Intervention: Optimize Energy Level  Recent Flowsheet Documentation  Taken 12/14/2023 1000 by Dianne Banks RN  Activity (Behavioral Health):   up ad lucas   activity encouraged   Goal Outcome Evaluation:    Plan of Care Reviewed With: patient             Presentation: Patient is observed in the lounge dressed in scrubs.  Patient has her hijab to cover her head.  Patient has a flat and blunted affect.  Speech is soft, quiet, and poverty of content.  Mood is sad, calm, low energy, and cooperative. Patient looks at writer during the conversation and assessment.  Patient is observed in the lounge sitting amongst peers, eating meals, playing CHETAN and watching TV.       Mental health symptoms:  Patient denies SI, SIB, HI, and hallucinations.  Soft verbal response to having depression and anxiety. Patient is asked if she is doing better since admission.  Patient answered \"yes.\"  Patient is absent of unsafe behaviors.  Will continue 15 minutes checks.         Medication compliance: Patient is compliant with all medications.  Provider is discussing dose increase with patient.  Medication education information is given to patient.  Patient is encouraged to ask questions.        Medical Concerns: Patient skin is CDI.  Color is wnl.  Breathing is even and unlabored.  Denies pain and SOB.  Patient vitals:  Blood pressure 114/80, pulse 61, temperature 98  F (36.7  C), temperature source Temporal, resp. rate 17, weight 54.7 kg (120 lb 8 oz), SpO2 99%, not currently breastfeeding.      PRN's: None      Precautions: Suicidal    Continue with plan of care          "

## 2023-12-14 NOTE — PLAN OF CARE
BEH IP Unit Acuity Rating Score (UARS)  Patient is given one point for every criteria they meet.    CRITERIA SCORING   On a 72 hour hold, court hold, committed, stay of commitment, or revocation 0/1    Patient LOS on BEH unit exceeds 20 days 0/1  LOS: 5   Patient under guardianship, 55+, otherwise medically complex, or under age 11 0/1   Suicide ideation without relief of precipitating factors 1/1   Current plan for suicide 0/1   Current plan for homicide 0/1   Imminent risk or actual attempt to seriously harm another without relief of factors precipitating the attempt 0/1   Severe dysfunction in daily living (ex: complete neglect for self care, extreme disruption in vegetative function, extreme deterioration in social interactions) 1/1   Recent (last 7 days) or current physical aggression in the ED or on unit 0/1   Restraints or seclusion episode in past 72 hours 0/1   Recent (last 7 days) or current verbal aggression, agitation, yelling, etc., while in the ED or unit 0/1   Active psychosis 0/1   Need for constant or near constant redirection (from leaving, from others, etc).  0/1   Intrusive or disruptive behaviors 0/1   TOTAL 2

## 2023-12-14 NOTE — PLAN OF CARE
"Goal Outcome Evaluation:    Patient presents with a flat/sad affect. Reports mood as \"Okay.\" Is pleasant, calm, and cooperative. Thought content presents as poverty of content. Thought process presents as relevant. Speech presents as paucity, soft, quiet, clear, and coherent. Eye contact is fair. Patient denies depression, anxiety, suicidal ideation, SIB, homicidal ideation, and auditory/visual hallucinations. Deedee for safety here in the hospital. No medical issues noted. Vital signs stable. Patient was medication compliant. Attended group this shift. Ate evening meal. Patient was out in the milieu this shift watching T.V. and movies. No interaction with staff and peers noted.      /69 (BP Location: Left arm, Patient Position: Sitting, Cuff Size: Adult Regular)   Pulse 69   Temp 98  F (36.7  C) (Temporal)   Resp 17   Wt 54.7 kg (120 lb 8 oz)   SpO2 100%   BMI 20.83 kg/m                     "

## 2023-12-14 NOTE — CARE PLAN
12/14/23 1359   Group Therapy Session   Group Attendance attended group session   Time Session Began 1300   Time Session Ended 1345   Total Time (minutes) 45   Total # Attendees 4   Group Type life skill;other (see comments)  (OT)   Group Topic Covered balanced lifestyle;cognitive activities;leisure exploration/use of leisure time;structured socialization   Group Session Detail OT - Topic: Pt actively participated in a structured occupational therapy group with a focus on visuospatial problem solving and social engagement via a group game.    Patient Response/Contribution able to recall/repeat info presented;cooperative with task;discussed personal experience with topic;expressed understanding of topic;offered helpful suggestions to peers   Patient Participation Detail Pt demonstrated understanding of the novel 3-step task after an initial explanation. Pt remained focused and engaged throughout the full duration of group. She also helped others by pointing out the sections they did have that made up the design. Affect was flat, although she did smile when she completed a design each turn.

## 2023-12-14 NOTE — PLAN OF CARE
Night Nursing Note   12/13/23 - 12/14/23        Kimmy was in bed at beginning of shift and appeared asleep.  Monitored q15 mins for safety.  Appeared to sleep majority of night without difficulty.  Continue to monitor, offer support ane reassurance per care plan.    Slept 6.75 hrs.

## 2023-12-14 NOTE — PLAN OF CARE
Team Note Due:  Monday    Assessment/Intervention/Current Symtoms and Care Coordination:  Chart review and met with team, discussed pt progress, symptomology, and response to treatment.  Discussed the discharge plan and any potential impediments to discharge.    Tasks Completed:  - Called and spoke to pt's brother, Tammie. Shared that pt is more accepting of IRTS placement following her conversation with him. Tammie again asked why pt cannot stay in the hospital long-term. CTC explained the purpose of inpatient hospitalization and typical length of stay. CTC explained that it is unrealistic to expect pt to be here for a month or longer. Tammie had questions regarding what will happen following IRTS. CTC shared that pt reports that her CM is working on obtaining a CADI waiver for pt which will help support more long-term care. [facility] will continue to attempt to receive DORIAN for pt's  to confirm this. Tammie requested phone call from provider to discuss medication changes. CTC followed-up with provider to share this request.    Discharge Plan or Goal:  Current plan is to stabilize symptoms and develop safe disposition plan. Following hospitalization, plan is for IRTS placement.     Barriers to Discharge:  Pt continues to stabilize - pt presents with flat and blunted affect and continues to endorse depressive symptoms. Pt currently denies SI/SIB.     Referral Status:  Once pt stabilizes further, appropriate referrals will be assessed.     Legal Status:  Pt is voluntary    Contacts:    Medication Management/Psychiatry:   Aston Alejandra MD with [facility] [id number] [place] [address]  Phone [phone] Fax [phone]    Family Contacts:   Name/Relationship: Tammie Mendez  Brother (DORIAN on file)  Phone: [phone]    Name/Relationship: Arabella  Cousin (DORIAN on file)  Phone: [phone]    Name/Relationship: Verena  Niece (No DORIAN on file)  Phone: [phone]      Upcoming Meetings and Dates/Important Information and next steps:  Meet with patient to get:   General IRTS DORIAN  DORIAN for

## 2023-12-14 NOTE — PROGRESS NOTES
"[facility], [place]   Psychiatric Progress Note        Interim History:     The patient's care was discussed with the treatment team during the daily team meeting and staff's chart notes were reviewed.  Pt was documented as sleeping 6.75 hours during the overnight shift.  She spent some time in the milieu but was not social.  She attended 1 group and participated minimally.  Staff report she has been less paranoid.  Yesterday her brother told her she cannot return home.  Pt stated, \"I don't feel okay (about this decision) but I don't have a choice.\"  Pt states her mood is \"okay, better.\"  She says her sleep and appetite have improved since admission.  She reports anxiety 5/10.  She denies symptoms consistent with jennifer and psychosis.  She denies suicidal ideation.  She believes her concentration is adequate, but when provider attempted to give her education regarding medications, she had difficulty processing.  Declines suggested Wellbutrin XL increase.  Pt denies having GERD.  Reviewed medical records from past several months.  Protonix was not listed as a discharge med from her medical hospitalization, so it was discontinued.      Provider called her brother Tammie ([phone]) and gave an update regarding progress and plan for care.  Discussed she will likely be hospitalized a couple more weeks while medications take effect and IRTS placement is determined.  He will call her and attempt to convince her to increase Wellbutrin XL.         Medications:       buPROPion  150 mg Oral QAM     mirtazapine  15 mg Oral At Bedtime     risperiDONE  1 mg Oral BID     traZODone  100 mg Oral At Bedtime          Allergies:     Allergies   Allergen Reactions     Dust Mites Other (See Comments)     congestion     Pork-Derived Products Other (See Comments)     Pt does not ingest of these products.           Labs:   No results found for this or any previous visit (from the past 24 hour(s)).       " "Psychiatric Examination:     /80   Pulse 61   Temp 98  F (36.7  C) (Temporal)   Resp 17   Wt 54.7 kg (120 lb 8 oz)   SpO2 99%   BMI 20.83 kg/m    Weight is 120 lbs 8 oz  Body mass index is 20.83 kg/m .    Orthostatic Vitals         Most Recent      Sitting Orthostatic /80 12/12 1430    Sitting Orthostatic Pulse (bpm) 94 12/12 1430    Standing Orthostatic /71 12/12 1430    Standing Orthostatic Pulse (bpm) 122 12/12 1430           Appearance: awake, alert, dressed in hospital scrubs, and appeared as age stated  Attitude:  cooperative  Eye Contact: good  Mood: \"okay, better\" moderately depressed & anxious  Affect:  constricted mobility   Speech:  decreased prosody and paucity of speech, some improvement compared to yesterday  Psychomotor Behavior: no evidence of tardive dyskinesia, dystonia, or tics and physical retardation  Throught Process:  linear  Associations:  no loose associations  Thought Content:   denies suicidal and homicidal ideation, denies psychotic symptoms  Insight:  partial  Judgement:  limited  Oriented to:  time, person, and place  Attention Span and Concentration:  limited  Recent and Remote Memory:  limited           Precautions:     Behavioral Orders   Procedures     Code 1 - Restrict to Unit     Routine Programming     As clinically indicated     Status 15     Every 15 minutes.     Suicide precautions     Patients on Suicide Precautions should have a Combination Diet ordered that includes a Diet selection(s) AND a Behavioral Tray selection for Safe Tray - with utensils, or Safe Tray - NO utensils            Diagnoses:     Bipolar disorder type 1 current episode depressed, severe         Plan:     She was admitted following a suicide attempt by overdose on Zyprexa. She was started on Risperdal, Trazodone, Remeron and Wellbutrin XL on 12/11/23.  Will continue to provide support and structure.  Patient reports she has psychiatric prescriber and a therapist in the community.  " She may not return to her sister's home.  Plan for referrals to IRTS facilities near her family's home in [place].        The patient was seen and evaluated by me, EMMANUEL Duncan, CNP  Total time > 35 minutes

## 2023-12-15 PROCEDURE — 124N000002 HC R&B MH UMMC

## 2023-12-15 PROCEDURE — 250N000013 HC RX MED GY IP 250 OP 250 PS 637: Performed by: PSYCHIATRY & NEUROLOGY

## 2023-12-15 PROCEDURE — 99232 SBSQ HOSP IP/OBS MODERATE 35: CPT | Performed by: NURSE PRACTITIONER

## 2023-12-15 PROCEDURE — 90832 PSYTX W PT 30 MINUTES: CPT

## 2023-12-15 PROCEDURE — 250N000013 HC RX MED GY IP 250 OP 250 PS 637: Performed by: NURSE PRACTITIONER

## 2023-12-15 PROCEDURE — H2032 ACTIVITY THERAPY, PER 15 MIN: HCPCS

## 2023-12-15 RX ORDER — BUPROPION HYDROCHLORIDE 150 MG/1
150 TABLET ORAL ONCE
Status: COMPLETED | OUTPATIENT
Start: 2023-12-15 | End: 2023-12-15

## 2023-12-15 RX ORDER — BUPROPION HYDROCHLORIDE 300 MG/1
300 TABLET ORAL EVERY MORNING
Status: DISCONTINUED | OUTPATIENT
Start: 2023-12-16 | End: 2023-12-22 | Stop reason: HOSPADM

## 2023-12-15 RX ADMIN — BUPROPION HYDROCHLORIDE 150 MG: 150 TABLET, FILM COATED, EXTENDED RELEASE ORAL at 08:37

## 2023-12-15 RX ADMIN — TRAZODONE HYDROCHLORIDE 100 MG: 100 TABLET ORAL at 19:52

## 2023-12-15 RX ADMIN — BUPROPION HYDROCHLORIDE 150 MG: 150 TABLET, FILM COATED, EXTENDED RELEASE ORAL at 09:59

## 2023-12-15 RX ADMIN — RISPERIDONE 1 MG: 1 TABLET ORAL at 08:37

## 2023-12-15 RX ADMIN — MIRTAZAPINE 15 MG: 15 TABLET, FILM COATED ORAL at 19:52

## 2023-12-15 RX ADMIN — RISPERIDONE 1 MG: 1 TABLET ORAL at 19:52

## 2023-12-15 ASSESSMENT — ACTIVITIES OF DAILY LIVING (ADL)
ADLS_ACUITY_SCORE: 31
ADLS_ACUITY_SCORE: 28
DRESS: INDEPENDENT
ORAL_HYGIENE: INDEPENDENT
DRESS: INDEPENDENT
LAUNDRY: WITH SUPERVISION
ADLS_ACUITY_SCORE: 28
ADLS_ACUITY_SCORE: 28
LAUNDRY: WITH SUPERVISION
HYGIENE/GROOMING: INDEPENDENT
ORAL_HYGIENE: INDEPENDENT
ADLS_ACUITY_SCORE: 31
ADLS_ACUITY_SCORE: 31
ADLS_ACUITY_SCORE: 28
ADLS_ACUITY_SCORE: 31
HYGIENE/GROOMING: INDEPENDENT
ADLS_ACUITY_SCORE: 28
ADLS_ACUITY_SCORE: 28

## 2023-12-15 NOTE — PLAN OF CARE
"Goal Outcome Evaluation:    Patient presents with a flat/sad affect, occasional smiling noted. Reports mood as \"Okay.\" Is pleasant, calm, and cooperative; yet is guarded. Thought content presents as poverty of content. Thought process presents as thought blocking. Speech presents as paucity, soft, quiet, clear, and coherent. Eye contact is fair. Patient denies depression, anxiety, suicidal ideation, SIB, homicidal ideation, and auditory/visual hallucinations. Deedee for safety here in the hospital. No medical issues noted. Vital signs stable. Patient was medication compliant. Attended group this shift. Ate evening meal. Patient was out in the milieu this shift watching T.V. and movies; and walking the hallway with a peer. Social with select peers.    BP 92/64   Pulse 79   Temp 98.6  F (37  C) (Oral)   Resp 16   Wt 54.7 kg (120 lb 8 oz)   SpO2 100%   BMI 20.83 kg/m                       "

## 2023-12-15 NOTE — PLAN OF CARE
Team Note Due:  Monday    Assessment/Intervention/Current Symtoms and Care Coordination:  Chart review and met with team, discussed pt progress, symptomology, and response to treatment.  Discussed the discharge plan and any potential impediments to discharge.    Discharge Plan or Goal:  Current plan is to stabilize symptoms and develop safe disposition plan. Following hospitalization, plan is for IRTS placement.     Barriers to Discharge:  Pt continues to stabilize - pt presents with flat and blunted affect and continues to endorse depressive symptoms. Pt currently denies SI/SIB.     Referral Status:  Once pt stabilizes further, appropriate referrals will be assessed.     Legal Status:  Pt is voluntary    Contacts:    Medication Management/Psychiatry:   Aston Alejandra MD with [facility] [id number] [place] [address]  Phone [phone] Fax [phone]    Family Contacts:   Name/Relationship: Tammie Mendez  Brother (DORIAN on file)  Phone: [phone]    Name/Relationship: Arabella  Cousin (DORIAN on file)  Phone: [phone]    Name/Relationship: Verena  Niece (No DORIAN on file)  Phone: [phone]     Upcoming Meetings and Dates/Important Information and next steps:  Meet with patient to get:   General IRTS DORIAN  DORIAN for

## 2023-12-15 NOTE — PLAN OF CARE
"Individual Therapy Note      Date of Service: December 15, 2023    Patient: Kimmy goes by \"Kimmy,\" uses she/her pronouns    Individuals Present: Janeth Pardo, Ph.D., [facility]    Session start: 1:00 PM  Session end: 1:30 PM  Session duration in minutes: 30    Patient Active Problem List   Diagnosis    Gestational diabetes    Depression affecting pregnancy, antepartum    Poor weight gain of pregnancy, third trimester    Gestational diabetes mellitus, antepartum    Depression with suicidal ideation    Seizure-like activity (H)    Seizure (H)    Recurrent major depression (H24)    ROWAN (generalized anxiety disorder)    Bipolar 1 disorder, manic, moderate (H)    Acute respiratory failure, unspecified whether with hypoxia or hypercapnia (H)    Overdose, intentional self-harm, initial encounter (H)    Depression     Modality Used:CBT, Motivational Interviewing, and Solution Focused    Goals: To begin to value self and feel confident with self    Mental Status Exam:   Attitude: cooperative  Eye Contact: fair  Mood: sad  and depressed  Affect: mood congruent  Speech: clear, coherent  Psychomotor Behavior: no evidence of tardive dyskinesia, dystonia, or tics  Thought Process:  goal oriented  Associations: no loose associations  Thought Content: passive suicidal ideation present  Insight: fair  Judgement: fair  Attention Span and Concentration: intact    Pt progress: Met with pt. She reported that she is feeling sad and depressed. Reported that she will like to be discharged home, but is hearing she might be going to a treatment facility. Writer processed her thoughts and feelings around this. Inquired of her own goals - how she plans to sustain her stabilization, post-treatment. Pt identified some gaps in her support system reporting that she only has her brother who lives in [place], and her sister and Aunt, around. Writer assisted the pt in exploring ways to focus on her present treatment and to keep complying " with treatment recommendations. Regarding her thoughts about low self worth and depressive feelings (not wanting to live, passive suicidal ideation), writer assisted the pt in practicing cognitive strategies to counter self-defeating thoughts.    Treatment Objective(s) Addressed:   The focus of this session was on orienting the patient to therapy, identifying and practicing coping strategies, identifying an appropriate aftercare plan, assessing safety, identifying treatment goals, building self-esteem, and identifying additional supports     Progress Towards Goals and Assessment of Patient:   Patient is making progress towards treatment goals as evidenced by reported commitment to continue with treatment and to comply with treatment recommendations and after-care plan.       Therapeutic Intervention(s):   Provided active listening, unconditional positive regard, and validation. Engaged in cognitive restructuring/ reframing, looked at common cognitive distortions and challenged negative thoughts. Engaged in guided discovery, explored patient's perspectives and helped expand them through socratic dialogue. Coached on coping techniques/relaxation skills to help improve distress tolerance and managing intense emotions. Taught the link between thoughts, feelings, and behaviors. Provided positive reinforcement for progress towards goals, gains in knowledge, and application of skills previously taught.     Plan/next step: Pt expressed interest in meeting again to check on progress with goals including improved sense of self.

## 2023-12-15 NOTE — PROGRESS NOTES
"[facility], [place]   Psychiatric Progress Note        Interim History:     The patient's care was discussed with the treatment team during the daily team meeting and staff's chart notes were reviewed.  Pt was documented as sleeping 7 hours during the overnight shift.  She attended 1 group yesterday and was focused and engaged.   She rates depression 5/10.  She denies feeling anxious.  She denies suicidal ideation.  She said her memory is \"good\" and concentration is \"okay,\" though appears slow to process information.  Her appetite is \"okay.\"  She reports waking frequently at night.  She feels tired during the daytime.  Energy has been low.  She denies symptoms consistent with jennifer and psychosis.  She is \"okay\" with the plan for IRTS placement.  She did agree to increase Wellbutrin XL as provider had suggested yesterday.           Medications:      buPROPion  150 mg Oral QAM    mirtazapine  15 mg Oral At Bedtime    risperiDONE  1 mg Oral BID    traZODone  100 mg Oral At Bedtime          Allergies:     Allergies   Allergen Reactions    Dust Mites Other (See Comments)     congestion    Pork-Derived Products Other (See Comments)     Pt does not ingest of these products.           Labs:   No results found for this or any previous visit (from the past 24 hour(s)).       Psychiatric Examination:     /69 (BP Location: Left arm, Patient Position: Sitting, Cuff Size: Adult Regular)   Pulse 69   Temp 98  F (36.7  C) (Temporal)   Resp 17   Wt 54.7 kg (120 lb 8 oz)   SpO2 100%   BMI 20.83 kg/m    Weight is 120 lbs 8 oz  Body mass index is 20.83 kg/m .    Orthostatic Vitals         Most Recent      Sitting Orthostatic /80 12/12 1430    Sitting Orthostatic Pulse (bpm) 94 12/12 1430    Standing Orthostatic /71 12/12 1430    Standing Orthostatic Pulse (bpm) 122 12/12 1430           Appearance: awake, alert, dressed in hospital scrubs, and appeared as age stated  Attitude:  " "cooperative  Eye Contact: good  Mood: \"okay,\" moderately depressed, denies feeling anxious today  Affect:  constricted mobility   Speech:  decreased prosody and paucity of speech, some improvement compared to yesterday  Psychomotor Behavior: no evidence of tardive dyskinesia, dystonia, or tics and physical retardation  Throught Process:  linear  Associations:  no loose associations  Thought Content:   denies suicidal and homicidal ideation, denies psychotic symptoms  Insight:  partial  Judgement:  limited  Oriented to:  time, person, and place  Attention Span and Concentration:  limited, some improvement  Recent and Remote Memory:  limited           Precautions:     Behavioral Orders   Procedures    Code 1 - Restrict to Unit    Routine Programming     As clinically indicated    Status 15     Every 15 minutes.    Suicide precautions     Patients on Suicide Precautions should have a Combination Diet ordered that includes a Diet selection(s) AND a Behavioral Tray selection for Safe Tray - with utensils, or Safe Tray - NO utensils            Diagnoses:     Bipolar disorder type 1 current episode depressed, severe         Plan:     She was admitted following a suicide attempt by overdose on Zyprexa. She was started on Risperdal, Trazodone, Remeron and Wellbutrin XL on 12/11/23.  Wellbutrin XL was increased 12/15.  Will continue to provide support and structure.  Patient reports she has psychiatric prescriber and a therapist in the community.  She may not return to her sister's home.  Plan for referrals to IRTS facilities near her family's home in [place].        The patient was seen and evaluated by me, Leta Amanda, APRN, CNP  Total time > 35 minutes      "

## 2023-12-15 NOTE — PLAN OF CARE
"  Problem: Suicide Risk  Goal: Absence of Self-Harm  Outcome: Progressing     Problem: Depressive Signs/Symptoms  Goal: Improved Mood Symptoms (Depressive Signs/Symptoms)  Outcome: Progressing  Flowsheets (Taken 12/15/2023 1152)  Mutually Determined Action Steps (Improved Mood Symptoms): engages in physical activity     Pt tachycardic when standing at 109 - pt asymptomatic at this time. Pt denies anxiety and depression. She denies SI/SIB/HI/AH/VH. Pt contracts for safety. A/O. Pt reports she did not sleep well last night and that she had difficulty staying asleep - writer updated provider of this during team meeting. Pt denied pain this shift.     Pt presents with a flat affect. Pt observed to be calm and cooperative. Pt observed to be sad. Pt's eye contact observed to be appropriate. Pt's speech observed to be soft/quiet but clear and coherent. Pt visible on unit attending lunch, watching television, and walking the hallway. Pt visible intermittently sitting and laying in bed. Pt visible intermittently sitting in the lounge and falling asleep. Pt reports she feels \"tired\" today.    Pt's scheduled wellbutrin increased today - pt received increased dose. Pt denies any questions regarding medication at this time. Pt continues to have a no roommate order. She is medication compliant. She denies any medication SE at this time. She denies any additional questions or concerns at this time. She remains on suicide precautions - no suicidal behaviors noted this shift. Will continue to monitor and assist as needed.     /82 (BP Location: Left arm, Patient Position: Sitting)   Pulse 85   Temp 98.5  F (36.9  C) (Temporal)   Resp 16   Wt 54.7 kg (120 lb 8 oz)   SpO2 99%   BMI 20.83 kg/m    "

## 2023-12-15 NOTE — PLAN OF CARE
Goal Outcome Evaluation:       Pt appeared to be a sleep @ all safety checks.  Pt slept 7 hours.

## 2023-12-15 NOTE — PLAN OF CARE
BEH IP Unit Acuity Rating Score (UARS)  Patient is given one point for every criteria they meet.    CRITERIA SCORING   On a 72 hour hold, court hold, committed, stay of commitment, or revocation 0/1    Patient LOS on BEH unit exceeds 20 days 0/1  LOS: 6   Patient under guardianship, 55+, otherwise medically complex, or under age 11 0/1   Suicide ideation without relief of precipitating factors 1/1   Current plan for suicide 0/1   Current plan for homicide 0/1   Imminent risk or actual attempt to seriously harm another without relief of factors precipitating the attempt 0/1   Severe dysfunction in daily living (ex: complete neglect for self care, extreme disruption in vegetative function, extreme deterioration in social interactions) 1/1   Recent (last 7 days) or current physical aggression in the ED or on unit 0/1   Restraints or seclusion episode in past 72 hours 0/1   Recent (last 7 days) or current verbal aggression, agitation, yelling, etc., while in the ED or unit 0/1   Active psychosis 0/1   Need for constant or near constant redirection (from leaving, from others, etc).  0/1   Intrusive or disruptive behaviors 0/1   TOTAL 2

## 2023-12-16 PROCEDURE — 124N000002 HC R&B MH UMMC

## 2023-12-16 PROCEDURE — 250N000013 HC RX MED GY IP 250 OP 250 PS 637: Performed by: NURSE PRACTITIONER

## 2023-12-16 PROCEDURE — 250N000013 HC RX MED GY IP 250 OP 250 PS 637: Performed by: PSYCHIATRY & NEUROLOGY

## 2023-12-16 RX ADMIN — MIRTAZAPINE 15 MG: 15 TABLET, FILM COATED ORAL at 19:50

## 2023-12-16 RX ADMIN — RISPERIDONE 1 MG: 1 TABLET ORAL at 19:50

## 2023-12-16 RX ADMIN — BUPROPION HYDROCHLORIDE 300 MG: 300 TABLET, FILM COATED, EXTENDED RELEASE ORAL at 09:13

## 2023-12-16 RX ADMIN — RISPERIDONE 1 MG: 1 TABLET ORAL at 09:13

## 2023-12-16 RX ADMIN — TRAZODONE HYDROCHLORIDE 100 MG: 100 TABLET ORAL at 19:50

## 2023-12-16 ASSESSMENT — ACTIVITIES OF DAILY LIVING (ADL)
ADLS_ACUITY_SCORE: 31
ORAL_HYGIENE: INDEPENDENT
ADLS_ACUITY_SCORE: 31
ADLS_ACUITY_SCORE: 31
HYGIENE/GROOMING: INDEPENDENT
ADLS_ACUITY_SCORE: 31
LAUNDRY: WITH SUPERVISION
DRESS: INDEPENDENT
ADLS_ACUITY_SCORE: 31
ADLS_ACUITY_SCORE: 31
HYGIENE/GROOMING: INDEPENDENT
LAUNDRY: WITH SUPERVISION
ORAL_HYGIENE: INDEPENDENT
ADLS_ACUITY_SCORE: 31
DRESS: INDEPENDENT
ADLS_ACUITY_SCORE: 31

## 2023-12-16 NOTE — PLAN OF CARE
"  Problem: Adult Behavioral Health Plan of Care  Goal: Absence of New-Onset Illness or Injury  Intervention: Identify and Manage Fall Risk  Recent Flowsheet Documentation  Taken 12/16/2023 2435 by Casalenda, Gina R, RN  Safety Measures: safety rounds completed     Problem: Suicide Risk  Goal: Absence of Self-Harm  Outcome: Progressing     Pt denies anxiety and depression. Pt denies SI/SIB/HI/AH/VH. Pt contracts for safety. Pt denies pain. A/O. BP 97/71 sitting - pt denies any dizziness or lightheadedness. Writer encouraged pt to drink fluids. BP recheck 125/77 sitting. Pt reports feeling \"tired.\" Pt reports she had difficulty last night falling asleep and staying asleep.    Pt presents with a flat affect. Pt observed to smile on occasion. Pt observed to be calm and cooperative. Pt observed to be pleasant and polite. Pt reports feeling \"sad.\" Pt visible on unit attending meals, pacing the hallway, and watching television. Pt visible on occasion sleeping in a chair in the lounge. Pt visible intermittently resting in her room. Pt social with select peers.     Pt is medication compliant. She denies any medication SE at this time. She denies any additional questions or concerns at this time. Pt continues to have a no roommate order. Pt remains on suicide precautions - no suicidal behaviors noted this shift. Will continue to monitor and assist as needed.     /77 (BP Location: Left arm, Patient Position: Sitting)   Pulse 83   Temp 97.4  F (36.3  C) (Temporal)   Resp 16   Wt 54.7 kg (120 lb 8 oz)   SpO2 100%   BMI 20.83 kg/m      "

## 2023-12-16 NOTE — PLAN OF CARE
Problem: Sleep Disturbance  Goal: Adequate Sleep/Rest  Outcome: Progressing   Goal Outcome Evaluation:  Patient was observed lying in bed with her eyes closed during 15 minutes safety checks.Non-labored breathing with even chest movements was observed.  Patient slept  7 hours.

## 2023-12-16 NOTE — CARE PLAN
12/15/23 1800   Group Therapy Session   Group Attendance attended group session   Time Session Began 1700   Time Session Ended 1745   Total Time (minutes) 45   Total # Attendees 7   Group Type expressive therapy   Group Topic Covered emotions/expression   Patient Response/Contribution cooperative with task       Art Therapy directive was to create group collaborative drawings by contributing art to each drawing using a variety of drawing media.  Goals of directive: to assess how individual functions within a group dynamic, social interest, emotional expression, emotional regulation, media exploration.   Pt was a quiet, engaged participant, focused on task for the full duration of group. Pt was withdrawn from peers, flat/blunted affect.  Pts mood was calm, further assessment is needed.

## 2023-12-16 NOTE — PLAN OF CARE
"Goal Outcome Evaluation:    Patient presents with a flat/sad affect, occasional smiling noted. Reports mood as \"I'm okay.\" Is pleasant, calm, and cooperative; yet is guarded. Thought content presents as poverty of content. Thought process presents as thought blocking. Speech presents as paucity, soft, quiet, clear, and coherent. Eye contact is fair. Patient denies depression, anxiety, suicidal ideation, SIB, homicidal ideation, and auditory/visual hallucinations. Deedee for safety here in the hospital. No medical issues noted. Vital signs stable. Patient was medication compliant. Ate evening meal. Patient was out in the milieu this shift watching T.V. and movies; and walking the hallway with peer. Social with select peers.     BP 94/69   Pulse 88   Temp 97.3  F (36.3  C) (Temporal)   Resp 16   Wt 54.7 kg (120 lb 8 oz)   SpO2 100%   BMI 20.83 kg/m                     "

## 2023-12-17 PROCEDURE — 124N000002 HC R&B MH UMMC

## 2023-12-17 PROCEDURE — 250N000013 HC RX MED GY IP 250 OP 250 PS 637: Performed by: NURSE PRACTITIONER

## 2023-12-17 PROCEDURE — 250N000013 HC RX MED GY IP 250 OP 250 PS 637: Performed by: PSYCHIATRY & NEUROLOGY

## 2023-12-17 RX ADMIN — TRAZODONE HYDROCHLORIDE 100 MG: 100 TABLET ORAL at 20:03

## 2023-12-17 RX ADMIN — RISPERIDONE 1 MG: 1 TABLET ORAL at 09:07

## 2023-12-17 RX ADMIN — RISPERIDONE 1 MG: 1 TABLET ORAL at 20:03

## 2023-12-17 RX ADMIN — MIRTAZAPINE 15 MG: 15 TABLET, FILM COATED ORAL at 20:03

## 2023-12-17 RX ADMIN — BUPROPION HYDROCHLORIDE 300 MG: 300 TABLET, FILM COATED, EXTENDED RELEASE ORAL at 09:07

## 2023-12-17 ASSESSMENT — ACTIVITIES OF DAILY LIVING (ADL)
DRESS: INDEPENDENT
ADLS_ACUITY_SCORE: 31
ADLS_ACUITY_SCORE: 31
ORAL_HYGIENE: INDEPENDENT
HYGIENE/GROOMING: INDEPENDENT
ADLS_ACUITY_SCORE: 31
LAUNDRY: WITH SUPERVISION
ADLS_ACUITY_SCORE: 31
DRESS: INDEPENDENT
ADLS_ACUITY_SCORE: 31
ORAL_HYGIENE: INDEPENDENT
HYGIENE/GROOMING: INDEPENDENT
ADLS_ACUITY_SCORE: 31
LAUNDRY: WITH SUPERVISION
ADLS_ACUITY_SCORE: 31

## 2023-12-17 NOTE — PLAN OF CARE
Patient has spent majority of the shift in the milieu. Affect is flat. Stated that she did not sleep well last night.  Denies suicidal ideation and self injurious thoughts. Denies anxiety and depression. Denies auditory and visual hallucinations. Ate breakfast and lunch.  Med compliant. Cooperative on the unit.    Patient evaluation continues. Assessed mood,anxiety,thoughts and behavior.     Patient gradually progressing towards goals.    Patient is encouraged to participate in groups and assisted to develop healthy coping skills.     VS reviewed: BP 94/67 (BP Location: Left arm)   Pulse 83   Temp 98.3  F (36.8  C) (Temporal)   Resp 16   Wt 55.5 kg (122 lb 6.4 oz)   SpO2 99%   BMI 21.16 kg/m      Length of stay: 8    Refer to daily team meeting notes for individualized plan of care. Nursing will continue to assess.

## 2023-12-17 NOTE — PLAN OF CARE
"Goal Outcome Evaluation:    Patient presents with a flat/sad affect, occasional smiling noted. Reports mood as \"I'm okay.\" Is pleasant, calm, and cooperative; yet is guarded. Thought content presents as poverty of content. Thought process presents as thought blocking. Speech presents as paucity, soft, quiet, clear, and coherent. Eye contact is good. Patient denies depression, anxiety, suicidal ideation, SIB, homicidal ideation, and auditory/visual hallucinations. Deedee for safety here in the hospital. No medical issues noted. Vital signs stable. Patient was medication compliant. Ate evening meal. Patient was out in the milieu this shift watching movies; and walking the hallway with peer. Social with select peers.      /73   Pulse 82   Temp 98.9  F (37.2  C) (Temporal)   Resp 16   Wt 55.5 kg (122 lb 6.4 oz)   SpO2 100%   BMI 21.16 kg/m                     "

## 2023-12-18 PROCEDURE — 99232 SBSQ HOSP IP/OBS MODERATE 35: CPT | Performed by: NURSE PRACTITIONER

## 2023-12-18 PROCEDURE — 250N000013 HC RX MED GY IP 250 OP 250 PS 637: Performed by: NURSE PRACTITIONER

## 2023-12-18 PROCEDURE — 250N000013 HC RX MED GY IP 250 OP 250 PS 637: Performed by: PSYCHIATRY & NEUROLOGY

## 2023-12-18 PROCEDURE — 124N000002 HC R&B MH UMMC

## 2023-12-18 PROCEDURE — 90853 GROUP PSYCHOTHERAPY: CPT

## 2023-12-18 RX ADMIN — Medication 150 MG: at 20:04

## 2023-12-18 RX ADMIN — RISPERIDONE 1 MG: 1 TABLET ORAL at 20:04

## 2023-12-18 RX ADMIN — RISPERIDONE 1 MG: 1 TABLET ORAL at 09:19

## 2023-12-18 RX ADMIN — BUPROPION HYDROCHLORIDE 300 MG: 300 TABLET, FILM COATED, EXTENDED RELEASE ORAL at 09:19

## 2023-12-18 RX ADMIN — MIRTAZAPINE 15 MG: 15 TABLET, FILM COATED ORAL at 20:04

## 2023-12-18 ASSESSMENT — ACTIVITIES OF DAILY LIVING (ADL)
ADLS_ACUITY_SCORE: 31
LAUNDRY: WITH SUPERVISION
ADLS_ACUITY_SCORE: 31
ORAL_HYGIENE: INDEPENDENT
ADLS_ACUITY_SCORE: 31
ADLS_ACUITY_SCORE: 31
DRESS: INDEPENDENT
ADLS_ACUITY_SCORE: 31
HYGIENE/GROOMING: INDEPENDENT
ADLS_ACUITY_SCORE: 31

## 2023-12-18 NOTE — PLAN OF CARE
Problem: Depressive Signs/Symptoms  Goal: Improved Mood Symptoms (Depressive Signs/Symptoms)  Outcome: Progressing    Patient has spent the evening sitting quietly in the lounge, watching TV or walking in the angelo. Minimal interactions noted. She is calm and cooperative and brightens some on approach. Affect is otherwise flat. Patient rated depression 5/10, and reports this is an improvement. She denies feeling anxious. No auditory or visual hallucinations. Patient denies SI/SIB/HI and contracts for safety.  No reports of pain or discomfort. Appetite is good. Patient is medication compliant. Reports that she has been having some difficulty staying asleep at night. Trazodone dose was increased today and given at bedtime.     /74   Pulse 84   Temp 98.6  F (37  C) (Temporal)   Resp 16   Wt 55.5 kg (122 lb 6.4 oz)   SpO2 100%   BMI 21.16 kg/m

## 2023-12-18 NOTE — PLAN OF CARE
BEH IP Unit Acuity Rating Score (UARS)  Patient is given one point for every criteria they meet.    CRITERIA SCORING   On a 72 hour hold, court hold, committed, stay of commitment, or revocation 0/1    Patient LOS on BEH unit exceeds 20 days 0/1  LOS: 9   Patient under guardianship, 55+, otherwise medically complex, or under age 11 0/1   Suicide ideation without relief of precipitating factors 1/1   Current plan for suicide 0/1   Current plan for homicide 0/1   Imminent risk or actual attempt to seriously harm another without relief of factors precipitating the attempt 0/1   Severe dysfunction in daily living (ex: complete neglect for self care, extreme disruption in vegetative function, extreme deterioration in social interactions) 1/1   Recent (last 7 days) or current physical aggression in the ED or on unit 0/1   Restraints or seclusion episode in past 72 hours 0/1   Recent (last 7 days) or current verbal aggression, agitation, yelling, etc., while in the ED or unit 0/1   Active psychosis 0/1   Need for constant or near constant redirection (from leaving, from others, etc).  0/1   Intrusive or disruptive behaviors 0/1   TOTAL 2

## 2023-12-18 NOTE — PLAN OF CARE
Goal Outcome Evaluation:       Pt a wake x1 briefly otherwise appeared to be a sleep at all other safety checks.  Pt slept 6.75 hours.

## 2023-12-18 NOTE — PROVIDER NOTIFICATION
12/18/23 1136   Individualization/Patient Specific Goals   Patient Personal Strengths expressive of needs;family/social support;resilient   Patient Vulnerabilities lacks insight into illness;traumatic event   Anxieties, Fears or Concerns Pt continues to present with slow responses and flat affect.   Interprofessional Rounds   Summary Pt went to some groups over the weekend. Pt has been focused and calm. Pt continues to be slow with responses and presents with flat affect. Pt has been somewhat social. Pt is more open and willing to go to IRTS at this time. IRTS referrals will begin today.   Participants CTC;psychiatrist;nursing   Behavioral Team Discussion   Participants Leta Amanda, APRN, CNP; Kellie Guzman, RN; Delia Vences, [facility]   Progress Pt slowly progresses toward her goals. Pt has been more communicative with staff.   Continued Stay Criteria/Rationale Pt continues to present with flat and blunted affect. Pt's responses and movements are slow.   Medical/Physical No issues noted   Precautions Suicide precautions   Plan Multidisciplinary team evaluation, medication management, care coordination   Rationale for change in precautions or plan N/A   Safety Plan Per unit protocol   Anticipated Discharge Disposition IRTS     PRECAUTIONS AND SAFETY    Behavioral Orders   Procedures    Code 1 - Restrict to Unit    Routine Programming     As clinically indicated    Status 15     Every 15 minutes.    Suicide precautions     Patients on Suicide Precautions should have a Combination Diet ordered that includes a Diet selection(s) AND a Behavioral Tray selection for Safe Tray - with utensils, or Safe Tray - NO utensils         Safety  Safety WDL: WDL  Patient Location: hallway, dining room, lounge  Observed Behavior: calm, pacing, walking, sitting  Observed Behavior (Comment): Walking the hallway  Safety Measures: safety rounds completed  Diversional Activity: television  Suicidality: Status 15

## 2023-12-18 NOTE — PLAN OF CARE
"  12/18/23     Group Therapy Session   Group Attendance Pt attended this group session   Time Session Began 1400   Time Session Ended 1445   Total Time (minutes) 45   Total # Attendees 3   Group Type Psychotherapy, process group   Group Topic Covered Sources of stress and ways to manage them. Reasons for medication compliance, understanding my diagnosis, working towards my discharge, seeking distress free communication.   Group Session Detail This session allowed group participants to engage in brainstorming and ideas sharing around:  Medication compliance. Patients were introduced to reasons patients need to take their medications as prescribed by their doctor. They discussed benefits they have had with taking their medications and reasons they do not take them. Through respectful and concrete feedback, and with the aid of peers, patients brainstormed ways to address these challenges. Some strategies included maintaining open communication with providers, reporting their needs on time so as to obtain timely feedback, not to make any changes or take any medications that are not prescribed by their doctors. Also educated patients on the fact that the doctors take their time to study their \"longitudinal data\" in prescribing their medications, among other reasons they should comply with what their doctors prescribed. Questions included \"other approaches work too, so, why should I take medications?\" In response, facilitator explained that studies have shown that both medication and therapy are helpful, but that doctors make decisions on medications based on the severity and scope of the needs of patients.  Regarding stress: Identified and reviewed the sources of stress. Provided feedback on coping. Identified sources of stress included: financial difficulties, not knowing when to discharge.  On communication: Three scenarios were presented where patients identified the most effective way to ensure that one listens to " gain understanding, during communication.   Patient Response/Contribution Patient stayed for the entire session. Received feedback and provided feedback to group members.   Patient Participation Detail Pt participated actively in group. Asked questions and helped process thoughts. Reported that she is preparing to go to an IRTS but has not found one.

## 2023-12-18 NOTE — PLAN OF CARE
"Team Note Due:  Monday    Assessment/Intervention/Current Symtoms and Care Coordination:  Chart review and met with team, discussed pt progress, symptomology, and response to treatment.  Discussed the discharge plan and any potential impediments to discharge.    During team meeting, pt's care was discussed. Pt is slowly progressing toward her goals and IRTS referrals can begin.     Tasks Completed:  - Spoke to pt's brother, Tammie. Shared update regarding pt and shared that we plan to start IRTS referrals today. Tammie shared that he spoke to pt on Saturday and that she was \"not doing well.\" Tammie reported that she shared with him that she is feeling the same way she was feeling when she was at home. Tammie shared that she denied thoughts of suicide. Tammie shared that he believes she is minimizing symptoms. Tammie reported that pt shared concern that she will be homeless following IRTS because she was told she cannot return home. [facility] shared this is a valid concern for pt and encouraged Tammie to continue supporting pt with long-term care options. [facility] will meet with pt to discuss IRTS referrals and discuss receiving DORIAN for her . [facility] also encouraged Tammie to follow-up with pt's  to be involved in long-term care management. Tammie reported that he believes he is being ignored by her . Tammie did acknowledge that he has spoken to the  a few times previously. Tammie had questions regarding medication changes. [facility] will pass this along to provider.   - Met with pt to discuss discharge. Pt continues to show more acceptance toward IRTS placement. Signed general DORIAN to begin IRTS referrals. CTC answered questions about placement and confirmed pt's preference to start in the [facility] so pt can remain close to family and her support system. [facility] also discussed receiving DORIAN for pt's . Pt was more open to this at this time to help " coordinate care. Pt signed DORIAN for [facility].   - CTC left message for pt's [facility].   - Submitted IRTS referrals to two [place] sites.    Discharge Plan or Goal:  Current plan is to stabilize symptoms and develop safe disposition plan. Following hospitalization, plan is for IRTS placement.     Barriers to Discharge:  Pt continues to stabilize - pt presents with flat and blunted affect and continues to endorse depressive symptoms. Pt currently denies SI/SIB.     Referral Status:  [facility] submitted the following IRTS Referrals:    [facility] - Submitted 12/18/2023   Phone: [phone]    Fax: [phone]   *Noted preference for [place] location ([facility])  12/18/2023: Mami called to confirm receipt of the referral. Noted they currently have a 1-2 week waitlist. Confirmed that pt is only open to [place] location at this time.    [facility] - Submitted 12/18/2023   Phone: [phone]   Fax: [phone]     Legal Status:  Pt is voluntary    Contacts:    Medication Management/Psychiatry:   Aston Alejandra MD with [facility] [id number] [address] [place] [address]  Phone [phone] Fax [phone]    :  Name/Organization: [facility] - DORIAN on file  Phone: [phone]    Family Contacts:   Name/Relationship: Tammie Mendez  Brother (DORIAN on file)  Phone: [phone]    Name/Relationship: Arabella  Cousin (DORIAN on file)  Phone: [phone]    Name/Relationship: Verena  Niece (No DORIAN on file)  Phone: [phone]     Upcoming Meetings and Dates/Important Information and next steps:  None

## 2023-12-18 NOTE — PLAN OF CARE
Goal Outcome Evaluation:    Problem: Suicide Risk  Goal: Absence of Self-Harm  Outcome: Progressing     Pt presents calm, cooperative, and pleasant. Pt denies SI/HI/SIB, hallucinations, and anxiety however Pt endorses depression and appears sad during assessment. Pt is guarded with a flat affect.   Pt spent time walking/pacing in the hallway with self as well as some select peers. Pt spent time in milieu and room.    Pt denies pain. Pt denies any need for PRN medications this morning. Pt denies concerns with bowel/bladder.   VSS (standing BP rechecked due to being low -- recheck 100/71), med compliant, behaviorally in control.

## 2023-12-18 NOTE — PROGRESS NOTES
"[facility], [place]   Psychiatric Progress Note        Interim History:     The patient's care was discussed with the treatment team during the daily team meeting and staff's chart notes were reviewed.  Pt was documented as sleeping 7, 7 and 6.75 hours during the weekend overnight shifts.  She has been attending some groups and has been focused and calm.  She has been spending some time in the milieu.  She reports her mood is \"okay.\"  She rates depression 5/10.  She denies feeling anxious.  She denies suicidal ideation.  She denies psychotic symptoms.  Concentration is \"good.\"  Energy is \"not bad.\"  Pt reports ongoing difficulty sleeping.  States she has initial and middle insomnia.  Discussed options and increased HS dose of Trazodone to 150 mg.  Pt states she feels \"okay\" about plan for IRTS.  Referrals in process.      Provider returned her brother Tammie's call and left a message with an update regarding her medications, symptoms and plan for IRTS placement.           Medications:       buPROPion  300 mg Oral QAM     mirtazapine  15 mg Oral At Bedtime     risperiDONE  1 mg Oral BID     traZODone  100 mg Oral At Bedtime          Allergies:     Allergies   Allergen Reactions     Dust Mites Other (See Comments)     congestion     Pork-Derived Products Other (See Comments)     Pt does not ingest of these products.           Labs:   No results found for this or any previous visit (from the past 24 hour(s)).       Psychiatric Examination:     /73   Pulse 82   Temp 98.9  F (37.2  C) (Temporal)   Resp 16   Wt 55.5 kg (122 lb 6.4 oz)   SpO2 100%   BMI 21.16 kg/m    Weight is 122 lbs 6.4 oz  Body mass index is 21.16 kg/m .    Orthostatic Vitals         Most Recent      Sitting Orthostatic /80 12/12 1430    Sitting Orthostatic Pulse (bpm) 94 12/12 1430    Standing Orthostatic /71 12/12 1430    Standing Orthostatic Pulse (bpm) 122 12/12 1430           Appearance: awake, " "alert, dressed in hospital scrubs, and appeared as age stated  Attitude:  cooperative  Eye Contact: good  Mood: \"okay,\" moderately depressed, denies feeling anxious today  Affect:  constricted mobility   Speech:  clear, coherent, minimal delays  Psychomotor Behavior: no evidence of tardive dyskinesia, dystonia, or tics and physical retardation  Throught Process:  linear  Associations:  no loose associations  Thought Content:   denies suicidal and homicidal ideation, denies psychotic symptoms  Insight:  partial  Judgement:  fair  Oriented to:  time, person, and place  Attention Span and Concentration:  limited, some improvement  Recent and Remote Memory:  fair           Precautions:     Behavioral Orders   Procedures     Code 1 - Restrict to Unit     Routine Programming     As clinically indicated     Status 15     Every 15 minutes.     Suicide precautions     Patients on Suicide Precautions should have a Combination Diet ordered that includes a Diet selection(s) AND a Behavioral Tray selection for Safe Tray - with utensils, or Safe Tray - NO utensils            Diagnoses:     Bipolar disorder type 1 current episode depressed, severe         Plan:     She was admitted following a suicide attempt by overdose on Zyprexa. She was started on Risperdal, Trazodone, Remeron and Wellbutrin XL on 12/11/23.  Wellbutrin XL was increased 12/15.  Will continue to provide support and structure.  Patient reports she has psychiatric prescriber and a therapist in the community.  She may not return to her sister's home.  Plan for referrals to IRTS facilities near her family's home in [place].        The patient was seen and evaluated by me, Leta Amanda, APRN, CNP  Total time > 35 minutes      "

## 2023-12-19 PROCEDURE — 124N000002 HC R&B MH UMMC

## 2023-12-19 PROCEDURE — 250N000013 HC RX MED GY IP 250 OP 250 PS 637: Performed by: PSYCHIATRY & NEUROLOGY

## 2023-12-19 PROCEDURE — 99232 SBSQ HOSP IP/OBS MODERATE 35: CPT | Performed by: NURSE PRACTITIONER

## 2023-12-19 PROCEDURE — 250N000013 HC RX MED GY IP 250 OP 250 PS 637: Performed by: NURSE PRACTITIONER

## 2023-12-19 RX ADMIN — BUPROPION HYDROCHLORIDE 300 MG: 300 TABLET, FILM COATED, EXTENDED RELEASE ORAL at 08:34

## 2023-12-19 RX ADMIN — RISPERIDONE 1 MG: 1 TABLET ORAL at 08:34

## 2023-12-19 RX ADMIN — Medication 150 MG: at 20:23

## 2023-12-19 RX ADMIN — RISPERIDONE 1 MG: 1 TABLET ORAL at 20:23

## 2023-12-19 RX ADMIN — MIRTAZAPINE 15 MG: 15 TABLET, FILM COATED ORAL at 20:23

## 2023-12-19 ASSESSMENT — ACTIVITIES OF DAILY LIVING (ADL)
ADLS_ACUITY_SCORE: 31
ORAL_HYGIENE: INDEPENDENT
ADLS_ACUITY_SCORE: 31
LAUNDRY: WITH SUPERVISION
ADLS_ACUITY_SCORE: 31
DRESS: INDEPENDENT
HYGIENE/GROOMING: INDEPENDENT
ADLS_ACUITY_SCORE: 31
ADLS_ACUITY_SCORE: 31

## 2023-12-19 NOTE — PLAN OF CARE
Problem: Sleep Disturbance  Goal: Adequate Sleep/Rest  Outcome: Progressing   Goal Outcome Evaluation:    Patient observed in assigned room throughout the night and appeared to be comfortably asleep. No PRN medications requested or administered, No additional issues or concerns reported or observed. Safety checks completed.  Sleep hours - 6.75.  Nursing will continue to monitor.

## 2023-12-19 NOTE — PLAN OF CARE
BEH IP Unit Acuity Rating Score (UARS)  Patient is given one point for every criteria they meet.    CRITERIA SCORING   On a 72 hour hold, court hold, committed, stay of commitment, or revocation 0/1    Patient LOS on BEH unit exceeds 20 days 0/1  LOS: 10   Patient under guardianship, 55+, otherwise medically complex, or under age 11 0/1   Suicide ideation without relief of precipitating factors 1/1   Current plan for suicide 0/1   Current plan for homicide 0/1   Imminent risk or actual attempt to seriously harm another without relief of factors precipitating the attempt 0/1   Severe dysfunction in daily living (ex: complete neglect for self care, extreme disruption in vegetative function, extreme deterioration in social interactions) 1/1   Recent (last 7 days) or current physical aggression in the ED or on unit 0/1   Restraints or seclusion episode in past 72 hours 0/1   Recent (last 7 days) or current verbal aggression, agitation, yelling, etc., while in the ED or unit 0/1   Active psychosis 0/1   Need for constant or near constant redirection (from leaving, from others, etc).  0/1   Intrusive or disruptive behaviors 0/1   TOTAL 2

## 2023-12-19 NOTE — DISCHARGE INSTRUCTIONS
Behavioral Discharge Planning and Instructions    Summary: You were admitted on 2023 due to Depression and Suicide Attempt.  You were treated by Cisco Hardy MD and EMMANUEL King CNP and discharged on 2023 from [facility] to [facility] located at [address] [place] [address].    Discharge Transportation    Insurance: [facility]     Phone: [phone]    Transportation company: [employer]   Phone: [phone]    Confirmation #: [id number]      Address: [place] [address] [place] [address]  Date/ Time: 23 @ 9:30 AM  Call on arrival: [address] Unit #: [phone]     Drop off  Address:  [place] [address]   Phone: [phone]    Main Diagnosis: Bipolar Disorder Type 1 current episode depressed, severe.    Health Care Follow-up:     Appointment type: Psychiatry   Date/time:  2024 @ 2:40 PM  In person  Provider:  Aston Alejandra MD  Address: [facility] [address]  [address] [place] [address]  [phone]  Fax: [phone]  Note:   If you would like to attend this appointment via telehealth, please contact the clinic.      Information will be faxed to your outpatient providers to ensure a healthy continuity of care for you.     Attend all scheduled appointments with your outpatient providers. Call at least 24 hours in advance if you need to reschedule an appointment to ensure continued access to your outpatient providers.     Major Treatments, Procedures and Findings:  You were provided with: a psychiatric assessment, assessed for medical stability, medication evaluation and/or management, group therapy, family therapy, individual therapy, milieu management, and medical interventions    Symptoms to Report: feeling more aggressive, increased confusion, losing more sleep, mood getting worse, or thoughts of suicide    Early warning  "signs can include: increased depression or anxiety sleep disturbances increased thoughts or behaviors of suicide or self-harm     Safety and Wellness:  Take all medicines as directed.  Make no changes unless your doctor suggests them.  Follow treatment recommendations. Refrain from alcohol and non-prescribed drugs. Ask your support system to help you reduce your access to items that could harm yourself or others. Items could include:    Firearms  Medicines (both prescribed and over-the-counter)  Knives and other sharp objects  Ropes and like materials  Car keys  If there is a concern for safety, call 911.     Resources:   Crisis Intervention: 755.711.2704 or 634-275-4181 (TTY: 452.928.7058).  Call anytime for help.  [place] on Mental Illness (www.[place].roni.org): 946.554.6994 or 922-323-0035.  National Suicide Prevention Line (www.mentalhealthmn.org): 298-100-IBJE (5149)  Mental Health Association of [place] (www.mentalhealth.org): 178.659.1766 or 372-597-1388  Self- Management and Recovery Training., SMART-- Toll free: 425.688.8805  www.[employer].Sendah Direct  [facility] Crisis Response 544-318-1675  Text 4 Life: txt \"LIFE\" to [address]290 for immediate support and crisis intervention  Crisis text line: Text \"[place]\" to 494659. Free, confidential, 24/7.     [facility] ([place] on Mental Illness) improves the lives of children and adults with mental illnesses and their families by providing free classes on mental illnesses and support groups for adults with mental illnesses, parents and family members. For more information: Phone: 774.857.4512 Toll free: 5-015-CHCJ-HELPS Website: www.namihelps.orghttp://www.namihelps.org/    General Medication Instructions:   See your medication sheet(s) for instructions.   Take all medicines as directed.  Make no changes unless your doctor suggests them.   Go to all your doctor visits.  Be sure to have all your required lab tests. This way, your medicines can be refilled on " time.  Do not use any drugs not prescribed by your doctor.  Avoid alcohol.    Advance Directives:   Scanned document on file with [employer]? No scanned doc  Is document scanned? Pt states no documents  Honoring Choices Your Rights Handout: Informed and given  Was more information offered? Pt declined    The Treatment team has appreciated the opportunity to work with you. If you have any questions or concerns about your recent admission, you can contact the unit which can receive your call 24 hours a day, 7 days a week. They will be able to get in touch with a Provider if needed. The unit number is [phone].

## 2023-12-19 NOTE — CARE PLAN
Occupational Therapy Group Note:     12/19/23 0727   Group Therapy Session   Group Attendance attended group session   Time Session Began 1300   Time Session Ended 1345   Total Time (minutes) 35 (no charge)   Total # Attendees 2   Group Type recreation   Group Topic Covered leisure exploration/use of leisure time;structured socialization   Group Session Detail OT Leisure Group: Skip-Abelardo   Patient Response/Contribution confronted peers appropriately;cooperative with task;listened actively   Patient Participation Detail Pt actively participated in a structured occupational therapy group with a focus on facilitating social and leisure engagement. This occupational therapy group was facilitated in order to: foster relaxation, explore leisure opportunities, cope with stress, decrease isolation/improve social skills, and exercise overall cognitive skills. Patient was able to follow 2 step directions of the novel task. Patient required consistent simplified verbal cueing throughout activity; minimal new learning observed despite repetition of task. Patient responded to humor throughout game. Minimal social engagement with writer; unless questions were directly asked. Affect: blunted. Mood: calm, cooperative, pleasant.

## 2023-12-19 NOTE — PLAN OF CARE
"Team Note Due:  Monday    Assessment/Intervention/Current Symtoms and Care Coordination:  Chart review and met with team, discussed pt progress, symptomology, and response to treatment.  Discussed the discharge plan and any potential impediments to discharge.    Tasks Completed:  - Received voicemail from [facility] confirming receipt of [facility] referral. Pt is on waitlist.  - Received call from [facility] to schedule phone screen for pt. Notified pt of phone screen and answered questions.  - Spoke to Calos with [facility] following phone screen with pt. Pt has been approved for admission with tentative plan to discharge Friday.  - Maria [facility], pt , called back. [facility] confirmed that pt is currently on a waitlist for CADI services and has a pending social security application. [facility] is in agreement with IRTS placement and was notified regarding tentative discharge date this Friday.  - Notified pt regarding tentative discharge date of Friday. Pt shared that the only thing she does not like about this IRTS is that \"men and women are together.\" [facility] validated and shared that unfortunately there are not placements that are women only. Pt reported she understood.  - Spoke to Tammie, pt's brother, to provide update regarding tentative discharge Friday to IRTS. Tammie expressed concern that pt is not ready for discharge due to conversation he had with pt on the phone this weekend. [facility] shared that pt is showing improvement by being more active in the milieu, participating in groups, denies SI/SIB, and affect is improving. [facility] again shared that inpatient stays are for short-term crisis stabilization. [facility] shared that the discharge plan is to discharge pt to IRTS which will provide ongoing stabilization for 1-3 months following her hospital stay and that pt's  continues to support pt with more long-term support options.    Discharge Plan or Goal:  Current plan is to stabilize symptoms and develop safe " disposition plan. Following hospitalization, plan is for IRTS placement.     Barriers to Discharge:  Pt continues to stabilize - pt presents with flat and blunted affect and continues to endorse depressive symptoms. Pt currently denies SI/SIB.     Referral Status:  [facility] submitted the following IRTS Referrals:    [facility] - Submitted 12/18/2023   Phone: [phone]    Fax: [phone]   *Noted preference for [place] location ([facility])  12/18/2023: M[facility] called to confirm receipt of the referral. Noted they currently have a 1-2 week waitlist. Confirmed that pt is only open to [facility] at this time.  12/19/2023: Calos called to schedule phone screen. Phone screen scheduled for 11 AM this morning. Pt approved for admission. Tentative discharge date of Friday.    [facility] - Submitted 12/18/2023   Phone: [phone]   Fax: [phone]  12/19/2023: Received voicemail from [facility] confirming receipt of I[facility]S referral. Pt is on waitlist.    Legal Status:  Pt is voluntary    Contacts:    Medication Management/Psychiatry:   Aston Alejandra MD with [place] [id number] [place] [address]  Phone [phone] Fax [phone]    :  Name/Organization: [facility] - DORIAN on file  Phone: [phone]    Family Contacts:   Name/Relationship: Tammie Mendez  Brother (DORIAN on file)  Phone: [phone]    Name/Relationship: Arabella  Cousin (DORIAN on file)  Phone: [phone]    Name/Relationship: Verena  Niece (No DORIAN on file)  Phone: [phone]     Upcoming Meetings and Dates/Important Information and next steps:  None

## 2023-12-19 NOTE — PLAN OF CARE
"Individual Therapy Note      Date of Service: December 19, 2023    Patient: Kimmy goes by \"Kimmy,\" uses she/her pronouns    Individuals Present: Kimmy Baptiste Chiherberthlauren    Session start: 1:10pm  Session end: 1:15pm  Session duration in minutes: 5    Patient Active Problem List   Diagnosis    Gestational diabetes    Depression affecting pregnancy, antepartum    Poor weight gain of pregnancy, third trimester    Gestational diabetes mellitus, antepartum    Depression with suicidal ideation    Seizure-like activity (H)    Seizure (H)    Recurrent major depression (H24)    ROWAN (generalized anxiety disorder)    Bipolar 1 disorder, manic, moderate (H)    Acute respiratory failure, unspecified whether with hypoxia or hypercapnia (H)    Overdose, intentional self-harm, initial encounter (H)    Depression         Modality Used:Person Centered    Goals: Improve communication around needs       Mental Status Exam:   Attitude: cooperative  Eye Contact: good  Mood: better  Affect: appropriate and in normal range  Speech: clear, coherent  Psychomotor Behavior: no evidence of tardive dyskinesia, dystonia, or tics  Thought Process:  logical and goal oriented  Associations: no loose associations  Thought Content: no evidence of suicidal ideation or homicidal ideation  Insight: fair  Judgement: fair  Attention Span and Concentration: intact    Pt progress: Met with this patient briefly. She reported that her day today was better than yesterday. Reported that she did not have much needs to report. Writer reviewed briefly, the skills and ideas that had been processed yesterday.     Treatment Objective(s) Addressed:   The focus of this session was on identifying and practicing coping strategies     Progress Towards Goals and Assessment of Patient:   Patient is making progress towards treatment goals as evidenced by Reported that she is practicing the skills learned yesterday.       Therapeutic Intervention(s):   Provided active " listening, unconditional positive regard, and validation. Provided positive reinforcement for progress towards goals, gains in knowledge, and application of skills previously taught.     Plan/next step: Review strategies to maintain progress around medication compliance.

## 2023-12-19 NOTE — PLAN OF CARE
Problem: Suicide Risk  Goal: Absence of Self-Harm  Outcome: Progressing  Intervention: Assess Risk to Self and Maintain Safety  Recent Flowsheet Documentation  Taken 12/19/2023 0852 by Aditi Woods RN  Enhanced Safety Measures: room near unit station   Goal Outcome Evaluation:         Possible discharged on Friday 12/22/23 to an ARTS facility.    Pt. Calm and cooperative, presents with a flat affect, pleasant on approach, visible in the lounge minimal engagement with peers, out in the lounge for meals good appetite, long periods of resting in bed. Speech is soft/quiet clear and coherent, resistant to share information maintains good eye contact, thought process is linear and organized. Pt. Lips are dry and peeling, Carmex provided.     Pt. Denies anxiety and depression, denies SI/HI/SIB and hallucinations, medication compliance and contracted for safety.

## 2023-12-19 NOTE — PROGRESS NOTES
[facility], [facility]   Psychiatric Progress Note        Interim History:     The patient's care was discussed with the treatment team during the daily team meeting and staff's chart notes were reviewed.  Pt was documented as sleeping 6.75 hours during the overnight shift.  Pt interviewed with and was accepted at [facility] for admission 12/22.  Pt smiled while speaking about this and states she feels ready to go.  She said depression was 10/10 on admission and is currently 5/10.  She denies suicidal ideation.  States her appetite is good.  She denies feeling anxious.  Reports she has been attending to ADLs and most recently showered 2 days ago.   Discussed she will likely have a roommate at [facility].  Discontinued no roommate order.       Provider returned her brother Tammie's call.  He expressed concerns that she is minimizing symptoms in order to be released from the hospital.  He said that when he spoke to her on Saturday, she reported feeling no different compared to when she was admitted.  He said she did not respond directly when asked about suicidal thoughts.  Discussed that the treatment team has noted her to be more engaged and active in the milieu and she has been consistently denying suicidal thoughts, and the treatment team is moving forward with IRTS referrals.           Medications:      buPROPion  300 mg Oral QAM    mirtazapine  15 mg Oral At Bedtime    risperiDONE  1 mg Oral BID    traZODone  150 mg Oral At Bedtime          Allergies:     Allergies   Allergen Reactions    Dust Mites Other (See Comments)     congestion    Pork-Derived Products Other (See Comments)     Pt does not ingest of these products.           Labs:   No results found for this or any previous visit (from the past 24 hour(s)).       Psychiatric Examination:     /76 (BP Location: Left arm)   Pulse 79   Temp 97.4  F (36.3  C) (Temporal)   Resp 16   Wt 56.7 kg (124 lb 14.4 oz)   SpO2 99%   BMI  "21.59 kg/m    Weight is 124 lbs 14.4 oz  Body mass index is 21.59 kg/m .    Orthostatic Vitals         Most Recent      Sitting Orthostatic /80 12/12 1430    Sitting Orthostatic Pulse (bpm) 94 12/12 1430    Standing Orthostatic /71 12/12 1430    Standing Orthostatic Pulse (bpm) 122 12/12 1430           Appearance: awake, alert, dressed in hospital scrubs, and appeared as age stated  Attitude:  cooperative  Eye Contact: good  Mood: \"okay,\" moderately depressed, denies feeling anxious today  Affect:  constricted mobility   Speech:  clear, coherent, minimal delays  Psychomotor Behavior: no evidence of tardive dyskinesia, dystonia, or tics and physical retardation  Throught Process:  linear  Associations:  no loose associations  Thought Content:   denies suicidal and homicidal ideation, denies psychotic symptoms  Insight:  partial  Judgement:  fair  Oriented to:  time, person, and place  Attention Span and Concentration:  fair, improved  Recent and Remote Memory:  fair           Precautions:     Behavioral Orders   Procedures    Code 1 - Restrict to Unit    Routine Programming     As clinically indicated    Status 15     Every 15 minutes.    Suicide precautions     Patients on Suicide Precautions should have a Combination Diet ordered that includes a Diet selection(s) AND a Behavioral Tray selection for Safe Tray - with utensils, or Safe Tray - NO utensils            Diagnoses:     Bipolar disorder type 1 current episode depressed, severe         Plan:     She was admitted following a suicide attempt by overdose on Zyprexa. She was started on Risperdal, Trazodone, Remeron and Wellbutrin XL on 12/11/23.  Wellbutrin XL was increased 12/15.  Trazodone was increased 12/18.  She has been more active in the milieu and reports improvements in symptoms.  Patient reports she has psychiatric prescriber and a therapist in the community.  She may not return to her sister's home.  As of 12/19, she [facility] accepted at [facility] with [facility].  [facility] admission paperwork will need to be completed and meds will need to be ordered.        The patient was seen and evaluated by me, EMMANUEL Duncan, CNP  Total time > 35 minutes

## 2023-12-20 PROCEDURE — 250N000013 HC RX MED GY IP 250 OP 250 PS 637: Performed by: NURSE PRACTITIONER

## 2023-12-20 PROCEDURE — H2032 ACTIVITY THERAPY, PER 15 MIN: HCPCS

## 2023-12-20 PROCEDURE — 250N000013 HC RX MED GY IP 250 OP 250 PS 637: Performed by: PSYCHIATRY & NEUROLOGY

## 2023-12-20 PROCEDURE — 124N000002 HC R&B MH UMMC

## 2023-12-20 PROCEDURE — 99233 SBSQ HOSP IP/OBS HIGH 50: CPT | Performed by: PSYCHIATRY & NEUROLOGY

## 2023-12-20 RX ADMIN — MIRTAZAPINE 15 MG: 15 TABLET, FILM COATED ORAL at 20:04

## 2023-12-20 RX ADMIN — RISPERIDONE 1 MG: 1 TABLET ORAL at 08:33

## 2023-12-20 RX ADMIN — Medication 150 MG: at 20:03

## 2023-12-20 RX ADMIN — RISPERIDONE 1 MG: 1 TABLET ORAL at 20:04

## 2023-12-20 RX ADMIN — BUPROPION HYDROCHLORIDE 300 MG: 300 TABLET, FILM COATED, EXTENDED RELEASE ORAL at 08:33

## 2023-12-20 ASSESSMENT — ACTIVITIES OF DAILY LIVING (ADL)
ADLS_ACUITY_SCORE: 31
ORAL_HYGIENE: INDEPENDENT
ADLS_ACUITY_SCORE: 31
ADLS_ACUITY_SCORE: 31
ORAL_HYGIENE: INDEPENDENT
LAUNDRY: WITH SUPERVISION
ADLS_ACUITY_SCORE: 31
ADLS_ACUITY_SCORE: 31
LAUNDRY: WITH SUPERVISION
HYGIENE/GROOMING: INDEPENDENT
DRESS: INDEPENDENT
ADLS_ACUITY_SCORE: 31
DRESS: SCRUBS (BEHAVIORAL HEALTH);INDEPENDENT
HYGIENE/GROOMING: INDEPENDENT
ADLS_ACUITY_SCORE: 31
ADLS_ACUITY_SCORE: 31

## 2023-12-20 NOTE — PLAN OF CARE
BEH IP Unit Acuity Rating Score (UARS)  Patient is given one point for every criteria they meet.    CRITERIA SCORING   On a 72 hour hold, court hold, committed, stay of commitment, or revocation 0/1    Patient LOS on BEH unit exceeds 20 days 0/1  LOS: 11   Patient under guardianship, 55+, otherwise medically complex, or under age 11 0/1   Suicide ideation without relief of precipitating factors 1/1   Current plan for suicide 0/1   Current plan for homicide 0/1   Imminent risk or actual attempt to seriously harm another without relief of factors precipitating the attempt 0/1   Severe dysfunction in daily living (ex: complete neglect for self care, extreme disruption in vegetative function, extreme deterioration in social interactions) 1/1   Recent (last 7 days) or current physical aggression in the ED or on unit 0/1   Restraints or seclusion episode in past 72 hours 0/1   Recent (last 7 days) or current verbal aggression, agitation, yelling, etc., while in the ED or unit 0/1   Active psychosis 0/1   Need for constant or near constant redirection (from leaving, from others, etc).  0/1   Intrusive or disruptive behaviors 0/1   TOTAL 2

## 2023-12-20 NOTE — CARE PLAN
12/20/23 1313   Group Therapy Session   Group Attendance attended group session   Time Session Began 1015   Time Session Ended 1100   Total Time (minutes) 15 - no charge   Total # Attendees 5   Group Type life skill;task skill;other (see comments)  (OT)   Group Topic Covered balanced lifestyle;leisure exploration/use of leisure time;coping skills/lifestyle management;structured socialization   Group Session Detail OT Clinic: Activity group for creative expression, promoting autonomy, building self worth, coping with stress and symptoms, and an opportunity to exercise cognitive skills (I.e. initiation, planning, organizing, sequencing, sustained attention, follow through, and overall self awareness).   Patient Response/Contribution able to recall/repeat info presented;cooperative with task   Patient Participation Detail Pt came in last 15 minutes of group. She smiled at staff and greeted her. She initially declined to engage in activity noting desire to just be around with others and look out the window. She then did ask for paper and markers to draw as others were also asking to engage in this type of activity. She briefly socialized with staff.

## 2023-12-20 NOTE — PLAN OF CARE
Care Coordinator Note(s):    Care Request(s):   Psychiatry  - Existing  Preferences: 3 weeks  Notes: [facility]    Care Outcome(s):    Appointment type: Psychiatry   Date/time:  Tuesday January 9th, 2024 @ 2:40 PM  In person  Provider:  Aston Alejandra MD  Address: [address] [address]  [phone]  Fax: [phone]  Note:   If you would like to attend this appointment via telehealth, please contact the clinic.     Further Actions:  None.    -Stefany Parra  Adult Behavioral Health Care Coordinator

## 2023-12-20 NOTE — PLAN OF CARE
Problem: Depressive Signs/Symptoms  Goal: Improved Mood Symptoms (Depressive Signs/Symptoms)  Outcome: Progressing     Problem: Suicide Risk  Goal: Absence of Self-Harm  Outcome: Progressing    Patient is quiet and withdrawn in general, but visible in the milieu most of the shift. Affect remains flat and restricted. She is calm and cooperative on approach. Patient reports no changes with mood today. Depression is moderate. She stated she is a little nervous about discharge on Friday, but feels ready and otherwise denies anxiety. Patient denies suicidal ideations or thoughts of self harm. She denies thoughts of harm to others or hallucinations. Contracts for safety. No reports of pain or other medical concerns. Patient ate dinner and has been medication compliant. No behavioral or safety concerns noted.     BP 90/63 (BP Location: Left arm)   Pulse 85   Temp 97.4  F (36.3  C) (Temporal)   Resp 16   Wt 56.7 kg (124 lb 14.4 oz)   SpO2 100%   BMI 21.59 kg/m

## 2023-12-20 NOTE — PLAN OF CARE
Problem: Depressive Signs/Symptoms  Goal: Increased Participation and Engagement (Depressive Signs/Symptoms)  Outcome: Progressing     Pt ate meals and was compliant with medications. Affect was blunted, flat. Mood was somber. Pt's family dropped off some extra clothing this morning, documented in her belongings and stored in pt's locker. Possible discharge plan is for pt to go to [facility] IRTS this Friday. No SI/SIB noted or observed. Will continue to monitor.

## 2023-12-20 NOTE — PLAN OF CARE
Problem: Sleep Disturbance  Goal: Adequate Sleep/Rest  Outcome: Progressing     Patient slept approximately 6.75 hours during the night shift, appeared comfortable with unlabored breathing patterns. Continues safety checks every 15 minutes.

## 2023-12-20 NOTE — PLAN OF CARE
12/20/23      Group Therapy Session   Group Attendance This patient attended group   Time Session Began 11:35   Time Session Ended 12:05   Total Time (minutes) 30   Total # Attendees 2   Group Type Psychotherapy   Group Topic Covered Sustainable safety planning for good mental health outcomes   Group Session Detail Pt and another attended the psychotherapy group and stayed for the entirety of the group. Both patients reported that their day was going well. Reviewed triggers and factors that exacerbate mental health symptoms, and identified safety initiatives that each participant may implement to ensure their safety.   Patient Response/Contribution Pt reported that he is feeling well today, and is looking forward to doing better as well. Received compliments for improved engagement. Was also informed that her affect seems to be improving. Pt reported that she was thankful about that, and looks forward to more improvement.   Patient Participation Detail Pt stayed and was engaged in the topic. Pt received feedback and provided feedback to the other group participant.    Emile Pardo, Ph.D., [facility]

## 2023-12-20 NOTE — PLAN OF CARE
Team Note Due:  Monday    Assessment/Intervention/Current Symtoms and Care Coordination:  Chart review and met with team, discussed pt progress, symptomology, and response to treatment.  Discussed the discharge plan and any potential impediments to discharge.    Tasks Completed:  - Pt's brother Tammie called [facility] and shared that he spoke to provider today and it was agreed that pt would stay for at least another week. [facility] shared that per conversation with provider, pt will be reassessed tomorrow for the tentative discharge on Friday. Pt's brother is requesting to speak to the provider tomorrow. [facility] will pass this along to provider during team meeting in the morning.    Discharge Plan or Goal:  Current plan is to stabilize symptoms and develop safe disposition plan. Following hospitalization, plan is for IRTS placement.     Barriers to Discharge:  Pt continues to stabilize - pt presents with flat and blunted affect and continues to endorse depressive symptoms. Pt currently denies SI/SIB.     Referral Status:  [facility] submitted the following IRTS Referrals:    [facility] - Submitted 12/18/2023   Phone: [phone]    Fax: [phone]   *Noted preference for [place] location ([facility])  12/18/2023: Mami called to confirm receipt of the referral. Noted they currently have a 1-2 week waitlist. Confirmed that pt is only open to [place] location at this time.  12/19/2023: Calos called to schedule phone screen. Phone screen scheduled for 11 AM this morning. Pt approved for admission. Tentative discharge date of Friday.    [facility] - Submitted 12/18/2023   Phone: [phone]   Fax: [phone]  12/19/2023: Received voicemail from [facility] confirming receipt of [facility]S referral. Pt is on waitlist.    Legal Status:  Pt is voluntary    Contacts:    Medication Management/Psychiatry:   Aston Alejandra MD with [facility] [address] [place] [address] [address] [place] [address]  Phone  [phone] Fax [phone]    :  Name/Organization: [facility] - DORIAN on file  Phone: [phone]    Family Contacts:   Name/Relationship: Tammie Mendez  Brother (DORIAN on file)  Phone: [phone]    Name/Relationship: Arabella  Cousin (DORIAN on file)  Phone: [phone]    Name/Relationship: Verena  Niece (No DORIAN on file)  Phone: [phone]     Upcoming Meetings and Dates/Important Information and next steps:  None

## 2023-12-21 PROCEDURE — 250N000013 HC RX MED GY IP 250 OP 250 PS 637: Performed by: NURSE PRACTITIONER

## 2023-12-21 PROCEDURE — 90832 PSYTX W PT 30 MINUTES: CPT

## 2023-12-21 PROCEDURE — 124N000002 HC R&B MH UMMC

## 2023-12-21 PROCEDURE — 99233 SBSQ HOSP IP/OBS HIGH 50: CPT | Performed by: PSYCHIATRY & NEUROLOGY

## 2023-12-21 PROCEDURE — G0177 OPPS/PHP; TRAIN & EDUC SERV: HCPCS

## 2023-12-21 PROCEDURE — 250N000013 HC RX MED GY IP 250 OP 250 PS 637: Performed by: PSYCHIATRY & NEUROLOGY

## 2023-12-21 RX ORDER — OLANZAPINE 10 MG/1
10 TABLET ORAL 3 TIMES DAILY PRN
Qty: 45 TABLET | Refills: 1 | Status: SHIPPED | OUTPATIENT
Start: 2023-12-21

## 2023-12-21 RX ORDER — MIRTAZAPINE 15 MG/1
15 TABLET, FILM COATED ORAL AT BEDTIME
Qty: 30 TABLET | Refills: 1 | Status: SHIPPED | OUTPATIENT
Start: 2023-12-21

## 2023-12-21 RX ORDER — AMOXICILLIN 250 MG
1 CAPSULE ORAL 2 TIMES DAILY PRN
Qty: 60 TABLET | Refills: 1 | Status: SHIPPED | OUTPATIENT
Start: 2023-12-21

## 2023-12-21 RX ORDER — TRAZODONE HYDROCHLORIDE 150 MG/1
150 TABLET ORAL AT BEDTIME
Qty: 30 TABLET | Refills: 1 | Status: SHIPPED | OUTPATIENT
Start: 2023-12-21

## 2023-12-21 RX ORDER — LANOLIN ALCOHOL/MO/W.PET/CERES
3 CREAM (GRAM) TOPICAL
Qty: 30 TABLET | Refills: 1 | Status: SHIPPED | OUTPATIENT
Start: 2023-12-21

## 2023-12-21 RX ORDER — HYDROXYZINE HYDROCHLORIDE 25 MG/1
25 TABLET, FILM COATED ORAL EVERY 4 HOURS PRN
Qty: 60 TABLET | Refills: 1 | Status: SHIPPED | OUTPATIENT
Start: 2023-12-21

## 2023-12-21 RX ORDER — RISPERIDONE 1 MG/1
1 TABLET ORAL 2 TIMES DAILY
Qty: 60 TABLET | Refills: 1 | Status: SHIPPED | OUTPATIENT
Start: 2023-12-21

## 2023-12-21 RX ORDER — BUPROPION HYDROCHLORIDE 300 MG/1
300 TABLET ORAL EVERY MORNING
Qty: 30 TABLET | Refills: 1 | Status: SHIPPED | OUTPATIENT
Start: 2023-12-22

## 2023-12-21 RX ADMIN — RISPERIDONE 1 MG: 1 TABLET ORAL at 08:40

## 2023-12-21 RX ADMIN — MIRTAZAPINE 15 MG: 15 TABLET, FILM COATED ORAL at 20:11

## 2023-12-21 RX ADMIN — Medication 150 MG: at 20:11

## 2023-12-21 RX ADMIN — BUPROPION HYDROCHLORIDE 300 MG: 300 TABLET, FILM COATED, EXTENDED RELEASE ORAL at 08:40

## 2023-12-21 RX ADMIN — RISPERIDONE 1 MG: 1 TABLET ORAL at 20:11

## 2023-12-21 ASSESSMENT — ACTIVITIES OF DAILY LIVING (ADL)
LAUNDRY: WITH SUPERVISION
DRESS: SCRUBS (BEHAVIORAL HEALTH);INDEPENDENT
ADLS_ACUITY_SCORE: 32
ADLS_ACUITY_SCORE: 31
ADLS_ACUITY_SCORE: 31
ADLS_ACUITY_SCORE: 32
ADLS_ACUITY_SCORE: 31
ADLS_ACUITY_SCORE: 32
ADLS_ACUITY_SCORE: 31
ORAL_HYGIENE: INDEPENDENT
ADLS_ACUITY_SCORE: 32
ADLS_ACUITY_SCORE: 31
HYGIENE/GROOMING: INDEPENDENT
ADLS_ACUITY_SCORE: 31
ADLS_ACUITY_SCORE: 32
ADLS_ACUITY_SCORE: 31

## 2023-12-21 NOTE — CARE PLAN
12/21/23 1303   Group Therapy Session   Group Attendance attended group session   Time Session Began 1115   Time Session Ended 1200   Total Time (minutes) 45   Total # Attendees 5   Group Type life skill;other (see comments)  (OT)   Group Topic Covered balanced lifestyle;coping skills/lifestyle management;other (see comments);relaxation techniques  (sensory)   Group Session Detail OT - Coping: Focus of group was on learning and practice of techniques to help increase vagal tone which can help increase ability for more effective self regulation and overall stress management.   Patient Response/Contribution able to recall/repeat info presented;cooperative with task;discussed personal experience with topic;expressed understanding of topic   Patient Participation Detail Pt initiated coming to group and listened to discussion. She did share a couple of examples when asked specifically. Affect was brighter during the group. When discussing laughing techniques, affect was brighter and she was engaged as others shared some ways to laugh.

## 2023-12-21 NOTE — PLAN OF CARE
"Team Note Due:  Monday    Assessment/Intervention/Current Symtoms and Care Coordination:  Chart review and met with team, discussed pt progress, symptomology, and response to treatment.  Discussed the discharge plan and any potential impediments to discharge.    Tasks Completed:  - [facility] shared with provider in team meeting that pt's brother would like to speak with him regarding discharge and his concerns.  - CTC met with pt. As [facility] approached pt, she greeted CTC with a big smile and reported feeling \"good\". CTC discussed discharge tomorrow and how pt is feeling about this. Pt shared that her brother feels she needs to stay for another week. CTC asked pt what she thinks and pt said her brother feels she needs another week for her medications to work and that she understands what he is saying and that it could be a good idea. Pt appeared to struggle with identifying what she wanted or needed and continued to go back to what her brother thinks would be best when asked what she wanted. CTC discussed IRTS and that she will have support there to ensure that she is safe and the medications are working, as well. [facility] shared that the provider will meet with pt to assess and encouraged pt to share her wishes with the provider. CTC shared that the IRTS would not be able to hold her spot and it is currently unknown when they  may have another opening. Pt reported she was not aware she would lose her spot and shared she does not want to lose it. [facility] encouraged pt to think about what she would like and what would help her feel most supported and safe. Pt shared she will think about it and will share with provider.   - [facility] received email from pt's  cc'ing pt's brother:   Manny Lin,   My name is Maria Esther. We have a mutual client name Kimmy Mendez. I'm her . After you and I spoke over the phone the client's brother Sai, reached out to me with concerns about the client getting discharged this Friday. The family " "is concerned and believes that Kimmy is not ready for a discharge. They're requested two-week extension for close observation to see if the new medication is working for the patient before discharge. They're very worried and would like us to request the extension. I CC'd Sai in this email as he requested.   Please let me know if there is anything I can do to help request the extension.   Thank you,  [facility]  - Provider shared with [facility] that pt will be discharging tomorrow. Provider to complete admissions paperwork and will place discharge order.  - Called to confirm admission with [facility] for tomorrow morning. Pt is confirmed for admission at 10 AM. [facility] will submit request for 9:30 AM discharge Friday.  - Submitted request with care coordinators to arrange transportation for 9:30 AM discharge.  - [facility], pt's  called [facility] and shared that she wants to remove her request for the extension. Maria Esther shared with [facility] that during conversation with provider and pt's brother, she recognized that it was [facility] and not the family that was pushing for the extension and that the pt wanted to be discharged. [facility] shared that pt will be discharged tomorrow morning and [facility] reports that she supports discharge and will continue to support pt with long-term plans following discharge and advocating for pt. [facility] plans to visit pt at IRTS tomorrow or over the weekend.  - Pt asked to meet with [facility]. Pt reports that she has decided to discharge tomorrow morning. Pt reported that she had a conversation with her brother and it was \"tough\" because he is really worried. Pt shared that she does not want to lose her spot but is feeling \"nervous\" about going to the IRTS because she does not know what it will be like there. [facility] provided validation regarding pt's feelings. [facility] shared that pt will continue to work with her  and that her  reported she will likely visit her at the IRTS tomorrow when she admits. [facility] " also expressed hope with the pt that her family will be able to visit there as it is closer to their home. [facility] shared that discharge is currently planned for 9:30 tomorrow morning.    Discharge Plan or Goal:  Pt will discharge to [facility] tomorrow morning. [facility] IRTS is in [place] closer to her family, per pt and family request. Pt has outpatient psychiatry appointment scheduled (AVS updated) with current provider and pt will continue to work with [facility]  on CADI and social security for long-term support.     Barriers to Discharge:  None.      Referral Status:  [facility] submitted the following IRTS Referrals:    [facility] - Submitted 12/18/2023   Phone: [phone]    Fax: [phone]   *Noted preference for [place] location ([facility])  12/18/2023: Mami called to confirm receipt of the referral. Noted they currently have a 1-2 week waitlist. Confirmed that pt is only open to [place] location at this time.  12/19/2023: Calos called to schedule phone screen. Phone screen scheduled for 11 AM this morning. Pt approved for admission. Tentative discharge date of Friday.  12/21/2023: Spoke to Calos at [facility] and confirmed admission date and time. Pt will admit to [facility] Friday, December 22 at 10 AM.     [facility] [place] - Submitted 12/18/2023   Phone: [phone]   Fax: [phone]  12/19/2023: Received voicemail from [facility] confirming receipt of [facility]RTS referral. Pt is on waitlist.    Legal Status:  Pt is voluntary    Contacts:    Medication Management/Psychiatry:   Aston Alejandra MD with [facility] [id number] [place] [address]  Phone [phone] Fax [phone]    :  Name/Organization: [facility] [employer] - DORIAN on file  Phone: [phone]    Family Contacts:   Name/Relationship: Tammie Mendez  Brother (DORIAN on file)  Phone: [phone]    Name/Relationship: Arabella  Cousin  (DORIAN on file)  Phone: [phone]    Name/Relationship: Verena Lopes (No DORIAN on file)  Phone: [phone]     Upcoming Meetings and Dates/Important Information and next steps:  None

## 2023-12-21 NOTE — PLAN OF CARE
Care Coordinator Note(s):    Care Request(s):   Discharge transportation 23 @ 9:30 AM to [facility] IRTS      Care Outcome(s):    Discharge Transportation    Insurance: JOSE AVENDANO     Phone: [phone]    Transportation company: [employer]   Phone: [phone]    Confirmation #: [id number]      Address: [facility]: [address] [place] [address]  Date/ Time: 23 @ 9:30 AM  Call on arrival: [address]: [phone]     Drop off  Address:  [place] [address]   Phone: [phone]        Further Actions:  None.    -Stefany Parra  Adult Behavioral Health Care Coordinator

## 2023-12-21 NOTE — PROGRESS NOTES
"[facility], [place]   Psychiatric Progress Note        Interim History:   The patient's care was discussed with the treatment team during the daily team meeting and/or staff's chart notes were reviewed.  Staff report patient slept for about 6.75 hours. She socialized with select peers or was simply sitting alone in common area. Was compliant with meds. While talking to staff, reported moderate depression and feeling anxious about going to IRTS, but still would like to be discharged. Denied presence of Suicidal ideation. See RN's note below:    \"Patient is quiet and withdrawn in general, but visible in the milieu most of the shift. Affect remains flat and restricted. She is calm and cooperative on approach. Patient reports no changes with mood today. Depression is moderate. She stated she is a little nervous about discharge on Friday, but feels ready and otherwise denies anxiety. Patient denies suicidal ideations or thoughts of self harm. She denies thoughts of harm to others or hallucinations. Contracts for safety. No reports of pain or other medical concerns. Patient ate dinner and has been medication compliant. No behavioral or safety concerns noted.\"      Met with patient: she was seen on 2 occasions - before and after my conversation with her brother Enio. Kimmy confirmed that her mood was, overall, better and denied presence of Suicidal ideation, when asked if she felt happy about discharge, said that she would, rather, go home to her family, but realized that family would like her to become more stable and would not take her home now. She did say that with some anxiety she still was looking forward to going to IRTS. After visit with Kimmy I had about 20 min long phone conversation with her brother Sai. Brother voiced concern that Kimmy was minimizing her symptoms and would like her to stay at this hospital for at least one more weeks: \"to give meds time to kick in\". I " explained that we recognizes his concern that sister would relapse and would get readmitted. Reminded him that sister would like to be discharged and we could not keep her at this hospital against her will. I promised that I would talk to Kimmy again and we would make a decision regarding her discharge together. After talking to Sai I talked to Kimmy again. She appeared to be reluctant to stay at this hospital, especially, after I told her that because of holidays she might have to stay here for a couple of weeks waiting for bed. She denied presence of Suicidal ideation and assured me that she would also like not come to this hospital again. We agreed to reassess her tomorrow and Kimmy had no further questions or concerns.         Medications:      buPROPion  300 mg Oral QAM    mirtazapine  15 mg Oral At Bedtime    risperiDONE  1 mg Oral BID    traZODone  150 mg Oral At Bedtime          Allergies:     Allergies   Allergen Reactions    Dust Mites Other (See Comments)     congestion    Pork-Derived Products Other (See Comments)     Pt does not ingest of these products.           Labs:   No results found for this or any previous visit (from the past 24 hour(s)).       Psychiatric Examination:     /70 (BP Location: Right arm, Patient Position: Sitting, Cuff Size: Adult Regular)   Pulse 84   Temp 97  F (36.1  C) (Temporal)   Resp 16   Wt 56.7 kg (124 lb 14.4 oz)   SpO2 100%   BMI 21.59 kg/m    Weight is 124 lbs 14.4 oz  Body mass index is 21.59 kg/m .    Orthostatic Vitals         Most Recent      Sitting Orthostatic /71 12/20 0900    Sitting Orthostatic Pulse (bpm) 83 12/20 0900    Standing Orthostatic BP 94/69 12/20 0900    Standing Orthostatic Pulse (bpm) 112 12/20 0900          Appearance: awake, alert, dressed in hospital scrubs, and appeared as age stated  Attitude:  cooperative  Eye Contact: fair  Mood: less anxious and sad   Affect:  constricted mobility  Speech:  more verbal  today  Psychomotor Behavior: no evidence of tardive dyskinesia, dystonia, or tics and physical retardation  Throught Process:  linear  Associations:  no loose associations  Thought Content:   denies presence of active or passive suicidal, Homicidal or psychotic symptoms.  Insight:  partial  Judgement:  limited, improving  Oriented to:  time, person, and place  Attention Span and Concentration: better  Recent and Remote Memory:  improving    Clinical Global Impressions  First: 7/4 12/11/2023      Most recent:            Precautions:     Behavioral Orders   Procedures    Code 1 - Restrict to Unit    Routine Programming     As clinically indicated    Status 15     Every 15 minutes.    Suicide precautions     Patients on Suicide Precautions should have a Combination Diet ordered that includes a Diet selection(s) AND a Behavioral Tray selection for Safe Tray - with utensils, or Safe Tray - NO utensils            DIagnoses:     Bipolar Disorder Type 1 current episode depressed, severe.         Plan:     Was started on Risperdal, Remeron and Wellbutrin XL on 12/11/23. Dose of Wellbutrin was increased 4 days ago. No medication changes today 12/20/2023. Will continue to provide support and structure. Will continue to evaluate if patient is ready for discharge to [facility] this Friday, See discussion above.     Total time spent was 53 minutes. Over 50% of times was spent counseling and coordination of care regarding coping skills, medication and discharge planning.

## 2023-12-21 NOTE — CARE PLAN
"   12/20/23 1900   Group Therapy Session   Group Attendance attended group session   Time Session Began 1700   Time Session Ended 1745   Total Time (minutes) 45   Total # Attendees 4   Group Type expressive therapy   Group Topic Covered emotions/expression   Patient Response/Contribution cooperative with task     Art Therapy directive is to create art in response to one of several watercolor painting prompts with the themes of personal growth and resiliency.  Goals of directive: emotional expression, emotional regulation, mindfulness, identifying personal strengths and goals, media exploration.  Pt was a quiet, engaged participant, focused on task for the majority of group. Pt finished painting and briefly verbally processed with group.  Pt created a painting in response to \"growth.\" Pt painted a flower design and rainbow to express personal growth.   Pt's mood was calm, flat/blunted affect.  "

## 2023-12-21 NOTE — PLAN OF CARE
Problem: Adult Behavioral Health Plan of Care  Goal: Develops/Participates in Therapeutic [place] to Support Successful Transition  Outcome: Progressing         Pt has spent this shift attending unit programming, walking in the hallways and making telephone calls. Pt reports she is discharging to IRTS tomorrow and is nervous about this, states she went to an all female IRTS previously and she is anxious that this program is co-ed, pt declines PRN for anxiety and reports she is walking in the hallways which helps. Pt reports feeling safe to leave, denies SI/SIB and agrees that she is going to a safe environment with staff present to support her. Pt denies HI/A/VH at this time, affect is blunted but does brighten somewhat on approach, able to make joke during medication administration this morning. Pt has been medication compliant, reports she occasionally feels like her hands are shaky, no tremor observed. Pt denies pain, reports sleep is adequate. Will continue to monitor and support plan of care.

## 2023-12-21 NOTE — PROGRESS NOTES
"[facility], [place]   Psychiatric Progress Note        Interim History:   The patient's care was discussed with the treatment team during the daily team meeting and/or staff's chart notes were reviewed.  Staff report patient slept OK and was compliant with meds, unit rules and care. While talking to staff, reported feeling anxious about going to IRTS, but still would like to be discharged. Denied presence of Suicidal ideation. See RN's note below:    \"Patient is up on the unit, pleasant, attends groups and does not socialize with peers. Patient denies any pain, anxiety,SI/HI and depression. Patient alert and oriented to person, place, and time, appeared well groomed. Pt calm, blunted affect, good eye contact, cooperative, and medication compliant. No behaviors noted, and no PRN's given this shift.\"      Met with patient: she was seen in the conference room during call with her brother and later patient's CM joined our call. We had pretty long and at times tough conversation with brother who insisted that Kimmy was not ready for discharge and insisted that she would stay at this hospital for one or two more weeks \"for observation to see if new medication works\". During our conversation I made it very clear to all participants that Kimmy at this hospital was and is a voluntary patient and could not be kept at this hospital against her will. I also explained that if patient doesn't go to her IRTS in [place], [place] tomorrow, her bed would be given to someone else and in light of holiday season she could spend 2 or even more weeks at this hospital waiting for another bed and might eventually, have to go not to IRTS in [place] which is close to her family, but to another IRTS which would have a bed by then. Kimmy in turn, made it clear that she could spend an extra week at this hospital, but would not want to stay for 2 or more weeks and would not like to lose her bed at [facility] in [place] because during her previous placement into more remote [facility] family was unable to visit her here. During our conversation I had to remind brother that he could not threaten to Kimmy to never talk to her again if she decided to leave and be discharged to [facility] tomorrow after he did so. We ended this meeting by agreement that Kimmy and her brother would talk again and come to a decision on what to do. Kimmy later on approached me and said that she made a decision to be discharged tomorrow.          Medications:      buPROPion  300 mg Oral QAM    mirtazapine  15 mg Oral At Bedtime    risperiDONE  1 mg Oral BID    traZODone  150 mg Oral At Bedtime          Allergies:     Allergies   Allergen Reactions    Dust Mites Other (See Comments)     congestion    Pork-Derived Products Other (See Comments)     Pt does not ingest of these products.           Labs:   No results found for this or any previous visit (from the past 24 hour(s)).       Psychiatric Examination:     /78   Pulse 77   Temp 98.6  F (37  C) (Temporal)   Resp 16   Wt 56.4 kg (124 lb 4.8 oz)   SpO2 100%   BMI 21.48 kg/m    Weight is 124 lbs 4.8 oz  Body mass index is 21.48 kg/m .    Orthostatic Vitals         Most Recent      Sitting Orthostatic /71 12/20 0900    Sitting Orthostatic Pulse (bpm) 83 12/20 0900    Standing Orthostatic /71 12/21 0901    Standing Orthostatic Pulse (bpm) 113 12/21 0901           Appearance: awake, alert, dressed in hospital scrubs, and appeared as age stated  Attitude:  cooperative  Eye Contact: fair  Mood: less anxious and sad   Affect:  constricted mobility  Speech:  more verbal today  Psychomotor Behavior: no evidence of tardive dyskinesia, dystonia, or tics and physical retardation  Throught Process:  linear  Associations:  no loose associations  Thought Content:   denies presence of active or passive suicidal, Homicidal or psychotic symptoms.  Insight:  partial  Judgement:  limited,  improving  Oriented to:  time, person, and place  Attention Span and Concentration: better  Recent and Remote Memory:  improving    Clinical Global Impressions  First: 7/4 12/11/2023      Most recent:            Precautions:     Behavioral Orders   Procedures    Code 1 - Restrict to Unit    Routine Programming     As clinically indicated    Status 15     Every 15 minutes.    Suicide precautions     Patients on Suicide Precautions should have a Combination Diet ordered that includes a Diet selection(s) AND a Behavioral Tray selection for Safe Tray - with utensils, or Safe Tray - NO utensils            DIagnoses:     Bipolar Disorder Type 1 current episode depressed, severe.         Plan:     Was started on Risperdal, Remeron and Wellbutrin XL on 12/11/23. Dose of Wellbutrin was increased 5 days ago. No medication changes today 12/21/2023. I reconciled meds and sent them to our discharge pharmacy and filled out IRTS forms. Will continue to provide support and structure. Will continue to evaluate if patient is ready for discharge to IRTS this Friday, See discussion above.     Total time spent was 65 minutes. Over 50% of times was spent counseling and coordination of care regarding coping skills, medication and discharge planning.         DISPLAY PLAN FREE TEXT DISPLAY PLAN FREE TEXT DISPLAY PLAN FREE TEXT DISPLAY PLAN FREE TEXT DISPLAY PLAN FREE TEXT DISPLAY PLAN FREE TEXT DISPLAY PLAN FREE TEXT DISPLAY PLAN FREE TEXT DISPLAY PLAN FREE TEXT

## 2023-12-21 NOTE — CARE PLAN
12/21/23 1246   Group Therapy Session   Group Attendance attended group session   Time Session Began 1015   Time Session Ended 1100   Total Time (minutes) 20 - no charge   Total # Attendees 6   Group Type life skill;task skill;other (see comments)  (OT)   Group Topic Covered balanced lifestyle;coping skills/lifestyle management;leisure exploration/use of leisure time;structured socialization   Group Session Detail OT Clinic: Activity group for creative expression, promoting autonomy, building self worth, coping with stress and symptoms, and an opportunity to exercise cognitive skills (I.e. initiation, planning, organizing, sequencing, sustained attention, follow through, and overall self awareness).   Patient Response/Contribution able to recall/repeat info presented;cooperative with task;discussed personal experience with topic;expressed understanding of topic   Patient Participation Detail Pt initiated coming to group and waited patiently for staff to assist her with engagement in an activity. She made a choice of a simple structured creative activity and was able to choose her own design. Worked quietly until called out to see provider.

## 2023-12-21 NOTE — PLAN OF CARE
Goal Outcome Evaluation:    Patient is up on the unit, pleasant, attends groups and does not socialize with peers. Patient denies any pain, anxiety,SI/HI and depression. Patient alert and oriented to person, place, and time, appeared well groomed. Pt calm, blunted affect, good eye contact, cooperative, and medication compliant. No behaviors noted, and no PRN's given this shift.  Vitals./70 (BP Location: Right arm, Patient Position: Sitting, Cuff Size: Adult Regular)   Pulse 84   Temp 97  F (36.1  C) (Temporal)   Resp 16   Wt 56.7 kg (124 lb 14.4 oz)   SpO2 100%   BMI 21.59 kg/m

## 2023-12-22 VITALS
WEIGHT: 124.3 LBS | SYSTOLIC BLOOD PRESSURE: 94 MMHG | HEART RATE: 96 BPM | OXYGEN SATURATION: 99 % | DIASTOLIC BLOOD PRESSURE: 70 MMHG | RESPIRATION RATE: 18 BRPM | BODY MASS INDEX: 21.48 KG/M2 | TEMPERATURE: 97.8 F

## 2023-12-22 PROCEDURE — 99239 HOSP IP/OBS DSCHRG MGMT >30: CPT | Performed by: PSYCHIATRY & NEUROLOGY

## 2023-12-22 PROCEDURE — 250N000013 HC RX MED GY IP 250 OP 250 PS 637: Performed by: NURSE PRACTITIONER

## 2023-12-22 PROCEDURE — 250N000013 HC RX MED GY IP 250 OP 250 PS 637: Performed by: PSYCHIATRY & NEUROLOGY

## 2023-12-22 RX ADMIN — BUPROPION HYDROCHLORIDE 300 MG: 300 TABLET, FILM COATED, EXTENDED RELEASE ORAL at 08:30

## 2023-12-22 RX ADMIN — RISPERIDONE 1 MG: 1 TABLET ORAL at 08:30

## 2023-12-22 ASSESSMENT — ACTIVITIES OF DAILY LIVING (ADL)
ADLS_ACUITY_SCORE: 32

## 2023-12-22 NOTE — PLAN OF CARE
Goal Outcome Evaluation:    Patient is pleasant, calm and cooperative. Patient brightens on approach, denies having pain and reports some anxiety related to discharging tomorrow. Patient is more concern about the location not being female only but is willing to give a try.  Pt on suicidal precautions, no behaviors observed. Pt contracts for safety. Pt observed in unit milieu watching movies, does not engage with peers, but appropriate with peers and staff. Pt demonstrates ability to communicate needs to staff. No behaviors noted. Will continue to monitor behavior and encourage engagement. Patient is med compliant, and offers no other concerns. Staff will continue to monitor.  Vitals./78   Pulse 77   Temp 98.6  F (37  C) (Temporal)   Resp 16   Wt 56.4 kg (124 lb 4.8 oz)   SpO2 100%   BMI 21.48 kg/m

## 2023-12-22 NOTE — PROGRESS NOTES
"Individual Therapy Note      Date of Service: December 21, 2023    Patient: Kimmy goes by \"Kimmy,\" uses she/her pronouns    Individuals Present: Kimmy Anna Marie Burciaga, LMFT , ATR- BC  Session start: 1:00 pm  Session end: 1:25 pm  Session duration in minutes: 25    Patient Active Problem List   Diagnosis    Gestational diabetes    Depression affecting pregnancy, antepartum    Poor weight gain of pregnancy, third trimester    Gestational diabetes mellitus, antepartum    Depression with suicidal ideation    Seizure-like activity (H)    Seizure (H)    Recurrent major depression (H24)    ROWAN (generalized anxiety disorder)    Bipolar 1 disorder, manic, moderate (H)    Acute respiratory failure, unspecified whether with hypoxia or hypercapnia (H)    Overdose, intentional self-harm, initial encounter (H)    Depression         Modality Used:Person Centered, Brief Therapy, and Solution Focused    Goals: Discharge tomorrwo, heikes meeting with her brother where he was aggressive but she seemed to feel she spoke to him after the meeting and she said the meeeting was hard, but she would like to go to [facility] , even though she is anxious. She wants to be close to sister ( cousin ) and have time when she has passes to go see with them, have meals with them etc.       Mental Status Exam:   Attitude: cooperative and guarded  Eye Contact: fair  Mood: anxious  Affect: appropriate and in normal range, intensity is blunted, and intensity is flat  Speech: paucity of speech  Psychomotor Behavior: no evidence of tardive dyskinesia, dystonia, or tics  Thought Process:  logical, goal oriented, and circumstantial  Associations: no loose associations  Thought Content: no evidence of suicidal ideation or homicidal ideation  Insight: fair  Judgement: fair  Attention Span and Concentration: intact    Pt progress: Pt is ready to discharge. Writer and she looked up the I[facility] and read about number of clients and programming. Their website didn't " have a lot of information, but there was a picture of the buidling which seemed to help her anxiety.    Treatment Objective(s) Addressed:   The focus of this session was on identifying and practicing coping strategies, processing feelings related to discharge to [facility] near family in [place], building self-esteem, and identifying additional supports     Progress Towards Goals and Assessment of Patient:   Patient is making progress towards treatment goals as evidenced by positive conversation, expression of readiness to go to I[facility]S even though having normal anxiety about a place she has not been to..       Therapeutic Intervention(s):   Provided active listening, unconditional positive regard, and validation. She is comfortable with her decision for dischage and happy to be able to see family.    Plan/next step: discharge to [facility] at 9:30 am tomorrow friday

## 2023-12-22 NOTE — PLAN OF CARE
Team Note Due:  Monday    Assessment/Intervention/Current Symtoms and Care Coordination:  Chart review and met with team, discussed pt progress, symptomology, and response to treatment.  Discussed the discharge plan and any potential impediments to discharge.    Tasks Completed:  - CTC confirmed transportation to the I[facility] this morning.   - CTC spoke to pt's  and provided address to the IRTS location.     Discharge Plan or Goal:  Pt discharged to [facility] this morning. [facility] is in [place] closer to her family, per pt and family request. Pt has outpatient psychiatry appointment scheduled (AVS updated) with current provider and pt will continue to work with [facility]  on CADI and social security for long-term support.     Barriers to Discharge:  None.      Referral Status:  [facility] submitted the following IRTS Referrals:    [facility] - Submitted 12/18/2023   Phone: [phone]    Fax: [phone]   *Noted preference for [place] location ([facility])  12/18/2023: Mami called to confirm receipt of the referral. Noted they currently have a 1-2 week waitlist. Confirmed that pt is only open to [facility] at this time.  12/19/2023: Calos called to schedule phone screen. Phone screen scheduled for 11 AM this morning. Pt approved for admission. Tentative discharge date of Friday.  12/21/2023: Spoke to Calos at [facility] and confirmed admission date and time. Pt will admit to [facility] Friday, December 22 at 10 AM.     [facility] - Submitted 12/18/2023   Phone: [phone]   Fax: [phone]  12/19/2023: Received voicemail from [facility] confirming receipt of [facility] referral. Pt is on waitlist.    Legal Status:  Pt is voluntary    Contacts:    Medication Management/Psychiatry:   Aston Alejandra MD with [facility] [address] [place] [address] [address] [place] [address]  Phone [phone] Fax [phone]    Case  Manager:  Name/Organization: Maria Esther Cr  [employer] - DORIAN on file  Phone: [phone]    Family Contacts:   Name/Relationship: Tammie Mendez  Brother (DORIAN on file)  Phone: [phone]    Name/Relationship: Arabella  Cousin (DORIAN on file)  Phone: [phone]    Name/Relationship: Verena  Niece (No DORIAN on file)  Phone: [phone]     Upcoming Meetings and Dates/Important Information and next steps:  None

## 2023-12-22 NOTE — PLAN OF CARE
Night Nursing Note    12/21/23 - 12/22/23      Kimmy was in bed at beginning of shift and appeared asleep.  Monitored q15 mins for safety.    Appeared to sleep majority of night without difficulty.  Planned discharge for today.    Continue to monitor, offer support and reassurance per care plan.    Slept 7 hrs.

## 2023-12-22 NOTE — PROGRESS NOTES
Discharge orders are received.  Reviewed and discussed discharge instructions, follow up care, future appointments and medication administration, discharge meds sent. Patient denies thoughts of SI, SIB or hallucinations.  Verbalized understanding of discharge instructions. Received personal belongings.  All forms are signed.  Patient picked up by cab @0964 to IRTS facility, pt reports feeling nervous and excited about leaving. Pt. Pleasant, medication compliance and contracted for safety and denies pain.

## 2023-12-22 NOTE — DISCHARGE SUMMARY
"Psychiatric Discharge Summary    Kimmy Mendez MRN# [id number]   Age: 38 year old YOB: 1985     Date of Admission:  12/9/2023  Date of Discharge:  12/22/2023  Admitting Physician:  Cisco Hardy MD  Discharge Physician:  Cisco Hardy MD (Contact: [phone])         Event Leading to Hospitalization:     CHIEF COMPLAINT  \"Overdose my medication\"    HPI at initial presentation per Internal Medicine transfer notes and [facility] Psych:   Kimmy Mendez is a 38 year old woman with history of prior suicidal ideation and postpartum depression who came to the ER after her mother found her unresponsive at home with multiple bottles of Olanzapine and Vitamin D lying around her.  She has a medical history including anxiety,depression, post-partum depression and extreme bereavement related to the death of a child. She was intubated for airway protection and was extubated 12/4/2023. She was evaluated by consult psychiatry service who recommend transfer to inpatient psychiatry unit. Patient is medically stable for transfer. Doc-to-doc sign off was performed 12/8/2023 with Dr. Hardy.     C/L Psychiatry:  Ms. Kimmy Mendez is a 38 year old woman seen for psychiatry consult after overdose on Olanzapine.  She was extubated yesterday.  History:  She was hospitalized for jennifer on [facility] in July and was discharged to an [facility], [facility].  After leaving the [facility] she changed doctors and had decreased doses of meds.  Increasing depression developed.     According to her brother Juvenciovijaya:  Family history positive for a mother with \"bipolar schizophrenia\" (and was the cause of patient's son's death.  He would like her to have a 3-6 month stay in a residential setting after the psych hospital stay..     Today, patient reports history as above.  Pt said whenever she gets her period she acts more, and she was on her period and took all of her medication.  Pt said every time it comes it changes her thinking.  Pt " "said she feels very sad an down and this last for several weeks.  Pt takes olanzapine and lorazepam, and pt was taking them every day while at home.  Pt said there was a medication change lately and that made her mood go down, but she isn't sure what changed.      PSYCHIATRIC REVIEW OF SYMPTOMS  Sleep: \"I'm not sleeping\"                                             Appetite/Weight change: lost appetite  Libido: low                                             Energy level: low  Depression: Endorses depressed mood, loss of interest,  feelings of worthlessness, diminished concentration, indecisiveness, recurrent suicidal ideation without a specific plan  Laura: Denies sleepless periods with abundant energy, racing thoughts, flight of ideas, grandiosity, hyper-religiousity, or increased engagement in pleasurable activities. There has been no increase in risk taking behavior.   Psychotic symptoms: Denies hallucinations, paranoia, no delusional content expressed                 Anxiety/panic attacks: Endorses excessive anxiety and worry, inability to manage the feelings, restlessness, feeling on edge, easily fatigued, difficulty concentrating, irritability, muscle tension. Denies panic attacks.   OCD: Denies obsessions, compulsions, or rituals.                  PTSD: Denies flashbacks/nightmares  Maltreatment and Abuse History: Denies                  Eating disorder: Denies previous restricting, binging, purging   Impulse control problems: Denies   ADHD: Denies previous diagnosis   Psychosocial stressors: Financial,                                     Current suicidal ideation: Denies                        History of Suicide Attempts: Yes, current only  Self Injurious Behaviors: Denies  History of Self-injurious behavior: Denies  Property destruction: Denies  Thoughts/threats to harm others: Denies   History of violence: Denies   Access to weapons: Denies         Able to contract for safety on the alexander: endorses safety " on unit   Ability to complete daily tasks (i.e. Laundry, dishes): yes         ADMISSION MEDICATIONS:    Prescriptions Prior to Admission           Medications Prior to Admission   Medication Sig Dispense Refill Last Dose    fish oil-omega-3 fatty acids 1000 MG capsule Take 1 capsule (1 g) by mouth daily 30 capsule 9 Past Week    pantoprazole (PROTONIX) 40 MG EC tablet Take 1 tablet (40 mg) by mouth every morning (before breakfast)     Unknown            CHEMICAL HEALTH HISTORY  Patient denies past or current issues with any substances.       See Admission note by by admitting physician/nurse-practitioner Dr. Maldonado for additional details.          DIagnoses:     Bipolar Disorder Type 1 current episode depressed          Labs:      Latest Reference Range & Units Most Recent 12/08/23 07:44 12/09/23 08:38   Sodium 135 - 145 mmol/L 140  12/8/23 07:44 140    Sodium POCT 133 - 144 mmol/L 135  12/2/23 16:16     Potassium 3.4 - 5.3 mmol/L 4.1  12/8/23 07:44 4.1    Potassium POCT 3.4 - 5.3 mmol/L 3.6  12/2/23 16:16     Chloride 98 - 107 mmol/L 103  12/8/23 07:44 103    CHLORIDE POCT 94 - 109 mmol/L 99  12/2/23 16:16     Carbon Dioxide (CO2) 22 - 29 mmol/L 23  12/8/23 07:44 23    TOTAL CO2 POCT 20 - 32 mmol/L 23  12/2/23 16:16     Urea Nitrogen 6.0 - 20.0 mg/dL 7.4  12/8/23 07:44 7.4    UREA NITROGEN POCT 7 - 30 mg/dL 8  12/2/23 16:16     Creatinine 0.51 - 0.95 mg/dL 0.47 (L)  12/8/23 07:44 0.47 (L)    Creatinine POCT 0.5 - 1.0 mg/dL 0.5  12/2/23 16:16     GFR Estimate >60 mL/min/1.73m2 >90  12/8/23 07:44 >90    Calcium 8.6 - 10.0 mg/dL 9.4  12/8/23 07:44 9.4    Anion Gap 7 - 15 mmol/L 14  12/8/23 07:44 14    Anion Gap POCT 3.0 - 14.0 mmol/L 18.0 (H)  12/2/23 16:16     Magnesium 1.7 - 2.3 mg/dL 2.1  12/8/23 07:44 2.1    Phosphorus 2.5 - 4.5 mg/dL 3.5  12/3/23 05:42     Albumin 3.5 - 5.2 g/dL 3.5  12/3/23 05:42     Protein Total 6.4 - 8.3 g/dL 5.6 (L)  12/3/23 05:42     Alkaline Phosphatase 40 - 150 U/L 54  12/3/23 05:42      ALT 0 - 50 U/L 11  12/3/23 05:42     AST 0 - 45 U/L 28  12/3/23 05:42     Bilirubin Total <=1.2 mg/dL 0.4  12/3/23 05:42     Calcium, Ionized Whole Blood POCT 4.4 - 5.2 mg/dL 5.1  12/2/23 16:16     Cholesterol <200 mg/dL 150  12/9/23 08:38  150   CK Total 26 - 192 U/L 39  12/2/23 16:47     Glucose 70 - 99 mg/dL 120 (H)  12/8/23 07:44 120 (H)    HCG Qualitative Serum Negative  Negative  7/25/23 18:43     HCG Qualitative POCT Negative, Indeterminate  Negative  12/2/23 16:16     HDL Cholesterol >=50 mg/dL 42 (L)  12/9/23 08:38  42 (L)   Hemoglobin A1C <5.7 % 5.7 (H)  12/9/23 08:38  5.7 (H)   IGE 0 - 114 kU/L 5  8/24/23 10:38     Lactic Acid 0.7 - 2.0 mmol/L 1.4  12/3/23 05:42     Lactic Acid POCT <=2.0 mmol/L 3.4 (H)  12/2/23 18:17     LDL Cholesterol Calculated <=100 mg/dL 93  12/9/23 08:38  93   Non HDL Cholesterol <130 mg/dL 108  12/9/23 08:38  108   T4 Free 0.90 - 1.70 ng/dL 1.36  12/3/23 05:42     Triglycerides <150 mg/dL 74  12/9/23 08:38  74   TSH 0.30 - 4.20 uIU/mL 7.71 (H)  12/3/23 05:42     GLUCOSE BY METER POCT 70 - 99 mg/dL 137 (H)  12/6/23 08:00     Glucose Whole Blood POCT 70 - 99 mg/dL 153 (H)  12/2/23 16:16     Allergen [place] <0.10 KU(A)/L <0.10  8/24/23 10:38     Allergen Cashew <0.10 KU(A)/L <0.10  8/24/23 10:38     Allergen Egg White <0.10 KU(A)/L <0.10  8/24/23 10:38     Allergen Fish(Cod) <0.10 KU(A)/L <0.10  8/24/23 10:38     Allergen Milk <0.10 KU(A)/L <0.10  8/24/23 10:38     Allergen Peanut <0.10 KU(A)/L <0.10  8/24/23 10:38     Allergen Scallop <0.10 KU(A)/L <0.10  8/24/23 10:38     Allergen Shrimp <0.10 KU(A)/L <0.10  8/24/23 10:38     Allergen Soybean IgE <0.10 KU(A)/L <0.10  8/24/23 10:38     Allergen Tuna <0.10 KU(A)/L <0.10  8/24/23 10:38     Allergen Wheat <0.10 KU(A)/L <0.10  8/24/23 10:38     Allergen, Hazelnut <0.10 KU(A)/L <0.10  8/24/23 10:38     Allergen, [place] <0.10 KU(A)/L <0.10  8/24/23 10:38     Allergen, Sesame Seed <0.10 KU(A)/L <0.10  8/24/23 10:38     Allergen, [place]  <0.10 KU(A)/L <0.10  8/24/23 10:38     pH Venous POCT 7.32 - 7.43  7.33  12/2/23 18:17     pCO2 Venous POCT 40 - 50 mm Hg 42  12/2/23 18:17     pO2 Venous POCT 25 - 47 mm Hg 45  12/2/23 18:17     O2 Sat, Venous POCT 94 - 100 % 77 (L)  12/2/23 18:17     Bicarbonate Venous POCT 21 - 28 mmol/L 22  12/2/23 18:17     WBC 4.0 - 11.0 10e3/uL 11.3 (H)  12/4/23 05:18     Hemoglobin 11.7 - 15.7 g/dL 11.8  12/4/23 05:18     Hemoglobin POCT 11.7 - 15.7 g/dL 14.3  12/2/23 16:16     Hematocrit 35.0 - 47.0 % 33.6 (L)  12/4/23 05:18     Hematocrit POCT 35 - 47 % 42  12/2/23 16:16     Platelet Count 150 - 450 10e3/uL 216  12/4/23 05:18     RBC Count 3.80 - 5.20 10e6/uL 4.03  12/4/23 05:18     MCV 78 - 100 fL 83  12/4/23 05:18     MCH 26.5 - 33.0 pg 29.3  12/4/23 05:18     MCHC 31.5 - 36.5 g/dL 35.1  12/4/23 05:18     RDW 10.0 - 15.0 % 13.8  12/4/23 05:18     % Neutrophils % 88  12/2/23 16:47     % Lymphocytes % 7  12/2/23 16:47     % Monocytes % 5  12/2/23 16:47     % Eosinophils % 0  12/2/23 16:47     % Basophils % 0  12/2/23 16:47     Absolute Basophils 0.0 - 0.2 10e3/uL 0.0  12/2/23 16:47     Absolute Eosinophils 0.0 - 0.7 10e3/uL 0.0  12/2/23 16:47     Absolute Immature Granulocytes <=0.4 10e3/uL 0.0  12/2/23 16:47     Absolute Lymphocytes 0.8 - 5.3 10e3/uL 0.7 (L)  12/2/23 16:47     Absolute Monocytes 0.0 - 1.3 10e3/uL 0.5  12/2/23 16:47     % Immature Granulocytes % 0  12/2/23 16:47     Absolute Neutrophils 1.6 - 8.3 10e3/uL 9.2 (H)  12/2/23 16:47     Absolute NRBCs 10e3/uL 0.0  12/2/23 16:47     NRBCs per 100 WBC <1 /100 0  12/2/23 16:47     RBC Morphology  Confirmed RBC Indices  12/3/23 05:42     Platelet Morphology Automated Count Confirmed. Platelet morphology is normal.  Automated Count Confirmed. Platelet morphology is normal.  12/3/23 05:42     Color Urine Colorless, Straw, Light Yellow, Yellow  Yellow  12/2/23 17:27     Appearance Urine Clear  Slightly Cloudy !  12/2/23 17:27     Glucose Urine Negative mg/dL  Negative  12/2/23 17:27     Bilirubin Urine Negative  Negative  12/2/23 17:27     Ketones Urine Negative mg/dL 40 !  12/2/23 17:27     Specific Gravity Urine 1.003 - 1.035  1.019  12/2/23 17:27     pH Urine 5.0 - 7.0  7.0  12/2/23 17:27     Protein Albumin Urine Negative mg/dL 20 !  12/2/23 17:27     Urobilinogen mg/dL Normal, 2.0 mg/dL Normal  12/2/23 17:27     Nitrite Urine Negative  Negative  12/2/23 17:27     Blood Urine Negative  Negative  12/2/23 17:27     Leukocyte Esterase Urine Negative  Negative  12/2/23 17:27     WBC Urine <=5 /HPF 4  12/2/23 17:27     RBC Urine <=2 /HPF 2  12/2/23 17:27     Bacteria Urine None Seen /HPF Moderate !  12/2/23 17:27     WBC Clumps None Seen /HPF Present !  12/2/23 17:27     Yeast Urine None Seen /HPF Moderate !  12/2/23 17:27     Squamous Epithelial /HPF Urine <=1 /HPF <1  12/2/23 17:27     Mucus Urine None Seen /LPF Present !  12/2/23 17:27     BLOOD CULTURE  Rpt  12/2/23 19:03     C Difficile Toxin B by PCR Negative  Negative  12/6/23 18:58     SARS CoV2 PCR Negative  Negative  12/7/23 05:33     Influenza A Negative  Negative  12/2/23 17:46     Influenza B Negative  Negative  12/2/23 17:46     Resp Syncytial Virus Negative  Negative  12/2/23 17:46     Salicylate mg/dL <0.3  12/2/23 16:47     Amphetamine Qual Urine Screen Negative  Screen Negative  12/2/23 17:27     Fentanyl Qual Urine Screen Negative  Screen Negative  12/2/23 17:27     Cocaine Urine Screen Negative  Screen Negative  12/2/23 17:27     Benzodiazepine Urine Screen Negative  Screen Positive !  12/2/23 17:27     Opiates Qualitative Urine Screen Negative  Screen Negative  12/2/23 17:27     PCP Urine Screen Negative  Screen Negative  12/2/23 17:27     Cannabinoids Qual Urine Screen Negative  Screen Negative  12/2/23 17:27     Barbiturates Qual Urine Screen Negative  Screen Negative  12/2/23 17:27     XR CHEST PORT 1 VIEW  Rpt  12/2/23 20:21     CT HEAD W/O CONTRAST  Rpt  12/2/23 18:12     MR BRAIN W/O & W  CONTRAST  Rpt  6/23/23 16:20     EKG 12-LEAD, TRACING ONLY  Rpt  12/2/23 18:18     Alcohol ethyl <=0.01 g/dL <0.01  12/2/23 16:47     EEG VIDEO 12-26 HR UNMONITORED  Rpt  6/23/23 13:35     EEG VIDEO 2-12 HRS CONTINUOUS MONITORING  Rpt  9/5/23 15:32     Acetaminophen 10.0 - 30.0 ug/mL <5.0 (L)  12/2/23 16:47     (L): Data is abnormally low  (H): Data is abnormally high  !: Data is abnormal  Rpt: View report in Results Review for more information         Consults:     Was seen by nutritional services, Malnutrition Diagnosis: Patient does not meet two of the above criteria necessary for diagnosing malnutrition          Hospital Course:   Kimmy Mendez was admitted to [facility] with attending Cisco Hardy MD as a voluntary patient. The patient was placed under status 15 (15 minute checks) to ensure patient safety. Patient was put on suicide precautions. She recognized this provider from being under my care only few months ago. Appeared to be detached and sad. Stated that after discharge from under my care she went to [facility] and completed program there, but felt more and more depressed and lonely as [facility] was far away and family could not visit her there. She openly admitted to overdose, but stated that it was rather an impulsive act. Some of patient's PTA meds were restarted: she was put on Risperdal for questionable psychotic symptoms and for mood stabilization. She denied having side effects. She was followed by me in the beginning and in the end of hospitalization and for a few days when I was off by other providers. Andrea always been cooperative with her meds, stated that she would be more compliant with meds after discharge as she believed that they were helping her which was a positive sign. Patient's family was contacted and they informed us that they would not take Kimmy home as they didn't feel she was safe there. We started working on placing patient to another [facility] program while we continued to work on her  "meds. Kimmy continued to remain with blunted affect, but reported improved mood and daily denied Suicidal ideation. It appeared that she gave different information to different people, at times telling her family that she felt no different now compared with her admission and at times that she was feeling significantly better. Subjectively, she appeared to be more organized, taking showers on average every other day, going to groups regularly and eating her meals. Patient indicated that she would like to go to a program closest to her family, so, that they could visit her there. She was interviewed and accepted to [facility] program in [place], [place]. During last days of patient's hospitalization myself and [facility] had regular phone discussions with patient's brother Sai who lived in [place], but was very involved in her care and insisted that she should stay at this facility for a longer time \"to be under observation to see if meds kick in\". Kimmy herself, felt that she achieved enough stability and could be discharged back to community. She was also afraid that if she lost bed at [facility], she might have to go to another [facility] further away and wait for weeks for [facility] bed to become available. In conversation with the patient, her brother, LISSETH and Kimmy's CM we agreed on her discharge to [facility] 12/22/2023. On the day of discharge she reported feeling anxious, but denied presence of Suicidal ideation, Homicidal thoughts, psychotic symptoms.       Kimmy Mendez did not participate in groups and was visible in the milieu.     The patient's symptoms of depression improved.     Kimmy Mendez was released to [facility]. At the time of discharge Kimmy Mendez was determined to not be a danger to herself or others.          Discharge Medications:     Discharge Medication List as of 12/22/2023  8:37 AM        START taking these medications    Details   buPROPion (WELLBUTRIN XL) 300 MG 24 hr tablet Take 1 tablet (300 mg) by mouth every " morning, Disp-30 tablet, R-1, E-Prescribe      hydrOXYzine HCl (ATARAX) 25 MG tablet Take 1 tablet (25 mg) by mouth every 4 hours as needed for anxiety, Disp-60 tablet, R-1, E-Prescribe      melatonin 3 MG tablet Take 1 tablet (3 mg) by mouth nightly as needed for sleep, Disp-30 tablet, R-1, E-Prescribe      mirtazapine (REMERON) 15 MG tablet Take 1 tablet (15 mg) by mouth at bedtime, Disp-30 tablet, R-1, E-Prescribe      OLANZapine (ZYPREXA) 10 MG tablet Take 1 tablet (10 mg) by mouth 3 times daily as needed (agitation), Disp-45 tablet, R-1, E-Prescribe      risperiDONE (RISPERDAL) 1 MG tablet Take 1 tablet (1 mg) by mouth 2 times daily, Disp-60 tablet, R-1, E-Prescribe      senna-docusate (SENOKOT-S/PERICOLACE) 8.6-50 MG tablet Take 1 tablet by mouth 2 times daily as needed for constipation, Disp-60 tablet, R-1, E-Prescribe      traZODone (DESYREL) 150 MG tablet Take 1 tablet (150 mg) by mouth at bedtime, Disp-30 tablet, R-1, E-Prescribe           CONTINUE these medications which have NOT CHANGED    Details   fish oil-omega-3 fatty acids 1000 MG capsule Take 1 capsule (1 g) by mouth daily, Disp-30 capsule, R-9, E-Prescribe           STOP taking these medications       pantoprazole (PROTONIX) 40 MG EC tablet Comments:   Reason for Stopping:                    Psychiatric Examination:   Appearance:  awake, alert and dressed in hospital scrubs  Attitude:  cooperative  Eye Contact:  fair  Mood:  anxious and better  Affect:  constricted mobility  Speech:  increased speech latency and decreased prosody  Psychomotor Behavior:  no evidence of tardive dyskinesia, dystonia, or tics  Thought Process:  logical and linear  Associations:  no loose associations  Thought Content:  no evidence of suicidal ideation or homicidal ideation and no evidence of psychotic thought  Insight:  partial  Judgment:  fair  Oriented to:  time, person, and place  Attention Span and Concentration:  fair  Recent and Remote Memory:  fair  Language:  Able to name objects, Able to repeat phrases, and Able to read and write  Fund of Knowledge: appropriate  Muscle Strength and Tone: normal  Gait and Station: Normal         Discharge Plan:     Health Care Follow-up:      Appointment type: Psychiatry   Date/time:  Tuesday January 9th, 2024 @ 2:40 PM  In person  Provider:  Aston Alejandra MD  Address: [address] [address]  [phone]  Fax: [phone]  Note:   If you would like to attend this appointment via telehealth, please contact the clinic.        Attestation:  The patient has been seen and evaluated by me,  Cisco Hardy MD     Total time spent was 37 minutes. Over 50% of times was spent counseling and coordination of care regarding coping skills, medication and discharge planning.

## 2024-04-28 ENCOUNTER — HEALTH MAINTENANCE LETTER (OUTPATIENT)
Age: 39
End: 2024-04-28

## 2024-09-26 ENCOUNTER — VIRTUAL VISIT (OUTPATIENT)
Dept: NEUROLOGY | Facility: CLINIC | Age: 39
End: 2024-09-26
Payer: COMMERCIAL

## 2024-09-26 DIAGNOSIS — R40.4 NONSPECIFIC PAROXYSMAL SPELL: Primary | ICD-10-CM

## 2024-09-26 RX ORDER — CHOLECALCIFEROL (VITAMIN D3) 50 MCG
1 TABLET ORAL DAILY
COMMUNITY
Start: 2024-09-17
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
## 2025-04-09 NOTE — PLAN OF CARE
----- Message from Miranda Kaur NP sent at 4/8/2025 10:34 PM CDT -----  Please inform patient bone density results shows stable findings of osteopenia. Recommend continue calcium/ Vitamin D supplementation.   ----- Message -----  From: Interface, Rad Results In  Sent: 3/28/2025   3:19 PM CDT  To: Miranda Kaur NP     NOC Shift Report    Pt in bed at beginning of shift, breathing quiet and unlabored. Pt slept through shift. Pt slept 5.75 hours.     No pt complaints or concerns at this time.     No PRNs given. Will continue to monitor.

## 2025-05-17 ENCOUNTER — HEALTH MAINTENANCE LETTER (OUTPATIENT)
Age: 40
End: 2025-05-17